# Patient Record
Sex: FEMALE | Race: ASIAN | NOT HISPANIC OR LATINO
[De-identification: names, ages, dates, MRNs, and addresses within clinical notes are randomized per-mention and may not be internally consistent; named-entity substitution may affect disease eponyms.]

---

## 2021-09-29 VITALS
HEIGHT: 62 IN | TEMPERATURE: 97.3 F | WEIGHT: 156 LBS | OXYGEN SATURATION: 98 % | BODY MASS INDEX: 28.71 KG/M2 | HEART RATE: 94 BPM | RESPIRATION RATE: 12 BRPM | SYSTOLIC BLOOD PRESSURE: 136 MMHG | DIASTOLIC BLOOD PRESSURE: 72 MMHG

## 2023-04-27 ENCOUNTER — NON-APPOINTMENT (OUTPATIENT)
Age: 63
End: 2023-04-27

## 2023-04-27 DIAGNOSIS — Z87.2 PERSONAL HISTORY OF DISEASES OF THE SKIN AND SUBCUTANEOUS TISSUE: ICD-10-CM

## 2023-04-27 DIAGNOSIS — Z86.2 PERSONAL HISTORY OF DISEASES OF THE BLOOD AND BLOOD-FORMING ORGANS AND CERTAIN DISORDERS INVOLVING THE IMMUNE MECHANISM: ICD-10-CM

## 2023-04-27 DIAGNOSIS — Z83.3 FAMILY HISTORY OF DIABETES MELLITUS: ICD-10-CM

## 2023-10-16 ENCOUNTER — INPATIENT (INPATIENT)
Facility: HOSPITAL | Age: 63
LOS: 3 days | Discharge: HOME HEALTH | End: 2023-10-20
Attending: STUDENT IN AN ORGANIZED HEALTH CARE EDUCATION/TRAINING PROGRAM | Admitting: STUDENT IN AN ORGANIZED HEALTH CARE EDUCATION/TRAINING PROGRAM
Payer: MEDICAID

## 2023-10-16 VITALS
TEMPERATURE: 98 F | RESPIRATION RATE: 18 BRPM | HEART RATE: 100 BPM | DIASTOLIC BLOOD PRESSURE: 73 MMHG | OXYGEN SATURATION: 100 % | SYSTOLIC BLOOD PRESSURE: 124 MMHG

## 2023-10-16 LAB
ALBUMIN SERPL ELPH-MCNC: 3.2 G/DL — LOW (ref 3.3–5)
ALBUMIN SERPL ELPH-MCNC: 3.2 G/DL — LOW (ref 3.3–5)
ALP SERPL-CCNC: 88 U/L — SIGNIFICANT CHANGE UP (ref 40–120)
ALP SERPL-CCNC: 88 U/L — SIGNIFICANT CHANGE UP (ref 40–120)
ALT FLD-CCNC: 14 U/L — SIGNIFICANT CHANGE UP (ref 4–33)
ALT FLD-CCNC: 14 U/L — SIGNIFICANT CHANGE UP (ref 4–33)
ANION GAP SERPL CALC-SCNC: 19 MMOL/L — HIGH (ref 7–14)
ANION GAP SERPL CALC-SCNC: 19 MMOL/L — HIGH (ref 7–14)
AST SERPL-CCNC: 18 U/L — SIGNIFICANT CHANGE UP (ref 4–32)
AST SERPL-CCNC: 18 U/L — SIGNIFICANT CHANGE UP (ref 4–32)
BASOPHILS # BLD AUTO: 0.03 K/UL — SIGNIFICANT CHANGE UP (ref 0–0.2)
BASOPHILS # BLD AUTO: 0.03 K/UL — SIGNIFICANT CHANGE UP (ref 0–0.2)
BASOPHILS NFR BLD AUTO: 0.2 % — SIGNIFICANT CHANGE UP (ref 0–2)
BASOPHILS NFR BLD AUTO: 0.2 % — SIGNIFICANT CHANGE UP (ref 0–2)
BILIRUB SERPL-MCNC: 0.2 MG/DL — SIGNIFICANT CHANGE UP (ref 0.2–1.2)
BILIRUB SERPL-MCNC: 0.2 MG/DL — SIGNIFICANT CHANGE UP (ref 0.2–1.2)
BUN SERPL-MCNC: 76 MG/DL — HIGH (ref 7–23)
BUN SERPL-MCNC: 76 MG/DL — HIGH (ref 7–23)
CALCIUM SERPL-MCNC: 9.2 MG/DL — SIGNIFICANT CHANGE UP (ref 8.4–10.5)
CALCIUM SERPL-MCNC: 9.2 MG/DL — SIGNIFICANT CHANGE UP (ref 8.4–10.5)
CHLORIDE SERPL-SCNC: 102 MMOL/L — SIGNIFICANT CHANGE UP (ref 98–107)
CHLORIDE SERPL-SCNC: 102 MMOL/L — SIGNIFICANT CHANGE UP (ref 98–107)
CO2 SERPL-SCNC: 16 MMOL/L — LOW (ref 22–31)
CO2 SERPL-SCNC: 16 MMOL/L — LOW (ref 22–31)
CREAT SERPL-MCNC: 5.36 MG/DL — HIGH (ref 0.5–1.3)
CREAT SERPL-MCNC: 5.36 MG/DL — HIGH (ref 0.5–1.3)
EGFR: 8 ML/MIN/1.73M2 — LOW
EGFR: 8 ML/MIN/1.73M2 — LOW
EOSINOPHIL # BLD AUTO: 0.25 K/UL — SIGNIFICANT CHANGE UP (ref 0–0.5)
EOSINOPHIL # BLD AUTO: 0.25 K/UL — SIGNIFICANT CHANGE UP (ref 0–0.5)
EOSINOPHIL NFR BLD AUTO: 1.6 % — SIGNIFICANT CHANGE UP (ref 0–6)
EOSINOPHIL NFR BLD AUTO: 1.6 % — SIGNIFICANT CHANGE UP (ref 0–6)
GLUCOSE SERPL-MCNC: 147 MG/DL — HIGH (ref 70–99)
GLUCOSE SERPL-MCNC: 147 MG/DL — HIGH (ref 70–99)
HCT VFR BLD CALC: 30.2 % — LOW (ref 34.5–45)
HCT VFR BLD CALC: 30.2 % — LOW (ref 34.5–45)
HGB BLD-MCNC: 9.7 G/DL — LOW (ref 11.5–15.5)
HGB BLD-MCNC: 9.7 G/DL — LOW (ref 11.5–15.5)
IANC: 11.55 K/UL — HIGH (ref 1.8–7.4)
IANC: 11.55 K/UL — HIGH (ref 1.8–7.4)
IMM GRANULOCYTES NFR BLD AUTO: 0.5 % — SIGNIFICANT CHANGE UP (ref 0–0.9)
IMM GRANULOCYTES NFR BLD AUTO: 0.5 % — SIGNIFICANT CHANGE UP (ref 0–0.9)
LIDOCAIN IGE QN: 113 U/L — HIGH (ref 7–60)
LIDOCAIN IGE QN: 113 U/L — HIGH (ref 7–60)
LYMPHOCYTES # BLD AUTO: 15 % — SIGNIFICANT CHANGE UP (ref 13–44)
LYMPHOCYTES # BLD AUTO: 15 % — SIGNIFICANT CHANGE UP (ref 13–44)
LYMPHOCYTES # BLD AUTO: 2.33 K/UL — SIGNIFICANT CHANGE UP (ref 1–3.3)
LYMPHOCYTES # BLD AUTO: 2.33 K/UL — SIGNIFICANT CHANGE UP (ref 1–3.3)
MAGNESIUM SERPL-MCNC: 2.7 MG/DL — HIGH (ref 1.6–2.6)
MAGNESIUM SERPL-MCNC: 2.7 MG/DL — HIGH (ref 1.6–2.6)
MCHC RBC-ENTMCNC: 28.9 PG — SIGNIFICANT CHANGE UP (ref 27–34)
MCHC RBC-ENTMCNC: 28.9 PG — SIGNIFICANT CHANGE UP (ref 27–34)
MCHC RBC-ENTMCNC: 32.1 GM/DL — SIGNIFICANT CHANGE UP (ref 32–36)
MCHC RBC-ENTMCNC: 32.1 GM/DL — SIGNIFICANT CHANGE UP (ref 32–36)
MCV RBC AUTO: 89.9 FL — SIGNIFICANT CHANGE UP (ref 80–100)
MCV RBC AUTO: 89.9 FL — SIGNIFICANT CHANGE UP (ref 80–100)
MONOCYTES # BLD AUTO: 1.28 K/UL — HIGH (ref 0–0.9)
MONOCYTES # BLD AUTO: 1.28 K/UL — HIGH (ref 0–0.9)
MONOCYTES NFR BLD AUTO: 8.3 % — SIGNIFICANT CHANGE UP (ref 2–14)
MONOCYTES NFR BLD AUTO: 8.3 % — SIGNIFICANT CHANGE UP (ref 2–14)
NEUTROPHILS # BLD AUTO: 11.55 K/UL — HIGH (ref 1.8–7.4)
NEUTROPHILS # BLD AUTO: 11.55 K/UL — HIGH (ref 1.8–7.4)
NEUTROPHILS NFR BLD AUTO: 74.4 % — SIGNIFICANT CHANGE UP (ref 43–77)
NEUTROPHILS NFR BLD AUTO: 74.4 % — SIGNIFICANT CHANGE UP (ref 43–77)
NRBC # BLD: 0 /100 WBCS — SIGNIFICANT CHANGE UP (ref 0–0)
NRBC # BLD: 0 /100 WBCS — SIGNIFICANT CHANGE UP (ref 0–0)
NRBC # FLD: 0 K/UL — SIGNIFICANT CHANGE UP (ref 0–0)
NRBC # FLD: 0 K/UL — SIGNIFICANT CHANGE UP (ref 0–0)
PLATELET # BLD AUTO: 385 K/UL — SIGNIFICANT CHANGE UP (ref 150–400)
PLATELET # BLD AUTO: 385 K/UL — SIGNIFICANT CHANGE UP (ref 150–400)
POTASSIUM SERPL-MCNC: 4.8 MMOL/L — SIGNIFICANT CHANGE UP (ref 3.5–5.3)
POTASSIUM SERPL-MCNC: 4.8 MMOL/L — SIGNIFICANT CHANGE UP (ref 3.5–5.3)
POTASSIUM SERPL-SCNC: 4.8 MMOL/L — SIGNIFICANT CHANGE UP (ref 3.5–5.3)
POTASSIUM SERPL-SCNC: 4.8 MMOL/L — SIGNIFICANT CHANGE UP (ref 3.5–5.3)
PROT SERPL-MCNC: 7.9 G/DL — SIGNIFICANT CHANGE UP (ref 6–8.3)
PROT SERPL-MCNC: 7.9 G/DL — SIGNIFICANT CHANGE UP (ref 6–8.3)
RBC # BLD: 3.36 M/UL — LOW (ref 3.8–5.2)
RBC # BLD: 3.36 M/UL — LOW (ref 3.8–5.2)
RBC # FLD: 12.5 % — SIGNIFICANT CHANGE UP (ref 10.3–14.5)
RBC # FLD: 12.5 % — SIGNIFICANT CHANGE UP (ref 10.3–14.5)
SODIUM SERPL-SCNC: 137 MMOL/L — SIGNIFICANT CHANGE UP (ref 135–145)
SODIUM SERPL-SCNC: 137 MMOL/L — SIGNIFICANT CHANGE UP (ref 135–145)
WBC # BLD: 15.51 K/UL — HIGH (ref 3.8–10.5)
WBC # BLD: 15.51 K/UL — HIGH (ref 3.8–10.5)
WBC # FLD AUTO: 15.51 K/UL — HIGH (ref 3.8–10.5)
WBC # FLD AUTO: 15.51 K/UL — HIGH (ref 3.8–10.5)

## 2023-10-16 PROCEDURE — 99285 EMERGENCY DEPT VISIT HI MDM: CPT

## 2023-10-16 RX ORDER — CEFTRIAXONE 500 MG/1
1000 INJECTION, POWDER, FOR SOLUTION INTRAMUSCULAR; INTRAVENOUS ONCE
Refills: 0 | Status: COMPLETED | OUTPATIENT
Start: 2023-10-16 | End: 2023-10-16

## 2023-10-16 RX ORDER — ACETAMINOPHEN 500 MG
975 TABLET ORAL ONCE
Refills: 0 | Status: COMPLETED | OUTPATIENT
Start: 2023-10-16 | End: 2023-10-16

## 2023-10-16 RX ORDER — SODIUM CHLORIDE 9 MG/ML
500 INJECTION INTRAMUSCULAR; INTRAVENOUS; SUBCUTANEOUS ONCE
Refills: 0 | Status: COMPLETED | OUTPATIENT
Start: 2023-10-16 | End: 2023-10-16

## 2023-10-16 RX ADMIN — Medication 975 MILLIGRAM(S): at 22:18

## 2023-10-16 RX ADMIN — SODIUM CHLORIDE 500 MILLILITER(S): 9 INJECTION INTRAMUSCULAR; INTRAVENOUS; SUBCUTANEOUS at 22:18

## 2023-10-16 NOTE — ED PROVIDER NOTE - CLINICAL SUMMARY MEDICAL DECISION MAKING FREE TEXT BOX
63-year-old female with history of hypertension, hyperlipidemia, diabetes, CKD with baseline creatinine of 1.1 and GFR of 11 comes into the ED for evaluation of increased weakness over the past 2 to 3 days with associated urinary symptoms and some abd pain. Will assess for anemia, electrolyte abnormalities, UA, treat with tylenol. Dispo pending reeval and work up. 63-year-old female with history of hypertension, hyperlipidemia, diabetes, CKD with baseline creatinine of 4.1 and GFR of 11 comes into the ED for evaluation of increased weakness over the past 2 to 3 days with associated urinary symptoms and some abd pain. Will assess for anemia, electrolyte abnormalities, UA, treat with tylenol. Dispo pending reeval and work up.

## 2023-10-16 NOTE — ED ADULT TRIAGE NOTE - CHIEF COMPLAINT QUOTE
Pt c/o burning on urination and fevers at home. Daughter states she has been weak over the last few days and had difficulty walking.MD Leroy called for stroke eval, no code stroke activated. Pt c/o burning on urination and fevers at home. Daughter states she has been weak over the last few days and had difficulty walking since 1400. States she had to help patient to bed to avoid falling to ground. Denies syncope. MD Leroy called for stroke eval, no code stroke activated. Hx DM, CKD, HTN, HLD

## 2023-10-16 NOTE — ED PROVIDER NOTE - PHYSICAL EXAMINATION
GENERAL: Not in acute distress, non-toxic appearing  HEAD: normocephalic, atraumatic  HEENT: PERRLA, EOMI, normal conjunctiva, oral mucosa moist, neck supple  CARDIAC: regular rate and rhythm, normal S1 and S2,  no appreciable murmurs  PULM: clear to ascultation bilaterally, no crackles, rales, rhonchi, or wheezing  GI: abdomen nondistended, soft, + mild suprapubic tenderness, no guarding or rebound tenderness  NEURO: alert and oriented x 3, normal speech, 5/5 strength in all 4 extremities, normal sensation in all 4 extremities, no gross neurologic deficit  MSK: No visible deformities, no peripheral edema, calf tenderness/redness/swelling  SKIN: No visible rashes, dry, well-perfused  PSYCH: appropriate mood and affect

## 2023-10-16 NOTE — ED PROVIDER NOTE - PROGRESS NOTE DETAILS
Antoine Lott PGY2:  Bedside US, no hydronephrosis noted bilat. Bladder does not appear consistent with urinary retention.

## 2023-10-16 NOTE — ED ADULT NURSE NOTE - NSFALLRISKINTERV_ED_ALL_ED
Assistance OOB with selected safe patient handling equipment if applicable/Assistance with ambulation/Communicate fall risk and risk factors to all staff, patient, and family/Monitor gait and stability/Provide visual cue: yellow wristband, yellow gown, etc/Reinforce activity limits and safety measures with patient and family/Call bell, personal items and telephone in reach/Instruct patient to call for assistance before getting out of bed/chair/stretcher/Non-slip footwear applied when patient is off stretcher/[place] to call system/Physically safe environment - no spills, clutter or unnecessary equipment/Purposeful Proactive Rounding/Room/bathroom lighting operational, light cord in reach

## 2023-10-16 NOTE — ED ADULT NURSE NOTE - OBJECTIVE STATEMENT
Dipti RN: Pt received in 7a. Pt alert and oriented x 4. Hx of DM, CKD, HTN, HLD. Pt c/o fever, burning on urination, and weakness that began a few days ago. Family at bedside reports pt having difficulty walking since earlier today and helped pt to the bed to avoid falling to the ground. Pt reports dysuria and constipation, last BM yesterday. Respirations even and unlabored. Abdomen soft, round, nondistended, nontender. No edema x 4 extremities. Pt denies chest pain, shortness of breath, N/V/D, headache, dizziness, blurry vision, dark stools. 20G IV in the right hand, labs drawn and pt medicated per MD orders. Bed in lowest position, safety maintained.

## 2023-10-16 NOTE — ED PROVIDER NOTE - OBJECTIVE STATEMENT
63-year-old female with history of hypertension, hyperlipidemia, diabetes, CKD with baseline creatinine of 1.1 and GFR of 11 comes into the ED for evaluation of increased weakness over the past 2 to 3 days with associated urinary symptoms and some suprapubic tenderness.  Patient is accompanied by her daughter who is translating.  She reports over the past day patient's been having increased weakness not able to walk as far as usual and had more difficulty getting out of bed.  She denies any change in vision, headaches, chest pain, shortness of breath, nausea, vomiting, lower extremity swelling or pain. 63-year-old female with history of hypertension, hyperlipidemia, diabetes, CKD with baseline creatinine of 4.1 and GFR of 11 comes into the ED for evaluation of increased weakness over the past 2 to 3 days with associated urinary symptoms and some suprapubic tenderness.  Patient is accompanied by her daughter who is translating.  She reports over the past day patient's been having increased weakness not able to walk as far as usual and had more difficulty getting out of bed.  She denies any change in vision, headaches, chest pain, shortness of breath, nausea, vomiting, lower extremity swelling or pain.

## 2023-10-16 NOTE — ED ADULT NURSE NOTE - CHIEF COMPLAINT QUOTE
Pt c/o burning on urination and fevers at home. Daughter states she has been weak over the last few days and had difficulty walking since 1400. States she had to help patient to bed to avoid falling to ground. Denies syncope. MD Leroy called for stroke eval, no code stroke activated. Hx DM, CKD, HTN, HLD

## 2023-10-16 NOTE — ED PROVIDER NOTE - ATTENDING CONTRIBUTION TO CARE
63-year-old female with history of diabetes CKD hypertension hyperlipidemia presents with generalized weakness fevers and urinary symptoms for last couple days.  Patient has urinary frequency and dysuria also having trouble urinating.  Patient has had UTI in the past and feels similar.  Patient fevers have been up to 101.  Patient is well-appearing vitals are within normal limits abdomen is nontender neuruo intact we will check labs UA cultures and start as antibiotic

## 2023-10-17 DIAGNOSIS — N39.0 URINARY TRACT INFECTION, SITE NOT SPECIFIED: ICD-10-CM

## 2023-10-17 DIAGNOSIS — Z29.9 ENCOUNTER FOR PROPHYLACTIC MEASURES, UNSPECIFIED: ICD-10-CM

## 2023-10-17 DIAGNOSIS — N18.4 CHRONIC KIDNEY DISEASE, STAGE 4 (SEVERE): ICD-10-CM

## 2023-10-17 DIAGNOSIS — E78.5 HYPERLIPIDEMIA, UNSPECIFIED: ICD-10-CM

## 2023-10-17 DIAGNOSIS — R53.1 WEAKNESS: ICD-10-CM

## 2023-10-17 DIAGNOSIS — E11.9 TYPE 2 DIABETES MELLITUS WITHOUT COMPLICATIONS: ICD-10-CM

## 2023-10-17 DIAGNOSIS — I10 ESSENTIAL (PRIMARY) HYPERTENSION: ICD-10-CM

## 2023-10-17 LAB
ANION GAP SERPL CALC-SCNC: 16 MMOL/L — HIGH (ref 7–14)
ANION GAP SERPL CALC-SCNC: 16 MMOL/L — HIGH (ref 7–14)
APPEARANCE UR: CLEAR — SIGNIFICANT CHANGE UP
APPEARANCE UR: CLEAR — SIGNIFICANT CHANGE UP
BACTERIA # UR AUTO: ABNORMAL /HPF
BACTERIA # UR AUTO: ABNORMAL /HPF
BASE EXCESS BLDV CALC-SCNC: -5.3 MMOL/L — LOW (ref -2–3)
BASE EXCESS BLDV CALC-SCNC: -5.3 MMOL/L — LOW (ref -2–3)
BASOPHILS # BLD AUTO: 0.04 K/UL — SIGNIFICANT CHANGE UP (ref 0–0.2)
BASOPHILS # BLD AUTO: 0.04 K/UL — SIGNIFICANT CHANGE UP (ref 0–0.2)
BASOPHILS NFR BLD AUTO: 0.3 % — SIGNIFICANT CHANGE UP (ref 0–2)
BASOPHILS NFR BLD AUTO: 0.3 % — SIGNIFICANT CHANGE UP (ref 0–2)
BILIRUB UR-MCNC: NEGATIVE — SIGNIFICANT CHANGE UP
BILIRUB UR-MCNC: NEGATIVE — SIGNIFICANT CHANGE UP
BLOOD GAS VENOUS COMPREHENSIVE RESULT: SIGNIFICANT CHANGE UP
BLOOD GAS VENOUS COMPREHENSIVE RESULT: SIGNIFICANT CHANGE UP
BUN SERPL-MCNC: 74 MG/DL — HIGH (ref 7–23)
BUN SERPL-MCNC: 74 MG/DL — HIGH (ref 7–23)
CALCIUM SERPL-MCNC: 9.5 MG/DL — SIGNIFICANT CHANGE UP (ref 8.4–10.5)
CALCIUM SERPL-MCNC: 9.5 MG/DL — SIGNIFICANT CHANGE UP (ref 8.4–10.5)
CAST: 2 /LPF — SIGNIFICANT CHANGE UP (ref 0–4)
CAST: 2 /LPF — SIGNIFICANT CHANGE UP (ref 0–4)
CHLORIDE BLDV-SCNC: 103 MMOL/L — SIGNIFICANT CHANGE UP (ref 96–108)
CHLORIDE BLDV-SCNC: 103 MMOL/L — SIGNIFICANT CHANGE UP (ref 96–108)
CHLORIDE SERPL-SCNC: 102 MMOL/L — SIGNIFICANT CHANGE UP (ref 98–107)
CHLORIDE SERPL-SCNC: 102 MMOL/L — SIGNIFICANT CHANGE UP (ref 98–107)
CO2 BLDV-SCNC: 21.1 MMOL/L — LOW (ref 22–26)
CO2 BLDV-SCNC: 21.1 MMOL/L — LOW (ref 22–26)
CO2 SERPL-SCNC: 20 MMOL/L — LOW (ref 22–31)
CO2 SERPL-SCNC: 20 MMOL/L — LOW (ref 22–31)
COLOR SPEC: YELLOW — SIGNIFICANT CHANGE UP
COLOR SPEC: YELLOW — SIGNIFICANT CHANGE UP
CREAT ?TM UR-MCNC: 37 MG/DL — SIGNIFICANT CHANGE UP
CREAT ?TM UR-MCNC: 37 MG/DL — SIGNIFICANT CHANGE UP
CREAT SERPL-MCNC: 5.13 MG/DL — HIGH (ref 0.5–1.3)
CREAT SERPL-MCNC: 5.13 MG/DL — HIGH (ref 0.5–1.3)
DIFF PNL FLD: NEGATIVE — SIGNIFICANT CHANGE UP
DIFF PNL FLD: NEGATIVE — SIGNIFICANT CHANGE UP
EGFR: 9 ML/MIN/1.73M2 — LOW
EGFR: 9 ML/MIN/1.73M2 — LOW
EOSINOPHIL # BLD AUTO: 0.22 K/UL — SIGNIFICANT CHANGE UP (ref 0–0.5)
EOSINOPHIL # BLD AUTO: 0.22 K/UL — SIGNIFICANT CHANGE UP (ref 0–0.5)
EOSINOPHIL NFR BLD AUTO: 1.8 % — SIGNIFICANT CHANGE UP (ref 0–6)
EOSINOPHIL NFR BLD AUTO: 1.8 % — SIGNIFICANT CHANGE UP (ref 0–6)
FINE GRAN CASTS #/AREA URNS AUTO: PRESENT
FINE GRAN CASTS #/AREA URNS AUTO: PRESENT
GAS PNL BLDV: 133 MMOL/L — LOW (ref 136–145)
GAS PNL BLDV: 133 MMOL/L — LOW (ref 136–145)
GAS PNL BLDV: SIGNIFICANT CHANGE UP
GAS PNL BLDV: SIGNIFICANT CHANGE UP
GLUCOSE BLDV-MCNC: 160 MG/DL — HIGH (ref 70–99)
GLUCOSE BLDV-MCNC: 160 MG/DL — HIGH (ref 70–99)
GLUCOSE SERPL-MCNC: 201 MG/DL — HIGH (ref 70–99)
GLUCOSE SERPL-MCNC: 201 MG/DL — HIGH (ref 70–99)
GLUCOSE UR QL: 250 MG/DL
GLUCOSE UR QL: 250 MG/DL
HCO3 BLDV-SCNC: 20 MMOL/L — LOW (ref 22–29)
HCO3 BLDV-SCNC: 20 MMOL/L — LOW (ref 22–29)
HCT VFR BLD CALC: 32.9 % — LOW (ref 34.5–45)
HCT VFR BLD CALC: 32.9 % — LOW (ref 34.5–45)
HCT VFR BLDA CALC: 32 % — LOW (ref 34.5–46.5)
HCT VFR BLDA CALC: 32 % — LOW (ref 34.5–46.5)
HGB BLD CALC-MCNC: 10.7 G/DL — LOW (ref 11.7–16.1)
HGB BLD CALC-MCNC: 10.7 G/DL — LOW (ref 11.7–16.1)
HGB BLD-MCNC: 10.5 G/DL — LOW (ref 11.5–15.5)
HGB BLD-MCNC: 10.5 G/DL — LOW (ref 11.5–15.5)
IANC: 9.18 K/UL — HIGH (ref 1.8–7.4)
IANC: 9.18 K/UL — HIGH (ref 1.8–7.4)
IMM GRANULOCYTES NFR BLD AUTO: 0.5 % — SIGNIFICANT CHANGE UP (ref 0–0.9)
IMM GRANULOCYTES NFR BLD AUTO: 0.5 % — SIGNIFICANT CHANGE UP (ref 0–0.9)
KETONES UR-MCNC: NEGATIVE MG/DL — SIGNIFICANT CHANGE UP
KETONES UR-MCNC: NEGATIVE MG/DL — SIGNIFICANT CHANGE UP
LACTATE BLDV-MCNC: 1.4 MMOL/L — SIGNIFICANT CHANGE UP (ref 0.5–2)
LACTATE BLDV-MCNC: 1.4 MMOL/L — SIGNIFICANT CHANGE UP (ref 0.5–2)
LEUKOCYTE ESTERASE UR-ACNC: NEGATIVE — SIGNIFICANT CHANGE UP
LEUKOCYTE ESTERASE UR-ACNC: NEGATIVE — SIGNIFICANT CHANGE UP
LYMPHOCYTES # BLD AUTO: 1.96 K/UL — SIGNIFICANT CHANGE UP (ref 1–3.3)
LYMPHOCYTES # BLD AUTO: 1.96 K/UL — SIGNIFICANT CHANGE UP (ref 1–3.3)
LYMPHOCYTES # BLD AUTO: 16 % — SIGNIFICANT CHANGE UP (ref 13–44)
LYMPHOCYTES # BLD AUTO: 16 % — SIGNIFICANT CHANGE UP (ref 13–44)
MAGNESIUM SERPL-MCNC: 2.9 MG/DL — HIGH (ref 1.6–2.6)
MAGNESIUM SERPL-MCNC: 2.9 MG/DL — HIGH (ref 1.6–2.6)
MCHC RBC-ENTMCNC: 28.7 PG — SIGNIFICANT CHANGE UP (ref 27–34)
MCHC RBC-ENTMCNC: 28.7 PG — SIGNIFICANT CHANGE UP (ref 27–34)
MCHC RBC-ENTMCNC: 31.9 GM/DL — LOW (ref 32–36)
MCHC RBC-ENTMCNC: 31.9 GM/DL — LOW (ref 32–36)
MCV RBC AUTO: 89.9 FL — SIGNIFICANT CHANGE UP (ref 80–100)
MCV RBC AUTO: 89.9 FL — SIGNIFICANT CHANGE UP (ref 80–100)
MONOCYTES # BLD AUTO: 0.81 K/UL — SIGNIFICANT CHANGE UP (ref 0–0.9)
MONOCYTES # BLD AUTO: 0.81 K/UL — SIGNIFICANT CHANGE UP (ref 0–0.9)
MONOCYTES NFR BLD AUTO: 6.6 % — SIGNIFICANT CHANGE UP (ref 2–14)
MONOCYTES NFR BLD AUTO: 6.6 % — SIGNIFICANT CHANGE UP (ref 2–14)
NEUTROPHILS # BLD AUTO: 9.18 K/UL — HIGH (ref 1.8–7.4)
NEUTROPHILS # BLD AUTO: 9.18 K/UL — HIGH (ref 1.8–7.4)
NEUTROPHILS NFR BLD AUTO: 74.8 % — SIGNIFICANT CHANGE UP (ref 43–77)
NEUTROPHILS NFR BLD AUTO: 74.8 % — SIGNIFICANT CHANGE UP (ref 43–77)
NITRITE UR-MCNC: NEGATIVE — SIGNIFICANT CHANGE UP
NITRITE UR-MCNC: NEGATIVE — SIGNIFICANT CHANGE UP
NRBC # BLD: 0 /100 WBCS — SIGNIFICANT CHANGE UP (ref 0–0)
NRBC # BLD: 0 /100 WBCS — SIGNIFICANT CHANGE UP (ref 0–0)
NRBC # FLD: 0 K/UL — SIGNIFICANT CHANGE UP (ref 0–0)
NRBC # FLD: 0 K/UL — SIGNIFICANT CHANGE UP (ref 0–0)
PCO2 BLDV: 37 MMHG — LOW (ref 39–52)
PCO2 BLDV: 37 MMHG — LOW (ref 39–52)
PH BLDV: 7.34 — SIGNIFICANT CHANGE UP (ref 7.32–7.43)
PH BLDV: 7.34 — SIGNIFICANT CHANGE UP (ref 7.32–7.43)
PH UR: 6 — SIGNIFICANT CHANGE UP (ref 5–8)
PH UR: 6 — SIGNIFICANT CHANGE UP (ref 5–8)
PHOSPHATE SERPL-MCNC: 5.6 MG/DL — HIGH (ref 2.5–4.5)
PHOSPHATE SERPL-MCNC: 5.6 MG/DL — HIGH (ref 2.5–4.5)
PLATELET # BLD AUTO: 403 K/UL — HIGH (ref 150–400)
PLATELET # BLD AUTO: 403 K/UL — HIGH (ref 150–400)
PO2 BLDV: 88 MMHG — HIGH (ref 25–45)
PO2 BLDV: 88 MMHG — HIGH (ref 25–45)
POTASSIUM BLDV-SCNC: 4.3 MMOL/L — SIGNIFICANT CHANGE UP (ref 3.5–5.1)
POTASSIUM BLDV-SCNC: 4.3 MMOL/L — SIGNIFICANT CHANGE UP (ref 3.5–5.1)
POTASSIUM SERPL-MCNC: 4.6 MMOL/L — SIGNIFICANT CHANGE UP (ref 3.5–5.3)
POTASSIUM SERPL-MCNC: 4.6 MMOL/L — SIGNIFICANT CHANGE UP (ref 3.5–5.3)
POTASSIUM SERPL-SCNC: 4.6 MMOL/L — SIGNIFICANT CHANGE UP (ref 3.5–5.3)
POTASSIUM SERPL-SCNC: 4.6 MMOL/L — SIGNIFICANT CHANGE UP (ref 3.5–5.3)
PROT UR-MCNC: 300 MG/DL
PROT UR-MCNC: 300 MG/DL
RBC # BLD: 3.66 M/UL — LOW (ref 3.8–5.2)
RBC # BLD: 3.66 M/UL — LOW (ref 3.8–5.2)
RBC # FLD: 12.4 % — SIGNIFICANT CHANGE UP (ref 10.3–14.5)
RBC # FLD: 12.4 % — SIGNIFICANT CHANGE UP (ref 10.3–14.5)
RBC CASTS # UR COMP ASSIST: 0 /HPF — SIGNIFICANT CHANGE UP (ref 0–4)
RBC CASTS # UR COMP ASSIST: 0 /HPF — SIGNIFICANT CHANGE UP (ref 0–4)
REVIEW: SIGNIFICANT CHANGE UP
REVIEW: SIGNIFICANT CHANGE UP
SAO2 % BLDV: 96.8 % — HIGH (ref 67–88)
SAO2 % BLDV: 96.8 % — HIGH (ref 67–88)
SODIUM SERPL-SCNC: 138 MMOL/L — SIGNIFICANT CHANGE UP (ref 135–145)
SODIUM SERPL-SCNC: 138 MMOL/L — SIGNIFICANT CHANGE UP (ref 135–145)
SODIUM UR-SCNC: 97 MMOL/L — SIGNIFICANT CHANGE UP
SODIUM UR-SCNC: 97 MMOL/L — SIGNIFICANT CHANGE UP
SP GR SPEC: 1.01 — SIGNIFICANT CHANGE UP (ref 1–1.03)
SP GR SPEC: 1.01 — SIGNIFICANT CHANGE UP (ref 1–1.03)
SQUAMOUS # UR AUTO: 5 /HPF — SIGNIFICANT CHANGE UP (ref 0–5)
SQUAMOUS # UR AUTO: 5 /HPF — SIGNIFICANT CHANGE UP (ref 0–5)
UROBILINOGEN FLD QL: 0.2 MG/DL — SIGNIFICANT CHANGE UP (ref 0.2–1)
UROBILINOGEN FLD QL: 0.2 MG/DL — SIGNIFICANT CHANGE UP (ref 0.2–1)
UUN UR-MCNC: 287 MG/DL — SIGNIFICANT CHANGE UP
UUN UR-MCNC: 287 MG/DL — SIGNIFICANT CHANGE UP
WBC # BLD: 12.27 K/UL — HIGH (ref 3.8–10.5)
WBC # BLD: 12.27 K/UL — HIGH (ref 3.8–10.5)
WBC # FLD AUTO: 12.27 K/UL — HIGH (ref 3.8–10.5)
WBC # FLD AUTO: 12.27 K/UL — HIGH (ref 3.8–10.5)
WBC UR QL: 24 /HPF — HIGH (ref 0–5)
WBC UR QL: 24 /HPF — HIGH (ref 0–5)

## 2023-10-17 PROCEDURE — 76770 US EXAM ABDO BACK WALL COMP: CPT | Mod: 26

## 2023-10-17 PROCEDURE — 99223 1ST HOSP IP/OBS HIGH 75: CPT

## 2023-10-17 RX ORDER — AMLODIPINE BESYLATE 2.5 MG/1
5 TABLET ORAL DAILY
Refills: 0 | Status: DISCONTINUED | OUTPATIENT
Start: 2023-10-17 | End: 2023-10-20

## 2023-10-17 RX ORDER — SODIUM CHLORIDE 9 MG/ML
1000 INJECTION, SOLUTION INTRAVENOUS
Refills: 0 | Status: DISCONTINUED | OUTPATIENT
Start: 2023-10-17 | End: 2023-10-20

## 2023-10-17 RX ORDER — DEXTROSE 50 % IN WATER 50 %
12.5 SYRINGE (ML) INTRAVENOUS ONCE
Refills: 0 | Status: DISCONTINUED | OUTPATIENT
Start: 2023-10-17 | End: 2023-10-20

## 2023-10-17 RX ORDER — INSULIN LISPRO 100/ML
VIAL (ML) SUBCUTANEOUS
Refills: 0 | Status: DISCONTINUED | OUTPATIENT
Start: 2023-10-17 | End: 2023-10-20

## 2023-10-17 RX ORDER — DEXTROSE 50 % IN WATER 50 %
15 SYRINGE (ML) INTRAVENOUS ONCE
Refills: 0 | Status: DISCONTINUED | OUTPATIENT
Start: 2023-10-17 | End: 2023-10-20

## 2023-10-17 RX ORDER — ACETAMINOPHEN 500 MG
650 TABLET ORAL EVERY 6 HOURS
Refills: 0 | Status: DISCONTINUED | OUTPATIENT
Start: 2023-10-17 | End: 2023-10-20

## 2023-10-17 RX ORDER — ATORVASTATIN CALCIUM 80 MG/1
10 TABLET, FILM COATED ORAL AT BEDTIME
Refills: 0 | Status: DISCONTINUED | OUTPATIENT
Start: 2023-10-17 | End: 2023-10-20

## 2023-10-17 RX ORDER — CEFTRIAXONE 500 MG/1
1000 INJECTION, POWDER, FOR SOLUTION INTRAMUSCULAR; INTRAVENOUS EVERY 24 HOURS
Refills: 0 | Status: DISCONTINUED | OUTPATIENT
Start: 2023-10-18 | End: 2023-10-19

## 2023-10-17 RX ORDER — DEXTROSE 50 % IN WATER 50 %
25 SYRINGE (ML) INTRAVENOUS ONCE
Refills: 0 | Status: DISCONTINUED | OUTPATIENT
Start: 2023-10-17 | End: 2023-10-20

## 2023-10-17 RX ORDER — SODIUM CHLORIDE 9 MG/ML
500 INJECTION, SOLUTION INTRAVENOUS
Refills: 0 | Status: DISCONTINUED | OUTPATIENT
Start: 2023-10-17 | End: 2023-10-20

## 2023-10-17 RX ORDER — SODIUM BICARBONATE 1 MEQ/ML
1300 SYRINGE (ML) INTRAVENOUS
Refills: 0 | Status: DISCONTINUED | OUTPATIENT
Start: 2023-10-17 | End: 2023-10-18

## 2023-10-17 RX ORDER — HEPARIN SODIUM 5000 [USP'U]/ML
5000 INJECTION INTRAVENOUS; SUBCUTANEOUS EVERY 8 HOURS
Refills: 0 | Status: DISCONTINUED | OUTPATIENT
Start: 2023-10-17 | End: 2023-10-20

## 2023-10-17 RX ORDER — INSULIN LISPRO 100/ML
VIAL (ML) SUBCUTANEOUS AT BEDTIME
Refills: 0 | Status: DISCONTINUED | OUTPATIENT
Start: 2023-10-17 | End: 2023-10-20

## 2023-10-17 RX ORDER — ONDANSETRON 8 MG/1
4 TABLET, FILM COATED ORAL EVERY 8 HOURS
Refills: 0 | Status: DISCONTINUED | OUTPATIENT
Start: 2023-10-17 | End: 2023-10-20

## 2023-10-17 RX ORDER — GLUCAGON INJECTION, SOLUTION 0.5 MG/.1ML
1 INJECTION, SOLUTION SUBCUTANEOUS ONCE
Refills: 0 | Status: DISCONTINUED | OUTPATIENT
Start: 2023-10-17 | End: 2023-10-20

## 2023-10-17 RX ORDER — LANOLIN ALCOHOL/MO/W.PET/CERES
3 CREAM (GRAM) TOPICAL AT BEDTIME
Refills: 0 | Status: DISCONTINUED | OUTPATIENT
Start: 2023-10-17 | End: 2023-10-20

## 2023-10-17 RX ADMIN — Medication 1300 MILLIGRAM(S): at 18:53

## 2023-10-17 RX ADMIN — Medication 2: at 17:36

## 2023-10-17 RX ADMIN — ATORVASTATIN CALCIUM 10 MILLIGRAM(S): 80 TABLET, FILM COATED ORAL at 23:19

## 2023-10-17 RX ADMIN — CEFTRIAXONE 100 MILLIGRAM(S): 500 INJECTION, POWDER, FOR SOLUTION INTRAMUSCULAR; INTRAVENOUS at 00:15

## 2023-10-17 RX ADMIN — SODIUM CHLORIDE 100 MILLILITER(S): 9 INJECTION, SOLUTION INTRAVENOUS at 15:37

## 2023-10-17 RX ADMIN — HEPARIN SODIUM 5000 UNIT(S): 5000 INJECTION INTRAVENOUS; SUBCUTANEOUS at 23:18

## 2023-10-17 NOTE — H&P ADULT - PROBLEM SELECTOR PLAN 1
-pt with sxs of dysuria, positive u/a, leukocytosis. Pt's daughter reports fevers at home; no fevers here. Monitor fever curve  -given no hx of recurrent UTI, will treat with CTX  -f/u urine and blood cx

## 2023-10-17 NOTE — H&P ADULT - PROBLEM SELECTOR PLAN 2
-reported worse kidney function: Cr 3 in January 2023, 4.17 in early October 2023, now Cr of 5.36  -BUN 70, bicarb of 16. Will initiate sodium bicarb tablets 1300 mg BID  -appears fairly dry on exam; wonder if recent overdiuresis with 40 mg PO Lasix BID  -hold Lasix and lisinopril  -will give 500 cc bolus  -nephrology c/s  -f/u daily BMP  -f/u TTE -reported worse kidney function: Cr 3 in January 2023, 4.17 in early October 2023, now Cr of 5.36  -BUN 70, bicarb of 16. Will initiate sodium bicarb tablets 1300 mg BID  -appears fairly dry on exam; wonder if recent overdiuresis with 40 mg PO Lasix BID  -hold Lasix and lisinopril  -f/u urine studies  -US kidneys/bladder without hydronephrosis  -will give 500 cc bolus  -nephrology c/s  -f/u daily BMP  -f/u TTE -reported worse kidney function: Cr 3 in January 2023, 4.17 in early October 2023, now Cr of 5.36  -BUN 70, bicarb of 16. Will initiate sodium bicarb tablets 1300 mg BID  -appears fairly dry on exam; wonder if recent overdiuresis with 40 mg PO Lasix BID  -hold Lasix and lisinopril  -f/u urine studies  -US kidneys/bladder without hydronephrosis  -will give 500 cc bolus  -follows nephrology at [facility]; will need house nephrology c/s  -f/u daily BMP  -f/u TTE -reported worse kidney function: Cr 3 in January 2023, 4.17 in early October 2023, now Cr of 5.36  -BUN 70, bicarb of 16. Will initiate sodium bicarb tablets 1300 mg BID  -appears fairly dry on exam; wonder if recent overdiuresis with 40 mg PO Lasix BID  -hold Lasix and lisinopril  -US kidneys/bladder without hydronephrosis  -will give 500 cc bolus. FeUrea consistent with pre-renal etiology  -follows nephrology at [facility]; will need house nephrology c/s  -f/u daily BMP  -f/u TTE

## 2023-10-17 NOTE — H&P ADULT - NSICDXPASTMEDICALHX_GEN_ALL_CORE_FT
PAST MEDICAL HISTORY:  HLD (hyperlipidemia)     HTN (hypertension)     Stage 4 chronic kidney disease     Type 2 diabetes mellitus

## 2023-10-17 NOTE — H&P ADULT - PROBLEM SELECTOR PLAN 3
-pt on multiple anti-diabetic agents at home: Victoza, Invokana, repaglinide, as well as Lantus 35 mg qam and 30 mg qhs. However, while admitted, pt has maintained BG <150  -hold lantus; will initiate pt on low dose corrective scale  -f/u hgb a1c

## 2023-10-17 NOTE — H&P ADULT - TIME BILLING
Review of laboratory data, radiology results, consultants' recommendations, documentation in [place], discussion with patient/house staff and interdisciplinary staff (such as , social workers, etc). Interventions were performed as documented above.

## 2023-10-17 NOTE — PATIENT PROFILE ADULT - FALL HARM RISK - HARM RISK INTERVENTIONS
Assistance with ambulation/Assistance OOB with selected safe patient handling equipment/Communicate Risk of Fall with Harm to all staff/Discuss with provider need for PT consult/Monitor for mental status changes/Monitor gait and stability/Provide patient with walking aids - walker, cane, crutches/Reinforce activity limits and safety measures with patient and family/Review medications for side effects contributing to fall risk/Sit up slowly, dangle for a short time, stand at bedside before walking/Tailored Fall Risk Interventions/Use of alarms - bed, chair and/or voice tab/Visual Cue: Yellow wristband and red socks/Bed in lowest position, wheels locked, appropriate side rails in place/Call bell, personal items and telephone in reach/Instruct patient to call for assistance before getting out of bed or chair/Non-slip footwear when patient is out of bed/[place] to call system/Physically safe environment - no spills, clutter or unnecessary equipment/Purposeful Proactive Rounding/Room/bathroom lighting operational, light cord in reach

## 2023-10-17 NOTE — H&P ADULT - PROBLEM SELECTOR PLAN 6
no
-Diet: consistent carb, DASH  -DVT ppx: sq heparin  -Dispo: pending clinical improvement, PT eval

## 2023-10-17 NOTE — H&P ADULT - ASSESSMENT
63 y.o. F with PMH of T2DM on insulin, CKD 4, HTN, HLD who presents with dysuria. Found to have UTI and NELSON superimposed on CKD 4.

## 2023-10-17 NOTE — H&P ADULT - NSHPLABSRESULTS_GEN_ALL_CORE
.                        9.7    15.51 )-----------( 385      ( 16 Oct 2023 22:15 )             30.2     Hgb Trend: 9.7<--  10-16    137  |  102  |  76<H>  ----------------------------<  147<H>  4.8   |  16<L>  |  5.36<H>    Ca    9.2      16 Oct 2023 22:15  Mg     2.70     10-    TPro  7.9  /  Alb  3.2<L>  /  TBili  0.2  /  DBili  x   /  AST  18  /  ALT  14  /  AlkPhos  88  10-    Creatinine Trend: 5.36<--    Urinalysis Basic - ( 17 Oct 2023 00:08 )    Color: Yellow / Appearance: Clear / S.012 / pH: x  Gluc: x / Ketone: Negative mg/dL  / Bili: Negative / Urobili: 0.2 mg/dL   Blood: x / Protein: 300 mg/dL / Nitrite: Negative   Leuk Esterase: Negative / RBC: 0 /HPF / WBC 24 /HPF   Sq Epi: x / Non Sq Epi: 5 /HPF / Bacteria: Many /HPF        Imaging reviewed personally.

## 2023-10-17 NOTE — ED ADULT NURSE REASSESSMENT NOTE - NS ED NURSE REASSESS COMMENT FT1
Break RN: Pt laying in bed, NAD, respirations even and unlabored. Urine collected and pt medicated per MD orders.

## 2023-10-17 NOTE — H&P ADULT - NSHPPHYSICALEXAM_GEN_ALL_CORE
ICU Vital Signs Last 24 Hrs  T(C): 36.8 (17 Oct 2023 09:03), Max: 37.4 (16 Oct 2023 20:38)  T(F): 98.2 (17 Oct 2023 09:03), Max: 99.3 (16 Oct 2023 20:38)  HR: 96 (17 Oct 2023 09:03) (84 - 100)  BP: 117/66 (17 Oct 2023 09:03) (114/57 - 133/69)  BP(mean): --  ABP: --  ABP(mean): --  RR: 16 (17 Oct 2023 09:03) (15 - 18)  SpO2: 98% (17 Oct 2023 09:03) (98% - 100%)    O2 Parameters below as of 17 Oct 2023 06:00  Patient On (Oxygen Delivery Method): room air      General: WN/WD NAD  Neurology:  nonfocal, ROMO x 4  Eyes: PERRLA/ EOMI, Gross vision intact  ENT/Neck: Neck supple, trachea midline, No JVD, Gross hearing intact  Respiratory: Normal respiratory rate. CTA B/L, No wheezing, rales, rhonchi  CV: RRR, S1S2, no murmurs, rubs or gallops. 1+ LE edema b/l  Abdominal: Soft, NT, ND +BS,   Extremities: , + peripheral pulses  Skin: No Rashes, Hematoma, Ecchymosis

## 2023-10-17 NOTE — H&P ADULT - HISTORY OF PRESENT ILLNESS
63 y.o. F with PMH of T2DM on insulin, CKD 4, HTN, HLD who presents with dysuria. Pt's daughter, Randi, provides hx over the phone. Symptoms began approximately 10 days ago. Additionally, pt has had decreased urination for approximately the same amount of time. Consequently, pt has become more debilitated over the past 4-5 days and is no longer able to ambulate; at baseline, she ambulates with walker. Pt's daughter also reported fever of 101F last week as well as decreased PO. Pt follows with nephrologist at [facility]; her cr was last found to be 4.17 earlier this month. Of note, pt recently returned from [facility] in September; she was away for the past 6 months. In [facility], pt's daughter states she was hospitalized for "swelling." Unaware if pt has hx of CHF. Upon return, pt's nephrologist maintained pt Lasix 40 mg BID; she recently was instructed to reduce frequency to 40 mg daily over the past few days.     In the ED, pt was found to have positive u/a. She was treated with a dose of CTX x 1.

## 2023-10-18 LAB
A1C WITH ESTIMATED AVERAGE GLUCOSE RESULT: 6.6 % — HIGH (ref 4–5.6)
A1C WITH ESTIMATED AVERAGE GLUCOSE RESULT: 6.6 % — HIGH (ref 4–5.6)
ANION GAP SERPL CALC-SCNC: 15 MMOL/L — HIGH (ref 7–14)
ANION GAP SERPL CALC-SCNC: 15 MMOL/L — HIGH (ref 7–14)
BUN SERPL-MCNC: 71 MG/DL — HIGH (ref 7–23)
BUN SERPL-MCNC: 71 MG/DL — HIGH (ref 7–23)
CALCIUM SERPL-MCNC: 9 MG/DL — SIGNIFICANT CHANGE UP (ref 8.4–10.5)
CALCIUM SERPL-MCNC: 9 MG/DL — SIGNIFICANT CHANGE UP (ref 8.4–10.5)
CHLORIDE SERPL-SCNC: 103 MMOL/L — SIGNIFICANT CHANGE UP (ref 98–107)
CHLORIDE SERPL-SCNC: 103 MMOL/L — SIGNIFICANT CHANGE UP (ref 98–107)
CO2 SERPL-SCNC: 20 MMOL/L — LOW (ref 22–31)
CO2 SERPL-SCNC: 20 MMOL/L — LOW (ref 22–31)
CREAT SERPL-MCNC: 4.74 MG/DL — HIGH (ref 0.5–1.3)
CREAT SERPL-MCNC: 4.74 MG/DL — HIGH (ref 0.5–1.3)
EGFR: 10 ML/MIN/1.73M2 — LOW
EGFR: 10 ML/MIN/1.73M2 — LOW
ESTIMATED AVERAGE GLUCOSE: 143 — SIGNIFICANT CHANGE UP
ESTIMATED AVERAGE GLUCOSE: 143 — SIGNIFICANT CHANGE UP
GLUCOSE BLDC GLUCOMTR-MCNC: 199 MG/DL — HIGH (ref 70–99)
GLUCOSE BLDC GLUCOMTR-MCNC: 199 MG/DL — HIGH (ref 70–99)
GLUCOSE BLDC GLUCOMTR-MCNC: 246 MG/DL — HIGH (ref 70–99)
GLUCOSE BLDC GLUCOMTR-MCNC: 246 MG/DL — HIGH (ref 70–99)
GLUCOSE BLDC GLUCOMTR-MCNC: 314 MG/DL — HIGH (ref 70–99)
GLUCOSE BLDC GLUCOMTR-MCNC: 314 MG/DL — HIGH (ref 70–99)
GLUCOSE SERPL-MCNC: 160 MG/DL — HIGH (ref 70–99)
GLUCOSE SERPL-MCNC: 160 MG/DL — HIGH (ref 70–99)
HCT VFR BLD CALC: 32.1 % — LOW (ref 34.5–45)
HCT VFR BLD CALC: 32.1 % — LOW (ref 34.5–45)
HCV AB S/CO SERPL IA: 0.09 S/CO — SIGNIFICANT CHANGE UP (ref 0–0.99)
HCV AB S/CO SERPL IA: 0.09 S/CO — SIGNIFICANT CHANGE UP (ref 0–0.99)
HCV AB SERPL-IMP: SIGNIFICANT CHANGE UP
HCV AB SERPL-IMP: SIGNIFICANT CHANGE UP
HGB BLD-MCNC: 10.5 G/DL — LOW (ref 11.5–15.5)
HGB BLD-MCNC: 10.5 G/DL — LOW (ref 11.5–15.5)
MAGNESIUM SERPL-MCNC: 2.8 MG/DL — HIGH (ref 1.6–2.6)
MAGNESIUM SERPL-MCNC: 2.8 MG/DL — HIGH (ref 1.6–2.6)
MCHC RBC-ENTMCNC: 29.1 PG — SIGNIFICANT CHANGE UP (ref 27–34)
MCHC RBC-ENTMCNC: 29.1 PG — SIGNIFICANT CHANGE UP (ref 27–34)
MCHC RBC-ENTMCNC: 32.7 GM/DL — SIGNIFICANT CHANGE UP (ref 32–36)
MCHC RBC-ENTMCNC: 32.7 GM/DL — SIGNIFICANT CHANGE UP (ref 32–36)
MCV RBC AUTO: 88.9 FL — SIGNIFICANT CHANGE UP (ref 80–100)
MCV RBC AUTO: 88.9 FL — SIGNIFICANT CHANGE UP (ref 80–100)
NRBC # BLD: 0 /100 WBCS — SIGNIFICANT CHANGE UP (ref 0–0)
NRBC # BLD: 0 /100 WBCS — SIGNIFICANT CHANGE UP (ref 0–0)
NRBC # FLD: 0 K/UL — SIGNIFICANT CHANGE UP (ref 0–0)
NRBC # FLD: 0 K/UL — SIGNIFICANT CHANGE UP (ref 0–0)
PHOSPHATE SERPL-MCNC: 6.4 MG/DL — HIGH (ref 2.5–4.5)
PHOSPHATE SERPL-MCNC: 6.4 MG/DL — HIGH (ref 2.5–4.5)
PLATELET # BLD AUTO: 399 K/UL — SIGNIFICANT CHANGE UP (ref 150–400)
PLATELET # BLD AUTO: 399 K/UL — SIGNIFICANT CHANGE UP (ref 150–400)
POTASSIUM SERPL-MCNC: 4.3 MMOL/L — SIGNIFICANT CHANGE UP (ref 3.5–5.3)
POTASSIUM SERPL-MCNC: 4.3 MMOL/L — SIGNIFICANT CHANGE UP (ref 3.5–5.3)
POTASSIUM SERPL-SCNC: 4.3 MMOL/L — SIGNIFICANT CHANGE UP (ref 3.5–5.3)
POTASSIUM SERPL-SCNC: 4.3 MMOL/L — SIGNIFICANT CHANGE UP (ref 3.5–5.3)
RBC # BLD: 3.61 M/UL — LOW (ref 3.8–5.2)
RBC # BLD: 3.61 M/UL — LOW (ref 3.8–5.2)
RBC # FLD: 12.3 % — SIGNIFICANT CHANGE UP (ref 10.3–14.5)
RBC # FLD: 12.3 % — SIGNIFICANT CHANGE UP (ref 10.3–14.5)
SODIUM SERPL-SCNC: 138 MMOL/L — SIGNIFICANT CHANGE UP (ref 135–145)
SODIUM SERPL-SCNC: 138 MMOL/L — SIGNIFICANT CHANGE UP (ref 135–145)
WBC # BLD: 11.14 K/UL — HIGH (ref 3.8–10.5)
WBC # BLD: 11.14 K/UL — HIGH (ref 3.8–10.5)
WBC # FLD AUTO: 11.14 K/UL — HIGH (ref 3.8–10.5)
WBC # FLD AUTO: 11.14 K/UL — HIGH (ref 3.8–10.5)

## 2023-10-18 PROCEDURE — 99232 SBSQ HOSP IP/OBS MODERATE 35: CPT

## 2023-10-18 PROCEDURE — 99254 IP/OBS CNSLTJ NEW/EST MOD 60: CPT | Mod: GC

## 2023-10-18 RX ORDER — SODIUM BICARBONATE 1 MEQ/ML
650 SYRINGE (ML) INTRAVENOUS EVERY 12 HOURS
Refills: 0 | Status: DISCONTINUED | OUTPATIENT
Start: 2023-10-18 | End: 2023-10-20

## 2023-10-18 RX ORDER — SODIUM CHLORIDE 9 MG/ML
1000 INJECTION INTRAMUSCULAR; INTRAVENOUS; SUBCUTANEOUS
Refills: 0 | Status: DISCONTINUED | OUTPATIENT
Start: 2023-10-18 | End: 2023-10-20

## 2023-10-18 RX ADMIN — Medication 650 MILLIGRAM(S): at 17:14

## 2023-10-18 RX ADMIN — ATORVASTATIN CALCIUM 10 MILLIGRAM(S): 80 TABLET, FILM COATED ORAL at 21:37

## 2023-10-18 RX ADMIN — HEPARIN SODIUM 5000 UNIT(S): 5000 INJECTION INTRAVENOUS; SUBCUTANEOUS at 21:34

## 2023-10-18 RX ADMIN — Medication 1: at 08:28

## 2023-10-18 RX ADMIN — CEFTRIAXONE 100 MILLIGRAM(S): 500 INJECTION, POWDER, FOR SOLUTION INTRAMUSCULAR; INTRAVENOUS at 05:46

## 2023-10-18 RX ADMIN — AMLODIPINE BESYLATE 5 MILLIGRAM(S): 2.5 TABLET ORAL at 05:45

## 2023-10-18 RX ADMIN — HEPARIN SODIUM 5000 UNIT(S): 5000 INJECTION INTRAVENOUS; SUBCUTANEOUS at 12:15

## 2023-10-18 RX ADMIN — Medication 1: at 17:15

## 2023-10-18 RX ADMIN — HEPARIN SODIUM 5000 UNIT(S): 5000 INJECTION INTRAVENOUS; SUBCUTANEOUS at 05:45

## 2023-10-18 RX ADMIN — Medication 1300 MILLIGRAM(S): at 05:45

## 2023-10-18 RX ADMIN — Medication 4: at 12:06

## 2023-10-18 NOTE — CONSULT NOTE ADULT - ASSESSMENT
Fartun Powers is a 62yo female with a hx of T2DM, CKD4, HTN, and HLD who presents with dysuria, found to have a UTI. Nephrology is consulted for NELSON on CKD vs CKD progression.    Follows with Dr. Alvarez ([facility] Tessie at [facility]. Creatinine has been steadily increasing from 2.26 (2022) -> 3 (1/2023) -> 4.17 (10/10/2023) -> 5.36 (10/16/2023) on admission. SCr has decreased to 4.74 today 10/18.       RECOMMENDATIONS:  - ***      Plan discussed with attending. Note is incomplete until signed by attending.    __________  Ayan Garcia MD PGY-1 Fartun Powers is a 62yo female with a hx of T2DM, CKD4, HTN, and HLD who presents with dysuria, found to have a UTI. Nephrology is consulted for NELSON on CKD vs CKD progression.    Follows with Dr. Alvarez ([facility]) Tessie at [facility]. Creatinine has been steadily increasing from 2.26 (2022) -> 3 (1/2023) -> 4.17 (10/10/2023) -> 5.36 (10/16/2023) on admission. SCr has decreased to 4.74 today 10/18. Likely NELSON on CKD in the setting of infection and over-diuresis. Responding appropriately to fluids and treatment of UTI.       RECOMMENDATIONS:  - Continue abx for UTI  - Decrease sodium bicarb to 675mg BID  - Trend BMP  - Avoid nephrotoxic drugs  - Renally dose medications  - Follow-up with outpatient nephrologist upon discharge      Plan discussed with attending. Note is incomplete until signed by attending.    __________  Ayan Garcia MD PGY-1 Fartun Powers is a 64yo female with a hx of T2DM, CKD4, HTN, and HLD who presents with dysuria, found to have a UTI. Nephrology is consulted for NELSON on CKD vs CKD progression.    Follows with Dr. Alvarez ([facility]) Tessie at [facility]. Creatinine has been steadily increasing from 2.26 (2022) -> 3 (1/2023) -> 4.17 (10/10/2023) -> 5.36 (10/16/2023) on admission. SCr has decreased to 4.74 today 10/18. Likely NELSON on CKD in the setting of infection and over-diuresis. Responding appropriately to fluids and treatment of UTI.       RECOMMENDATIONS:  - Continue abx for UTI  - Hydrate with NS at 75ml/hr   - Decrease sodium bicarb to 650mg BID  - Trend BMP  - Avoid nephrotoxic drugs  - Renally dose medications  - Follow-up with outpatient nephrologist upon discharge      Plan discussed with attending. Note is incomplete until signed by attending.    __________  Ayan Garcia MD PGY-1

## 2023-10-18 NOTE — PHYSICAL THERAPY INITIAL EVALUATION ADULT - ADDITIONAL COMMENTS
Pt. ambulates with a rolling walker. Pt. requires assistance with functional mobility and ADLs.     Pt. was left seated at edge of bed post PT Evaluation, no apparent distress, call bell within reach. RN made aware of pt. status and participation in PT.

## 2023-10-18 NOTE — PHYSICAL THERAPY INITIAL EVALUATION ADULT - ASSISTIVE DEVICE FOR TRANSFER: SIT/STAND, REHAB EVAL
[place] [facility] [place] [facility]  Patient reports she is feeling anxious today, specifically about her body odor issues. She is waiting for her insurance to switch over to make an appt with a specialist.  Patient also states she is losing weight (4 lbs since last appt) but states her appetite is good. She states she may try to drink more Boost drinks again. Patient also states she still has tingling in her left fingers and hot flashes that are unchanged since last visit. Patient had surgery on 4/7 to look at lymph nodes and states that went well.      Review of Systems   Constitutional: Positive for unexpected weight change.   HENT:  Negative.    Eyes: Negative.    Respiratory: Negative.    Cardiovascular: Negative.    Gastrointestinal: Negative.    Endocrine: Positive for hot flashes.   Genitourinary: Negative.     Musculoskeletal: Negative.    Skin: Negative.    Neurological: Positive for numbness (tingling).   Hematological: Negative.    Psychiatric/Behavioral: The patient is nervous/anxious.      Plan: To get pertuzamab next treatment pending insurance auth. Radiation at [facility] hopefully in May. Consult for endocrinology.    rolling walker

## 2023-10-18 NOTE — PHYSICAL THERAPY INITIAL EVALUATION ADULT - GENERAL OBSERVATIONS, REHAB EVAL
Consult received, chart reviewed. Patient received seated at edge of bed, no apparent distress, daughter present.

## 2023-10-18 NOTE — PROGRESS NOTE ADULT - PROBLEM SELECTOR PLAN 2
-reported worse kidney function: Cr 3 in January 2023, 4.17 in early October 2023, now Cr of 5.36 BUN 70, bicarb of 16. S/p 500cc bolus and maintenance fluids with improvement of Cr. to ~4 today. Pre-renal NELSON. Renal following.   -hold Lasix and lisinopril  -US kidneys/bladder without hydronephrosis  -follows nephrology at [facility]; will need house nephrology c/s  -f/u daily BMP  -f/u TTE

## 2023-10-18 NOTE — PROGRESS NOTE ADULT - SUBJECTIVE AND OBJECTIVE BOX
Hospitalist Progress Note  Aimee Neumann MD Pager # [phone]    OVERNIGHT EVENTS: EDIN     SUBJECTIVE / INTERVAL HPI: Patient seen and examined at bedside. Patient's daughter at bedside who translates and says that she is feeling better and has no complaints. Denies chest pain/SOB. No fevers/chills.     VITAL SIGNS:  Vital Signs Last 24 Hrs  T(C): 36.6 (18 Oct 2023 11:43), Max: 36.8 (17 Oct 2023 20:36)  T(F): 97.9 (18 Oct 2023 11:43), Max: 98.3 (17 Oct 2023 20:36)  HR: 91 (18 Oct 2023 11:43) (88 - 100)  BP: 131/74 (18 Oct 2023 11:43) (124/82 - 146/80)  BP(mean): --  RR: 16 (18 Oct 2023 11:43) (16 - 18)  SpO2: 98% (18 Oct 2023 11:43) (98% - 100%)    Parameters below as of 18 Oct 2023 05:44  Patient On (Oxygen Delivery Method): room air        PHYSICAL EXAM:    General: Comfortable and resting in bed   HEENT: NC/AT; PERRL, anicteric sclera; MMM  Neck: supple  Cardiovascular: +S1/S2; RRR  Respiratory: CTA B/L; no W/R/R  Gastrointestinal: soft, NT/ND; +BSx4  Extremities: WWP; no edema, clubbing or cyanosis  Vascular: 2+ radial, DP/PT pulses B/L  Neurological: AAOx3; no focal deficits    MEDICATIONS:  MEDICATIONS  (STANDING):  amLODIPine   Tablet 5 milliGRAM(s) Oral daily  atorvastatin 10 milliGRAM(s) Oral at bedtime  cefTRIAXone   IVPB 1000 milliGRAM(s) IV Intermittent every 24 hours  dextrose 5%. 1000 milliLiter(s) (100 mL/Hr) IV Continuous <Continuous>  dextrose 5%. 1000 milliLiter(s) (50 mL/Hr) IV Continuous <Continuous>  dextrose 50% Injectable 25 Gram(s) IV Push once  dextrose 50% Injectable 25 Gram(s) IV Push once  dextrose 50% Injectable 12.5 Gram(s) IV Push once  glucagon  Injectable 1 milliGRAM(s) IntraMuscular once  heparin   Injectable 5000 Unit(s) SubCutaneous every 8 hours  insulin lispro (ADMELOG) corrective regimen sliding scale   SubCutaneous at bedtime  insulin lispro (ADMELOG) corrective regimen sliding scale   SubCutaneous three times a day before meals  lactated ringers. 500 milliLiter(s) (100 mL/Hr) IV Continuous <Continuous>  sodium bicarbonate 650 milliGRAM(s) Oral every 12 hours  sodium chloride 0.9%. 1000 milliLiter(s) (75 mL/Hr) IV Continuous <Continuous>    MEDICATIONS  (PRN):  acetaminophen     Tablet .. 650 milliGRAM(s) Oral every 6 hours PRN Temp greater or equal to 38C (100.4F), Mild Pain (1 - 3)  aluminum hydroxide/magnesium hydroxide/simethicone Suspension 30 milliLiter(s) Oral every 4 hours PRN Dyspepsia  dextrose Oral Gel 15 Gram(s) Oral once PRN Blood Glucose LESS THAN 70 milliGRAM(s)/deciliter  melatonin 3 milliGRAM(s) Oral at bedtime PRN Insomnia  ondansetron Injectable 4 milliGRAM(s) IV Push every 8 hours PRN Nausea and/or Vomiting      ALLERGIES:  Allergies    No Known Allergies    Intolerances        LABS:                        10.5   11.14 )-----------( 399      ( 18 Oct 2023 06:18 )             32.1     10-18    138  |  103  |  71<H>  ----------------------------<  160<H>  4.3   |  20<L>  |  4.74<H>    Ca    9.0      18 Oct 2023 06:18  Phos  6.4     10-18  Mg     2.80     10-18    TPro  7.9  /  Alb  3.2<L>  /  TBili  0.2  /  DBili  x   /  AST  18  /  ALT  14  /  AlkPhos  88  10-16      Urinalysis Basic - ( 18 Oct 2023 06:18 )    Color: x / Appearance: x / SG: x / pH: x  Gluc: 160 mg/dL / Ketone: x  / Bili: x / Urobili: x   Blood: x / Protein: x / Nitrite: x   Leuk Esterase: x / RBC: x / WBC x   Sq Epi: x / Non Sq Epi: x / Bacteria: x      CAPILLARY BLOOD GLUCOSE      POCT Blood Glucose.: 314 mg/dL (18 Oct 2023 11:51)      RADIOLOGY & ADDITIONAL TESTS: Reviewed.    ASSESSMENT:    PLAN:

## 2023-10-18 NOTE — CONSULT NOTE ADULT - ATTENDING COMMENTS
NELSON on CKD 4 in the setting of acute UTI  Cont IVF, antibiotics  Monitor labs and Is/Os  Dose meds for GFR <15

## 2023-10-19 ENCOUNTER — TRANSCRIPTION ENCOUNTER (OUTPATIENT)
Age: 63
End: 2023-10-19

## 2023-10-19 DIAGNOSIS — R53.1 WEAKNESS: ICD-10-CM

## 2023-10-19 LAB
-  AMPICILLIN: SIGNIFICANT CHANGE UP
-  AMPICILLIN: SIGNIFICANT CHANGE UP
-  CIPROFLOXACIN: SIGNIFICANT CHANGE UP
-  CIPROFLOXACIN: SIGNIFICANT CHANGE UP
-  LEVOFLOXACIN: SIGNIFICANT CHANGE UP
-  LEVOFLOXACIN: SIGNIFICANT CHANGE UP
-  NITROFURANTOIN: SIGNIFICANT CHANGE UP
-  NITROFURANTOIN: SIGNIFICANT CHANGE UP
-  TETRACYCLINE: SIGNIFICANT CHANGE UP
-  TETRACYCLINE: SIGNIFICANT CHANGE UP
-  VANCOMYCIN: SIGNIFICANT CHANGE UP
-  VANCOMYCIN: SIGNIFICANT CHANGE UP
ANION GAP SERPL CALC-SCNC: 15 MMOL/L — HIGH (ref 7–14)
ANION GAP SERPL CALC-SCNC: 15 MMOL/L — HIGH (ref 7–14)
BUN SERPL-MCNC: 66 MG/DL — HIGH (ref 7–23)
BUN SERPL-MCNC: 66 MG/DL — HIGH (ref 7–23)
CALCIUM SERPL-MCNC: 9.5 MG/DL — SIGNIFICANT CHANGE UP (ref 8.4–10.5)
CALCIUM SERPL-MCNC: 9.5 MG/DL — SIGNIFICANT CHANGE UP (ref 8.4–10.5)
CHLORIDE SERPL-SCNC: 102 MMOL/L — SIGNIFICANT CHANGE UP (ref 98–107)
CHLORIDE SERPL-SCNC: 102 MMOL/L — SIGNIFICANT CHANGE UP (ref 98–107)
CO2 SERPL-SCNC: 21 MMOL/L — LOW (ref 22–31)
CO2 SERPL-SCNC: 21 MMOL/L — LOW (ref 22–31)
CREAT SERPL-MCNC: 4.07 MG/DL — HIGH (ref 0.5–1.3)
CREAT SERPL-MCNC: 4.07 MG/DL — HIGH (ref 0.5–1.3)
CULTURE RESULTS: SIGNIFICANT CHANGE UP
CULTURE RESULTS: SIGNIFICANT CHANGE UP
EGFR: 12 ML/MIN/1.73M2 — LOW
EGFR: 12 ML/MIN/1.73M2 — LOW
GLUCOSE BLDC GLUCOMTR-MCNC: 189 MG/DL — HIGH (ref 70–99)
GLUCOSE BLDC GLUCOMTR-MCNC: 189 MG/DL — HIGH (ref 70–99)
GLUCOSE BLDC GLUCOMTR-MCNC: 220 MG/DL — HIGH (ref 70–99)
GLUCOSE BLDC GLUCOMTR-MCNC: 220 MG/DL — HIGH (ref 70–99)
GLUCOSE BLDC GLUCOMTR-MCNC: 261 MG/DL — HIGH (ref 70–99)
GLUCOSE BLDC GLUCOMTR-MCNC: 261 MG/DL — HIGH (ref 70–99)
GLUCOSE BLDC GLUCOMTR-MCNC: 293 MG/DL — HIGH (ref 70–99)
GLUCOSE BLDC GLUCOMTR-MCNC: 293 MG/DL — HIGH (ref 70–99)
GLUCOSE BLDC GLUCOMTR-MCNC: 338 MG/DL — HIGH (ref 70–99)
GLUCOSE BLDC GLUCOMTR-MCNC: 338 MG/DL — HIGH (ref 70–99)
GLUCOSE SERPL-MCNC: 332 MG/DL — HIGH (ref 70–99)
GLUCOSE SERPL-MCNC: 332 MG/DL — HIGH (ref 70–99)
HCT VFR BLD CALC: 31.1 % — LOW (ref 34.5–45)
HCT VFR BLD CALC: 31.1 % — LOW (ref 34.5–45)
HGB BLD-MCNC: 10.2 G/DL — LOW (ref 11.5–15.5)
HGB BLD-MCNC: 10.2 G/DL — LOW (ref 11.5–15.5)
MAGNESIUM SERPL-MCNC: 2.5 MG/DL — SIGNIFICANT CHANGE UP (ref 1.6–2.6)
MAGNESIUM SERPL-MCNC: 2.5 MG/DL — SIGNIFICANT CHANGE UP (ref 1.6–2.6)
MCHC RBC-ENTMCNC: 29.2 PG — SIGNIFICANT CHANGE UP (ref 27–34)
MCHC RBC-ENTMCNC: 29.2 PG — SIGNIFICANT CHANGE UP (ref 27–34)
MCHC RBC-ENTMCNC: 32.8 GM/DL — SIGNIFICANT CHANGE UP (ref 32–36)
MCHC RBC-ENTMCNC: 32.8 GM/DL — SIGNIFICANT CHANGE UP (ref 32–36)
MCV RBC AUTO: 89.1 FL — SIGNIFICANT CHANGE UP (ref 80–100)
MCV RBC AUTO: 89.1 FL — SIGNIFICANT CHANGE UP (ref 80–100)
METHOD TYPE: SIGNIFICANT CHANGE UP
METHOD TYPE: SIGNIFICANT CHANGE UP
NRBC # BLD: 0 /100 WBCS — SIGNIFICANT CHANGE UP (ref 0–0)
NRBC # BLD: 0 /100 WBCS — SIGNIFICANT CHANGE UP (ref 0–0)
NRBC # FLD: 0 K/UL — SIGNIFICANT CHANGE UP (ref 0–0)
NRBC # FLD: 0 K/UL — SIGNIFICANT CHANGE UP (ref 0–0)
ORGANISM # SPEC MICROSCOPIC CNT: SIGNIFICANT CHANGE UP
PHOSPHATE SERPL-MCNC: 5.8 MG/DL — HIGH (ref 2.5–4.5)
PHOSPHATE SERPL-MCNC: 5.8 MG/DL — HIGH (ref 2.5–4.5)
PLATELET # BLD AUTO: 414 K/UL — HIGH (ref 150–400)
PLATELET # BLD AUTO: 414 K/UL — HIGH (ref 150–400)
POTASSIUM SERPL-MCNC: 5.2 MMOL/L — SIGNIFICANT CHANGE UP (ref 3.5–5.3)
POTASSIUM SERPL-MCNC: 5.2 MMOL/L — SIGNIFICANT CHANGE UP (ref 3.5–5.3)
POTASSIUM SERPL-SCNC: 5.2 MMOL/L — SIGNIFICANT CHANGE UP (ref 3.5–5.3)
POTASSIUM SERPL-SCNC: 5.2 MMOL/L — SIGNIFICANT CHANGE UP (ref 3.5–5.3)
RBC # BLD: 3.49 M/UL — LOW (ref 3.8–5.2)
RBC # BLD: 3.49 M/UL — LOW (ref 3.8–5.2)
RBC # FLD: 12 % — SIGNIFICANT CHANGE UP (ref 10.3–14.5)
RBC # FLD: 12 % — SIGNIFICANT CHANGE UP (ref 10.3–14.5)
SODIUM SERPL-SCNC: 138 MMOL/L — SIGNIFICANT CHANGE UP (ref 135–145)
SODIUM SERPL-SCNC: 138 MMOL/L — SIGNIFICANT CHANGE UP (ref 135–145)
SPECIMEN SOURCE: SIGNIFICANT CHANGE UP
SPECIMEN SOURCE: SIGNIFICANT CHANGE UP
WBC # BLD: 10.21 K/UL — SIGNIFICANT CHANGE UP (ref 3.8–10.5)
WBC # BLD: 10.21 K/UL — SIGNIFICANT CHANGE UP (ref 3.8–10.5)
WBC # FLD AUTO: 10.21 K/UL — SIGNIFICANT CHANGE UP (ref 3.8–10.5)
WBC # FLD AUTO: 10.21 K/UL — SIGNIFICANT CHANGE UP (ref 3.8–10.5)

## 2023-10-19 PROCEDURE — 99232 SBSQ HOSP IP/OBS MODERATE 35: CPT

## 2023-10-19 PROCEDURE — 93306 TTE W/DOPPLER COMPLETE: CPT | Mod: 26

## 2023-10-19 PROCEDURE — 99232 SBSQ HOSP IP/OBS MODERATE 35: CPT | Mod: GC

## 2023-10-19 RX ORDER — FUROSEMIDE 40 MG
40 TABLET ORAL DAILY
Refills: 0 | Status: DISCONTINUED | OUTPATIENT
Start: 2023-10-19 | End: 2023-10-19

## 2023-10-19 RX ORDER — LISINOPRIL 2.5 MG/1
40 TABLET ORAL DAILY
Refills: 0 | Status: DISCONTINUED | OUTPATIENT
Start: 2023-10-19 | End: 2023-10-19

## 2023-10-19 RX ORDER — INSULIN GLARGINE 100 [IU]/ML
25 INJECTION, SOLUTION SUBCUTANEOUS EVERY MORNING
Refills: 0 | Status: DISCONTINUED | OUTPATIENT
Start: 2023-10-20 | End: 2023-10-20

## 2023-10-19 RX ORDER — AMOXICILLIN 250 MG/5ML
500 SUSPENSION, RECONSTITUTED, ORAL (ML) ORAL EVERY 12 HOURS
Refills: 0 | Status: DISCONTINUED | OUTPATIENT
Start: 2023-10-19 | End: 2023-10-20

## 2023-10-19 RX ORDER — INSULIN GLARGINE 100 [IU]/ML
20 INJECTION, SOLUTION SUBCUTANEOUS AT BEDTIME
Refills: 0 | Status: DISCONTINUED | OUTPATIENT
Start: 2023-10-19 | End: 2023-10-20

## 2023-10-19 RX ADMIN — CEFTRIAXONE 100 MILLIGRAM(S): 500 INJECTION, POWDER, FOR SOLUTION INTRAMUSCULAR; INTRAVENOUS at 06:35

## 2023-10-19 RX ADMIN — Medication 500 MILLIGRAM(S): at 15:42

## 2023-10-19 RX ADMIN — ATORVASTATIN CALCIUM 10 MILLIGRAM(S): 80 TABLET, FILM COATED ORAL at 22:25

## 2023-10-19 RX ADMIN — Medication 650 MILLIGRAM(S): at 17:12

## 2023-10-19 RX ADMIN — Medication 1: at 22:41

## 2023-10-19 RX ADMIN — Medication 650 MILLIGRAM(S): at 06:30

## 2023-10-19 RX ADMIN — HEPARIN SODIUM 5000 UNIT(S): 5000 INJECTION INTRAVENOUS; SUBCUTANEOUS at 14:16

## 2023-10-19 RX ADMIN — HEPARIN SODIUM 5000 UNIT(S): 5000 INJECTION INTRAVENOUS; SUBCUTANEOUS at 06:31

## 2023-10-19 RX ADMIN — Medication 500 MILLIGRAM(S): at 22:39

## 2023-10-19 RX ADMIN — Medication 3: at 16:33

## 2023-10-19 RX ADMIN — HEPARIN SODIUM 5000 UNIT(S): 5000 INJECTION INTRAVENOUS; SUBCUTANEOUS at 22:25

## 2023-10-19 RX ADMIN — AMLODIPINE BESYLATE 5 MILLIGRAM(S): 2.5 TABLET ORAL at 06:31

## 2023-10-19 RX ADMIN — Medication 2: at 08:42

## 2023-10-19 RX ADMIN — Medication 4: at 12:22

## 2023-10-19 RX ADMIN — INSULIN GLARGINE 20 UNIT(S): 100 INJECTION, SOLUTION SUBCUTANEOUS at 22:26

## 2023-10-19 NOTE — DISCHARGE NOTE PROVIDER - NSDCFUSCHEDAPPT_GEN_ALL_CORE_FT
Adam Holcomb  [facility]  INT[address]  Scheduled Appointment: 10/27/2023     Judith Mcqueen  [facility]  NEPHRO [address]  Scheduled Appointment: 11/02/2023

## 2023-10-19 NOTE — DISCHARGE NOTE PROVIDER - NSDCMRMEDTOKEN_GEN_ALL_CORE_FT
amLODIPine 5 mg oral tablet: 1 tab(s) orally  atorvastatin 10 mg oral tablet: 1 tab(s) orally  furosemide 40 mg oral tablet: 1 tab(s) orally  Invokana 100 mg oral tablet: 1 tab(s) orally once a day  Lantus 100 units/mL subcutaneous solution: 35 unit(s) subcutaneous once a day (in the morning)  Lantus 100 units/mL subcutaneous solution: 30 subcutaneous once a day (at bedtime)  lisinopril 40 mg oral tablet: 1 tab(s) orally  repaglinide 1 mg oral tablet: 1 tab(s) orally once a day after breakfast  repaglinide 2 mg oral tablet: 1 tab(s) orally once a day after lunch  Victoza 18 mg/3 mL subcutaneous solution: 1.8 milligram(s) subcutaneously once a day   amLODIPine 5 mg oral tablet: 1 tab(s) orally once a day  atorvastatin 10 mg oral tablet: 1 tab(s) orally once a day  furosemide 40 mg oral tablet: 1 tab(s) orally once a day as needed for  leg swelling/edema  Lantus 100 units/mL subcutaneous solution: 35 unit(s) subcutaneous once a day (in the morning)  Lantus 100 units/mL subcutaneous solution: 30 subcutaneous once a day (at bedtime)  repaglinide 1 mg oral tablet: 1 tab(s) orally once a day after breakfast  repaglinide 2 mg oral tablet: 1 tab(s) orally once a day after lunch  Victoza 18 mg/3 mL subcutaneous solution: 1.8 milligram(s) subcutaneously once a day   amLODIPine 5 mg oral tablet: 1 tab(s) orally once a day  amoxicillin 500 mg oral capsule: 1 cap(s) orally every 12 hours  atorvastatin 10 mg oral tablet: 1 tab(s) orally once a day  furosemide 40 mg oral tablet: 1 tab(s) orally once a day as needed for  leg swelling/edema  Lantus 100 units/mL subcutaneous solution: 35 unit(s) subcutaneous once a day (in the morning)  Lantus 100 units/mL subcutaneous solution: 30 subcutaneous once a day (at bedtime)  repaglinide 1 mg oral tablet: 1 tab(s) orally once a day after breakfast  repaglinide 2 mg oral tablet: 1 tab(s) orally once a day after lunch  sodium bicarbonate 650 mg oral tablet: 1 tab(s) orally every 12 hours  Victoza 18 mg/3 mL subcutaneous solution: 1.8 milligram(s) subcutaneously once a day

## 2023-10-19 NOTE — PHARMACOTHERAPY INTERVENTION NOTE - COMMENTS
Patient's daughter at bedside, discussed amoxicillin and its indication, administration, and potential side effects. Advised to take probiotic and to separate administration from antibiotic. Patient's daughter was provided a drug information handout.     Mohammad Harris Jalili, PharmD  PGY-1 Pharmacy Resident  spectra: [phone]; ext: [phone]  Available on Teams

## 2023-10-19 NOTE — DIETITIAN INITIAL EVALUATION ADULT - NS FNS DIET ORDER
Diet, DASH/TLC:   Sodium & Cholesterol Restricted  Consistent Carbohydrate {Evening Snack} (CSTCHOSN)  Jana (10-17-23 @ 14:23)

## 2023-10-19 NOTE — PROGRESS NOTE ADULT - ASSESSMENT
Fartun Powers is a 62yo female with a hx of T2DM, CKD4, HTN, and HLD who presents with dysuria, found to have a UTI. Nephrology is consulted for NELSON on CKD vs CKD progression.    Follows with Dr. Alvarez ([facility]) Tessie at [facility] Creatinine has been steadily increasing from 2.26 (2022) -> 3 (1/2023) -> 4.17 (10/10/2023) -> 5.36 (10/16/2023) on admission. SCr has decreased to *** today 10/19. Likely NELSON on CKD in the setting of infection and over-diuresis. Responding appropriately to fluids and treatment of UTI.       RECOMMENDATIONS: ***  - Continue abx for UTI  - cCntinue sodium bicarb to 650mg BID  - Trend BMP  - Avoid nephrotoxic drugs  - Renally dose medications  - Follow-up with outpatient nephrologist upon discharge      Plan discussed with attending. Note is incomplete until signed by attending.    __________  Ayan Garcia MD PGY-1 Fartun Powers is a 62yo female with a hx of T2DM, CKD4, HTN, and HLD who presents with dysuria, found to have a UTI. Nephrology is consulted for NELSON on CKD vs CKD progression.    Follows with Dr. Alvarez ([facility]) Tessie at [facility]. Creatinine has been steadily increasing from 2.26 (2022) -> 3 (1/2023) -> 4.17 (10/10/2023) -> 5.36 (10/16/2023) on admission. SCr has decreased to *** today 10/19. Likely NELSON on CKD in the setting of infection and over-diuresis. Responding appropriately to fluids and treatment of UTI.       RECOMMENDATIONS: *** pending labs  - Continue abx for UTI  - Continue sodium bicarb to 650mg BID  - Trend BMP  - Avoid nephrotoxic drugs  - Renally dose medications  - Follow-up with outpatient nephrologist upon discharge      Plan discussed with attending. Note is incomplete until signed by attending.    __________  Ayan Garcia MD PGY-1 Fartun Powers is a 62yo female with a hx of T2DM, CKD4, HTN, and HLD who presents with dysuria, found to have a UTI. Nephrology is consulted for NELSON on CKD vs CKD progression.    Follows with Dr. Alvarez ([facility]) Tessie at [facility]. Creatinine has been steadily increasing from 2.26 (2022) -> 3 (1/2023) -> 4.17 (10/10/2023) -> 5.36 (10/16/2023) on admission. SCr has decreased to 4.07 today 10/19. Likely NELSON on CKD in the setting of infection and over-diuresis. Responding appropriately to fluids and treatment of UTI.       RECOMMENDATIONS:   - Continue abx for UTI  - Continue sodium bicarb to 650mg BID  - Trend BMP  - Avoid nephrotoxic drugs  - Renally dose medications  - Follow-up with outpatient nephrologist upon discharge      Plan discussed with attending. Note is incomplete until signed by attending.    __________  Ayan Garcia MD PGY-1

## 2023-10-19 NOTE — PROGRESS NOTE ADULT - PROBLEM SELECTOR PLAN 2
-reported worse kidney function: Cr 3 in January 2023, 4.17 in early October 2023, now Cr of 5.36 BUN 70, bicarb of 16. S/p 500cc bolus and maintenance fluids with improvement of Cr. to ~4 today. Pre-renal NELSON. Renal following.   -hold Lasix and lisinopril  -US kidneys/bladder without hydronephrosis  -f/u daily BMP  -f/u TTE  - Renal following -reported worse kidney function: Cr 3 in January 2023, 4.17 in early October 2023, now Cr of 5.36 BUN 70, bicarb of 16. S/p 500cc bolus and maintenance fluids with improvement of Cr. to ~4 today. Pre-renal NELSON. Renal following.   -hold Lasix   -US kidneys/bladder without hydronephrosis  -f/u daily BMP  -f/u TTE  - Renal following

## 2023-10-19 NOTE — PROGRESS NOTE ADULT - PROBLEM SELECTOR PLAN 3
-pt on multiple anti-diabetic agents at home: Victoza, Invokana, repaglinide, as well as Lantus 35 mg qam and 30 mg qhs. Lantus 30U BID held at first but now sugars are elevated and will restart.   - Lantus 25U AM and Lantus 20U PM - reduced dosing for inpatient management.  - ISS  - Trend glucose
-pt on multiple anti-diabetic agents at home: Victoza, Invokana, repaglinide, as well as Lantus 35 mg qam and 30 mg qhs. However, while admitted, pt has maintained BG <150. A1C 6.6.   -hold lantus; will initiate pt on low dose corrective scale

## 2023-10-19 NOTE — DIETITIAN INITIAL EVALUATION ADULT - PERTINENT MEDS FT
MEDICATIONS  (STANDING):  amLODIPine   Tablet 5 milliGRAM(s) Oral daily  amoxicillin 500 milliGRAM(s) Oral every 12 hours  atorvastatin 10 milliGRAM(s) Oral at bedtime  dextrose 5%. 1000 milliLiter(s) (50 mL/Hr) IV Continuous <Continuous>  dextrose 5%. 1000 milliLiter(s) (100 mL/Hr) IV Continuous <Continuous>  dextrose 50% Injectable 12.5 Gram(s) IV Push once  dextrose 50% Injectable 25 Gram(s) IV Push once  dextrose 50% Injectable 25 Gram(s) IV Push once  glucagon  Injectable 1 milliGRAM(s) IntraMuscular once  heparin   Injectable 5000 Unit(s) SubCutaneous every 8 hours  insulin lispro (ADMELOG) corrective regimen sliding scale   SubCutaneous at bedtime  insulin lispro (ADMELOG) corrective regimen sliding scale   SubCutaneous three times a day before meals  lactated ringers. 500 milliLiter(s) (100 mL/Hr) IV Continuous <Continuous>  sodium bicarbonate 650 milliGRAM(s) Oral every 12 hours  sodium chloride 0.9%. 1000 milliLiter(s) (75 mL/Hr) IV Continuous <Continuous>    MEDICATIONS  (PRN):  acetaminophen     Tablet .. 650 milliGRAM(s) Oral every 6 hours PRN Temp greater or equal to 38C (100.4F), Mild Pain (1 - 3)  aluminum hydroxide/magnesium hydroxide/simethicone Suspension 30 milliLiter(s) Oral every 4 hours PRN Dyspepsia  dextrose Oral Gel 15 Gram(s) Oral once PRN Blood Glucose LESS THAN 70 milliGRAM(s)/deciliter  melatonin 3 milliGRAM(s) Oral at bedtime PRN Insomnia  ondansetron Injectable 4 milliGRAM(s) IV Push every 8 hours PRN Nausea and/or Vomiting

## 2023-10-19 NOTE — DIETITIAN INITIAL EVALUATION ADULT - PERTINENT LABORATORY DATA
10-19    138  |  102  |  66<H>  ----------------------------<  332<H>  5.2   |  21<L>  |  4.07<H>    Ca    9.5      19 Oct 2023 11:53  Phos  5.8     10-19  Mg     2.50     10-19    POCT Blood Glucose.: 338 mg/dL (10-19-23 @ 12:21)  A1C with Estimated Average Glucose Result: 6.6 % (10-18-23 @ 06:18)  A1C with Estimated Average Glucose Result: 6.7 % (10-17-23 @ 14:30)

## 2023-10-19 NOTE — PROGRESS NOTE ADULT - SUBJECTIVE AND OBJECTIVE BOX
[facility] Division of Kidney Diseases & Hypertension  FOLLOW UP NOTE  [phone]    --------------------------------------------------------------------------------  Chief Complaint: Weakness    24 hour events/subjective: ***      PAST HISTORY  --------------------------------------------------------------------------------  No significant changes to PMH, PSH, FHx, SHx, unless otherwise noted    ALLERGIES & MEDICATIONS  --------------------------------------------------------------------------------  Allergies    No Known Allergies    Intolerances      Standing Inpatient Medications  amLODIPine   Tablet 5 milliGRAM(s) Oral daily  amoxicillin 500 milliGRAM(s) Oral every 12 hours  atorvastatin 10 milliGRAM(s) Oral at bedtime  dextrose 5%. 1000 milliLiter(s) IV Continuous <Continuous>  dextrose 5%. 1000 milliLiter(s) IV Continuous <Continuous>  dextrose 50% Injectable 25 Gram(s) IV Push once  dextrose 50% Injectable 12.5 Gram(s) IV Push once  dextrose 50% Injectable 25 Gram(s) IV Push once  glucagon  Injectable 1 milliGRAM(s) IntraMuscular once  heparin   Injectable 5000 Unit(s) SubCutaneous every 8 hours  insulin lispro (ADMELOG) corrective regimen sliding scale   SubCutaneous at bedtime  insulin lispro (ADMELOG) corrective regimen sliding scale   SubCutaneous three times a day before meals  lactated ringers. 500 milliLiter(s) IV Continuous <Continuous>  sodium bicarbonate 650 milliGRAM(s) Oral every 12 hours  sodium chloride 0.9%. 1000 milliLiter(s) IV Continuous <Continuous>    PRN Inpatient Medications  acetaminophen     Tablet .. 650 milliGRAM(s) Oral every 6 hours PRN  aluminum hydroxide/magnesium hydroxide/simethicone Suspension 30 milliLiter(s) Oral every 4 hours PRN  dextrose Oral Gel 15 Gram(s) Oral once PRN  melatonin 3 milliGRAM(s) Oral at bedtime PRN  ondansetron Injectable 4 milliGRAM(s) IV Push every 8 hours PRN      REVIEW OF SYSTEMS  --------------------------------------------------------------------------------  Gen: No fevers/chills  Skin: No rashes  Head/Eyes/Ears/Mouth: No headache  Respiratory: No dyspnea, cough  CV: No chest pain  GI: No abdominal pain, diarrhea, constipation, nausea, vomiting  : (+) dysuria. No increased frequency, hematuria, nocturia  MSK: No joint pain, no edema  Neuro: (+) weakness. No lightheadedness.    All other systems were reviewed and are negative, except as noted.    VITALS/PHYSICAL EXAM  --------------------------------------------------------------------------------  T(C): 36.7 (10-19-23 @ 06:15), Max: 36.8 (10-18-23 @ 20:26)  HR: 88 (10-19-23 @ 06:15) (85 - 91)  BP: 132/65 (10-19-23 @ 06:15) (131/65 - 132/65)  RR: 17 (10-19-23 @ 06:15) (16 - 18)  SpO2: 96% (10-19-23 @ 06:15) (96% - 99%)  Wt(kg): --  Height (cm): 154.9 (10-18-23 @ 11:43)  Weight (kg): 71.4 (10-18-23 @ 11:43)  BMI (kg/m2): 29.8 (10-18-23 @ 11:43)  BSA (m2): 1.71 (10-18-23 @ 11:43)    10-18-23 @ 07:01  -  10-19-23 @ 07:00  --------------------------------------------------------  IN: 450 mL / OUT: 0 mL / NET: 450 mL    10-19-23 @ 07:01  -  10-19-23 @ 10:14  --------------------------------------------------------  IN: 237 mL / OUT: 0 mL / NET: 237 mL    Physical Exam:  CONSTITUTIONAL: nno apparent distress.  EYES: PERRLA. EOMI. No scleral icterus.  CV: RRR. Normal S1 and S2. No murmurs, rubs, or gallops. No edema. Pulses equal bilaterally.  PULM: Clear to auscultation throughout. Respirations unlabored. No wheezing, rales, or rhonchi.  GI: Soft, non-tender. No palpable masses. Normal abnormal bowel sounds.  MSK: No cyanosis or clubbing.  NEURO: CN II-XII grossly intact.  PSYCH: A+O, appropriately communicating and responding to questions    LABS/STUDIES  --------------------------------------------------------------------------------              10.5   11.14 >-----------<  399      [10-18-23 @ 06:18]              32.1     138  |  103  |  71  ----------------------------<  160      [10-18-23 @ 06:18]  4.3   |  20  |  4.74        Ca     9.0     [10-18-23 @ 06:18]      Mg     2.80     [10-18-23 @ 06:18]      Phos  6.4     [10-18-23 @ 06:18]    Creatinine Trend:  SCr 4.74 [10-18 @ 06:18]  SCr 5.13 [10-17 @ 14:30]  SCr 5.36 [10-16 @ 22:15]    Urinalysis - [10-18-23 @ 06:18]      Color  / Appearance  / SG  / pH       Gluc 160 / Ketone   / Bili  / Urobili        Blood  / Protein  / Leuk Est  / Nitrite       RBC  / WBC  / Hyaline  / Gran  / Sq Epi  / Non Sq Epi  / Bacteria     Urine Creatinine 37      [10-17-23 @ 14:30]  Urine Sodium 97      [10-17-23 @ 14:30]  Urine Urea Nitrogen 287.0      [10-17-23 @ 14:30]      HCV 0.09, Nonreact      [10-18-23 @ 06:18]     [facility] Division of Kidney Diseases & Hypertension  FOLLOW UP NOTE  [phone]    --------------------------------------------------------------------------------  Chief Complaint: Weakness    24 hour events/subjective: No acute events overnight. No dysuria or hematuria.       PAST HISTORY  --------------------------------------------------------------------------------  No significant changes to PMH, PSH, FHx, SHx, unless otherwise noted    ALLERGIES & MEDICATIONS  --------------------------------------------------------------------------------  Allergies    No Known Allergies    Intolerances      Standing Inpatient Medications  amLODIPine   Tablet 5 milliGRAM(s) Oral daily  amoxicillin 500 milliGRAM(s) Oral every 12 hours  atorvastatin 10 milliGRAM(s) Oral at bedtime  dextrose 5%. 1000 milliLiter(s) IV Continuous <Continuous>  dextrose 5%. 1000 milliLiter(s) IV Continuous <Continuous>  dextrose 50% Injectable 25 Gram(s) IV Push once  dextrose 50% Injectable 12.5 Gram(s) IV Push once  dextrose 50% Injectable 25 Gram(s) IV Push once  glucagon  Injectable 1 milliGRAM(s) IntraMuscular once  heparin   Injectable 5000 Unit(s) SubCutaneous every 8 hours  insulin lispro (ADMELOG) corrective regimen sliding scale   SubCutaneous at bedtime  insulin lispro (ADMELOG) corrective regimen sliding scale   SubCutaneous three times a day before meals  lactated ringers. 500 milliLiter(s) IV Continuous <Continuous>  sodium bicarbonate 650 milliGRAM(s) Oral every 12 hours  sodium chloride 0.9%. 1000 milliLiter(s) IV Continuous <Continuous>    PRN Inpatient Medications  acetaminophen     Tablet .. 650 milliGRAM(s) Oral every 6 hours PRN  aluminum hydroxide/magnesium hydroxide/simethicone Suspension 30 milliLiter(s) Oral every 4 hours PRN  dextrose Oral Gel 15 Gram(s) Oral once PRN  melatonin 3 milliGRAM(s) Oral at bedtime PRN  ondansetron Injectable 4 milliGRAM(s) IV Push every 8 hours PRN      REVIEW OF SYSTEMS  --------------------------------------------------------------------------------  Gen: No fevers/chills  Skin: No rashes  Head/Eyes/Ears/Mouth: No headache  Respiratory: No dyspnea, cough  CV: No chest pain  GI: No abdominal pain, diarrhea, constipation, nausea, vomiting  : (+) dysuria. No increased frequency, hematuria, nocturia  MSK: No joint pain, no edema  Neuro: (+) weakness. No lightheadedness.    All other systems were reviewed and are negative, except as noted.    VITALS/PHYSICAL EXAM  --------------------------------------------------------------------------------  T(C): 36.7 (10-19-23 @ 06:15), Max: 36.8 (10-18-23 @ 20:26)  HR: 88 (10-19-23 @ 06:15) (85 - 91)  BP: 132/65 (10-19-23 @ 06:15) (131/65 - 132/65)  RR: 17 (10-19-23 @ 06:15) (16 - 18)  SpO2: 96% (10-19-23 @ 06:15) (96% - 99%)  Wt(kg): --  Height (cm): 154.9 (10-18-23 @ 11:43)  Weight (kg): 71.4 (10-18-23 @ 11:43)  BMI (kg/m2): 29.8 (10-18-23 @ 11:43)  BSA (m2): 1.71 (10-18-23 @ 11:43)    10-18-23 @ 07:01  -  10-19-23 @ 07:00  --------------------------------------------------------  IN: 450 mL / OUT: 0 mL / NET: 450 mL    10-19-23 @ 07:01  -  10-19-23 @ 10:14  --------------------------------------------------------  IN: 237 mL / OUT: 0 mL / NET: 237 mL    Physical Exam:  CONSTITUTIONAL: nno apparent distress.  EYES: PERRLA. EOMI. No scleral icterus.  CV: RRR. Normal S1 and S2. No murmurs, rubs, or gallops. No edema. Pulses equal bilaterally.  PULM: Clear to auscultation throughout. Respirations unlabored. No wheezing, rales, or rhonchi.  GI: Soft, non-tender. No palpable masses. Normal abnormal bowel sounds.  MSK: No cyanosis or clubbing.  NEURO: CN II-XII grossly intact.  PSYCH: A+O, appropriately communicating and responding to questions    LABS/STUDIES  --------------------------------------------------------------------------------              10.5   11.14 >-----------<  399      [10-18-23 @ 06:18]              32.1     138  |  103  |  71  ----------------------------<  160      [10-18-23 @ 06:18]  4.3   |  20  |  4.74        Ca     9.0     [10-18-23 @ 06:18]      Mg     2.80     [10-18-23 @ 06:18]      Phos  6.4     [10-18-23 @ 06:18]    Creatinine Trend:  SCr 4.74 [10-18 @ 06:18]  SCr 5.13 [10-17 @ 14:30]  SCr 5.36 [10-16 @ 22:15]    Urinalysis - [10-18-23 @ 06:18]      Color  / Appearance  / SG  / pH       Gluc 160 / Ketone   / Bili  / Urobili        Blood  / Protein  / Leuk Est  / Nitrite       RBC  / WBC  / Hyaline  / Gran  / Sq Epi  / Non Sq Epi  / Bacteria     Urine Creatinine 37      [10-17-23 @ 14:30]  Urine Sodium 97      [10-17-23 @ 14:30]  Urine Urea Nitrogen 287.0      [10-17-23 @ 14:30]      HCV 0.09, Nonreact      [10-18-23 @ 06:18]

## 2023-10-19 NOTE — PROGRESS NOTE ADULT - PROBLEM SELECTOR PLAN 5
-hold lisinopril given NELSON on CKD  -hold Lasix, as above - Restarted lisinopril   -hold Lasix, as above

## 2023-10-19 NOTE — DISCHARGE NOTE PROVIDER - CARE PROVIDER_API CALL
Judith Mcqueen  Nephrology  [address] [place] [address], [address]  [place] [address]  Phone: [phone]  Fax: [phone]  Follow Up Time:

## 2023-10-19 NOTE — DISCHARGE NOTE PROVIDER - NSDCCPCAREPLAN_GEN_ALL_CORE_FT
PRINCIPAL DISCHARGE DIAGNOSIS  Diagnosis: Urinary tract infection  Assessment and Plan of Treatment: You presented to the hospital with symptoms of urinary frequency and incontinence due to a urinary tract infection. The symptoms improved with antibiotics. Please continue to take amoxicillin as prescribed.      SECONDARY DISCHARGE DIAGNOSES  Diagnosis: Acute kidney injury superimposed on CKD  Assessment and Plan of Treatment: You had an elevated creatinine level from your baseline creatinine level. Normally, your creatinine level is elevated but it was even higher. This likely happened due to dehyration from the urinary tract infection. You were given IV fluids and the creatinine level came back down to around your normal kidney function. The kidney doctors saw you in the hospital and recommended that you start sodium bicarbonate tablets 650mg twice a day. Please continue to take this every day. We recommend that you visit your kidney doctor soon and regularly.     PRINCIPAL DISCHARGE DIAGNOSIS  Diagnosis: Urinary tract infection  Assessment and Plan of Treatment: You presented to the hospital with symptoms of urinary frequency and incontinence due to a urinary tract infection. The symptoms improved with antibiotics. Please continue to take amoxicillin till 10/21.      SECONDARY DISCHARGE DIAGNOSES  Diagnosis: Acute kidney injury superimposed on CKD  Assessment and Plan of Treatment: You had an elevated creatinine level from your baseline creatinine level. Normally, your creatinine level is elevated but it was even higher. This likely happened due to dehyration from the urinary tract infection. You were given IV fluids and the creatinine level came back down to around your normal kidney function. The kidney doctors saw you in the hospital and recommended that you start sodium bicarbonate tablets 650mg twice a day. Please continue to take this every day. We recommend that you visit your kidney doctor soon and regularly. Please do not take lisinopril at home. If you have swelling of your legs you can take the lasix as needed but do not take this medication every day.    Diagnosis: Type 2 diabetes mellitus  Assessment and Plan of Treatment: You have a history of diabetes. Please discontinue Invokana. Continue to take all of your other diabetes medications and follow up with your endocrinologist in 1-2 weeks.

## 2023-10-19 NOTE — PROGRESS NOTE ADULT - SUBJECTIVE AND OBJECTIVE BOX
Hospitalist Progress Note  Aimee Neumann MD Pager # [phone]    OVERNIGHT EVENTS: EDIN    SUBJECTIVE / INTERVAL HPI: Patient seen and examined at bedside. Daughter at bedside and provides translation. Pt. reports feeling better. No longer experiencing dysuria or increased frequency of urination.     VITAL SIGNS:  Vital Signs Last 24 Hrs  T(C): 36.8 (19 Oct 2023 14:00), Max: 36.8 (18 Oct 2023 20:26)  T(F): 98.3 (19 Oct 2023 14:00), Max: 98.3 (19 Oct 2023 14:00)  HR: 85 (19 Oct 2023 14:00) (85 - 88)  BP: 132/65 (19 Oct 2023 14:00) (131/65 - 132/65)  BP(mean): --  RR: 97 (19 Oct 2023 14:00) (17 - 97)  SpO2: 99% (19 Oct 2023 14:00) (96% - 99%)    Parameters below as of 19 Oct 2023 14:00  Patient On (Oxygen Delivery Method): room air        PHYSICAL EXAM:    General: Elderly appearing female resting in bed   HEENT: NC/AT; PERRL, anicteric sclera; MMM  Neck: supple  Cardiovascular: +S1/S2; RRR  Respiratory: CTA B/L; no W/R/R  Gastrointestinal: soft, NT/ND; +BSx4  Extremities: WWP; no edema, clubbing or cyanosis  Vascular: 2+ radial, DP/PT pulses B/L  Neurological: AAOx3; no focal deficits    MEDICATIONS:  MEDICATIONS  (STANDING):  amLODIPine   Tablet 5 milliGRAM(s) Oral daily  amoxicillin 500 milliGRAM(s) Oral every 12 hours  atorvastatin 10 milliGRAM(s) Oral at bedtime  dextrose 5%. 1000 milliLiter(s) (50 mL/Hr) IV Continuous <Continuous>  dextrose 5%. 1000 milliLiter(s) (100 mL/Hr) IV Continuous <Continuous>  dextrose 50% Injectable 12.5 Gram(s) IV Push once  dextrose 50% Injectable 25 Gram(s) IV Push once  dextrose 50% Injectable 25 Gram(s) IV Push once  glucagon  Injectable 1 milliGRAM(s) IntraMuscular once  heparin   Injectable 5000 Unit(s) SubCutaneous every 8 hours  insulin lispro (ADMELOG) corrective regimen sliding scale   SubCutaneous at bedtime  insulin lispro (ADMELOG) corrective regimen sliding scale   SubCutaneous three times a day before meals  lactated ringers. 500 milliLiter(s) (100 mL/Hr) IV Continuous <Continuous>  sodium bicarbonate 650 milliGRAM(s) Oral every 12 hours  sodium chloride 0.9%. 1000 milliLiter(s) (75 mL/Hr) IV Continuous <Continuous>    MEDICATIONS  (PRN):  acetaminophen     Tablet .. 650 milliGRAM(s) Oral every 6 hours PRN Temp greater or equal to 38C (100.4F), Mild Pain (1 - 3)  aluminum hydroxide/magnesium hydroxide/simethicone Suspension 30 milliLiter(s) Oral every 4 hours PRN Dyspepsia  dextrose Oral Gel 15 Gram(s) Oral once PRN Blood Glucose LESS THAN 70 milliGRAM(s)/deciliter  melatonin 3 milliGRAM(s) Oral at bedtime PRN Insomnia  ondansetron Injectable 4 milliGRAM(s) IV Push every 8 hours PRN Nausea and/or Vomiting      ALLERGIES:  Allergies    No Known Allergies    Intolerances        LABS:                        10.2   10.21 )-----------( 414      ( 19 Oct 2023 11:53 )             31.1     10-19    138  |  102  |  66<H>  ----------------------------<  332<H>  5.2   |  21<L>  |  4.07<H>    Ca    9.5      19 Oct 2023 11:53  Phos  5.8     10-19  Mg     2.50     10-19        Urinalysis Basic - ( 19 Oct 2023 11:53 )    Color: x / Appearance: x / SG: x / pH: x  Gluc: 332 mg/dL / Ketone: x  / Bili: x / Urobili: x   Blood: x / Protein: x / Nitrite: x   Leuk Esterase: x / RBC: x / WBC x   Sq Epi: x / Non Sq Epi: x / Bacteria: x      CAPILLARY BLOOD GLUCOSE      POCT Blood Glucose.: 338 mg/dL (19 Oct 2023 12:21)      RADIOLOGY & ADDITIONAL TESTS: Reviewed.    ASSESSMENT:    PLAN:

## 2023-10-19 NOTE — DISCHARGE NOTE PROVIDER - PREFACE STATEMENT FOR MINUTES SPENT
Report given to [place] [facility].   Tel# [phone] I personally spent

## 2023-10-19 NOTE — DISCHARGE NOTE PROVIDER - NSDCFUADDAPPT_GEN_ALL_CORE_FT
APPTS ARE READY TO BE MADE: [ X ] YES    Best Family or Patient Contact (if needed):    Additional Information about above appointments (if needed):    1: Primary care doctor  2: Endocrinologist     Other comments or requests: patient will like to follow up with the [facility]    APPTS ARE READY TO BE MADE: [ X ] YES    Best Family or Patient Contact (if needed):    Additional Information about above appointments (if needed):    1: Primary care doctor  2: Endocrinologist     Other comments or requests: patient will like to follow up with the [facility] system     Pt is sandra with Dr. Holcomb on 10/27 at 10:20am, [address] [facility] location. APPTS ARE READY TO BE MADE: [ X ] YES    Best Family or Patient Contact (if needed):    Additional Information about above appointments (if needed):    1: Primary care doctor  2: Endocrinologist     Other comments or requests: patient will like to follow up with the [facility]   Providers PCP, Endo, and Nephrology office was contacted to secure an appointment, however the office will follow up with the patient/caregiver directly.   Pt is sandra with Dr. Holcomb on 10/27 at 10:20am, [address] [facility] location.

## 2023-10-19 NOTE — ED ADULT NURSE NOTE - ALCOHOL PRE SCREEN (AUDIT - C)
Detail Level: Detailed Number Of Curettages: 3 Size Of Lesion In Cm: 1.5 Size Of Lesion After Curettage: 1.7 Add Intralesional Injection: No Concentration (Mg/Ml Or Millions Of Plaque Forming Units/Cc): 0.01 Total Volume (Ccs): 1 Anesthesia Type: 1% lidocaine with epinephrine Statement Selected Cautery Type: electrodesiccation What Was Performed First?: Curettage Final Size Statement: The size of the lesion after curettage was Additional Information: (Optional): The wound was cleaned, and a pressure dressing was applied.  The patient received detailed post-op instructions. Consent was obtained from the patient. The risks, benefits and alternatives to therapy were discussed in detail. Specifically, the risks of infection, scarring, bleeding, prolonged wound healing, nerve injury, incomplete removal, allergy to anesthesia and recurrence were addressed. Alternatives to ED&C, such as: surgical removal and XRT were also discussed.  Prior to the procedure, the treatment site was clearly identified and confirmed by the patient. All components of Universal Protocol/PAUSE Rule completed. Post-Care Instructions: I reviewed with the patient in detail post-care instructions. Patient is to keep the area dry for 48 hours, and not to engage in any swimming until the area is healed. Should the patient develop any fevers, chills, bleeding, severe pain patient will contact the office immediately. Bill As A Line Item Or As Units: Line Item Size Of Lesion After Curettage: 1.2

## 2023-10-19 NOTE — PROGRESS NOTE ADULT - PROBLEM SELECTOR PLAN 1
-pt with sxs of dysuria, positive u/a, leukocytosis. Pt's daughter reports fevers at home; no fevers here. Monitor fever curve  -given no hx of recurrent UTI, will treat with CTX  -f/u urine and blood cx
-pt with sxs of dysuria, positive u/a, leukocytosis. Pt's daughter reports fevers at home; no fevers here. Monitor fever curve  -given no hx of recurrent UTI, will treat with CTX  -f/u urine and blood cx

## 2023-10-19 NOTE — DISCHARGE NOTE PROVIDER - NSFOLLOWUPCLINICS_GEN_ALL_ED_FT
[facility]  Endocrinology  [address] [place] [address]  Phone: [phone]  Fax:     [facility]  Internal Medicine  [address] [place] [address]  Phone: [phone]  Fax: [phone]     Detail Level: Zone Photo Preface (Leave Blank If You Do Not Want): Photographs were obtained today

## 2023-10-19 NOTE — DISCHARGE NOTE PROVIDER - HOSPITAL COURSE
64yo female with a hx of T2DM, CKD4, HTN, and HLD who presents with dysuria. In the ED VSS, afebrile and no leukocytosis. BUN/Cr. elevated from baseline. UA positive for infection. Baseline creatinine 2.26 (2022) and slowly uptrending. Cr. 4.17 on 10/10 and on admission Cr. 5.36. Cr downtrending to 4.07. Pre-renal NELSON on CKD that improved with fluid resuscitation. Renal recommending NaHCO3 tabs 650 BID (new med) and to follow up with outpatient nephrologist on discharge. Pt. was started on cefepime for UTI. UCx growing <100,000 enterococcus. Given patients significant symptoms, decided to continue to treat. Cefepime was changed to amoxicillin.

## 2023-10-19 NOTE — DIETITIAN INITIAL EVALUATION ADULT - ORAL INTAKE PTA/DIET HISTORY
Pt seen with daughter at bedside who provided collateral and reported with low po intake for past couple of years now which has resulted on wt. loss but not sure of the amount of loss.  Pt takes Glucerna shakes twice per day at home.

## 2023-10-19 NOTE — PROGRESS NOTE ADULT - PROBLEM SELECTOR PLAN 6
-Diet: consistent carb, DASH  -DVT ppx: sq heparin  -Dispo: pending clinical improvement, PT eval- home with home PT
-Diet: consistent carb, DASH  -DVT ppx: sq heparin  -Dispo: pending clinical improvement, PT eval

## 2023-10-19 NOTE — DISCHARGE NOTE PROVIDER - ATTENDING DISCHARGE PHYSICAL EXAMINATION:
.  VITAL SIGNS:  T(C): 36.4 (10-20-23 @ 05:40), Max: 36.4 (10-19-23 @ 22:15)  T(F): 97.6 (10-20-23 @ 05:40), Max: 97.6 (10-19-23 @ 22:15)  HR: 82 (10-20-23 @ 05:40) (80 - 82)  BP: 135/58 (10-20-23 @ 05:40) (135/58 - 139/90)  BP(mean): --  RR: 18 (10-20-23 @ 05:40) (18 - 18)  SpO2: 99% (10-20-23 @ 05:40) (99% - 99%)  Wt(kg): --    PHYSICAL EXAM:    Constitutional: WDWN resting comfortably in bed; NAD  Head: NC/AT  Eyes: PERRL, EOMI, anicteric sclera  ENT: no nasal discharge; uvula midline, no oropharyngeal erythema or exudates; MMM  Neck: supple; no JVD or thyromegaly  Respiratory: CTA B/L; no W/R/R, no retractions  Cardiac: +S1/S2; RRR; no M/R/G; PMI non-displaced  Gastrointestinal: abdomen soft, NT/ND; no rebound or guarding; +BSx4  Back: spine midline, no bony tenderness or step-offs; no CVAT B/L  Extremities: WWP, no clubbing or cyanosis; no peripheral edema  Musculoskeletal: NROM x4; no joint swelling, tenderness or erythema  Vascular: 2+ radial, femoral, DP/PT pulses B/L  Dermatologic: skin warm, dry and intact; no rashes, wounds, or scars  Neurologic: AAOx3; CNII-XII grossly intact; no focal deficits  Psychiatric: affect and characteristics of appearance, verbalizations, behaviors are appropriate

## 2023-10-19 NOTE — DIETITIAN INITIAL EVALUATION ADULT - OTHER INFO
63 y.o. F with PMH of T2DM on insulin, CKD 4, HTN, HLD who presents with dysuria. Found to have UTI and NELSON superimposed on CKD 4    Pt seen sleeping and the daughter at bedside provided collateral. As per daughter pt feels hungry but eats 25-50% meals due to feels tired shortly and stops eating. No GI distress (nausea/vomiting/diarrhea/constipation.) at present. Rec supplement Glucerna Therapeutic Nutrition 8oz. 2x daily (~440 Kcals, ~20 gm Protein). Noted skin ecchymosis, no edema per nursing flwo shee.t Labs reviewed. A1c- 6.6% (10/18/23).

## 2023-10-19 NOTE — PROGRESS NOTE ADULT - TIME BILLING
Review of laboratory data, radiology results, consultants' recommendations, documentation in [place], discussion with patient/advanced care providers and interdisciplinary staff (such as , social workers, etc). Interventions were performed as documented above.
Review of laboratory data, radiology results, consultants' recommendations, documentation in [place], discussion with patient/advanced care providers and interdisciplinary staff (such as , social workers, etc). Interventions were performed as documented above.

## 2023-10-20 ENCOUNTER — TRANSCRIPTION ENCOUNTER (OUTPATIENT)
Age: 63
End: 2023-10-20

## 2023-10-20 VITALS
SYSTOLIC BLOOD PRESSURE: 128 MMHG | DIASTOLIC BLOOD PRESSURE: 63 MMHG | TEMPERATURE: 98 F | HEART RATE: 83 BPM | RESPIRATION RATE: 18 BRPM | OXYGEN SATURATION: 98 %

## 2023-10-20 LAB
ANION GAP SERPL CALC-SCNC: 16 MMOL/L — HIGH (ref 7–14)
ANION GAP SERPL CALC-SCNC: 16 MMOL/L — HIGH (ref 7–14)
BUN SERPL-MCNC: 62 MG/DL — HIGH (ref 7–23)
BUN SERPL-MCNC: 62 MG/DL — HIGH (ref 7–23)
CALCIUM SERPL-MCNC: 8.9 MG/DL — SIGNIFICANT CHANGE UP (ref 8.4–10.5)
CALCIUM SERPL-MCNC: 8.9 MG/DL — SIGNIFICANT CHANGE UP (ref 8.4–10.5)
CHLORIDE SERPL-SCNC: 103 MMOL/L — SIGNIFICANT CHANGE UP (ref 98–107)
CHLORIDE SERPL-SCNC: 103 MMOL/L — SIGNIFICANT CHANGE UP (ref 98–107)
CO2 SERPL-SCNC: 19 MMOL/L — LOW (ref 22–31)
CO2 SERPL-SCNC: 19 MMOL/L — LOW (ref 22–31)
CREAT SERPL-MCNC: 3.79 MG/DL — HIGH (ref 0.5–1.3)
CREAT SERPL-MCNC: 3.79 MG/DL — HIGH (ref 0.5–1.3)
EGFR: 13 ML/MIN/1.73M2 — LOW
EGFR: 13 ML/MIN/1.73M2 — LOW
GLUCOSE BLDC GLUCOMTR-MCNC: 203 MG/DL — HIGH (ref 70–99)
GLUCOSE BLDC GLUCOMTR-MCNC: 203 MG/DL — HIGH (ref 70–99)
GLUCOSE BLDC GLUCOMTR-MCNC: 344 MG/DL — HIGH (ref 70–99)
GLUCOSE BLDC GLUCOMTR-MCNC: 344 MG/DL — HIGH (ref 70–99)
GLUCOSE SERPL-MCNC: 196 MG/DL — HIGH (ref 70–99)
GLUCOSE SERPL-MCNC: 196 MG/DL — HIGH (ref 70–99)
HCT VFR BLD CALC: 30.7 % — LOW (ref 34.5–45)
HCT VFR BLD CALC: 30.7 % — LOW (ref 34.5–45)
HGB BLD-MCNC: 9.9 G/DL — LOW (ref 11.5–15.5)
HGB BLD-MCNC: 9.9 G/DL — LOW (ref 11.5–15.5)
MAGNESIUM SERPL-MCNC: 2.6 MG/DL — SIGNIFICANT CHANGE UP (ref 1.6–2.6)
MAGNESIUM SERPL-MCNC: 2.6 MG/DL — SIGNIFICANT CHANGE UP (ref 1.6–2.6)
MCHC RBC-ENTMCNC: 28.9 PG — SIGNIFICANT CHANGE UP (ref 27–34)
MCHC RBC-ENTMCNC: 28.9 PG — SIGNIFICANT CHANGE UP (ref 27–34)
MCHC RBC-ENTMCNC: 32.2 GM/DL — SIGNIFICANT CHANGE UP (ref 32–36)
MCHC RBC-ENTMCNC: 32.2 GM/DL — SIGNIFICANT CHANGE UP (ref 32–36)
MCV RBC AUTO: 89.8 FL — SIGNIFICANT CHANGE UP (ref 80–100)
MCV RBC AUTO: 89.8 FL — SIGNIFICANT CHANGE UP (ref 80–100)
NRBC # BLD: 0 /100 WBCS — SIGNIFICANT CHANGE UP (ref 0–0)
NRBC # BLD: 0 /100 WBCS — SIGNIFICANT CHANGE UP (ref 0–0)
NRBC # FLD: 0 K/UL — SIGNIFICANT CHANGE UP (ref 0–0)
NRBC # FLD: 0 K/UL — SIGNIFICANT CHANGE UP (ref 0–0)
PHOSPHATE SERPL-MCNC: 5.8 MG/DL — HIGH (ref 2.5–4.5)
PHOSPHATE SERPL-MCNC: 5.8 MG/DL — HIGH (ref 2.5–4.5)
PLATELET # BLD AUTO: 428 K/UL — HIGH (ref 150–400)
PLATELET # BLD AUTO: 428 K/UL — HIGH (ref 150–400)
POTASSIUM SERPL-MCNC: 4.3 MMOL/L — SIGNIFICANT CHANGE UP (ref 3.5–5.3)
POTASSIUM SERPL-MCNC: 4.3 MMOL/L — SIGNIFICANT CHANGE UP (ref 3.5–5.3)
POTASSIUM SERPL-SCNC: 4.3 MMOL/L — SIGNIFICANT CHANGE UP (ref 3.5–5.3)
POTASSIUM SERPL-SCNC: 4.3 MMOL/L — SIGNIFICANT CHANGE UP (ref 3.5–5.3)
RBC # BLD: 3.42 M/UL — LOW (ref 3.8–5.2)
RBC # BLD: 3.42 M/UL — LOW (ref 3.8–5.2)
RBC # FLD: 11.9 % — SIGNIFICANT CHANGE UP (ref 10.3–14.5)
RBC # FLD: 11.9 % — SIGNIFICANT CHANGE UP (ref 10.3–14.5)
SODIUM SERPL-SCNC: 138 MMOL/L — SIGNIFICANT CHANGE UP (ref 135–145)
SODIUM SERPL-SCNC: 138 MMOL/L — SIGNIFICANT CHANGE UP (ref 135–145)
WBC # BLD: 10.96 K/UL — HIGH (ref 3.8–10.5)
WBC # BLD: 10.96 K/UL — HIGH (ref 3.8–10.5)
WBC # FLD AUTO: 10.96 K/UL — HIGH (ref 3.8–10.5)
WBC # FLD AUTO: 10.96 K/UL — HIGH (ref 3.8–10.5)

## 2023-10-20 PROCEDURE — 99239 HOSP IP/OBS DSCHRG MGMT >30: CPT

## 2023-10-20 PROCEDURE — 99232 SBSQ HOSP IP/OBS MODERATE 35: CPT | Mod: GC

## 2023-10-20 RX ORDER — AMOXICILLIN 250 MG/5ML
1 SUSPENSION, RECONSTITUTED, ORAL (ML) ORAL
Qty: 3 | Refills: 0
Start: 2023-10-20

## 2023-10-20 RX ORDER — SODIUM BICARBONATE 1 MEQ/ML
1 SYRINGE (ML) INTRAVENOUS
Qty: 0 | Refills: 0 | DISCHARGE
Start: 2023-10-20

## 2023-10-20 RX ORDER — SODIUM BICARBONATE 1 MEQ/ML
1 SYRINGE (ML) INTRAVENOUS
Qty: 60 | Refills: 0
Start: 2023-10-20 | End: 2023-11-18

## 2023-10-20 RX ADMIN — Medication 4: at 12:50

## 2023-10-20 RX ADMIN — Medication 650 MILLIGRAM(S): at 05:40

## 2023-10-20 RX ADMIN — Medication 500 MILLIGRAM(S): at 05:56

## 2023-10-20 RX ADMIN — INSULIN GLARGINE 25 UNIT(S): 100 INJECTION, SOLUTION SUBCUTANEOUS at 08:30

## 2023-10-20 RX ADMIN — AMLODIPINE BESYLATE 5 MILLIGRAM(S): 2.5 TABLET ORAL at 05:41

## 2023-10-20 RX ADMIN — HEPARIN SODIUM 5000 UNIT(S): 5000 INJECTION INTRAVENOUS; SUBCUTANEOUS at 05:40

## 2023-10-20 RX ADMIN — HEPARIN SODIUM 5000 UNIT(S): 5000 INJECTION INTRAVENOUS; SUBCUTANEOUS at 12:50

## 2023-10-20 RX ADMIN — Medication 2: at 08:22

## 2023-10-20 NOTE — PROGRESS NOTE ADULT - ATTENDING COMMENTS
NELSON on CKD 4  UTI    Cr improving with antibx, cont for now- hold lisinopril and can be restarted as an outpatient

## 2023-10-20 NOTE — DISCHARGE NOTE NURSING/CASE MANAGEMENT/SOCIAL WORK - NSDCPEFALRISK_GEN_ALL_CORE
For information on Fall & Injury Prevention, visit: [email or web address] OR  [email or web address] OR  https://www.cdc.gov/steadi/patient.html

## 2023-10-20 NOTE — PROGRESS NOTE ADULT - SUBJECTIVE AND OBJECTIVE BOX
[facility] Division of Kidney Diseases & Hypertension  FOLLOW UP NOTE  [phone]    --------------------------------------------------------------------------------  Chief Complaint:Weakness    24 hour events/subjective: No acute events overnight. Seen with daughter at bedside. Ambulating without issues, much improved since admission. No dysuria or hematuria. Patient would like to transition all care, including nephrology, to [facility].      PAST HISTORY  --------------------------------------------------------------------------------  No significant changes to PMH, PSH, FHx, SHx, unless otherwise noted    ALLERGIES & MEDICATIONS  --------------------------------------------------------------------------------  Allergies    No Known Allergies    Intolerances    Standing Inpatient Medications  amLODIPine   Tablet 5 milliGRAM(s) Oral daily  amoxicillin 500 milliGRAM(s) Oral every 12 hours  atorvastatin 10 milliGRAM(s) Oral at bedtime  dextrose 5%. 1000 milliLiter(s) IV Continuous <Continuous>  dextrose 5%. 1000 milliLiter(s) IV Continuous <Continuous>  dextrose 50% Injectable 12.5 Gram(s) IV Push once  dextrose 50% Injectable 25 Gram(s) IV Push once  dextrose 50% Injectable 25 Gram(s) IV Push once  glucagon  Injectable 1 milliGRAM(s) IntraMuscular once  heparin   Injectable 5000 Unit(s) SubCutaneous every 8 hours  insulin glargine Injectable (LANTUS) 20 Unit(s) SubCutaneous at bedtime  insulin glargine Injectable (LANTUS) 25 Unit(s) SubCutaneous every morning  insulin lispro (ADMELOG) corrective regimen sliding scale   SubCutaneous three times a day before meals  insulin lispro (ADMELOG) corrective regimen sliding scale   SubCutaneous at bedtime  lactated ringers. 500 milliLiter(s) IV Continuous <Continuous>  sodium bicarbonate 650 milliGRAM(s) Oral every 12 hours  sodium chloride 0.9%. 1000 milliLiter(s) IV Continuous <Continuous>    PRN Inpatient Medications  acetaminophen     Tablet .. 650 milliGRAM(s) Oral every 6 hours PRN  aluminum hydroxide/magnesium hydroxide/simethicone Suspension 30 milliLiter(s) Oral every 4 hours PRN  dextrose Oral Gel 15 Gram(s) Oral once PRN  melatonin 3 milliGRAM(s) Oral at bedtime PRN  ondansetron Injectable 4 milliGRAM(s) IV Push every 8 hours PRN      REVIEW OF SYSTEMS  --------------------------------------------------------------------------------  Gen: No fevers/chills  Skin: No rashes  Head/Eyes/Ears/Mouth: No headache  Respiratory: No dyspnea, cough  CV: No chest pain  GI: No abdominal pain, diarrhea, constipation, nausea, vomiting  : (+) dysuria. No increased frequency, hematuria, nocturia  MSK: No joint pain, no edema  Neuro: (+) weakness. No lightheadedness.    All other systems were reviewed and are negative, except as noted.    VITALS/PHYSICAL EXAM  --------------------------------------------------------------------------------  T(C): 36.4 (10-20-23 @ 05:40), Max: 36.8 (10-19-23 @ 14:00)  HR: 82 (10-20-23 @ 05:40) (80 - 85)  BP: 135/58 (10-20-23 @ 05:40) (132/65 - 139/90)  RR: 18 (10-20-23 @ 05:40) (17 - 18)  SpO2: 99% (10-20-23 @ 05:40) (99% - 99%)  Wt(kg): --  Height (cm): 154.9 (10-18-23 @ 11:43)  Weight (kg): 71.4 (10-18-23 @ 11:43)  BMI (kg/m2): 29.8 (10-18-23 @ 11:43)  BSA (m2): 1.71 (10-18-23 @ 11:43)    10-19-23 @ 07:01  -  10-20-23 @ 07:00  --------------------------------------------------------  IN: 837 mL / OUT: 0 mL / NET: 837 mL    Physical Exam:  CONSTITUTIONAL: nno apparent distress.  EYES: PERRLA. EOMI. No scleral icterus.  CV: RRR. Normal S1 and S2. No murmurs, rubs, or gallops. No edema. Pulses equal bilaterally.  PULM: Clear to auscultation throughout. Respirations unlabored. No wheezing, rales, or rhonchi.  GI: Soft, non-tender. No palpable masses. Normal abnormal bowel sounds.  MSK: No cyanosis or clubbing.  NEURO: CN II-XII grossly intact.  PSYCH: A+O, appropriately communicating and responding to questions    LABS/STUDIES  --------------------------------------------------------------------------------              9.9    10.96 >-----------<  428      [10-20-23 @ 07:02]              30.7     138  |  103  |  62  ----------------------------<  196      [10-20-23 @ 07:02]  4.3   |  19  |  3.79        Ca     8.9     [10-20-23 @ 07:02]      Mg     2.60     [10-20-23 @ 07:02]      Phos  5.8     [10-20-23 @ 07:02]    Creatinine Trend:  SCr 3.79 [10-20 @ 07:02]  SCr 4.07 [10-19 @ 11:53]  SCr 4.74 [10-18 @ 06:18]  SCr 5.13 [10-17 @ 14:30]  SCr 5.36 [10-16 @ 22:15]    Urinalysis - [10-20-23 @ 07:02]      Color  / Appearance  / SG  / pH       Gluc 196 / Ketone   / Bili  / Urobili        Blood  / Protein  / Leuk Est  / Nitrite       RBC  / WBC  / Hyaline  / Gran  / Sq Epi  / Non Sq Epi  / Bacteria     Urine Creatinine 37      [10-17-23 @ 14:30]  Urine Sodium 97      [10-17-23 @ 14:30]  Urine Urea Nitrogen 287.0      [10-17-23 @ 14:30]      HCV 0.09, Nonreact      [10-18-23 @ 06:18]

## 2023-10-20 NOTE — DISCHARGE NOTE NURSING/CASE MANAGEMENT/SOCIAL WORK - NSDCFUADDAPPT_GEN_ALL_CORE_FT
APPTS ARE READY TO BE MADE: [ X ] YES    Best Family or Patient Contact (if needed):    Additional Information about above appointments (if needed):    1: Primary care doctor  2: Endocrinologist     Other comments or requests: patient will like to follow up with the [facility]

## 2023-10-20 NOTE — DISCHARGE NOTE NURSING/CASE MANAGEMENT/SOCIAL WORK - PATIENT PORTAL LINK FT
You can access the FollowMyHealth Patient Portal offered by [facility] by registering at the following website: [email or web address]. By joining [employer]’s FollowMyHealth portal, you will also be able to view your health information using other applications (apps) compatible with our system.

## 2023-10-20 NOTE — PROGRESS NOTE ADULT - ASSESSMENT
Fartun Powers is a 62yo female with a hx of T2DM, CKD4, HTN, and HLD who presents with dysuria, found to have a UTI. Nephrology is consulted for NELSON on CKD vs CKD progression.    [place] [facility] [place]. Alvarez ([facility]. Creatinine has been steadily increasing from 2.26 (2022) -> 3 (1/2023) -> 4.17 (10/10/2023) -> 5.36 (10/16/2023) on admission. SCr has decreased to 3.79 today 10/20. Likely NELSON on CKD in the setting of infection and over-diuresis. Responding appropriately to fluids and treatment of UTI.       RECOMMENDATIONS:   - Continue abx for UTI  - Increase sodium bicarb 650mg to TID  - Avoid nephrotoxic drugs  - Renally dose medications  - Patient wishes to transition care to [facility]. Follow-up with Dr. Mcqueen in 4 weeks at [address] [place], [place] [address]Jamil      Plan discussed with attending. Note is incomplete until signed by attending.    __________  Ayan Garcia MD PGY-1 Fartun Powers is a 62yo female with a hx of T2DM, CKD4, HTN, and HLD who presents with dysuria, found to have a UTI. Nephrology is consulted for NELSON on CKD vs CKD progression.    Follows with Dr. Alvarez ([facility]) Tessie at [facility]. Creatinine has been steadily increasing from 2.26 (2022) -> 3 (1/2023) -> 4.17 (10/10/2023) -> 5.36 (10/16/2023) on admission. SCr has decreased to 3.79 today 10/20. Likely NELSON on CKD in the setting of infection and over-diuresis. Responding appropriately to fluids and treatment of UTI.       RECOMMENDATIONS:   - Continue abx for UTI  - Increase sodium bicarb 650mg to TID  - Avoid nephrotoxic drugs  - Renally dose medications  - Hold lisinopril upon d/c as it can be restarted as an outpatient  - Discharge with Lasix prn for LE swelling  - Patient wishes to transition care to [facility]. Follow-up with Dr. Mcqueen in 4 weeks at [address] [place], [place] [address]Jamil Oliva discussed with attending. Note is incomplete until signed by attending.    __________  Ayan Garcia MD PGY-1

## 2023-10-20 NOTE — DISCHARGE NOTE NURSING/CASE MANAGEMENT/SOCIAL WORK - ADDITIONAL RESOURCE NAME
Provided with listing of physical therapy office that make home visits. Please call to schedule appointment.

## 2023-10-22 LAB
CULTURE RESULTS: SIGNIFICANT CHANGE UP
SPECIMEN SOURCE: SIGNIFICANT CHANGE UP

## 2023-10-23 NOTE — CHART NOTE - NSCHARTNOTEFT_GEN_A_CORE
Spoke with Nephrology, recommend to continue holding Lisinopril on discharge. Pt can continue with Lasix PRN for leg swelling. Recommend outpatient follow up with Dr. Hawthorne in 4 weeks.     ACP  [phone]
Left a message for patient in regards to follow up care with callback information.
Patient requested call back on 10/24

## 2023-10-24 PROBLEM — Z00.00 ENCOUNTER FOR PREVENTIVE HEALTH EXAMINATION: Status: ACTIVE | Noted: 2023-10-24

## 2023-10-27 ENCOUNTER — APPOINTMENT (OUTPATIENT)
Dept: INTERNAL MEDICINE | Facility: CLINIC | Age: 63
End: 2023-10-27

## 2023-10-27 DIAGNOSIS — Z13.228 ENCOUNTER FOR SCREENING FOR OTHER METABOLIC DISORDERS: ICD-10-CM

## 2023-10-27 DIAGNOSIS — Z12.9 ENCOUNTER FOR SCREENING FOR MALIGNANT NEOPLASM, SITE UNSPECIFIED: ICD-10-CM

## 2023-10-27 DIAGNOSIS — Z13.6 ENCOUNTER FOR SCREENING FOR CARDIOVASCULAR DISORDERS: ICD-10-CM

## 2023-11-02 ENCOUNTER — APPOINTMENT (OUTPATIENT)
Dept: NEPHROLOGY | Facility: CLINIC | Age: 63
End: 2023-11-02
Payer: MEDICAID

## 2023-11-02 VITALS
BODY MASS INDEX: 30.21 KG/M2 | WEIGHT: 160 LBS | HEART RATE: 89 BPM | OXYGEN SATURATION: 99 % | DIASTOLIC BLOOD PRESSURE: 72 MMHG | HEIGHT: 61 IN | SYSTOLIC BLOOD PRESSURE: 138 MMHG | TEMPERATURE: 98.7 F

## 2023-11-02 DIAGNOSIS — N17.9 ACUTE KIDNEY FAILURE, UNSPECIFIED: ICD-10-CM

## 2023-11-02 PROCEDURE — 99215 OFFICE O/P EST HI 40 MIN: CPT

## 2023-11-03 DIAGNOSIS — D72.829 ELEVATED WHITE BLOOD CELL COUNT, UNSPECIFIED: ICD-10-CM

## 2023-11-03 LAB
25(OH)D3 SERPL-MCNC: 19.5 NG/ML
ALBUMIN SERPL ELPH-MCNC: 3.6 G/DL
ANION GAP SERPL CALC-SCNC: 14 MMOL/L
BASOPHILS # BLD AUTO: 0.02 K/UL
BASOPHILS NFR BLD AUTO: 0.1 %
BUN SERPL-MCNC: 60 MG/DL
CALCIUM SERPL-MCNC: 9.2 MG/DL
CALCIUM SERPL-MCNC: 9.2 MG/DL
CHLORIDE SERPL-SCNC: 103 MMOL/L
CO2 SERPL-SCNC: 22 MMOL/L
CREAT SERPL-MCNC: 3.35 MG/DL
EGFR: 15 ML/MIN/1.73M2
EOSINOPHIL # BLD AUTO: 0.15 K/UL
EOSINOPHIL NFR BLD AUTO: 1.1 %
FERRITIN SERPL-MCNC: 165 NG/ML
GLUCOSE SERPL-MCNC: 128 MG/DL
HCT VFR BLD CALC: 30.4 %
HGB BLD-MCNC: 10.1 G/DL
IMM GRANULOCYTES NFR BLD AUTO: 0.3 %
IRON SATN MFR SERPL: 11 %
IRON SERPL-MCNC: 31 UG/DL
LYMPHOCYTES # BLD AUTO: 1.43 K/UL
LYMPHOCYTES NFR BLD AUTO: 10.4 %
MAN DIFF?: NORMAL
MCHC RBC-ENTMCNC: 29.1 PG
MCHC RBC-ENTMCNC: 33.2 GM/DL
MCV RBC AUTO: 87.6 FL
MONOCYTES # BLD AUTO: 0.78 K/UL
MONOCYTES NFR BLD AUTO: 5.7 %
NEUTROPHILS # BLD AUTO: 11.27 K/UL
NEUTROPHILS NFR BLD AUTO: 82.4 %
PARATHYROID HORMONE INTACT: 72 PG/ML
PHOSPHATE SERPL-MCNC: 5.5 MG/DL
PLATELET # BLD AUTO: 415 K/UL
POTASSIUM SERPL-SCNC: 3.8 MMOL/L
RBC # BLD: 3.47 M/UL
RBC # FLD: 12.4 %
SODIUM SERPL-SCNC: 138 MMOL/L
TIBC SERPL-MCNC: 272 UG/DL
UIBC SERPL-MCNC: 241 UG/DL
WBC # FLD AUTO: 13.69 K/UL

## 2023-11-03 RX ORDER — CHOLECALCIFEROL (VITAMIN D3) 125 MCG
50 MCG TABLET ORAL
Qty: 90 | Refills: 3 | Status: ACTIVE | COMMUNITY
Start: 2023-11-03 | End: 1900-01-01

## 2023-11-07 RX ORDER — CANAGLIFLOZIN 100 MG/1
1 TABLET, FILM COATED ORAL
Refills: 0 | DISCHARGE

## 2023-11-07 RX ORDER — LISINOPRIL 2.5 MG/1
1 TABLET ORAL
Refills: 0 | DISCHARGE

## 2023-11-22 LAB
APPEARANCE: CLEAR
BACTERIA UR CULT: NORMAL
BACTERIA: NEGATIVE /HPF
BILIRUBIN URINE: NEGATIVE
BLOOD URINE: NEGATIVE
CAST: 0 /LPF
COLOR: YELLOW
CREAT SPEC-SCNC: 21 MG/DL
CREAT/PROT UR: 10.8 RATIO
EPITHELIAL CELLS: 2 /HPF
GLUCOSE QUALITATIVE U: NEGATIVE MG/DL
KETONES URINE: NEGATIVE MG/DL
LEUKOCYTE ESTERASE URINE: NEGATIVE
MICROSCOPIC-UA: NORMAL
NITRITE URINE: NEGATIVE
PH URINE: 6
PROT UR-MCNC: 224 MG/DL
PROTEIN URINE: 300 MG/DL
RED BLOOD CELLS URINE: 2 /HPF
SPECIFIC GRAVITY URINE: 1.01
UROBILINOGEN URINE: 0.2 MG/DL
WHITE BLOOD CELLS URINE: 8 /HPF

## 2023-11-28 ENCOUNTER — OUTPATIENT (OUTPATIENT)
Dept: OUTPATIENT SERVICES | Facility: HOSPITAL | Age: 63
LOS: 1 days | End: 2023-11-28
Payer: MEDICAID

## 2023-11-28 ENCOUNTER — MED ADMIN CHARGE (OUTPATIENT)
Age: 63
End: 2023-11-28

## 2023-11-28 ENCOUNTER — APPOINTMENT (OUTPATIENT)
Dept: INTERNAL MEDICINE | Facility: CLINIC | Age: 63
End: 2023-11-28
Payer: MEDICAID

## 2023-11-28 ENCOUNTER — RESULT REVIEW (OUTPATIENT)
Age: 63
End: 2023-11-28

## 2023-11-28 VITALS
HEIGHT: 61 IN | DIASTOLIC BLOOD PRESSURE: 70 MMHG | OXYGEN SATURATION: 99 % | HEART RATE: 85 BPM | WEIGHT: 155 LBS | SYSTOLIC BLOOD PRESSURE: 130 MMHG | BODY MASS INDEX: 29.27 KG/M2

## 2023-11-28 DIAGNOSIS — T78.40XA ALLERGY, UNSPECIFIED, INITIAL ENCOUNTER: ICD-10-CM

## 2023-11-28 DIAGNOSIS — Z00.00 ENCOUNTER FOR GENERAL ADULT MEDICAL EXAMINATION W/OUT ABNORMAL FINDINGS: ICD-10-CM

## 2023-11-28 DIAGNOSIS — I10 ESSENTIAL (PRIMARY) HYPERTENSION: ICD-10-CM

## 2023-11-28 PROBLEM — E11.9 TYPE 2 DIABETES MELLITUS WITHOUT COMPLICATIONS: Chronic | Status: ACTIVE | Noted: 2023-10-17

## 2023-11-28 PROBLEM — N18.4 CHRONIC KIDNEY DISEASE, STAGE 4 (SEVERE): Chronic | Status: ACTIVE | Noted: 2023-10-17

## 2023-11-28 PROBLEM — E78.5 HYPERLIPIDEMIA, UNSPECIFIED: Chronic | Status: ACTIVE | Noted: 2023-10-17

## 2023-11-28 PROCEDURE — G0438: CPT

## 2023-11-28 PROCEDURE — G0009: CPT

## 2023-11-28 PROCEDURE — 90677 PCV20 VACCINE IM: CPT

## 2023-11-28 PROCEDURE — 80061 LIPID PANEL: CPT

## 2023-11-28 PROCEDURE — 99387 INIT PM E/M NEW PAT 65+ YRS: CPT | Mod: GC

## 2023-11-28 PROCEDURE — 83036 HEMOGLOBIN GLYCOSYLATED A1C: CPT

## 2023-11-28 RX ORDER — LISINOPRIL 5 MG/1
5 TABLET ORAL DAILY
Refills: 0 | Status: DISCONTINUED | COMMUNITY
End: 2023-11-28

## 2023-11-28 RX ORDER — MONTELUKAST 10 MG/1
10 TABLET, FILM COATED ORAL
Qty: 30 | Refills: 2 | Status: ACTIVE | COMMUNITY

## 2023-11-28 RX ORDER — SITAGLIPTIN 50 MG/1
50 TABLET, FILM COATED ORAL AT BEDTIME
Refills: 0 | Status: DISCONTINUED | COMMUNITY
End: 2023-11-28

## 2023-11-28 RX ORDER — AMLODIPINE BESYLATE 10 MG/1
10 TABLET ORAL DAILY
Refills: 0 | Status: DISCONTINUED | COMMUNITY
End: 2023-11-28

## 2023-11-28 RX ORDER — PEN NEEDLE, DIABETIC 30 GX3/16"
30G X 8 MM NEEDLE, DISPOSABLE MISCELLANEOUS
Qty: 1 | Refills: 0 | Status: ACTIVE | COMMUNITY
Start: 2023-11-28

## 2023-11-28 RX ORDER — FLUTICASONE PROPIONATE 50 UG/1
50 SPRAY, METERED NASAL
Qty: 1 | Refills: 2 | Status: ACTIVE | COMMUNITY

## 2023-11-28 RX ORDER — ALBUTEROL SULFATE 90 UG/1
108 (90 BASE) AEROSOL, METERED RESPIRATORY (INHALATION)
Refills: 0 | Status: DISCONTINUED | COMMUNITY
End: 2023-11-28

## 2023-11-28 RX ORDER — INSULIN GLARGINE 100 [IU]/ML
100 INJECTION, SOLUTION SUBCUTANEOUS
Refills: 0 | Status: DISCONTINUED | COMMUNITY
End: 2023-11-28

## 2023-11-28 RX ORDER — MOMETASONE FUROATE AND FORMOTEROL FUMARATE DIHYDRATE 200; 5 UG/1; UG/1
200-5 AEROSOL RESPIRATORY (INHALATION)
Qty: 1 | Refills: 2 | Status: ACTIVE | COMMUNITY
Start: 2023-11-28 | End: 1900-01-01

## 2023-11-28 RX ORDER — AZELASTINE HYDROCHLORIDE 137 UG/1
137 SPRAY, METERED NASAL
Qty: 1 | Refills: 2 | Status: ACTIVE | COMMUNITY

## 2023-11-28 RX ORDER — CETIRIZINE HCL 10 MG
10 TABLET ORAL DAILY
Refills: 0 | Status: DISCONTINUED | COMMUNITY
End: 2023-11-28

## 2023-11-28 RX ORDER — ATENOLOL 25 MG/1
25 TABLET ORAL DAILY
Refills: 0 | Status: DISCONTINUED | COMMUNITY
End: 2023-11-28

## 2023-11-28 RX ORDER — METFORMIN HYDROCHLORIDE 850 MG/1
850 TABLET, COATED ORAL DAILY
Refills: 0 | Status: DISCONTINUED | COMMUNITY
End: 2023-11-28

## 2023-11-29 LAB
CHOLEST SERPL-MCNC: 107 MG/DL
ESTIMATED AVERAGE GLUCOSE: 154 MG/DL
HBA1C MFR BLD HPLC: 7 %
HDLC SERPL-MCNC: 42 MG/DL
LDLC SERPL CALC-MCNC: 41 MG/DL
NONHDLC SERPL-MCNC: 65 MG/DL
TRIGL SERPL-MCNC: 140 MG/DL

## 2023-12-12 ENCOUNTER — APPOINTMENT (OUTPATIENT)
Dept: NEPHROLOGY | Facility: CLINIC | Age: 63
End: 2023-12-12
Payer: MEDICAID

## 2023-12-12 ENCOUNTER — TRANSCRIPTION ENCOUNTER (OUTPATIENT)
Age: 63
End: 2023-12-12

## 2023-12-12 VITALS
HEART RATE: 82 BPM | OXYGEN SATURATION: 97 % | TEMPERATURE: 97.6 F | DIASTOLIC BLOOD PRESSURE: 90 MMHG | SYSTOLIC BLOOD PRESSURE: 156 MMHG | HEIGHT: 61 IN

## 2023-12-12 DIAGNOSIS — N18.4 CHRONIC KIDNEY DISEASE, STAGE 4 (SEVERE): ICD-10-CM

## 2023-12-12 DIAGNOSIS — R60.0 LOCALIZED EDEMA: ICD-10-CM

## 2023-12-12 PROCEDURE — 99215 OFFICE O/P EST HI 40 MIN: CPT

## 2023-12-13 DIAGNOSIS — N18.4 CHRONIC KIDNEY DISEASE, STAGE 4 (SEVERE): ICD-10-CM

## 2023-12-13 DIAGNOSIS — M54.50 LOW BACK PAIN, UNSPECIFIED: ICD-10-CM

## 2023-12-13 DIAGNOSIS — Z23 ENCOUNTER FOR IMMUNIZATION: ICD-10-CM

## 2023-12-13 DIAGNOSIS — Z00.00 ENCOUNTER FOR GENERAL ADULT MEDICAL EXAMINATION WITHOUT ABNORMAL FINDINGS: ICD-10-CM

## 2023-12-13 DIAGNOSIS — T78.40XA ALLERGY, UNSPECIFIED, INITIAL ENCOUNTER: ICD-10-CM

## 2023-12-13 DIAGNOSIS — G89.29 OTHER CHRONIC PAIN: ICD-10-CM

## 2023-12-13 DIAGNOSIS — E78.5 HYPERLIPIDEMIA, UNSPECIFIED: ICD-10-CM

## 2023-12-13 DIAGNOSIS — E11.9 TYPE 2 DIABETES MELLITUS WITHOUT COMPLICATIONS: ICD-10-CM

## 2023-12-13 PROBLEM — R60.0 LOWER EXTREMITY EDEMA: Status: ACTIVE | Noted: 2023-11-02

## 2023-12-13 NOTE — PHYSICAL EXAM
[General Appearance - Alert] : alert [General Appearance - In No Acute Distress] : in no acute distress [Sclera] : the sclera and conjunctiva were normal [PERRL With Normal Accommodation] : pupils were equal in size, round, and reactive to light [Outer Ear] : the ears and nose were normal in appearance [Extraocular Movements] : extraocular movements were intact [Oropharynx] : the oropharynx was normal [Neck Appearance] : the appearance of the neck was normal [Exaggerated Use Of Accessory Muscles For Inspiration] : no accessory muscle use [Pitting Edema] : pitting edema present [___ +] : bilateral [unfilled]+ pretibial pitting edema [Bowel Sounds] : normal bowel sounds [Abdomen Soft] : soft [Abdomen Tenderness] : non-tender [Abdomen Mass (___ Cm)] : no abdominal mass palpated [Nail Clubbing] : no clubbing  or cyanosis of the fingernails [Involuntary Movements] : no involuntary movements were seen [Skin Color & Pigmentation] : normal skin color and pigmentation [Skin Turgor] : normal skin turgor [] : no rash [No Focal Deficits] : no focal deficits [Oriented To Time, Place, And Person] : oriented to person, place, and time [Impaired Insight] : insight and judgment were intact [Affect] : the affect was normal

## 2023-12-13 NOTE — HISTORY OF PRESENT ILLNESS
[FreeTextEntry1] : 63F with here for follow up post hospitalization. She has a history of T2DM, CKD4, HTN, and HLD who presented to [facility] with dysuria on 10/16/23, found to have a UTI in the setting of invokana use. Nephrology was consulted for NELSON on CKD vs CKD progression.  Follows with Dr. Alvarez ([facility]) Tessie at [facility]. Creatinine has been steadily increasing from 2.26 (2022) -> 3 (1/2023) -> 4.17 (10/10/2023) -> 5.36 (10/16/2023) on admission. SCr has decreased to 3.79 on 10/20. Likely NELSON on CKD in the setting of infection and over-diuresis. Responded appropriately to fluids and treatment of UTI.  Pt has had persistent LE swelling since her discharge from the hospital and daughter has been giving her lasix 40mg po qd which I recommended on last visit. She is not wheezing but has a long history of asthma. No sough or congestion. No fevers or chills.  No blood in the urine, no dysuria, no nausea, vomiting or diarrhea. Her appetite is low. No itching.  She got flu shot 10/12/23.

## 2023-12-13 NOTE — PLAN
[TextEntry] : NELSON on CKD 4- was resolving in the hospital however she has persistent LE edema- cannot be sure it is not related to CCB. Per pt and daughter she has a very high salt diet and refuses to eat unless she adds more salt. Stressed the importance of avoiding salt and continuing lasix daily. Cont on furosemide 40mg po qd standing. Will check CKD labs today. Information given to her regarding CKD 4/5 and possibility of needing dialysis and transplant in the near future previously, daughter attended informational session done by  RN.   Cont follow up with Sue from .  Will check labs today and call daughter. Follow up in 6-8 wks.

## 2023-12-13 NOTE — CURRENT MEDS
[TextEntry] : amlodipine 5mg atorvastatin 10mg Lantus 30 u qhs lantus 35u q am repaglinide 1mg/2mg sodium bicarb 650mg bid Victoza 1.8mg SQW furosemide 40mg po qd

## 2023-12-14 DIAGNOSIS — D50.9 IRON DEFICIENCY ANEMIA, UNSPECIFIED: ICD-10-CM

## 2023-12-15 ENCOUNTER — NON-APPOINTMENT (OUTPATIENT)
Age: 63
End: 2023-12-15

## 2023-12-18 ENCOUNTER — APPOINTMENT (OUTPATIENT)
Dept: NEPHROLOGY | Facility: CLINIC | Age: 63
End: 2023-12-18
Payer: COMMERCIAL

## 2023-12-18 ENCOUNTER — APPOINTMENT (OUTPATIENT)
Dept: TRANSPLANT | Facility: CLINIC | Age: 63
End: 2023-12-18
Payer: COMMERCIAL

## 2023-12-18 VITALS
OXYGEN SATURATION: 98 % | WEIGHT: 155 LBS | DIASTOLIC BLOOD PRESSURE: 83 MMHG | HEIGHT: 61 IN | TEMPERATURE: 98 F | SYSTOLIC BLOOD PRESSURE: 153 MMHG | RESPIRATION RATE: 12 BRPM | HEART RATE: 84 BPM | BODY MASS INDEX: 29.27 KG/M2

## 2023-12-18 PROCEDURE — 99205 OFFICE O/P NEW HI 60 MIN: CPT

## 2023-12-18 RX ORDER — GUSELKUMAB 100 MG/ML
100 INJECTION SUBCUTANEOUS
Refills: 0 | Status: ACTIVE | COMMUNITY
Start: 2023-12-18

## 2023-12-18 RX ORDER — CETIRIZINE HYDROCHLORIDE 10 MG/1
10 TABLET, COATED ORAL DAILY
Refills: 0 | Status: ACTIVE | COMMUNITY
Start: 2023-12-18

## 2023-12-18 NOTE — HISTORY OF PRESENT ILLNESS
[Diabetes Mellitus] : Diabetes Mellitus [Hypertension] : Hypertension [Diabetes] : diabetes [Retinopathy] : retinopathy [Neuropathy] : neuropathy [Insulin] : insulin treatment [Not Working] : Not working [Previous Kidney Transplant] : no previous kidney transplant [Cardiac History] : no cardiac history [Blood Transfusion] : no prior blood transfusion [Claudication/Angina] : no claudication/angina [Hx of DVT/Thrombosis/Miscarriage] : no history of dvt/thrombosis/miscarriage [Lower Extremity Ulcers] : no lower extremity ulcers [TextBox_16] : n/a [TextBox_20] : 2018 [TextBox_24] : 30 years [TextBox_28] : 30 years [TextBox_30] : 1-2 blocks before wheezing [TextBox_39] : 1995 [TextBox_42] : 63F with worsening CKD 5 for evaluation for kidney transplant. Patient initially diagnosed in 2018 with CKD based on routine blood work. Had recent admission 10/23 to [facility] with LE edema and found to have progression to CKD 5. Responded to diuretics  Last Cr 3.26, GFR 15  Prior transplant - No Listed elsewhere - No Dx of kidney disease - 2018 HD - pre-emptive Living Donor - Daughter interested  PMH: CKD 5, HTN, DM, hypercholesterolemia PSH: none Meds: amLODIPine Besylate 5 MG Oral Tablet Atorvastatin Calcium 10 MG Oral Tablet; TAKE 1 TABLET AT BEDTIME Azelastine HCl - 137 MCG/SPRAY Nasal Solution; 2 PUFF BID IN THE NOSTRILS Dulera 200-5 MCG/ACT Inhalation Aerosol; INHALE 2 PUFFS AT 12 HOUR INTERVALS (MORNING AND EVENING) Fluticasone Propionate 50 MCG/ACT Nasal Suspension; 2 PUFF DAILY IN THE NOSTRILS Furosemide 40 MG Oral Tablet; TAKE 1 TABLET DAILY AS DIRECTED Lantus 100 UNIT/ML Subcutaneous Solution Montelukast Sodium 10 MG Oral Tablet; TAKE 1 TABLET IN THE EVENING Pen Needles 30G X 8 MM; USE AS DIRECTED Repaglinide 1 MG Oral Tablet Repaglinide 2 MG Oral Tablet Sodium Bicarbonate 650 MG Oral Tablet; 1 tabs bid Victoza 18 MG/3ML Subcutaneous Solution Pen-injector Vitamin D2 50 MCG (2000 UT) Oral Tablet; Take 1 tablet daily amlodipine 5mg atorvastatin 10mg Lantus 30 u qhs lantus 35u q am repaglinide 1mg/2mg sodium bicarb 650mg bid Victoza 1.8mg SQW furosemide 40mg po qd  Allergies: NKDA Social: Denies tob, etoh, drugs Lives with daughter in [place] [facility]: Parents both , DM-related complications No kidney disease or malignancy Has 5 sisters and 4 brothers, all have DM, no known kidney disease 6 children, no significant medical problems  ROS: Cardiac - no MI, CHF, CAD Pulmonary- no h/o COPD, Asthma, or pneumonia Infections- no h/o TB, HIV, Hepatitis. +vaccinated for covid.  Heme- no h/o malignancy or bleeding disorders. No DVT/PE Endo- Diabetes, insulin with retinopathy and neuropathy. no Thyroid disease Neuro- no h/o CVA or seizures.  Sensitization events- no previous blood transfusions or previous transplant Hospitalized 2 months ago for UTI  - No Kidney stones. Makes normal amount of urine GI - Never had colonoscopy. no blood in stool Last Mammogram in , normal Last PAP smear in , negative

## 2023-12-18 NOTE — PHYSICAL EXAM
[No Acute Distress] : no acute distress [Sclera Anicteric] : sclera anicteric [Clear to Auscultation] : clear to auscultation [Breathing Comfortably on RA] : breathing comfortably on room air [Normal Rate] : normal rate [Regular Rhythm] : regular rhythm [Soft] : soft [Non-tender] : non-tender [No] : no fistula/graft [] : right dorsalis pedis palpable [Normal] : normal [FreeTextEntry1] : lower dentures [TextBox_34] : slight edema b/l LE, No ulcers [TextBox_86] : No inguinal lymphadenopathy

## 2023-12-18 NOTE — REASON FOR VISIT
[Initial] : an initial visit for [Kidney Transplant Evaluation] : kidney transplant evaluation [Family Member] : family member [FreeTextEntry1] : CKD 5 [FreeTextEntry2] : Dr. Judith Mcqueen

## 2023-12-18 NOTE — ASSESSMENT
[Good candidate] : a good candidate. We should proceed with our protocol for evaluation for kidney transplantation. [FreeTextEntry1] : 63F with worsening CKD 5, not yet on HD  Has potential living donors Needs colonoscopy (never had one) Cardiology eval CT abd/pel without contrast to evaluate anatomy and vasculature Routine transplant workup

## 2023-12-18 NOTE — REVIEW OF SYSTEMS
[Sclera anicteric] : sclera anicteric [Wears glasses] : wears glasses [Fever] : no fever [Chest Pain] : no chest pain [SOB] : no shortness of breath [Abdominal Pain] : no abdominal pain [Dysuria] : no dysuria

## 2023-12-18 NOTE — END OF VISIT
[Time Spent: ___ minutes] : I have spent [unfilled] minutes of time on the encounter. [>50% of the face to face encounter time was spent on counseling and/or coordination of care for ___] : Greater than 50% of the face to face encounter time was spent on counseling and/or coordination of care for [unfilled] [Attestation] : We have discussed with the patient at length the risks and benefits of transplantation. The patient is aware that transplantation is a process that requires a life-long commitment, and that it is a personal choice to proceed with it rather than to remain with alternative treatments such as dialysis.  We discussed the differences in quality of life and patient survival between remaining on dialysis versus receiving a kidney transplant. We also discussed increased benefits of receiving a live donor kidney transplant versus a  donor kidney transplant. We discussed the risks of kidney transplantation in depth. Surgical risks included but were not limited to bleeding, infection, graft thrombosis, ureteral and vascular strictures, delayed graft function, urine leak, the development of fluid collections, cardiac events, damage to native blood vessels and potential need for re operation postoperatively. We also discussed the risks of postoperative immunosuppression including but not limited to increased risk of infection, cancer, weight gain, new onset or worsening of diabetes or hypertension on a temporary or permanent basis, water retention, back pain, constipation, diarrhea, dizziness, headache, joint pain, loss of appetite, nausea, stomach pain or upset, trouble sleeping, vomiting, and/or mental or mood changes were.  The patient also understands that there is a risk of recurrent primary kidney disease in the transplanted organ. We also discussed the risks and benefits of receiving a kidney from a donor with a Kidney Donor Profile Index (KDPI) score greater than 85% including a greater risk of delayed graft function, increased need for dialysis within the first 2-3 weeks after transplant, and statistically lower graft survival at 3 years. We discussed the one-year observed and expected patient and graft survival rates in comparison to the national one-year averages as described in the evaluation consent forms. Patient consent is in the chart. Patient is aware that they may withdraw their consent for transplantation at any time. I have answered all of the patient's questions as well as all questions of those present with the patient.  At the culmination of the patient's transplant candidacy workup, the patient will be presented to the Multidisciplinary Transplant Selection Committee for a decision whether to proceed with listing and transplantation.

## 2023-12-19 LAB
25(OH)D3 SERPL-MCNC: 23.9 NG/ML
ALBUMIN SERPL ELPH-MCNC: 3.1 G/DL
ANION GAP SERPL CALC-SCNC: 14 MMOL/L
APPEARANCE: CLEAR
BACTERIA UR CULT: NORMAL
BACTERIA: ABNORMAL /HPF
BASOPHILS # BLD AUTO: 0.03 K/UL
BASOPHILS NFR BLD AUTO: 0.2 %
BILIRUBIN URINE: NEGATIVE
BLOOD URINE: NEGATIVE
BUN SERPL-MCNC: 32 MG/DL
CALCIUM SERPL-MCNC: 8.2 MG/DL
CALCIUM SERPL-MCNC: 8.2 MG/DL
CAST: 1 /LPF
CHLORIDE SERPL-SCNC: 105 MMOL/L
CO2 SERPL-SCNC: 26 MMOL/L
COLOR: YELLOW
CREAT SERPL-MCNC: 3.26 MG/DL
CREAT SPEC-SCNC: 26 MG/DL
CREAT/PROT UR: 15.6 RATIO
EGFR: 15 ML/MIN/1.73M2
EOSINOPHIL # BLD AUTO: 0.36 K/UL
EOSINOPHIL NFR BLD AUTO: 2.7 %
EPITHELIAL CELLS: 7 /HPF
GLUCOSE QUALITATIVE U: 100 MG/DL
GLUCOSE SERPL-MCNC: 39 MG/DL
HCT VFR BLD CALC: 27.9 %
HGB BLD-MCNC: 8.6 G/DL
IMM GRANULOCYTES NFR BLD AUTO: 0.5 %
KETONES URINE: NEGATIVE MG/DL
LEUKOCYTE ESTERASE URINE: ABNORMAL
LYMPHOCYTES # BLD AUTO: 2.3 K/UL
LYMPHOCYTES NFR BLD AUTO: 17.6 %
MAN DIFF?: NORMAL
MCHC RBC-ENTMCNC: 27.5 PG
MCHC RBC-ENTMCNC: 30.8 GM/DL
MCV RBC AUTO: 89.1 FL
MICROSCOPIC-UA: NORMAL
MONOCYTES # BLD AUTO: 0.74 K/UL
MONOCYTES NFR BLD AUTO: 5.6 %
NEUTROPHILS # BLD AUTO: 9.6 K/UL
NEUTROPHILS NFR BLD AUTO: 73.4 %
NITRITE URINE: NEGATIVE
PARATHYROID HORMONE INTACT: 94 PG/ML
PH URINE: 7.5
PHOSPHATE SERPL-MCNC: 4.7 MG/DL
PLATELET # BLD AUTO: 297 K/UL
POTASSIUM SERPL-SCNC: 3.1 MMOL/L
PROT UR-MCNC: 402 MG/DL
PROTEIN URINE: 300 MG/DL
RBC # BLD: 3.13 M/UL
RBC # FLD: 13.5 %
RED BLOOD CELLS URINE: 0 /HPF
SODIUM SERPL-SCNC: 144 MMOL/L
SPECIFIC GRAVITY URINE: 1.01
UROBILINOGEN URINE: 0.2 MG/DL
WBC # FLD AUTO: 13.1 K/UL
WHITE BLOOD CELLS URINE: 5 /HPF

## 2023-12-19 NOTE — HISTORY OF PRESENT ILLNESS
[TextBox_42] : COURTNEY OLSON is a 63 year old female who presents for kidney transplant evaluation.  Cause of advancecd CKD : DM. Has known CKD since 2018.  Nephrologist:  Dr Judith Mcqueen Preemptive candidate   Patient initially diagnosed in 2018 with CKD based on routine blood work. Had recent admission 10/23 to [facility] with LE edema and found to have progression to CKD 5 which responded to diuretics. Last Cr 3.26, GFR 15  Other PMH : Cardiac -has HTN, HLD. no h/o MI , CHF, CAD Pulmonary-  no h/o COPD, Asthma Infections- no h/o TB, HIV, Hepatitis   Heme- no h/o malignancy or bleeding disorders. No DVT/PE Endo- has diabetes , on insulin. with neuropathy and retinopathy.  no Thyroid disease Neuro- no h/o CVA - no h/o renal stones Sensitization events-  no h/o blood transfusions . no h/o previous transplant Ob/gyn- has 6 children.  Surgical h/o : none   Functional status: can ambulate independently. able to walk 1-2 blocks.  Social history:  Is a non smoker, no alcohol or illicit use. not working. Lives with her daughter in [facility]: Parents both , DM-related complications. No kidney disease or malignancy. Has 5 sisters and 4 brothers, all have DM, no known kidney disease. 6 children, no significant medical problems

## 2023-12-19 NOTE — PLAN
[FreeTextEntry1] : 1. Advanced CKD:  Ms. COURTNEY OLSON  Is a good candidate for kidney transplantation. Has potential donors.   2.Cardiac risk: obtain echo, stress test and cardiac evaluation  3. Cancer screening- obtain colonoscopy, Mammo from 2022 was wnl. , Pap smear from 2023 was normal.  4. ID: Serology for acute and chronic viral infections. Screening for latent TB 5. Imaging: CXR, CT A/P  6.Diabetes Mellitus: check HbA1c   I have personally discussed the risks and benefits of transplantation and patient attended transplant education class where the following was disclosed:   Reviewed factors affecting survival and morbidity while on dialysis, the transplant wait list and reviewed carmen-operative and long-term risk factors affecting outcome in kidney transplantation.    One year SRTR outcomes for national and [facility] were discussed in regards to patient survival and graft survival after transplantation.    Details of transplant surgery, including complications were discussed. Immunosuppression and complications including infection including life threatening sepsis and opportunistic infections, malignancy and new onset diabetes were discussed.    Benefits of live donor transplantation as well as variability in wait times across regions and multiple listing were discussed.  KDPI >85% and PHS high risk criteria donors were discussed.  HCV kidney transplantation was discussed.   Will proceed with completing/ updating work up and listing for transplant/ live donor transplant once work up is reviewed and found to be acceptable by multidisciplinary listing committee.

## 2023-12-21 LAB
ABO + RH PNL BLD: NORMAL
ABO + RH PNL BLD: NORMAL
ALBUMIN SERPL ELPH-MCNC: 3.2 G/DL
ALP BLD-CCNC: 80 U/L
ALT SERPL-CCNC: 20 U/L
ANION GAP SERPL CALC-SCNC: 12 MMOL/L
APPEARANCE: CLEAR
AST SERPL-CCNC: 20 U/L
BACTERIA: NEGATIVE /HPF
BILIRUB SERPL-MCNC: 0.2 MG/DL
BILIRUBIN URINE: NEGATIVE
BLOOD URINE: NEGATIVE
BUN SERPL-MCNC: 37 MG/DL
C PEPTIDE SERPL-MCNC: 7.4 NG/ML
CALCIUM SERPL-MCNC: 9 MG/DL
CAST: 1 /LPF
CHLORIDE SERPL-SCNC: 104 MMOL/L
CHOLEST SERPL-MCNC: 121 MG/DL
CMV IGG SERPL QL: 4 U/ML
CMV IGG SERPL-IMP: POSITIVE
CO2 SERPL-SCNC: 26 MMOL/L
COLOR: YELLOW
COVID-19 NUCLEOCAPSID  GAM ANTIBODY INTERPRETATION: POSITIVE
COVID-19 SPIKE DOMAIN ANTIBODY INTERPRETATION: POSITIVE
CREAT SERPL-MCNC: 3.3 MG/DL
CREAT SPEC-SCNC: 27 MG/DL
CREAT/PROT UR: 18.1 RATIO
EBV DNA SERPL NAA+PROBE-ACNC: NOT DETECTED IU/ML
EBV EA AB SER IA-ACNC: <5 U/ML
EBV EA AB TITR SER IF: POSITIVE
EBV EA IGG SER QL IA: >600 U/ML
EBV EA IGG SER-ACNC: NEGATIVE
EBV EA IGM SER IA-ACNC: NEGATIVE
EBV PATRN SPEC IB-IMP: NORMAL
EBV VCA IGG SER IA-ACNC: 125 U/ML
EBV VCA IGM SER QL IA: <10 U/ML
EBVPCR LOG: NOT DETECTED LOG10IU/ML
EGFR: 15 ML/MIN/1.73M2
EPITHELIAL CELLS: 2 /HPF
EPSTEIN-BARR VIRUS CAPSID ANTIGEN IGG: POSITIVE
ESTIMATED AVERAGE GLUCOSE: 143 MG/DL
GLUCOSE QUALITATIVE U: 100 MG/DL
GLUCOSE SERPL-MCNC: 65 MG/DL
HAV IGM SER QL: NONREACTIVE
HBA1C MFR BLD HPLC: 6.6 %
HBV CORE IGG+IGM SER QL: NONREACTIVE
HBV SURFACE AB SER QL: NONREACTIVE
HBV SURFACE AB SERPL IA-ACNC: <3 MIU/ML
HBV SURFACE AG SER QL: NONREACTIVE
HCT VFR BLD CALC: 29.4 %
HCV AB SER QL: NONREACTIVE
HCV S/CO RATIO: 0.15 S/CO
HDLC SERPL-MCNC: 47 MG/DL
HEPATITIS A IGG ANTIBODY: REACTIVE
HGB BLD-MCNC: 9.6 G/DL
HIV1+2 AB SPEC QL IA.RAPID: NONREACTIVE
HSV 1+2 IGG SER IA-IMP: NEGATIVE
HSV 1+2 IGG SER IA-IMP: POSITIVE
HSV1 IGG SER QL: 31.7 INDEX
HSV2 IGG SER QL: 0.09 INDEX
KETONES URINE: NEGATIVE MG/DL
LDLC SERPL CALC-MCNC: 49 MG/DL
LEUKOCYTE ESTERASE URINE: NEGATIVE
M TB IFN-G BLD-IMP: NEGATIVE
MAGNESIUM SERPL-MCNC: 2.5 MG/DL
MCHC RBC-ENTMCNC: 27.9 PG
MCHC RBC-ENTMCNC: 32.7 GM/DL
MCV RBC AUTO: 85.5 FL
MICROSCOPIC-UA: NORMAL
NITRITE URINE: NEGATIVE
NONHDLC SERPL-MCNC: 74 MG/DL
PH URINE: 7.5
PHOSPHATE SERPL-MCNC: 5.2 MG/DL
PLATELET # BLD AUTO: 324 K/UL
POTASSIUM SERPL-SCNC: 3.2 MMOL/L
PROT SERPL-MCNC: 7.4 G/DL
PROT UR-MCNC: 490 MG/DL
PROTEIN URINE: 300 MG/DL
PSA SERPL-MCNC: <0.01 NG/ML
QUANTIFERON TB PLUS MITOGEN MINUS NIL: 8.09 IU/ML
QUANTIFERON TB PLUS NIL: 0.04 IU/ML
QUANTIFERON TB PLUS TB1 MINUS NIL: -0.01 IU/ML
QUANTIFERON TB PLUS TB2 MINUS NIL: 0 IU/ML
RBC # BLD: 3.44 M/UL
RBC # FLD: 13.8 %
RED BLOOD CELLS URINE: 0 /HPF
RUBV IGG FLD-ACNC: 1.1 INDEX
RUBV IGG SER-IMP: POSITIVE
SARS-COV-2 AB SERPL IA-ACNC: >250 U/ML
SARS-COV-2 AB SERPL QL IA: 10.3 INDEX
SODIUM SERPL-SCNC: 142 MMOL/L
SPECIFIC GRAVITY URINE: 1.01
T GONDII AB SER-IMP: NEGATIVE
T GONDII IGG SER QL: 4.2 IU/ML
T PALLIDUM AB SER QL IA: NEGATIVE
TRIGL SERPL-MCNC: 149 MG/DL
UROBILINOGEN URINE: 0.2 MG/DL
VZV AB TITR SER: POSITIVE
VZV IGG SER IF-ACNC: 1887 INDEX
WBC # FLD AUTO: 12.33 K/UL
WHITE BLOOD CELLS URINE: 3 /HPF

## 2023-12-27 ENCOUNTER — NON-APPOINTMENT (OUTPATIENT)
Age: 63
End: 2023-12-27

## 2023-12-27 ENCOUNTER — APPOINTMENT (OUTPATIENT)
Dept: CARDIOLOGY | Facility: CLINIC | Age: 63
End: 2023-12-27
Payer: COMMERCIAL

## 2023-12-27 VITALS
HEART RATE: 82 BPM | DIASTOLIC BLOOD PRESSURE: 86 MMHG | WEIGHT: 146 LBS | HEIGHT: 61 IN | BODY MASS INDEX: 27.56 KG/M2 | SYSTOLIC BLOOD PRESSURE: 182 MMHG | OXYGEN SATURATION: 100 % | RESPIRATION RATE: 16 BRPM

## 2023-12-27 DIAGNOSIS — I34.0 NONRHEUMATIC MITRAL (VALVE) INSUFFICIENCY: ICD-10-CM

## 2023-12-27 DIAGNOSIS — I44.4 LEFT ANTERIOR FASCICULAR BLOCK: ICD-10-CM

## 2023-12-27 DIAGNOSIS — I44.0 ATRIOVENTRICULAR BLOCK, FIRST DEGREE: ICD-10-CM

## 2023-12-27 PROCEDURE — 99205 OFFICE O/P NEW HI 60 MIN: CPT

## 2023-12-27 PROCEDURE — 93000 ELECTROCARDIOGRAM COMPLETE: CPT

## 2023-12-27 NOTE — HISTORY OF PRESENT ILLNESS
[FreeTextEntry1] : Mrs. Fartun Powers is a 63 year old woman presenting for cardiovascular evaluation as a preemptive candidate for kidney transplant. Advanced kidney disease attributed to diabetes. She also has hypertension and hyperlipidemia, but no known cardiac disease. She walks, but very slowly, denies chest pain or dyspnea. Sleeps poorly at night, but there is no orthopnea or paroxysmal nocturnal dyspnea.

## 2023-12-27 NOTE — PHYSICAL EXAM
[Normal Rate] : normal [Rhythm Regular] : regular [Normal S1] : normal S1 [Normal S2] : normal S2 [II] : a grade 2 [Holosystolic] : holosystolic [Medium] : medium pitched [No Pitting Edema] : no pitting edema present [2+] : left 2+ [1+] : left 1+ [No Abnormalities] : the abdominal aorta was not enlarged and no bruit was heard [Normal] : alert and oriented, normal memory [Right Carotid Bruit] : no bruit heard over the right carotid [Left Carotid Bruit] : no bruit heard over the left carotid

## 2023-12-27 NOTE — DISCUSSION/SUMMARY
[Patient] : the patient [EKG obtained to assist in diagnosis and management of assessed problem(s)] : EKG obtained to assist in diagnosis and management of assessed problem(s) [Hypercholesterolemia] : hypercholesterolemia [Responding to Treatment] : responding to treatment [Hypertension] : hypertension [Hypertensive Cardiomyopathy] : hypertensive cardiomyopathy [Echocardiogram] : echocardiogram [de-identified] : not well controlled [de-identified] : Murmur consistent with mitral regurgitation [FreeTextEntry1] : 63 year old woman preemptive candidate for kidney transplant without cardiac history, but with elevated blood pressure, murmur of mitral regurgitation, abnormal EKG. Arranging for cardiac echo and pharmacologic stress test with perfusion imaging.

## 2023-12-27 NOTE — CARDIOLOGY SUMMARY
[de-identified] : 12/27/2023 sinus rhythm 1str degree heart block,  ms, LAHB, LVH, non-specific ST-T wave abnormalitiy.

## 2024-01-18 ENCOUNTER — NON-APPOINTMENT (OUTPATIENT)
Age: 64
End: 2024-01-18

## 2024-01-29 ENCOUNTER — OUTPATIENT (OUTPATIENT)
Dept: OUTPATIENT SERVICES | Facility: HOSPITAL | Age: 64
LOS: 1 days | End: 2024-01-29
Payer: MEDICAID

## 2024-01-29 ENCOUNTER — APPOINTMENT (OUTPATIENT)
Dept: INTERNAL MEDICINE | Facility: CLINIC | Age: 64
End: 2024-01-29
Payer: MEDICAID

## 2024-01-29 DIAGNOSIS — J02.9 ACUTE PHARYNGITIS, UNSPECIFIED: ICD-10-CM

## 2024-01-29 PROCEDURE — G0463: CPT

## 2024-01-29 PROCEDURE — ZZZZZ: CPT

## 2024-01-30 ENCOUNTER — INPATIENT (INPATIENT)
Facility: HOSPITAL | Age: 64
LOS: 14 days | Discharge: HOME | End: 2024-02-14
Attending: STUDENT IN AN ORGANIZED HEALTH CARE EDUCATION/TRAINING PROGRAM | Admitting: STUDENT IN AN ORGANIZED HEALTH CARE EDUCATION/TRAINING PROGRAM
Payer: MEDICAID

## 2024-01-30 VITALS
TEMPERATURE: 98 F | HEART RATE: 118 BPM | SYSTOLIC BLOOD PRESSURE: 161 MMHG | OXYGEN SATURATION: 95 % | DIASTOLIC BLOOD PRESSURE: 59 MMHG | RESPIRATION RATE: 16 BRPM

## 2024-01-30 DIAGNOSIS — I10 ESSENTIAL (PRIMARY) HYPERTENSION: ICD-10-CM

## 2024-01-30 LAB
ALBUMIN SERPL ELPH-MCNC: 3 G/DL — LOW (ref 3.3–5)
ALP SERPL-CCNC: 69 U/L — SIGNIFICANT CHANGE UP (ref 40–120)
ALT FLD-CCNC: 20 U/L — SIGNIFICANT CHANGE UP (ref 4–33)
ANION GAP SERPL CALC-SCNC: 19 MMOL/L — HIGH (ref 7–14)
ANION GAP SERPL CALC-SCNC: 23 MMOL/L — HIGH (ref 7–14)
APPEARANCE UR: CLEAR — SIGNIFICANT CHANGE UP
AST SERPL-CCNC: 21 U/L — SIGNIFICANT CHANGE UP (ref 4–32)
B PERT DNA SPEC QL NAA+PROBE: SIGNIFICANT CHANGE UP
B PERT+PARAPERT DNA PNL SPEC NAA+PROBE: SIGNIFICANT CHANGE UP
B-OH-BUTYR SERPL-SCNC: 0.7 MMOL/L — HIGH (ref 0–0.4)
BACTERIA # UR AUTO: NEGATIVE /HPF — SIGNIFICANT CHANGE UP
BASE EXCESS BLDV CALC-SCNC: -0.8 MMOL/L — SIGNIFICANT CHANGE UP (ref -2–3)
BASE EXCESS BLDV CALC-SCNC: -3.3 MMOL/L — LOW (ref -2–3)
BASOPHILS # BLD AUTO: 0.03 K/UL — SIGNIFICANT CHANGE UP (ref 0–0.2)
BASOPHILS NFR BLD AUTO: 0.2 % — SIGNIFICANT CHANGE UP (ref 0–2)
BILIRUB SERPL-MCNC: 0.3 MG/DL — SIGNIFICANT CHANGE UP (ref 0.2–1.2)
BILIRUB UR-MCNC: NEGATIVE — SIGNIFICANT CHANGE UP
BLOOD GAS VENOUS COMPREHENSIVE RESULT: SIGNIFICANT CHANGE UP
BLOOD GAS VENOUS COMPREHENSIVE RESULT: SIGNIFICANT CHANGE UP
BORDETELLA PARAPERTUSSIS (RAPRVP): SIGNIFICANT CHANGE UP
BUN SERPL-MCNC: 49 MG/DL — HIGH (ref 7–23)
BUN SERPL-MCNC: 49 MG/DL — HIGH (ref 7–23)
C PNEUM DNA SPEC QL NAA+PROBE: SIGNIFICANT CHANGE UP
CALCIUM SERPL-MCNC: 9 MG/DL — SIGNIFICANT CHANGE UP (ref 8.4–10.5)
CALCIUM SERPL-MCNC: 9.4 MG/DL — SIGNIFICANT CHANGE UP (ref 8.4–10.5)
CAST: 1 /LPF — SIGNIFICANT CHANGE UP (ref 0–4)
CHLORIDE BLDV-SCNC: 101 MMOL/L — SIGNIFICANT CHANGE UP (ref 96–108)
CHLORIDE BLDV-SCNC: 97 MMOL/L — SIGNIFICANT CHANGE UP (ref 96–108)
CHLORIDE SERPL-SCNC: 100 MMOL/L — SIGNIFICANT CHANGE UP (ref 98–107)
CHLORIDE SERPL-SCNC: 97 MMOL/L — LOW (ref 98–107)
CO2 BLDV-SCNC: 22.3 MMOL/L — SIGNIFICANT CHANGE UP (ref 22–26)
CO2 BLDV-SCNC: 24.3 MMOL/L — SIGNIFICANT CHANGE UP (ref 22–26)
CO2 SERPL-SCNC: 19 MMOL/L — LOW (ref 22–31)
CO2 SERPL-SCNC: 21 MMOL/L — LOW (ref 22–31)
COLOR SPEC: YELLOW — SIGNIFICANT CHANGE UP
CREAT SERPL-MCNC: 5.13 MG/DL — HIGH (ref 0.5–1.3)
CREAT SERPL-MCNC: 5.25 MG/DL — HIGH (ref 0.5–1.3)
DIFF PNL FLD: ABNORMAL
EGFR: 9 ML/MIN/1.73M2 — LOW
EGFR: 9 ML/MIN/1.73M2 — LOW
EOSINOPHIL # BLD AUTO: 0.02 K/UL — SIGNIFICANT CHANGE UP (ref 0–0.5)
EOSINOPHIL NFR BLD AUTO: 0.1 % — SIGNIFICANT CHANGE UP (ref 0–6)
FLUAV SUBTYP SPEC NAA+PROBE: SIGNIFICANT CHANGE UP
FLUBV RNA SPEC QL NAA+PROBE: SIGNIFICANT CHANGE UP
GAS PNL BLDV: 133 MMOL/L — LOW (ref 136–145)
GAS PNL BLDV: 137 MMOL/L — SIGNIFICANT CHANGE UP (ref 136–145)
GAS PNL BLDV: SIGNIFICANT CHANGE UP
GAS PNL BLDV: SIGNIFICANT CHANGE UP
GLUCOSE BLDV-MCNC: 255 MG/DL — HIGH (ref 70–99)
GLUCOSE BLDV-MCNC: 457 MG/DL — CRITICAL HIGH (ref 70–99)
GLUCOSE SERPL-MCNC: 247 MG/DL — HIGH (ref 70–99)
GLUCOSE SERPL-MCNC: 351 MG/DL — HIGH (ref 70–99)
GLUCOSE UR QL: >=1000 MG/DL
HADV DNA SPEC QL NAA+PROBE: SIGNIFICANT CHANGE UP
HCO3 BLDV-SCNC: 21 MMOL/L — LOW (ref 22–29)
HCO3 BLDV-SCNC: 23 MMOL/L — SIGNIFICANT CHANGE UP (ref 22–29)
HCOV 229E RNA SPEC QL NAA+PROBE: SIGNIFICANT CHANGE UP
HCOV HKU1 RNA SPEC QL NAA+PROBE: SIGNIFICANT CHANGE UP
HCOV NL63 RNA SPEC QL NAA+PROBE: SIGNIFICANT CHANGE UP
HCOV OC43 RNA SPEC QL NAA+PROBE: SIGNIFICANT CHANGE UP
HCT VFR BLD CALC: 26.2 % — LOW (ref 34.5–45)
HCT VFR BLDA CALC: 28 % — LOW (ref 34.5–46.5)
HCT VFR BLDA CALC: 31 % — LOW (ref 34.5–46.5)
HGB BLD CALC-MCNC: 10.3 G/DL — LOW (ref 11.7–16.1)
HGB BLD CALC-MCNC: 9.3 G/DL — LOW (ref 11.7–16.1)
HGB BLD-MCNC: 8.9 G/DL — LOW (ref 11.5–15.5)
HMPV RNA SPEC QL NAA+PROBE: SIGNIFICANT CHANGE UP
HPIV1 RNA SPEC QL NAA+PROBE: SIGNIFICANT CHANGE UP
HPIV2 RNA SPEC QL NAA+PROBE: SIGNIFICANT CHANGE UP
HPIV3 RNA SPEC QL NAA+PROBE: SIGNIFICANT CHANGE UP
HPIV4 RNA SPEC QL NAA+PROBE: SIGNIFICANT CHANGE UP
IANC: 16.07 K/UL — HIGH (ref 1.8–7.4)
IMM GRANULOCYTES NFR BLD AUTO: 0.7 % — SIGNIFICANT CHANGE UP (ref 0–0.9)
KETONES UR-MCNC: NEGATIVE MG/DL — SIGNIFICANT CHANGE UP
LACTATE BLDV-MCNC: 3.4 MMOL/L — HIGH (ref 0.5–2)
LACTATE BLDV-MCNC: 3.7 MMOL/L — HIGH (ref 0.5–2)
LEUKOCYTE ESTERASE UR-ACNC: NEGATIVE — SIGNIFICANT CHANGE UP
LYMPHOCYTES # BLD AUTO: 1.46 K/UL — SIGNIFICANT CHANGE UP (ref 1–3.3)
LYMPHOCYTES # BLD AUTO: 7.8 % — LOW (ref 13–44)
M PNEUMO DNA SPEC QL NAA+PROBE: SIGNIFICANT CHANGE UP
MAGNESIUM SERPL-MCNC: 2 MG/DL — SIGNIFICANT CHANGE UP (ref 1.6–2.6)
MAGNESIUM SERPL-MCNC: 2.1 MG/DL — SIGNIFICANT CHANGE UP (ref 1.6–2.6)
MCHC RBC-ENTMCNC: 28.4 PG — SIGNIFICANT CHANGE UP (ref 27–34)
MCHC RBC-ENTMCNC: 34 GM/DL — SIGNIFICANT CHANGE UP (ref 32–36)
MCV RBC AUTO: 83.7 FL — SIGNIFICANT CHANGE UP (ref 80–100)
MONOCYTES # BLD AUTO: 1.12 K/UL — HIGH (ref 0–0.9)
MONOCYTES NFR BLD AUTO: 5.9 % — SIGNIFICANT CHANGE UP (ref 2–14)
NEUTROPHILS # BLD AUTO: 16.07 K/UL — HIGH (ref 1.8–7.4)
NEUTROPHILS NFR BLD AUTO: 85.3 % — HIGH (ref 43–77)
NITRITE UR-MCNC: NEGATIVE — SIGNIFICANT CHANGE UP
NRBC # BLD: 0 /100 WBCS — SIGNIFICANT CHANGE UP (ref 0–0)
NRBC # FLD: 0 K/UL — SIGNIFICANT CHANGE UP (ref 0–0)
NT-PROBNP SERPL-SCNC: HIGH PG/ML
PCO2 BLDV: 35 MMHG — LOW (ref 39–52)
PCO2 BLDV: 35 MMHG — LOW (ref 39–52)
PH BLDV: 7.39 — SIGNIFICANT CHANGE UP (ref 7.32–7.43)
PH BLDV: 7.43 — SIGNIFICANT CHANGE UP (ref 7.32–7.43)
PH UR: 6.5 — SIGNIFICANT CHANGE UP (ref 5–8)
PHOSPHATE SERPL-MCNC: 5.6 MG/DL — HIGH (ref 2.5–4.5)
PLATELET # BLD AUTO: 337 K/UL — SIGNIFICANT CHANGE UP (ref 150–400)
PO2 BLDV: 58 MMHG — HIGH (ref 25–45)
PO2 BLDV: 61 MMHG — HIGH (ref 25–45)
POTASSIUM BLDV-SCNC: 2.7 MMOL/L — CRITICAL LOW (ref 3.5–5.1)
POTASSIUM BLDV-SCNC: 3.7 MMOL/L — SIGNIFICANT CHANGE UP (ref 3.5–5.1)
POTASSIUM SERPL-MCNC: 2.8 MMOL/L — CRITICAL LOW (ref 3.5–5.3)
POTASSIUM SERPL-MCNC: 2.9 MMOL/L — CRITICAL LOW (ref 3.5–5.3)
POTASSIUM SERPL-SCNC: 2.8 MMOL/L — CRITICAL LOW (ref 3.5–5.3)
POTASSIUM SERPL-SCNC: 2.9 MMOL/L — CRITICAL LOW (ref 3.5–5.3)
PROT SERPL-MCNC: 7.6 G/DL — SIGNIFICANT CHANGE UP (ref 6–8.3)
PROT UR-MCNC: >=1000 MG/DL
RAPID RVP RESULT: SIGNIFICANT CHANGE UP
RBC # BLD: 3.13 M/UL — LOW (ref 3.8–5.2)
RBC # FLD: 14.6 % — HIGH (ref 10.3–14.5)
RBC CASTS # UR COMP ASSIST: 4 /HPF — SIGNIFICANT CHANGE UP (ref 0–4)
RSV RNA SPEC QL NAA+PROBE: SIGNIFICANT CHANGE UP
RV+EV RNA SPEC QL NAA+PROBE: SIGNIFICANT CHANGE UP
SAO2 % BLDV: 87.1 % — SIGNIFICANT CHANGE UP (ref 67–88)
SAO2 % BLDV: 91.5 % — HIGH (ref 67–88)
SARS-COV-2 RNA SPEC QL NAA+PROBE: SIGNIFICANT CHANGE UP
SODIUM SERPL-SCNC: 139 MMOL/L — SIGNIFICANT CHANGE UP (ref 135–145)
SODIUM SERPL-SCNC: 140 MMOL/L — SIGNIFICANT CHANGE UP (ref 135–145)
SP GR SPEC: 1.03 — SIGNIFICANT CHANGE UP (ref 1–1.03)
SQUAMOUS # UR AUTO: 1 /HPF — SIGNIFICANT CHANGE UP (ref 0–5)
TROPONIN T, HIGH SENSITIVITY RESULT: 116 NG/L — CRITICAL HIGH
TROPONIN T, HIGH SENSITIVITY RESULT: 118 NG/L — CRITICAL HIGH
UROBILINOGEN FLD QL: 0.2 MG/DL — SIGNIFICANT CHANGE UP (ref 0.2–1)
WBC # BLD: 18.83 K/UL — HIGH (ref 3.8–10.5)
WBC # FLD AUTO: 18.83 K/UL — HIGH (ref 3.8–10.5)
WBC UR QL: 1 /HPF — SIGNIFICANT CHANGE UP (ref 0–5)

## 2024-01-30 PROCEDURE — 99291 CRITICAL CARE FIRST HOUR: CPT

## 2024-01-30 PROCEDURE — 71046 X-RAY EXAM CHEST 2 VIEWS: CPT | Mod: 26

## 2024-01-30 RX ORDER — INSULIN LISPRO 100/ML
12 VIAL (ML) SUBCUTANEOUS ONCE
Refills: 0 | Status: COMPLETED | OUTPATIENT
Start: 2024-01-30 | End: 2024-01-30

## 2024-01-30 RX ORDER — POTASSIUM CHLORIDE 20 MEQ
40 PACKET (EA) ORAL ONCE
Refills: 0 | Status: COMPLETED | OUTPATIENT
Start: 2024-01-30 | End: 2024-01-30

## 2024-01-30 RX ORDER — POTASSIUM CHLORIDE 20 MEQ
10 PACKET (EA) ORAL
Refills: 0 | Status: DISCONTINUED | OUTPATIENT
Start: 2024-01-30 | End: 2024-01-30

## 2024-01-30 RX ORDER — ACETAMINOPHEN 500 MG
1000 TABLET ORAL ONCE
Refills: 0 | Status: COMPLETED | OUTPATIENT
Start: 2024-01-30 | End: 2024-01-30

## 2024-01-30 RX ORDER — IPRATROPIUM/ALBUTEROL SULFATE 18-103MCG
3 AEROSOL WITH ADAPTER (GRAM) INHALATION
Refills: 0 | Status: COMPLETED | OUTPATIENT
Start: 2024-01-30 | End: 2024-01-30

## 2024-01-30 RX ORDER — PIPERACILLIN AND TAZOBACTAM 4; .5 G/20ML; G/20ML
3.38 INJECTION, POWDER, LYOPHILIZED, FOR SOLUTION INTRAVENOUS ONCE
Refills: 0 | Status: COMPLETED | OUTPATIENT
Start: 2024-01-30 | End: 2024-01-30

## 2024-01-30 RX ORDER — INSULIN GLARGINE 100 [IU]/ML
25 INJECTION, SOLUTION SUBCUTANEOUS ONCE
Refills: 0 | Status: COMPLETED | OUTPATIENT
Start: 2024-01-30 | End: 2024-01-30

## 2024-01-30 RX ORDER — VANCOMYCIN HCL 1 G
1000 VIAL (EA) INTRAVENOUS ONCE
Refills: 0 | Status: COMPLETED | OUTPATIENT
Start: 2024-01-30 | End: 2024-01-30

## 2024-01-30 RX ADMIN — Medication 1000 MILLIGRAM(S): at 18:27

## 2024-01-30 RX ADMIN — PIPERACILLIN AND TAZOBACTAM 200 GRAM(S): 4; .5 INJECTION, POWDER, LYOPHILIZED, FOR SOLUTION INTRAVENOUS at 19:46

## 2024-01-30 RX ADMIN — Medication 400 MILLIGRAM(S): at 17:57

## 2024-01-30 RX ADMIN — Medication 125 MILLIGRAM(S): at 17:35

## 2024-01-30 RX ADMIN — Medication 3 MILLILITER(S): at 17:32

## 2024-01-30 RX ADMIN — Medication 100 MILLIEQUIVALENT(S): at 22:37

## 2024-01-30 RX ADMIN — Medication 3 MILLILITER(S): at 17:51

## 2024-01-30 RX ADMIN — Medication 40 MILLIEQUIVALENT(S): at 19:46

## 2024-01-30 RX ADMIN — Medication 3 MILLILITER(S): at 17:36

## 2024-01-30 RX ADMIN — Medication 250 MILLIGRAM(S): at 21:07

## 2024-01-30 NOTE — ED ADULT TRIAGE NOTE - CHIEF COMPLAINT QUOTE
Pt history of asthma presents for shortness of breath, fever, generalized weakness for the past 3 days. Pt breathing even and mildly labored spo2 95% on room air. Pt denies any present pain.

## 2024-01-30 NOTE — ED ADULT NURSE NOTE - EXTENSIONS OF SELF_ADULT
INTERVAL HPI/OVERNIGHT EVENTS: complains only of dry cough, feels some throat irritation - coughing started yesterday morning  denies chest pain or dyspnea    PHYSICAL EXAM:  GENERAL: NAD, coughing occasionally    HEAD:  Atraumatic, normocephalic, orange beard   EYES: EOMI, PERRL, sclera and conjunctiva clear  ENMT:  MMM, No oropharyngeal erythema or exudates  NECK:  Supple, normal appearance, trachea midline,  NERVOUS SYSTEM:  Alert & Oriented X3, Symmetric strength in all extremities    CHEST/LUNG: Bilateral air entry, no chest deformity, no crackles or wheeze  HEART: Regular rate, regular rhythm;  ABDOMEN: Soft, Nontender, Nondistended; No rebound or guarding, bowel sounds present  EXTREMITIES:  No edema, no clubbing, no cyanosis.  LYMPH:  No lymphadenopathy noted  SKIN:  No visible rashes or skin lesions, good capillary refill  PSYCH: appropriate affect and behavior    RADIOLOGY personally reviewed: cxr - no infiltrates, effusion, or pneumothorax  GOALS OF CARE DISCUSSION WITH PATIENT/FAMILY/PROXY: family updated at bedside   --------------------------------------------------------------------------------------------------------------------------------------------------------------  On Admission  12-22-19 (3d)  HPI:  Pt is a 70 YOM h/o CAD, CABG, HFrEF, MI, HTN, HLD, DM, PAF on last admission pt was recently discharged on Thursday after being treated for decompensated HF with milrinone, dopamine(which he did not tolerate).  Pt presents to ER with weakness and vomiting since yesterday.  Per pt's son since he was discharged his appetite has been very poor and he has been weak but yesterday started vomiting and not tolerating PO medications.  In ER found to have multiple organ dysfunction including RAS, acute liver transaminitis, DKA/anion gap acidosis.  Pt denies CP, no SOB, admits to feeling very cold. (22 Dec 2019 18:25)    PAST MEDICAL & SURGICAL HISTORY:  Heart failure  Essential hypertension  Type 2 diabetes mellitus with other circulatory complication, without long-term current use of insul  Coronary artery disease involving native coronary artery of native heart, angina presence unspecifie  Coronary artery disease involving coronary bypass graft of native heart with unstable angina pectoris  High cholesterol  GERD (gastroesophageal reflux disease)  Status post open reduction with internal fixation of fracture  S/P primary angioplasty with coronary stent  S/P CABG (coronary artery bypass graft): 4V 1983 [place]      Antimicrobial:    Cardiovascular:  metoprolol succinate ER 50 milliGRAM(s) Oral daily    Pulmonary:  albuterol/ipratropium for Nebulization 3 milliLiter(s) Nebulizer every 6 hours PRN    Hematalogic:  aspirin  chewable 81 milliGRAM(s) Oral daily  heparin  Injectable 5000 Unit(s) SubCutaneous every 12 hours  ticagrelor 90 milliGRAM(s) Oral every 12 hours    Other:  benzocaine 15 mG/menthol 3.6 mG (Sugar-Free) Lozenge 1 Lozenge Oral every 4 hours PRN  dextrose 40% Gel 15 Gram(s) Oral once PRN  dextrose 5%. 1000 milliLiter(s) IV Continuous <Continuous>  dextrose 50% Injectable 12.5 Gram(s) IV Push once  dextrose 50% Injectable 25 Gram(s) IV Push once  dextrose 50% Injectable 25 Gram(s) IV Push once  glucagon  Injectable 1 milliGRAM(s) IntraMuscular once PRN  influenza   Vaccine 0.5 milliLiter(s) IntraMuscular once  insulin glargine Injectable (LANTUS) 20 Unit(s) SubCutaneous at bedtime  insulin lispro (HumaLOG) corrective regimen sliding scale   SubCutaneous three times a day before meals  pantoprazole    Tablet 40 milliGRAM(s) Oral before breakfast  potassium chloride    Tablet ER 40 milliEquivalent(s) Oral every 4 hours      Drug Dosing Weight  Height (cm): 170.2 (22 Dec 2019 20:10)  Weight (kg): 58.7 (22 Dec 2019 20:10)  BMI (kg/m2): 20.3 (22 Dec 2019 20:10)  BSA (m2): 1.68 (22 Dec 2019 20:10)    T(C): 36.7 (12-25-19 @ 07:37), Max: 37.2 (12-24-19 @ 15:55)  HR: 99 (12-25-19 @ 08:00)  BP: 122/66 (12-25-19 @ 08:00)  BP(mean): 86 (12-25-19 @ 08:00)  ABP: --  ABP(mean): --  RR: 27 (12-25-19 @ 08:00)  SpO2: 100% (12-25-19 @ 08:00)          12-24 @ 07:01  -  12-25 @ 07:00  --------------------------------------------------------  IN: 1567.7 mL / OUT: 1150 mL / NET: 417.7 mL              LABS:  CBC Full  -  ( 25 Dec 2019 05:32 )  WBC Count : 8.41 K/uL  RBC Count : 4.02 M/uL  Hemoglobin : 9.7 g/dL  Hematocrit : 30.4 %  Platelet Count - Automated : 182 K/uL  Mean Cell Volume : 75.6 fl  Mean Cell Hemoglobin : 24.1 pg  Mean Cell Hemoglobin Concentration : 31.9 gm/dL  Auto Neutrophil # : x  Auto Lymphocyte # : x  Auto Monocyte # : x  Auto Eosinophil # : x  Auto Basophil # : x  Auto Neutrophil % : x  Auto Lymphocyte % : x  Auto Monocyte % : x  Auto Eosinophil % : x  Auto Basophil % : x    12-25    133<L>  |  93<L>  |  37.0<H>  ----------------------------<  93  3.1<L>   |  24.0  |  1.28    Ca    8.6      25 Dec 2019 05:32  Phos  3.1     12-25  Mg     1.9     12-25    TPro  6.2<L>  /  Alb  3.2<L>  /  TBili  1.0  /  DBili  x   /  AST  1942<H>  /  ALT  3321<H>  /  AlkPhos  106  12-25    PT/INR - ( 24 Dec 2019 04:36 )   PT: 23.7 sec;   INR: 2.02 ratio         PTT - ( 24 Dec 2019 04:36 )  PTT:31.0 sec None

## 2024-01-30 NOTE — HISTORY OF PRESENT ILLNESS
[Home] : at home, [unfilled] , at the time of the visit. [Medical Office: ([facility])___] : at the medical office located in  [Verbal consent obtained from patient] : the patient, [unfilled] [FreeTextEntry8] : Telephone visit today for concern for sore throat x2days. Information obtained by daughter - mom with sore throat for 2 days no fevers, however mild wheezing has hx of asthma. No cough or erythema of throat however notes mild pain when she touches mom's 'chin'? concern for tonsils? Has neve had covid prior. Has not gotten tested for covid/ flu. Received flu vaccine in nov 2023. Lives at home with daughter and her family- daughter also with mild cough. Daughter amenable to having mom tested for covid/ flu and make appointment this week for in-person visit as history could not be fully obained via telephone visit today.

## 2024-01-30 NOTE — REVIEW OF SYSTEMS
[Sore Throat] : sore throat [Fever] : no fever [Abdominal Pain] : no abdominal pain [Joint Pain] : no joint pain [Headache] : no headache

## 2024-01-30 NOTE — ED ADULT NURSE REASSESSMENT NOTE - NS ED NURSE REASSESS COMMENT FT1
Received report from MEGA Landaverde. Pt is A&Ox4, respirations are even and unlabored. Pt endorses some SOB, denies pain at this time. Wheezing auscultated bilaterally. Pt on 1L nasal canula. IV is patent and intact. bed in lowest position, safety maintained.

## 2024-01-30 NOTE — ED PROVIDER NOTE - ATTENDING CONTRIBUTION TO CARE
63 year old with complicated medical hx presents with sob and wheezing. concern for viral illness va rad vs chf exacerbation. Will obtain cbc to rule out anemia. Will obtain CMP to rule out electrolyte abnormalities, liver failure or renal failure. duonbes. Will obtain a chest xray to rule out PNA, PTX or pleural effusion steroids. bnp. patient requires bipap for increased wob. frequent reassessments. micu consult. admit.  Discussed with Dr. Cleveland from MICU

## 2024-01-30 NOTE — ED ADULT NURSE REASSESSMENT NOTE - NS ED NURSE REASSESS COMMENT FT1
minimal improvement noted post duoneb treatment. pt still abdominal breathing and mildly tachypneic on exam. indwelling dobbs 14Fr w/10cc balloon inserted to monitor urine output, approx 300cc urine noted. daughter remains at bedside. Plan to start BiPaP. pt taken to [facility] with handoff given to Madison AYOUB.

## 2024-01-30 NOTE — CONSULT NOTE ADULT - ASSESSMENT
63-year-old female history of CKD, hypertension, hyperlipidemia, diabetes, asthma presenting for evaluation of difficulty breathing associated with wheezing onset last night. Placed on bipap for WOB. MICU consulted for new bipap and concern for DKA.    #WOB/DKA concern:  - WOB likely 2/2 volume overload status from HF exacerbation   - gases non-concerning and would recommend to take pt off bipap  - can diurese as needed, f/u repeat TTE  - bicarb mildly decreased but pH normal and AG can be explained by lactic acidosis - unlikely to be in DKA. Do not start pt on insulin gtt  - not a MICU candidate at this time

## 2024-01-30 NOTE — ED ADULT NURSE NOTE - NS ED NURSE REPORT GIVEN TO FT
Pt called and stated she has poison ivy. She has tried OTC options and they are not helping. Pt states she is very uncomfortable to the point where she can't sleep. Pt would like something called in to the [facility] on [facility] to clear the poison ivy up.    Pt callback- [phone]   elizabet Ovalle

## 2024-01-30 NOTE — ED ADULT NURSE REASSESSMENT NOTE - NS ED NURSE REASSESS COMMENT FT1
PRECEPTOR RN: as per MD Leavitt, hold rest of K runs after first IVPB r/t kidney function. pt pending repeat blood work results from add on testing. primary RN Vy made aware.

## 2024-01-30 NOTE — ED ADULT NURSE NOTE - OBJECTIVE STATEMENT
pt aox4, from home accompanied by daughter who is translating at patient request, for shortness of breath and generalized fatigue since yesterday. pt hx of asthma per daughter. pt noted to be tachypneic with mild wheezing noted and diminished breath sounds to bilateral lower lobes. no chest pain. no GI or  complaints. right ac20g inserted using aseptic technique, +blood return and flushed well. pt medicated as per MD orders. stretcher in lowest position. pt aox4, from home accompanied by daughter who is translating at patient request, for shortness of breath and generalized fatigue since yesterday. pt hx of asthma, HTN, Stage 4 CKD per daughter. pt noted to be tachypneic with mild wheezing noted and diminished breath sounds to bilateral lower lobes. no chest pain. no GI or  complaints. right ac20g inserted using aseptic technique, +blood return and flushed well. pt medicated as per MD orders. stretcher in lowest position.  continuous glucose monitor noted to lower abdomen- no insulin pump.

## 2024-01-30 NOTE — ED PROVIDER NOTE - PHYSICAL EXAMINATION
GENERAL: sitting upright, tripoding slightly   HEAD: normocephalic, atraumatic  HEENT: normal conjunctiva, oral mucosa moist,   CARDIAC: regular rate and rhythm, no appreciable murmurs,  PULM: increased work of breathing, shallow respirations clear to ascultation bilaterally, no rales, rhonchi, wheezing  GI: abdomen nondistended, soft, nontender, no guarding, rebound tenderness  NEURO: AAOx3  MSK: +2 pitting edema in the LE b/l, no calf tenderness b/l  SKIN: well-perfused, extremities warm, no visible rashes  PSYCH: appropriate mood and affect

## 2024-01-30 NOTE — ED PROVIDER NOTE - OBJECTIVE STATEMENT
63-year-old female history of CKD, hypertension, hyperlipidemia, diabetes, asthma presenting for evaluation of difficulty breathing associated with wheezing onset last night.  Patient also reports generalized weakness and myalgias.  Daughter at bedside states that they were giving albuterol frequently, at one point every 15 minutes, with no improvement in symptoms.  Patient reports chest tightness without chest pain, denies abdominal pain, nausea, vomiting, diarrhea, fever.  Patient denies recent travel.

## 2024-01-30 NOTE — ED PROVIDER NOTE - CLINICAL SUMMARY MEDICAL DECISION MAKING FREE TEXT BOX
63-year-old female history of CKD, hypertension, hyperlipidemia, diabetes, asthma presenting for evaluation of difficulty breathing associated with wheezing onset last night w/ generalized weakness and myalagias.  Patient is febrile, with increased work of breathing, hypoxic to 93% and tachycardic.    Differential diagnosis includes not manage to acute asthma exacerbation secondary to possible viral syndrome versus pneumonia versus CHF exacerbation versus fluid overload secondary to kidney failure/heart failure plan for steroids, DuoNebs, labs, chest x-ray.  Dispo pending findings.

## 2024-01-30 NOTE — CONSULT NOTE ADULT - SUBJECTIVE AND OBJECTIVE BOX
CHIEF COMPLAINT: SO)B    HPI: 63-year-old female history of CKD, hypertension, hyperlipidemia, diabetes, asthma presenting for evaluation of difficulty breathing associated with wheezing onset last night.  Per ED note, patient also reported generalized weakness and myalgias.  Daughter at bedside states that they were giving albuterol frequently, at one point every 15 minutes, with no improvement in symptoms. In the ED, noted to have inc WOB so given duoneb x3, vanc/zosynx1 and started on bipap. MICU consulted.       PAST MEDICAL & SURGICAL HISTORY:  Type 2 diabetes mellitus  Stage 4 chronic kidney disease  HTN (hypertension)  HLD (hyperlipidemia)    No significant past surgical history          FAMILY HISTORY:  FH: type 2 diabetes mellitus (Mother)      Allergies  No Known Allergies      REVIEW OF SYSTEMS:  [x] Unable to assess ROS because of bipap mask    OBJECTIVE:  ICU Vital Signs Last 24 Hrs  T(C): 36.8 (30 Jan 2024 21:00), Max: 37.9 (30 Jan 2024 17:40)  T(F): 98.2 (30 Jan 2024 21:00), Max: 100.2 (30 Jan 2024 17:40)  HR: 84 (30 Jan 2024 21:51) (84 - 120)  BP: 143/57 (30 Jan 2024 21:00) (143/57 - 161/59)  BP(mean): 84 (30 Jan 2024 21:00) (84 - 84)  ABP: --  ABP(mean): --  RR: 20 (30 Jan 2024 21:00) (16 - 22)  SpO2: 100% (30 Jan 2024 21:51) (93% - 100%)    O2 Parameters below as of 30 Jan 2024 21:00  Patient On (Oxygen Delivery Method): nasal cannula  O2 Flow (L/min): 1        CAPILLARY BLOOD GLUCOSE      POCT Blood Glucose.: 319 mg/dL (30 Jan 2024 14:25)      PHYSICAL EXAM:  GENERAL: NAD, lying in bed comfortably  HEAD:  Atraumatic, Normocephalic  EYES: conjunctiva and sclera clear  ENT: Moist mucous membranes  NECK: Supple, No JVD  CHEST/LUNG: Clear to auscultation bilaterally; No rales, rhonchi, wheezing, or rubs. Unlabored respirations  HEART: Regular rate and rhythm; No murmurs, rubs, or gallops  ABDOMEN: BSx4; Soft, nontender, nondistended  EXTREMITIES:  b/l LE edema      [facility] MEDICATIONS:  MEDICATIONS  (STANDING):  insulin glargine Injectable (LANTUS) 25 Unit(s) SubCutaneous once  insulin lispro Injectable (ADMELOG) 12 Unit(s) SubCutaneous Once    MEDICATIONS  (PRN):      LABS:                        8.9    18.83 )-----------( 337      ( 30 Jan 2024 17:25 )             26.2     01-30    139  |  97<L>  |  49<H>  ----------------------------<  351<H>  2.8<LL>   |  19<L>  |  5.25<H>    Ca    9.0      30 Jan 2024 20:59  Phos  5.6     01-30  Mg     2.00     01-30    TPro  7.5  /  Alb  3.1<L>  /  TBili  0.4  /  DBili  <0.2  /  AST  21  /  ALT  20  /  AlkPhos  66  01-30      Urinalysis Basic - ( 30 Jan 2024 20:59 )    Color: x / Appearance: x / SG: x / pH: x  Gluc: 351 mg/dL / Ketone: x  / Bili: x / Urobili: x   Blood: x / Protein: x / Nitrite: x   Leuk Esterase: x / RBC: x / WBC x   Sq Epi: x / Non Sq Epi: x / Bacteria: x        Venous Blood Gas:  01-30 @ 22:35  7.39/35/58/21/87.1  VBG Lactate: 3.4  Venous Blood Gas:  01-30 @ 17:25  7.43/35/61/23/91.5  VBG Lactate: 3.7

## 2024-01-30 NOTE — ED PROVIDER NOTE - PROGRESS NOTE DETAILS
Myrtle Linda MD, PGY2:    Patient reexamined, patient still with increased work of breathing and accessory muscle usage, breaths are now deeper, clear to auscultation, oxygenation improved after DuoNebs, at patient will need admission this time with Josefa Canales.  Patient will need admission.  Pending chest x-ray at this time.  Pending CMP and troponin at this time.  Patient's lactate is 3.6, however patient has evidence of fluid overload on exam with +2 pitting edema bilaterally, will refrain from giving fluids as patient's increased work of breathing and hypoxia may be due to fluid overload and will want to avoid exacerbating this. Myrtle Linda MD, PGY2:  patient hypokalemic to 2.9, likely in setting of frequent albuterol use,.  Chest x-ray shows perihilar opacities with effusion that is more likely cardiogenic in nature compared to multifocal pneumonia, given patient's breathing, anticipate patient will require BiPAP.  Will replete patient with potassium.  Will escalate patient's care and move patient to the main ED, signout given to red team CAMDEN GREGORY: MICU consulted for BIPAP need. (increased work of breathing and volume overload) Khalif PGY3: patient's repeat lab result reviewed. K 2.8 (2.9 earlier), elevated blood glucose 351, elevated AG 23, downtrending bicarb now 19. Possible DKA vs renal failure considered. Will hold off insulin for now due to low K level. MICU updated. MICU reports patient's lab result likely due to kidney failure and patient is not a MICU candidate. Nephrology consult placed. Patient received as a signout from the intake team.  Plan to place the patient on BiPAP and call MICU by intake team.  MICU evaluated the patient and does not think patient needs to be admitted to MICU at this time.  Patient seems in mild respiratory distress, fluid overloaded.  Labs are reviewed.  Patient has already received antibiotics.  Plan to place the patient on BiPAP given concern for possible decompensation.  Repeat labs are sent which shows potassium dropped despite giving the potassium, not worsening anion gap and bicarb.  MICU was reconsulted.  Still do not think patient is a MICU candidate at this time.  Nephrology was consulted pending recommendations.  Plan to send repeat VBG.  ?pt reports multiple albuterol tx at home and then received three treatments here - ?causing hypokalemia   Taryn Leavitt, DO

## 2024-01-30 NOTE — ED ADULT NURSE NOTE - NSFALLRISKINTERV_ED_ALL_ED

## 2024-01-30 NOTE — ED ADULT NURSE REASSESSMENT NOTE - NS ED NURSE REASSESS COMMENT FT1
Pt medicated as per MD order, pt denies pain at this time. Pt placed on BiPAP, 12,6, 40%. Bed in lowest position, safety maintained.

## 2024-01-30 NOTE — ED PROVIDER NOTE - CARE PLAN
1 Principal Discharge DX:	Shortness of breath   Principal Discharge DX:	Shortness of breath  Secondary Diagnosis:	Heart failure  Secondary Diagnosis:	Pneumonia

## 2024-01-31 DIAGNOSIS — E11.9 TYPE 2 DIABETES MELLITUS WITHOUT COMPLICATIONS: ICD-10-CM

## 2024-01-31 DIAGNOSIS — N17.9 ACUTE KIDNEY FAILURE, UNSPECIFIED: ICD-10-CM

## 2024-01-31 DIAGNOSIS — R06.02 SHORTNESS OF BREATH: ICD-10-CM

## 2024-01-31 DIAGNOSIS — I10 ESSENTIAL (PRIMARY) HYPERTENSION: ICD-10-CM

## 2024-01-31 DIAGNOSIS — D62 ACUTE POSTHEMORRHAGIC ANEMIA: ICD-10-CM

## 2024-01-31 DIAGNOSIS — R79.89 OTHER SPECIFIED ABNORMAL FINDINGS OF BLOOD CHEMISTRY: ICD-10-CM

## 2024-01-31 DIAGNOSIS — E87.6 HYPOKALEMIA: ICD-10-CM

## 2024-01-31 DIAGNOSIS — R65.10 SYSTEMIC INFLAMMATORY RESPONSE SYNDROME (SIRS) OF NON-INFECTIOUS ORIGIN WITHOUT ACUTE ORGAN DYSFUNCTION: ICD-10-CM

## 2024-01-31 DIAGNOSIS — E87.20 ACIDOSIS, UNSPECIFIED: ICD-10-CM

## 2024-01-31 DIAGNOSIS — E78.5 HYPERLIPIDEMIA, UNSPECIFIED: ICD-10-CM

## 2024-01-31 DIAGNOSIS — D64.9 ANEMIA, UNSPECIFIED: ICD-10-CM

## 2024-01-31 DIAGNOSIS — N18.4 CHRONIC KIDNEY DISEASE, STAGE 4 (SEVERE): ICD-10-CM

## 2024-01-31 DIAGNOSIS — J45.901 UNSPECIFIED ASTHMA WITH (ACUTE) EXACERBATION: ICD-10-CM

## 2024-01-31 DIAGNOSIS — E87.70 FLUID OVERLOAD, UNSPECIFIED: ICD-10-CM

## 2024-01-31 DIAGNOSIS — Z29.9 ENCOUNTER FOR PROPHYLACTIC MEASURES, UNSPECIFIED: ICD-10-CM

## 2024-01-31 DIAGNOSIS — J96.01 ACUTE RESPIRATORY FAILURE WITH HYPOXIA: ICD-10-CM

## 2024-01-31 LAB
A1C WITH ESTIMATED AVERAGE GLUCOSE RESULT: 6.1 % — HIGH (ref 4–5.6)
ALBUMIN SERPL ELPH-MCNC: 3 G/DL — LOW (ref 3.3–5)
ALP SERPL-CCNC: 61 U/L — SIGNIFICANT CHANGE UP (ref 40–120)
ALT FLD-CCNC: 15 U/L — SIGNIFICANT CHANGE UP (ref 4–33)
ANION GAP SERPL CALC-SCNC: 13 MMOL/L — SIGNIFICANT CHANGE UP (ref 7–14)
AST SERPL-CCNC: 20 U/L — SIGNIFICANT CHANGE UP (ref 4–32)
B-OH-BUTYR SERPL-SCNC: <0 MMOL/L — SIGNIFICANT CHANGE UP (ref 0–0.4)
BASOPHILS # BLD AUTO: 0.02 K/UL — SIGNIFICANT CHANGE UP (ref 0–0.2)
BASOPHILS NFR BLD AUTO: 0.1 % — SIGNIFICANT CHANGE UP (ref 0–2)
BILIRUB SERPL-MCNC: 0.2 MG/DL — SIGNIFICANT CHANGE UP (ref 0.2–1.2)
BUN SERPL-MCNC: 54 MG/DL — HIGH (ref 7–23)
CALCIUM SERPL-MCNC: 9 MG/DL — SIGNIFICANT CHANGE UP (ref 8.4–10.5)
CHLORIDE SERPL-SCNC: 102 MMOL/L — SIGNIFICANT CHANGE UP (ref 98–107)
CO2 SERPL-SCNC: 22 MMOL/L — SIGNIFICANT CHANGE UP (ref 22–31)
CREAT ?TM UR-MCNC: 78 MG/DL — SIGNIFICANT CHANGE UP
CREAT SERPL-MCNC: 5.66 MG/DL — HIGH (ref 0.5–1.3)
EGFR: 8 ML/MIN/1.73M2 — LOW
EOSINOPHIL # BLD AUTO: 0 K/UL — SIGNIFICANT CHANGE UP (ref 0–0.5)
EOSINOPHIL NFR BLD AUTO: 0 % — SIGNIFICANT CHANGE UP (ref 0–6)
ESTIMATED AVERAGE GLUCOSE: 128 — SIGNIFICANT CHANGE UP
GAS PNL BLDV: SIGNIFICANT CHANGE UP
GLUCOSE BLDC GLUCOMTR-MCNC: 158 MG/DL — HIGH (ref 70–99)
GLUCOSE BLDC GLUCOMTR-MCNC: 320 MG/DL — HIGH (ref 70–99)
GLUCOSE BLDC GLUCOMTR-MCNC: 338 MG/DL — HIGH (ref 70–99)
GLUCOSE SERPL-MCNC: 272 MG/DL — HIGH (ref 70–99)
HCT VFR BLD CALC: 25.2 % — LOW (ref 34.5–45)
HGB BLD-MCNC: 8.6 G/DL — LOW (ref 11.5–15.5)
IANC: 14.34 K/UL — HIGH (ref 1.8–7.4)
IMM GRANULOCYTES NFR BLD AUTO: 1 % — HIGH (ref 0–0.9)
LYMPHOCYTES # BLD AUTO: 0.67 K/UL — LOW (ref 1–3.3)
LYMPHOCYTES # BLD AUTO: 4.3 % — LOW (ref 13–44)
MAGNESIUM SERPL-MCNC: 2.3 MG/DL — SIGNIFICANT CHANGE UP (ref 1.6–2.6)
MCHC RBC-ENTMCNC: 28.9 PG — SIGNIFICANT CHANGE UP (ref 27–34)
MCHC RBC-ENTMCNC: 34.1 GM/DL — SIGNIFICANT CHANGE UP (ref 32–36)
MCV RBC AUTO: 84.6 FL — SIGNIFICANT CHANGE UP (ref 80–100)
MONOCYTES # BLD AUTO: 0.25 K/UL — SIGNIFICANT CHANGE UP (ref 0–0.9)
MONOCYTES NFR BLD AUTO: 1.6 % — LOW (ref 2–14)
NEUTROPHILS # BLD AUTO: 14.34 K/UL — HIGH (ref 1.8–7.4)
NEUTROPHILS NFR BLD AUTO: 93 % — HIGH (ref 43–77)
NRBC # BLD: 0 /100 WBCS — SIGNIFICANT CHANGE UP (ref 0–0)
NRBC # FLD: 0 K/UL — SIGNIFICANT CHANGE UP (ref 0–0)
OSMOLALITY UR: 348 MOSM/KG — SIGNIFICANT CHANGE UP (ref 50–1200)
PHOSPHATE SERPL-MCNC: 4.2 MG/DL — SIGNIFICANT CHANGE UP (ref 2.5–4.5)
PLATELET # BLD AUTO: 299 K/UL — SIGNIFICANT CHANGE UP (ref 150–400)
POTASSIUM SERPL-MCNC: 4 MMOL/L — SIGNIFICANT CHANGE UP (ref 3.5–5.3)
POTASSIUM SERPL-SCNC: 4 MMOL/L — SIGNIFICANT CHANGE UP (ref 3.5–5.3)
PROCALCITONIN SERPL-MCNC: 0.34 NG/ML — HIGH (ref 0.02–0.1)
PROT ?TM UR-MCNC: 1723 MG/DL — SIGNIFICANT CHANGE UP
PROT SERPL-MCNC: 7.5 G/DL — SIGNIFICANT CHANGE UP (ref 6–8.3)
PROT/CREAT UR-RTO: 22.2 RATIO — HIGH (ref 0–0.2)
RBC # BLD: 2.98 M/UL — LOW (ref 3.8–5.2)
RBC # FLD: 14.6 % — HIGH (ref 10.3–14.5)
SODIUM SERPL-SCNC: 137 MMOL/L — SIGNIFICANT CHANGE UP (ref 135–145)
SODIUM UR-SCNC: <20 MMOL/L — SIGNIFICANT CHANGE UP
UUN UR-MCNC: 381 MG/DL — SIGNIFICANT CHANGE UP
WBC # BLD: 15.44 K/UL — HIGH (ref 3.8–10.5)
WBC # FLD AUTO: 15.44 K/UL — HIGH (ref 3.8–10.5)

## 2024-01-31 PROCEDURE — 76770 US EXAM ABDO BACK WALL COMP: CPT | Mod: 26

## 2024-01-31 PROCEDURE — 99223 1ST HOSP IP/OBS HIGH 75: CPT

## 2024-01-31 PROCEDURE — 99254 IP/OBS CNSLTJ NEW/EST MOD 60: CPT | Mod: GC

## 2024-01-31 RX ORDER — ATORVASTATIN CALCIUM 80 MG/1
1 TABLET, FILM COATED ORAL
Qty: 0 | Refills: 0 | DISCHARGE

## 2024-01-31 RX ORDER — DEXTROSE 50 % IN WATER 50 %
15 SYRINGE (ML) INTRAVENOUS ONCE
Refills: 0 | Status: DISCONTINUED | OUTPATIENT
Start: 2024-01-31 | End: 2024-02-14

## 2024-01-31 RX ORDER — ATORVASTATIN CALCIUM 80 MG/1
10 TABLET, FILM COATED ORAL AT BEDTIME
Refills: 0 | Status: DISCONTINUED | OUTPATIENT
Start: 2024-01-31 | End: 2024-01-31

## 2024-01-31 RX ORDER — POTASSIUM CHLORIDE 20 MEQ
40 PACKET (EA) ORAL ONCE
Refills: 0 | Status: COMPLETED | OUTPATIENT
Start: 2024-01-31 | End: 2024-01-31

## 2024-01-31 RX ORDER — FUROSEMIDE 40 MG
40 TABLET ORAL DAILY
Refills: 0 | Status: DISCONTINUED | OUTPATIENT
Start: 2024-02-01 | End: 2024-02-01

## 2024-01-31 RX ORDER — INSULIN GLARGINE 100 [IU]/ML
30 INJECTION, SOLUTION SUBCUTANEOUS
Refills: 0 | DISCHARGE

## 2024-01-31 RX ORDER — MONTELUKAST 4 MG/1
1 TABLET, CHEWABLE ORAL
Refills: 0 | DISCHARGE

## 2024-01-31 RX ORDER — BUMETANIDE 0.25 MG/ML
2 INJECTION INTRAMUSCULAR; INTRAVENOUS ONCE
Refills: 0 | Status: COMPLETED | OUTPATIENT
Start: 2024-01-31 | End: 2024-01-31

## 2024-01-31 RX ORDER — MOMETASONE FUROATE AND FORMOTEROL FUMARATE DIHYDRATE 200; 5 UG/1; UG/1
2 AEROSOL RESPIRATORY (INHALATION)
Refills: 0 | DISCHARGE

## 2024-01-31 RX ORDER — BUDESONIDE AND FORMOTEROL FUMARATE DIHYDRATE 160; 4.5 UG/1; UG/1
2 AEROSOL RESPIRATORY (INHALATION)
Refills: 0 | Status: DISCONTINUED | OUTPATIENT
Start: 2024-01-31 | End: 2024-02-14

## 2024-01-31 RX ORDER — INSULIN LISPRO 100/ML
20 VIAL (ML) SUBCUTANEOUS ONCE
Refills: 0 | Status: COMPLETED | OUTPATIENT
Start: 2024-01-31 | End: 2024-01-31

## 2024-01-31 RX ORDER — INSULIN GLARGINE 100 [IU]/ML
15 INJECTION, SOLUTION SUBCUTANEOUS AT BEDTIME
Refills: 0 | Status: DISCONTINUED | OUTPATIENT
Start: 2024-01-31 | End: 2024-02-01

## 2024-01-31 RX ORDER — INSULIN LISPRO 100/ML
5 VIAL (ML) SUBCUTANEOUS
Refills: 0 | Status: DISCONTINUED | OUTPATIENT
Start: 2024-01-31 | End: 2024-02-01

## 2024-01-31 RX ORDER — AMLODIPINE BESYLATE 2.5 MG/1
1 TABLET ORAL
Refills: 0 | DISCHARGE

## 2024-01-31 RX ORDER — POTASSIUM CHLORIDE 20 MEQ
10 PACKET (EA) ORAL
Refills: 0 | Status: COMPLETED | OUTPATIENT
Start: 2024-01-31 | End: 2024-01-31

## 2024-01-31 RX ORDER — INSULIN LISPRO 100/ML
8 VIAL (ML) SUBCUTANEOUS
Refills: 0 | Status: DISCONTINUED | OUTPATIENT
Start: 2024-01-31 | End: 2024-01-31

## 2024-01-31 RX ORDER — DEXTROSE 50 % IN WATER 50 %
12.5 SYRINGE (ML) INTRAVENOUS ONCE
Refills: 0 | Status: DISCONTINUED | OUTPATIENT
Start: 2024-01-31 | End: 2024-02-14

## 2024-01-31 RX ORDER — IPRATROPIUM/ALBUTEROL SULFATE 18-103MCG
3 AEROSOL WITH ADAPTER (GRAM) INHALATION EVERY 6 HOURS
Refills: 0 | Status: DISCONTINUED | OUTPATIENT
Start: 2024-01-31 | End: 2024-01-31

## 2024-01-31 RX ORDER — ATORVASTATIN CALCIUM 80 MG/1
1 TABLET, FILM COATED ORAL
Refills: 0 | DISCHARGE

## 2024-01-31 RX ORDER — INSULIN LISPRO 100/ML
VIAL (ML) SUBCUTANEOUS
Refills: 0 | Status: DISCONTINUED | OUTPATIENT
Start: 2024-01-31 | End: 2024-02-01

## 2024-01-31 RX ORDER — DEXTROSE 50 % IN WATER 50 %
25 SYRINGE (ML) INTRAVENOUS ONCE
Refills: 0 | Status: DISCONTINUED | OUTPATIENT
Start: 2024-01-31 | End: 2024-02-14

## 2024-01-31 RX ORDER — POLYETHYLENE GLYCOL 3350 17 G/17G
17 POWDER, FOR SOLUTION ORAL DAILY
Refills: 0 | Status: DISCONTINUED | OUTPATIENT
Start: 2024-01-31 | End: 2024-02-06

## 2024-01-31 RX ORDER — SODIUM CHLORIDE 9 MG/ML
1000 INJECTION, SOLUTION INTRAVENOUS
Refills: 0 | Status: DISCONTINUED | OUTPATIENT
Start: 2024-01-31 | End: 2024-02-14

## 2024-01-31 RX ORDER — GUSELKUMAB 100 MG/ML
100 INJECTION SUBCUTANEOUS
Refills: 0 | DISCHARGE

## 2024-01-31 RX ORDER — AMLODIPINE BESYLATE 2.5 MG/1
1 TABLET ORAL
Qty: 0 | Refills: 0 | DISCHARGE

## 2024-01-31 RX ORDER — ATORVASTATIN CALCIUM 80 MG/1
20 TABLET, FILM COATED ORAL AT BEDTIME
Refills: 0 | Status: DISCONTINUED | OUTPATIENT
Start: 2024-01-31 | End: 2024-02-14

## 2024-01-31 RX ORDER — GLUCAGON INJECTION, SOLUTION 0.5 MG/.1ML
1 INJECTION, SOLUTION SUBCUTANEOUS ONCE
Refills: 0 | Status: DISCONTINUED | OUTPATIENT
Start: 2024-01-31 | End: 2024-02-14

## 2024-01-31 RX ORDER — SENNA PLUS 8.6 MG/1
2 TABLET ORAL AT BEDTIME
Refills: 0 | Status: DISCONTINUED | OUTPATIENT
Start: 2024-01-31 | End: 2024-02-14

## 2024-01-31 RX ORDER — INSULIN GLARGINE 100 [IU]/ML
35 INJECTION, SOLUTION SUBCUTANEOUS
Refills: 0 | DISCHARGE

## 2024-01-31 RX ORDER — ALBUTEROL 90 UG/1
2 AEROSOL, METERED ORAL EVERY 6 HOURS
Refills: 0 | Status: DISCONTINUED | OUTPATIENT
Start: 2024-01-31 | End: 2024-01-31

## 2024-01-31 RX ORDER — MONTELUKAST 4 MG/1
10 TABLET, CHEWABLE ORAL DAILY
Refills: 0 | Status: DISCONTINUED | OUTPATIENT
Start: 2024-01-31 | End: 2024-01-31

## 2024-01-31 RX ORDER — SODIUM BICARBONATE 1 MEQ/ML
650 SYRINGE (ML) INTRAVENOUS EVERY 12 HOURS
Refills: 0 | Status: DISCONTINUED | OUTPATIENT
Start: 2024-01-31 | End: 2024-02-02

## 2024-01-31 RX ORDER — LORATADINE 10 MG/1
10 TABLET ORAL DAILY
Refills: 0 | Status: DISCONTINUED | OUTPATIENT
Start: 2024-01-31 | End: 2024-02-14

## 2024-01-31 RX ORDER — MONTELUKAST 4 MG/1
10 TABLET, CHEWABLE ORAL DAILY
Refills: 0 | Status: DISCONTINUED | OUTPATIENT
Start: 2024-01-31 | End: 2024-02-14

## 2024-01-31 RX ORDER — HEPARIN SODIUM 5000 [USP'U]/ML
5000 INJECTION INTRAVENOUS; SUBCUTANEOUS EVERY 8 HOURS
Refills: 0 | Status: DISCONTINUED | OUTPATIENT
Start: 2024-01-31 | End: 2024-02-01

## 2024-01-31 RX ADMIN — Medication 100 MILLIEQUIVALENT(S): at 04:27

## 2024-01-31 RX ADMIN — HEPARIN SODIUM 5000 UNIT(S): 5000 INJECTION INTRAVENOUS; SUBCUTANEOUS at 22:08

## 2024-01-31 RX ADMIN — MONTELUKAST 10 MILLIGRAM(S): 4 TABLET, CHEWABLE ORAL at 12:40

## 2024-01-31 RX ADMIN — Medication 650 MILLIGRAM(S): at 18:52

## 2024-01-31 RX ADMIN — Medication 40 MILLIEQUIVALENT(S): at 03:06

## 2024-01-31 RX ADMIN — Medication 20 UNIT(S): at 03:06

## 2024-01-31 RX ADMIN — HEPARIN SODIUM 5000 UNIT(S): 5000 INJECTION INTRAVENOUS; SUBCUTANEOUS at 18:52

## 2024-01-31 RX ADMIN — Medication 10 MILLIEQUIVALENT(S): at 04:30

## 2024-01-31 RX ADMIN — BUMETANIDE 2 MILLIGRAM(S): 0.25 INJECTION INTRAMUSCULAR; INTRAVENOUS at 03:04

## 2024-01-31 RX ADMIN — INSULIN GLARGINE 25 UNIT(S): 100 INJECTION, SOLUTION SUBCUTANEOUS at 00:26

## 2024-01-31 RX ADMIN — POLYETHYLENE GLYCOL 3350 17 GRAM(S): 17 POWDER, FOR SOLUTION ORAL at 12:40

## 2024-01-31 RX ADMIN — BUDESONIDE AND FORMOTEROL FUMARATE DIHYDRATE 2 PUFF(S): 160; 4.5 AEROSOL RESPIRATORY (INHALATION) at 22:50

## 2024-01-31 RX ADMIN — INSULIN GLARGINE 15 UNIT(S): 100 INJECTION, SOLUTION SUBCUTANEOUS at 22:50

## 2024-01-31 RX ADMIN — ATORVASTATIN CALCIUM 20 MILLIGRAM(S): 80 TABLET, FILM COATED ORAL at 22:08

## 2024-01-31 RX ADMIN — Medication 1: at 14:02

## 2024-01-31 RX ADMIN — Medication 12 UNIT(S): at 00:25

## 2024-01-31 RX ADMIN — Medication 100 MILLIEQUIVALENT(S): at 03:03

## 2024-01-31 RX ADMIN — Medication 5 UNIT(S): at 18:52

## 2024-01-31 RX ADMIN — Medication 4: at 18:52

## 2024-01-31 NOTE — H&P ADULT - PROBLEM SELECTOR PLAN 2
No formal diagnosis of CHF. Prior echo 10/23 with EF 61%  -f/u repeat TTE  -c/w IV Lasix 40mg daily CXR with perihilar opacities, likely cardiogenic. BNP elevated. No formal diagnosis of CHF. Chronic BLE edema per daughter. Prior echo 10/23 with EF 61%  -f/u repeat TTE  -c/w IV Lasix 40mg daily tachycardia with leukocytosis on admission, meeting SIRS criteria but no obvious source of infection. Likely has underlying viral URI, low suspicion for bacterial infection  -f/u blood cultures, MRSA  -monitor off abx

## 2024-01-31 NOTE — H&P ADULT - PROBLEM SELECTOR PLAN 6
stable on admission.   -c/w home meds K 2.9 on admission, now resolved. Likely iso home albuterol use  -trend K, replete to 4.0 Sugars 300s/400s on admission. BHB 0.7 but no acidosis. A1c 6.6% from 10/23. Received Lantus 25u overnight, sugars now improving. Diet resumed. On Basal/bolus 20u/20u at home, as well as repaglinide and victoza  -f/u A1c  -hold home repaglinide and victoza  -c/w lantus 15u qhs, admelog 5u premeal, and low ISS  -FS with meals

## 2024-01-31 NOTE — ED ADULT NURSE REASSESSMENT NOTE - NS ED NURSE REASSESS COMMENT FT1
Received report from day RN. Patient is A&Ox4, daughter at bedside to translate. Patient denies chest pain, chills, nausea, vomiting, abdominal pain, diarrhea. Breathing even and unlabored on 3L NC. 14Fr dobbs in place to monitor fluid output. Patient is admitted, awaiting bed placement.

## 2024-01-31 NOTE — H&P ADULT - NSHPLABSRESULTS_GEN_ALL_CORE
Personally reviewed old records.  Personally reviewed labs.  Personally reviewed imaging.  Personally reviewed EKG.                          8.9    18.83 )-----------( 337      ( 30 Jan 2024 17:25 )             26.2       01-30    136  |  97<L>  |  50<H>  ----------------------------<  450<HH>  3.3<L>   |  17<L>  |  5.23<H>    Ca    9.0      30 Jan 2024 22:35  Phos  5.6     01-30  Mg     2.00     01-30    TPro  7.5  /  Alb  3.1<L>  /  TBili  0.4  /  DBili  <0.2  /  AST  21  /  ALT  20  /  AlkPhos  66  01-30            LIVER FUNCTIONS - ( 30 Jan 2024 20:59 )  Alb: 3.1 g/dL / Pro: 7.5 g/dL / ALK PHOS: 66 U/L / ALT: 20 U/L / AST: 21 U/L / GGT: x                 Urinalysis Basic - ( 30 Jan 2024 22:35 )    Color: x / Appearance: x / SG: x / pH: x  Gluc: 450 mg/dL / Ketone: x  / Bili: x / Urobili: x   Blood: x / Protein: x / Nitrite: x   Leuk Esterase: x / RBC: x / WBC x   Sq Epi: x / Non Sq Epi: x / Bacteria: x

## 2024-01-31 NOTE — ED ADULT NURSE REASSESSMENT NOTE - NS ED NURSE REASSESS COMMENT FT1
BREAK RN: pt a&ox4 with no new complaints. as per MD Pastrana pt only needs a total of 3 K runs. pt pending 1 more Potassium Chloride IVPB. pt pending FS at 345 s/p insulin. pt tolerating room air with respirations even and unlabored. MD Celeste aware of sats on RA. 20g to the Right AC with no redness or swelling. pt safety maintained.

## 2024-01-31 NOTE — H&P ADULT - NSHPSOCIALHISTORY_GEN_ALL_CORE
ambulates with walker ambulates by herself at home  wheelchair outside of home  lives with family, dependent on family for most ADLs

## 2024-01-31 NOTE — H&P ADULT - PROBLEM SELECTOR PLAN 1
p/w 2 days dyspnea, wheezing, generalized weakness, myalgias, and chest tightness.    dyspnea and wheezing  exacerbation of asthma and undiagnosed HFpEF. Lower suspicion for PNA but on differential    -Goal SpO2 >92%, on supplemental O2, wean as tolerated  -f/u blood cultures p/w 2 days dyspnea, wheezing, generalized weakness, myalgias, and chest tightness. Required BiPAP for increased WOB, now titrated to 3L NC. CXR with perihilar opacities. Pt reports improving sx with tx received in ED. Presentation possibly iso viral prodrome leading to exacerbation of asthma and undiagnosed HFpEF. Lower suspicion for PNA but on differential  -Goal SpO2 >92%, on supplemental O2, wean as tolerated  -f/u blood cultures, MRSA  -c/w Zosyn (1/30 - )  -volume overload and asthma exacerbation management per below p/w 2 days dyspnea, wheezing, generalized weakness, myalgias, and chest tightness. Required BiPAP for increased WOB, now titrated to 3L NC. CXR with perihilar opacities. Pt reports improving sx with tx received in ED. Likely exacerbation of CHF 2/2 underlying URI. asthma exacerbation and PNA lower on differential  -Goal SpO2 >92%, on supplemental O2, wean as tolerated  -volume overload and asthma exacerbation management per below

## 2024-01-31 NOTE — CONSULT NOTE ADULT - PROBLEM SELECTOR RECOMMENDATION 3
Pt. with metabolic acidosis in the setting of advanced CKD and lactic acidosis. Lactate initially elevated at 3.7 and remains elevated/improved to 3.4. SCO2 low at 19 on 1/30/24 and remains low at 17 today. Recommend continuing on bicarb tabs at 650mg BID. Please continue to replete potassium to goal of 4. Monitor SCO2.

## 2024-01-31 NOTE — H&P ADULT - HISTORY OF PRESENT ILLNESS
64yo female with a hx of T2DM, asthma, CKD4, HTN, and HLD here for dyspnea and wheezing that began the evening prior to presentation. Also complains of associated generalized weakness, myalgias, and chest tightness. Per daughter giving multiple rounds of Albuterol at home without improvement in symptoms. Denied chest pain, denies abdominal pain, nausea, vomiting, diarrhea, fever.  Patient denies recent travel.    In ED, initial vitals were 98.2F, , /59, 93% on RA. Labs significant for WBC 18.83, Hgb 8.9 (baseline 9-10), K 2.9 > 3.3, BUN/Cr 49/5.13 (baseline Cr ~5), AG 19, Trop 118 > 116, BNP 32027, BHB 0.7, VBG lactate 3.7 > 3.4, UA with >1k glucose and protein. Glucose 480, given Lantus 25u x1 and Admelog 20u x2. RVP negative. CXR with Perihilar opacities with effusion more likely cardiogenic in origin than multifocal pneumonia. Prior EF 61% from 10/23. Due to increase WOB, MICU consulted for first-time BiPAP use which was tolerated well, pt determined to not be MICU candidate. Pt was given Vanc x1, Zosyn x1, solu-medrol, duonebs, and Bumex 2mg x1, and admitted for further management. 64yo female with a hx of T2DM, asthma, CKD4, HTN, and HLD here for dyspnea and wheezing that began the evening prior to presentation. Also complains of associated generalized weakness, myalgias, and chest tightness. Per daughter giving multiple rounds of Albuterol at home without improvement in symptoms. Not on home O2. Reports having sore throat and chills 4 days ago. Denied chest pain, denies abdominal pain, nausea, vomiting, diarrhea, fever, dizziness, lightheadedness, nasal congestion, rhinorrhea, urinary sx.  Patient denies recent travel or sick contacts at home. Per family has chronic lower extremity swelling for >1 year.     In ED, initial vitals were 98.2F, , /59, 93% on RA. Labs significant for WBC 18.83, Hgb 8.9 (baseline 9-10), K 2.9 > 3.3, BUN/Cr 49/5.13 (baseline Cr ~3.3), AG 19, Trop 118 > 116, BNP 13440, BHB 0.7, VBG lactate 3.7 > 3.4, procal 0.34. UA with >1k glucose and protein. Glucose 480, given Lantus 25u x1 and Admelog 20u x2. RVP negative. CXR with Perihilar opacities with effusion more likely cardiogenic in origin than multifocal pneumonia. Prior EF 61% from 10/23. Due to increase WOB, MICU consulted for first-time BiPAP use which was tolerated well, pt determined to not be MICU candidate. Pt was given Vanc x1, Zosyn x1, solu-medrol, duonebs, and Bumex 2mg x1, and admitted for further management.

## 2024-01-31 NOTE — H&P ADULT - PROBLEM SELECTOR PLAN 3
-s/p methylprednisolone and duonebs x3 in ED  -c/w duonebs prn P/w dyspnea not improved with home albuterol. Noted to have increased work of breathing and accessory muscle usage in ED  -s/p methylprednisolone and duonebs x3 in ED  -c/w duonebs prn  -monitor off steroids for now CXR with perihilar opacities, likely cardiogenic. BNP elevated. No formal diagnosis of CHF. Chronic BLE edema per daughter. Prior echo 10/23 with EF 61%  -f/u repeat TTE  -c/w IV Lasix 40mg daily  -goal diuresis 1-1.5L daily

## 2024-01-31 NOTE — H&P ADULT - PROBLEM SELECTOR PLAN 9
stable but soft on admission. on amlodipine at home  -home hold meds iso soft pressures Hgb 8.9 on admission, baseline 9-10. Likely iso CKD. Low suspicion for any source of bleeding  -trend cbc  -f/u iron studies, b12, folate

## 2024-01-31 NOTE — H&P ADULT - NSHPREVIEWOFSYSTEMS_GEN_ALL_CORE
REVIEW OF SYSTEMS:  CONSTITUTIONAL: No weakness. No fevers. No chills.   EYES: No blurry vision. No eye pain.  ENT/NECK:No dysphagia. No sore throat. No Sinusitis/rhinorrhea.  CARDIAC: No chest pain. No palpitations. No lightheadedness.   RESPIRATORY: No cough. No SOB.  GASTROINTESTINAL: No abdominal pain. No nausea. No vomiting. No diarrhea. No constipation  GENITOURINARY: No dysuria. No frequency.   NEUROLOGICAL: No numbness/tingling. No focal weakness. No headache.  BACK: No back pain. No flank pain.  EXTREMITIES: No lower extremity edema. Full ROM. No joint pain.  SKIN: No rashes. No other lesions.  HEMATOLOGIC/LYMPHATIC: No easy bruising. No swollen or tender lymph nodes.  PSYCHIATRIC: No depression. No anxiety. No SI/HI.  ALLERGIC: No lip swelling. No hives.  All other systems reviewed and are negative unless indicated above. REVIEW OF SYSTEMS:  CONSTITUTIONAL: +weakness. No fevers. +chills.   EYES: No blurry vision. No eye pain.  ENT/NECK:No dysphagia. +sore throat. No Sinusitis/rhinorrhea.  CARDIAC: No chest pain. No palpitations. No lightheadedness.   RESPIRATORY: No cough. +SOB.  GASTROINTESTINAL: No abdominal pain. No nausea. No vomiting. No diarrhea.   GENITOURINARY: No dysuria. No frequency.   NEUROLOGICAL: No numbness/tingling. No focal weakness. No headache.  BACK: No back pain. No flank pain.  EXTREMITIES: +lower extremity edema. Full ROM. No joint pain.  SKIN: No rashes. No other lesions.  All other systems reviewed and are negative unless indicated above.

## 2024-01-31 NOTE — H&P ADULT - PROBLEM SELECTOR PLAN 8
diet: NPO pending sugar improvement, then consistent carb  dvt ppx: heparin tid  dispo: pending clinical improvement Hgb 8.9 on admission, baseline 9-10. Likely iso CKD. Low suspicion for any source of bleeding  -trend cbc  -f/u iron studies, b12, folate Elevated trop 118 on admission, now downtrending. No chest pain. ECG with sinus tachycardia, ?LVH. Likely demand ischemia  -CTM

## 2024-01-31 NOTE — H&P ADULT - ATTENDING COMMENTS
The patient is a 63-year-old woman who is followed for ckd stage 4/5, most likely secondary to diabetic nephropathy, and who is admitted for evaluation and management of acute hypoxic respiratory failure, most likely secondary to pulmonary edema in the setting of acute kidney injury superimposed on chronic kidney disease, and complicated by the development of hyperglycemia.     Acte respiratory failure with hypoxia: -Most likely secondary to pulmonary edema in setting of acute kidney injury   -Most recent transthoracic echo does not demonstrate evidence of systolic or diastolic dysfunction; elevated serum troponin this admission with t-wave inversions in lateral leads; will obtain repeat tte to look for any evidence of new cardiomyopathy   -Administering 40 mg of intravenous furosemide once daily (approximately double home dose) with goal diuresis 1-2 liters daily     SIRS: -Patient has evident leukocytosis and tachycardia on presentation with low-grade fever to 100.2   -Administered antibiotics in emergency department; however, clinical syndrome does not seem consistent with bacterial pneumonia; uri symptoms without evident cough or pulmonary consolidation   -Will avoid further antibiotic administration at this time; suspect syndrome more likely secondary to nelson on ckd and pulmonary edema     NELSON on CKD 4/5: -Suspect secondary to vasoplegia in the setting of viral upper respiratory infection; also possible that this represents progression of chronic kidney disease   -Etiology unclear of chronic kidney disease; suspected secondary to diabetic nephropathy, but patient has had a1c level in our lab that measures less than 7; possible that patient had long-standing hyperglycemia prior to need for insulin use     Diabetes mellitus with hyperglycemia: -Known insulin-dependent diabetes mellitus; glucose elevated this admission to 300-400 (subsequent to administration of methylprednisolone in ed)   -Will monitor serum glucose levels and adjust insulin dose as necessary     Asthma: -Suspected asthma history although no pfts to confirm; no evident wheezing and shortness of breath significantly improved today; will avoid administration of additional steroids at this time     Prophylactic measure: -heparin for dvt prophylaxis; consistent carbohydrate diet

## 2024-01-31 NOTE — CONSULT NOTE ADULT - SUBJECTIVE AND OBJECTIVE BOX
[facility] DIVISION OF KIDNEY DISEASES AND HYPERTENSION -- [phone]  -- INITIAL CONSULT NOTE  --------------------------------------------------------------------------------  HPI: 63F with PMH of HTN, HLD, DM2, asthma, and advanced CKD now presenting to the ED with sore throat and chills for the past 3 days with worsening dyspnea x1 day. In the last 24 hours, daughter reports giving 10-12 puffs of albuterol for possible asthma exacerbation. Nephrology consulted for NELSON on CKD.     On review of labs on [place] [facility], last Scr was 3.3 on 12/18/23. On admission, Scr elevated at 5.13 on 1/30/24 and remains elevated at 5.23 today. Serum potassium initially low at 2.9 and remains low at 3.3 today. Hgb low at 8.9.     Pt seen and evaluated in the ED. Pt reports chills and sore throat that has now improved. Pt. currently on BiPAP. CXR on admission shows perihilar opacities with effusion more likely cardiogenic in origin than multifocal pneumonia. Of note pt. was initially seeing Dr. Jah Kurtz at [facility] and now follows with Dr. Mcqueen. Daughter reports that pt has had decreased PO intake and energy levels, feeling more fatigued, and slightly nauseous over the past month. She has not discussed the possibility of dialysis with her mother. However, pt. was seen by Dr. ADDISON Preciado on 12/18/23 for transplant evaluation.     PAST HISTORY  --------------------------------------------------------------------------------  PAST MEDICAL & SURGICAL HISTORY:  Type 2 diabetes mellitus  Stage 4 chronic kidney disease  HTN (hypertension)  HLD (hyperlipidemia)  No significant past surgical history    FAMILY HISTORY:  FH: type 2 diabetes mellitus (Mother)    PAST SOCIAL HISTORY:  No history of smoking    ALLERGIES & MEDICATIONS  --------------------------------------------------------------------------------  Allergies  No Known Allergies    Intolerances    Standing Inpatient Medicationsinsulin glargine Injectable (LANTUS) 25 Unit(s) SubCutaneous once  insulin lispro Injectable (ADMELOG) 12 Unit(s) SubCutaneous Once    PRN Inpatient Medications  REVIEW OF SYSTEMS  --------------------------------------------------------------------------------  Gen: No fevers, +chills  Skin: No rashes  Head/Eyes/Ears: No HA  Respiratory: No cough, +dyspnea  CV: No chest pain  GI: No abdominal pain, diarrhea, +decreased appetite  : No dysuria, hematuria  MSK: No edema    All other systems were reviewed and are negative, except as noted.    VITALS/PHYSICAL EXAM  --------------------------------------------------------------------------------  T(C): 36.8 (01-30-24 @ 21:00), Max: 37.9 (01-30-24 @ 17:40)  HR: 84 (01-30-24 @ 21:51) (84 - 120)  BP: 143/57 (01-30-24 @ 21:00) (143/57 - 161/59)  RR: 20 (01-30-24 @ 21:00) (16 - 22)  SpO2: 100% (01-30-24 @ 21:51) (93% - 100%)  Wt(kg): --    Physical Exam:  Gen: Appears comfortable on BiPAP  HEENT: MMM  Pulm: fine rales in the bases and mild wheezing B/L  CV: S1S2  Abd: Soft, +BS   : dobbs catheter with clear yellow urine  Ext: No B/L LE edema  Neuro: Awake, asterixes   Skin: Warm and dry  Vascular access: None for dialysis    LABS/STUDIES  --------------------------------------------------------------------------------              8.9    18.83 >-----------<  337      [01-30-24 @ 17:25]              26.2     136  |  97  |  50  ----------------------------<  450      [01-30-24 @ 22:35]  3.3   |  17  |  5.23        Ca     9.0     [01-30-24 @ 22:35]      Mg     2.00     [01-30-24 @ 20:59]      Phos  5.6     [01-30-24 @ 20:59]    TPro  7.5  /  Alb  3.1  /  TBili  0.4  /  DBili  <0.2  /  AST  21  /  ALT  20  /  AlkPhos  66  [01-30-24 @ 20:59]    Creatinine Trend:  SCr 5.23 [01-30 @ 22:35]  SCr 5.25 [01-30 @ 20:59]  SCr 5.13 [01-30 @ 17:25]

## 2024-01-31 NOTE — H&P ADULT - PROBLEM SELECTOR PLAN 11
diet: dash/tlc  dvt ppx: heparin tid  dispo: pending clinical improvement  Bowel regimen: miralax and senna  Code: full    PT eval

## 2024-01-31 NOTE — ED ADULT NURSE REASSESSMENT NOTE - NS ED NURSE REASSESS COMMENT FT1
Pt is A&Ox4, respirations are even and unlabored. Pt denies SOB or pain at this time. IV is patent and intact, morning labs drawn and sent. Bed in lowest position, safety maintianed.

## 2024-01-31 NOTE — CONSULT NOTE ADULT - PROBLEM SELECTOR RECOMMENDATION 2
Pt. with hypokalemia in the setting of lasix, albuterol, and oral bicarbonate therapy. Recommend giving IV K riders and oral potassium supplementation to goal of 4 as pt. is being given insulin for hyperglycemia, as well as IV diuretics for presumed HF exacerbation/renal failure, oral bicarbonate therapy for metabolic acidosis, and duonebs for SOB/wheezing. Maintain potassium to goal of 4. Recommend checking VBGs Q4 hours.

## 2024-01-31 NOTE — CONSULT NOTE ADULT - PROBLEM SELECTOR RECOMMENDATION 9
Pt. with NELSON on CKD in the setting of longstanding HTN and DM2. On review of labs on [facility], last Scr was 3.3 on 12/18/23. On admission, Scr elevated at 5.13 on 1/30/24 and remains elevated at 5.23 today. UPCR was 18.1 on 12/18/23. Pt. was previously admitted from 10/17-10/20/23 with UTI and NELSON on CKD. Serologic workup at that time was negative: C3/C4 not low, kappa/lambda ratio WNL with no monoclonal band identified, negative for ANCA, CELIA, dsDNA, Hep B, Hep C, and XAZ1T-Oc. On this admission, UA with >1000mg protein, trace blood, glucosuria. RVP and COVID negative. Pro-BNP elevated at 10,471. CXR shows perihilar opacities with effusion more likely cardiogenic in origin than multifocal pneumonia. ECHO from previous admission with EF of ~60%. Pt likely with progressive CKD. Discussed possibility of hemodialysis with daughter at bedside who informed me she has not discussed this with her mother, would like some time to discuss this with her mother. They are hoping for pre-emptive kidney transplant. Pt. was evaluated by Dr. ADDISON Preciado for kidney transplantation. Recommend to obtain kidney US. Garcia catheter in place. Agree with IV diuretics once potassium is supplemented and is normalized. Recommend Endocrinology consult for optimization of medications for DM2. No urgent indication for dialysis at this time. Monitor labs and urine output. Avoid nephrotoxins. Dose medications as per eGFR.

## 2024-01-31 NOTE — H&P ADULT - PROBLEM SELECTOR PLAN 5
Sugars 300s/400s on admission. A1c 6.6% from 10/23. Received Lantus 25u overnight, sugars now improving. Diet resumed. On Basal/bolus 20u/20u at home, as well as repaglinide and victoza  -f/u A1c  -hold home repaglinide and victoza  -c/w lantus 15u qhs and moderate ISS  -FS with meals Sugars 300s/400s on admission. BHB 0.7 but no acidosis. A1c 6.6% from 10/23. Received Lantus 25u overnight, sugars now improving. Diet resumed. On Basal/bolus 20u/20u at home, as well as repaglinide and victoza  -f/u A1c  -hold home repaglinide and victoza  -c/w lantus 15u qhs and moderate ISS  -FS with meals BUN/Cr 49/5.13 (baseline Cr ~3.3). Also with metabolic acidosis now improving. Makes urine, dobbs placed due to decreased output in ED. Likely pre-renal iso poor PO intake, insensible losses, and third spacing vs ATN   US Kidney/bladder with echogenic lesion in the superior pole of right kidney vs right adrenal gland, CT recommended for further evaluation  -Nephrology consulted, appreciate recs. no plan for dialysis at this time  -trend bmp, UOP, I&Os  -f/u urine lytes  -c/w bicarb tabs bid  -can f/u lesion as outpatient

## 2024-01-31 NOTE — ED ADULT NURSE REASSESSMENT NOTE - NS ED NURSE REASSESS COMMENT FT1
Pt is A&Ox4, respirations are even and unlabored. Pt not in acute distress, denies pain at this time. FS is 360. IV is patent and intact, bed in lowest position safety maintained.

## 2024-01-31 NOTE — CONSULT NOTE ADULT - PROBLEM SELECTOR RECOMMENDATION 4
Pt. with anemia in the setting of advanced CKD. Hgb low at 8.3. Recommend checking iron studies and ferritin level. Transfusion as per primary team. Monitor Hgb.      If you have any questions, please feel free to contact me.  Lc Michael MD  Nephrology Fellow  [phone] / [employer] Teams (Preferred)  (After 4pm or on weekends, please call the on-call Fellow)

## 2024-01-31 NOTE — H&P ADULT - PROBLEM SELECTOR PLAN 10
on atorvastatin at home  -c/w home meds stable but soft on admission. on amlodipine at home  -home hold meds iso soft pressures    #Hyperlipidemia  on atorvastatin at home  -c/w home meds

## 2024-01-31 NOTE — ED ADULT NURSE REASSESSMENT NOTE - NS ED NURSE REASSESS COMMENT FT1
Received patient in room 2, admitted to telemetry awaiting bed assignment. Patient resting in stretcher, no distress noted. Patient denies any pain/discomfort at this time. Patient is on cardiac monitor sinus rhythm w/O2 sat 97% on a 3L/NC. Patient is A&OX4, airway patent, breathing unlabored and even, radial pulses palpable, lungs clear, abdomen soft, nontender. Side rails up and safety maintained. Fall precaution in place. Call bells within reach. Family at bedside.

## 2024-01-31 NOTE — H&P ADULT - ASSESSMENT
62yo female with a hx of T2DM, asthma, CKD4, HTN, and HLD here for dyspnea and wheezing that began the evening prior to presentation, admitted for AHRF likely iso HF exacerbation vs asthma exacerbation.

## 2024-01-31 NOTE — ED ADULT NURSE REASSESSMENT NOTE - NS ED NURSE REASSESS COMMENT FT1
BREAK RN: pt with episode of hypoxia to 88onRA.  pt placed on 4L NC. MD Celeste made aware. pt endorses relief with nasal canula. improvement of O2 sat and improvement of work of breathing noted.

## 2024-01-31 NOTE — H&P ADULT - PROBLEM SELECTOR PLAN 4
BUN/Cr 49/5.13 (baseline Cr ~3.3). Also with metabolic acidosis now improving. Makes her own urine, dobbs placed due to decreased output in ED. Likely pre-renal iso poor PO intake and insensible losses vs intrinsic   -Nephrology consulted, appreciate recs  -trend bmp, UOP, I&Os  -f/u US kidney/bladder, urine lytes  -c/w bicarb tabs bid  -no plan for dialysis at this time P/w dyspnea not improved with home albuterol. Noted to have increased work of breathing and accessory muscle usage in ED  -s/p methylprednisolone and duonebs x3 in ED  -c/w duonebs prn  -monitor off steroids for now

## 2024-01-31 NOTE — H&P ADULT - PROBLEM SELECTOR PLAN 7
-c/w home meds Elevated trop 118 on admission, now downtrending. No chest pain. ECG with sinus tachycardia, ?LVH. Likely demand ischemia  -CTM K 2.9 on admission, now resolved. Likely iso home albuterol use  -trend K, replete to 4.0

## 2024-02-01 LAB
A1C WITH ESTIMATED AVERAGE GLUCOSE RESULT: 6.5 % — HIGH (ref 4–5.6)
ANION GAP SERPL CALC-SCNC: 16 MMOL/L — HIGH (ref 7–14)
ANISOCYTOSIS BLD QL: SLIGHT — SIGNIFICANT CHANGE UP
BASE EXCESS BLDV CALC-SCNC: -2.8 MMOL/L — LOW (ref -2–3)
BASOPHILS # BLD AUTO: 0 K/UL — SIGNIFICANT CHANGE UP (ref 0–0.2)
BASOPHILS NFR BLD AUTO: 0 % — SIGNIFICANT CHANGE UP (ref 0–2)
BUN SERPL-MCNC: 68 MG/DL — HIGH (ref 7–23)
CALCIUM SERPL-MCNC: 8.4 MG/DL — SIGNIFICANT CHANGE UP (ref 8.4–10.5)
CHLORIDE SERPL-SCNC: 101 MMOL/L — SIGNIFICANT CHANGE UP (ref 98–107)
CO2 BLDV-SCNC: 23 MMOL/L — SIGNIFICANT CHANGE UP (ref 22–26)
CO2 SERPL-SCNC: 21 MMOL/L — LOW (ref 22–31)
CREAT SERPL-MCNC: 6.11 MG/DL — HIGH (ref 0.5–1.3)
CULTURE RESULTS: NO GROWTH — SIGNIFICANT CHANGE UP
DIALYSIS INSTRUMENT RESULT - HEPATITIS B SURFACE ANTIGEN: NEGATIVE — SIGNIFICANT CHANGE UP
EGFR: 7 ML/MIN/1.73M2 — LOW
EOSINOPHIL # BLD AUTO: 0 K/UL — SIGNIFICANT CHANGE UP (ref 0–0.5)
EOSINOPHIL NFR BLD AUTO: 0 % — SIGNIFICANT CHANGE UP (ref 0–6)
ESTIMATED AVERAGE GLUCOSE: 140 — SIGNIFICANT CHANGE UP
FERRITIN SERPL-MCNC: 276 NG/ML — SIGNIFICANT CHANGE UP (ref 13–330)
FOLATE SERPL-MCNC: 14 NG/ML — SIGNIFICANT CHANGE UP (ref 3.1–17.5)
GLUCOSE BLDC GLUCOMTR-MCNC: 128 MG/DL — HIGH (ref 70–99)
GLUCOSE BLDC GLUCOMTR-MCNC: 133 MG/DL — HIGH (ref 70–99)
GLUCOSE BLDC GLUCOMTR-MCNC: 133 MG/DL — HIGH (ref 70–99)
GLUCOSE BLDC GLUCOMTR-MCNC: 187 MG/DL — HIGH (ref 70–99)
GLUCOSE BLDC GLUCOMTR-MCNC: 188 MG/DL — HIGH (ref 70–99)
GLUCOSE BLDC GLUCOMTR-MCNC: 259 MG/DL — HIGH (ref 70–99)
GLUCOSE BLDC GLUCOMTR-MCNC: 289 MG/DL — HIGH (ref 70–99)
GLUCOSE BLDC GLUCOMTR-MCNC: 299 MG/DL — HIGH (ref 70–99)
GLUCOSE BLDC GLUCOMTR-MCNC: 299 MG/DL — HIGH (ref 70–99)
GLUCOSE BLDC GLUCOMTR-MCNC: 326 MG/DL — HIGH (ref 70–99)
GLUCOSE BLDC GLUCOMTR-MCNC: 350 MG/DL — HIGH (ref 70–99)
GLUCOSE SERPL-MCNC: 285 MG/DL — HIGH (ref 70–99)
HCO3 BLDV-SCNC: 22 MMOL/L — SIGNIFICANT CHANGE UP (ref 22–29)
HCT VFR BLD CALC: 23.7 % — LOW (ref 34.5–45)
HGB BLD-MCNC: 7.9 G/DL — LOW (ref 11.5–15.5)
IANC: 24.15 K/UL — HIGH (ref 1.8–7.4)
IRON SATN MFR SERPL: 20 % — SIGNIFICANT CHANGE UP (ref 14–50)
IRON SATN MFR SERPL: 44 UG/DL — SIGNIFICANT CHANGE UP (ref 30–160)
LYMPHOCYTES # BLD AUTO: 0.7 K/UL — LOW (ref 1–3.3)
LYMPHOCYTES # BLD AUTO: 2.6 % — LOW (ref 13–44)
MAGNESIUM SERPL-MCNC: 2.4 MG/DL — SIGNIFICANT CHANGE UP (ref 1.6–2.6)
MCHC RBC-ENTMCNC: 28.8 PG — SIGNIFICANT CHANGE UP (ref 27–34)
MCHC RBC-ENTMCNC: 33.3 GM/DL — SIGNIFICANT CHANGE UP (ref 32–36)
MCV RBC AUTO: 86.5 FL — SIGNIFICANT CHANGE UP (ref 80–100)
MICROCYTES BLD QL: SLIGHT — SIGNIFICANT CHANGE UP
MONOCYTES # BLD AUTO: 0.7 K/UL — SIGNIFICANT CHANGE UP (ref 0–0.9)
MONOCYTES NFR BLD AUTO: 2.6 % — SIGNIFICANT CHANGE UP (ref 2–14)
MRSA PCR RESULT.: SIGNIFICANT CHANGE UP
MYELOCYTES NFR BLD: 0.9 % — HIGH (ref 0–0)
NEUTROPHILS # BLD AUTO: 25.43 K/UL — HIGH (ref 1.8–7.4)
NEUTROPHILS NFR BLD AUTO: 93.9 % — HIGH (ref 43–77)
PCO2 BLDV: 37 MMHG — LOW (ref 39–52)
PH BLDV: 7.38 — SIGNIFICANT CHANGE UP (ref 7.32–7.43)
PHOSPHATE SERPL-MCNC: 5.7 MG/DL — HIGH (ref 2.5–4.5)
PLAT MORPH BLD: ABNORMAL
PLATELET # BLD AUTO: 393 K/UL — SIGNIFICANT CHANGE UP (ref 150–400)
PLATELET COUNT - ESTIMATE: NORMAL — SIGNIFICANT CHANGE UP
PO2 BLDV: 58 MMHG — HIGH (ref 25–45)
POLYCHROMASIA BLD QL SMEAR: SLIGHT — SIGNIFICANT CHANGE UP
POTASSIUM SERPL-MCNC: 3.9 MMOL/L — SIGNIFICANT CHANGE UP (ref 3.5–5.3)
POTASSIUM SERPL-SCNC: 3.9 MMOL/L — SIGNIFICANT CHANGE UP (ref 3.5–5.3)
RBC # BLD: 2.74 M/UL — LOW (ref 3.8–5.2)
RBC # FLD: 14.8 % — HIGH (ref 10.3–14.5)
RBC BLD AUTO: ABNORMAL
S AUREUS DNA NOSE QL NAA+PROBE: SIGNIFICANT CHANGE UP
SAO2 % BLDV: 88.2 % — HIGH (ref 67–88)
SODIUM SERPL-SCNC: 138 MMOL/L — SIGNIFICANT CHANGE UP (ref 135–145)
SPECIMEN SOURCE: SIGNIFICANT CHANGE UP
TIBC SERPL-MCNC: 221 UG/DL — SIGNIFICANT CHANGE UP (ref 220–430)
UIBC SERPL-MCNC: 177 UG/DL — SIGNIFICANT CHANGE UP (ref 110–370)
VIT B12 SERPL-MCNC: 780 PG/ML — SIGNIFICANT CHANGE UP (ref 200–900)
WBC # BLD: 27.08 K/UL — HIGH (ref 3.8–10.5)
WBC # FLD AUTO: 27.08 K/UL — HIGH (ref 3.8–10.5)

## 2024-02-01 PROCEDURE — 77001 FLUOROGUIDE FOR VEIN DEVICE: CPT | Mod: 26,GC

## 2024-02-01 PROCEDURE — 76937 US GUIDE VASCULAR ACCESS: CPT | Mod: 26

## 2024-02-01 PROCEDURE — 99233 SBSQ HOSP IP/OBS HIGH 50: CPT

## 2024-02-01 PROCEDURE — 36556 INSERT NON-TUNNEL CV CATH: CPT

## 2024-02-01 PROCEDURE — 99233 SBSQ HOSP IP/OBS HIGH 50: CPT | Mod: GC

## 2024-02-01 RX ORDER — INSULIN LISPRO 100/ML
VIAL (ML) SUBCUTANEOUS
Refills: 0 | Status: DISCONTINUED | OUTPATIENT
Start: 2024-02-01 | End: 2024-02-05

## 2024-02-01 RX ORDER — BUMETANIDE 0.25 MG/ML
2 INJECTION INTRAMUSCULAR; INTRAVENOUS EVERY 12 HOURS
Refills: 0 | Status: DISCONTINUED | OUTPATIENT
Start: 2024-02-01 | End: 2024-02-09

## 2024-02-01 RX ORDER — SODIUM CHLORIDE 9 MG/ML
100 INJECTION INTRAMUSCULAR; INTRAVENOUS; SUBCUTANEOUS
Refills: 0 | Status: DISCONTINUED | OUTPATIENT
Start: 2024-02-01 | End: 2024-02-14

## 2024-02-01 RX ORDER — INSULIN GLARGINE 100 [IU]/ML
18 INJECTION, SOLUTION SUBCUTANEOUS AT BEDTIME
Refills: 0 | Status: DISCONTINUED | OUTPATIENT
Start: 2024-02-01 | End: 2024-02-04

## 2024-02-01 RX ORDER — CHLORHEXIDINE GLUCONATE 213 G/1000ML
1 SOLUTION TOPICAL
Refills: 0 | Status: DISCONTINUED | OUTPATIENT
Start: 2024-02-01 | End: 2024-02-01

## 2024-02-01 RX ORDER — SODIUM CHLORIDE 9 MG/ML
10 INJECTION INTRAMUSCULAR; INTRAVENOUS; SUBCUTANEOUS
Refills: 0 | Status: DISCONTINUED | OUTPATIENT
Start: 2024-02-01 | End: 2024-02-14

## 2024-02-01 RX ORDER — CHLORHEXIDINE GLUCONATE 213 G/1000ML
1 SOLUTION TOPICAL DAILY
Refills: 0 | Status: DISCONTINUED | OUTPATIENT
Start: 2024-02-01 | End: 2024-02-13

## 2024-02-01 RX ORDER — INSULIN LISPRO 100/ML
6 VIAL (ML) SUBCUTANEOUS
Refills: 0 | Status: DISCONTINUED | OUTPATIENT
Start: 2024-02-01 | End: 2024-02-04

## 2024-02-01 RX ADMIN — BUDESONIDE AND FORMOTEROL FUMARATE DIHYDRATE 2 PUFF(S): 160; 4.5 AEROSOL RESPIRATORY (INHALATION) at 22:57

## 2024-02-01 RX ADMIN — BUMETANIDE 2 MILLIGRAM(S): 0.25 INJECTION INTRAMUSCULAR; INTRAVENOUS at 17:06

## 2024-02-01 RX ADMIN — MONTELUKAST 10 MILLIGRAM(S): 4 TABLET, CHEWABLE ORAL at 11:59

## 2024-02-01 RX ADMIN — Medication 5 UNIT(S): at 07:35

## 2024-02-01 RX ADMIN — ATORVASTATIN CALCIUM 20 MILLIGRAM(S): 80 TABLET, FILM COATED ORAL at 22:58

## 2024-02-01 RX ADMIN — Medication 650 MILLIGRAM(S): at 05:40

## 2024-02-01 RX ADMIN — Medication 6 UNIT(S): at 16:55

## 2024-02-01 RX ADMIN — HEPARIN SODIUM 5000 UNIT(S): 5000 INJECTION INTRAVENOUS; SUBCUTANEOUS at 05:39

## 2024-02-01 RX ADMIN — BUDESONIDE AND FORMOTEROL FUMARATE DIHYDRATE 2 PUFF(S): 160; 4.5 AEROSOL RESPIRATORY (INHALATION) at 09:30

## 2024-02-01 RX ADMIN — Medication 6 UNIT(S): at 11:58

## 2024-02-01 RX ADMIN — Medication 2: at 16:56

## 2024-02-01 RX ADMIN — SENNA PLUS 2 TABLET(S): 8.6 TABLET ORAL at 22:58

## 2024-02-01 RX ADMIN — Medication 3: at 11:58

## 2024-02-01 RX ADMIN — Medication 3: at 07:35

## 2024-02-01 RX ADMIN — LORATADINE 10 MILLIGRAM(S): 10 TABLET ORAL at 11:59

## 2024-02-01 RX ADMIN — INSULIN GLARGINE 18 UNIT(S): 100 INJECTION, SOLUTION SUBCUTANEOUS at 22:57

## 2024-02-01 RX ADMIN — CHLORHEXIDINE GLUCONATE 1 APPLICATION(S): 213 SOLUTION TOPICAL at 17:15

## 2024-02-01 RX ADMIN — Medication 40 MILLIGRAM(S): at 05:39

## 2024-02-01 RX ADMIN — Medication 650 MILLIGRAM(S): at 17:06

## 2024-02-01 RX ADMIN — Medication 1 TABLET(S): at 11:59

## 2024-02-01 NOTE — PROGRESS NOTE ADULT - PROBLEM SELECTOR PLAN 5
BUN/Cr 49/5.13 (baseline Cr ~3.3). Also with metabolic acidosis now improving. Makes urine, dobbs placed due to decreased output in ED. Likely pre-renal iso poor PO intake, insensible losses, and third spacing vs ATN   US Kidney/bladder with echogenic lesion in the superior pole of right kidney vs right adrenal gland, CT recommended for further evaluation  -Nephrology consulted, appreciate recs. no plan for dialysis at this time  -trend bmp, UOP, I&Os  -FeUrea consistent with intrinsic disease  -c/w bicarb tabs bid  -can f/u lesion as outpatient BUN/Cr 49/5.13 (baseline Cr ~3.3). Also with metabolic acidosis now improving. Makes urine, dobbs placed due to decreased output in ED. Likely pre-renal iso poor PO intake, insensible losses, and third spacing vs ATN   US Kidney/bladder with echogenic lesion in the superior pole of right kidney vs right adrenal gland, CT recommended for further evaluation  -Nephrology consulted, appreciate recs. no plan for dialysis at this time  -trend bmp, UOP, I&Os  -FeUrea consistent with intrinsic disease  -c/w bicarb tabs bid  -dobbs placed d/t decreased UOP, will consider TOV overnight  -can f/u lesion as outpatient BUN/Cr 49/5.13 (baseline Cr ~3.3). Also with metabolic acidosis now improving. Makes urine, dobbs placed due to decreased output in ED. Likely pre-renal iso poor PO intake, insensible losses, and third spacing vs ATN   US Kidney/bladder with echogenic lesion in the superior pole of right kidney vs right adrenal gland, CT recommended for further evaluation  -Nephrology consulted, appreciate recs. Tentative plan for HD while inpatient  -will consult IR for non tunneled catheter placement  -trend bmp, UOP, I&Os  -c/w bicarb tabs bid  -dobbs placed d/t decreased UOP, will consider TOV overnight  -can f/u lesion as outpatient

## 2024-02-01 NOTE — PHYSICAL THERAPY INITIAL EVALUATION ADULT - PERTINENT HX OF CURRENT PROBLEM, REHAB EVAL
Pt is a 63 year old female with a past medical history of T2DM, asthma, CKD4, HTN, and HLD here for dyspnea and wheezing that began the evening prior to presentation. Also complains of associated generalized weakness, myalgias, and chest tightness. Per daughter giving multiple rounds of Albuterol at home without improvement in symptoms. Not on home O2. Reports having sore throat and chills 4 days ago. Denied chest pain, denies abdominal pain, nausea, vomiting, diarrhea, fever, dizziness, lightheadedness, nasal congestion, rhinorrhea, urinary sx.  Patient denies recent travel or sick contacts at home. Per family has chronic lower extremity swelling for >1 year.

## 2024-02-01 NOTE — PROGRESS NOTE ADULT - PROBLEM SELECTOR PLAN 1
Pt. with NELSON on CKD in the setting of longstanding HTN and DM2. On review of labs on [facility], last Scr was 3.3 on 12/18/23. On admission, Scr elevated at 5.13 on 1/30/24 and remains elevated at 6.11 today. UPCR was 18.1 on 12/18/23. Pt. was previously admitted from 10/17-10/20/23 with UTI and NELSON on CKD. Serologic workup at that time was negative: C3/C4 not low, kappa/lambda ratio WNL with no monoclonal band identified, negative for ANCA, CELIA, dsDNA, Hep B, Hep C, and YEK1I-Ps. On this admission, UA with >1000mg protein, trace blood, glucosuria. Renal US w/o hydronephrosis. RVP and COVID negative. Pro-BNP elevated at 10,471. CXR shows perihilar opacities with effusion more likely cardiogenic in origin than multifocal pneumonia. ECHO from previous admission with EF of ~60%. Pt likely with progressive CKD. Pt. was evaluated by Dr. ADDISON Preciado for kidney transplantation. HD consent obtained and placed in chart. Plan for HD today. IR consulted for HD catheter. c/w IV diuretics for now.  Monitor labs and urine output. Avoid nephrotoxins. Dose medications as per CrCl

## 2024-02-01 NOTE — PHYSICAL THERAPY INITIAL EVALUATION ADULT - PHYSICAL ASSIST/NONPHYSICAL ASSIST: SUPINE/SIT, REHAB EVAL
Immediate Brief Procedure Note for Pain management    Patient: Jamarcus Castro  MRN: [id number]    Procedure(s):  Transforaminal Epidural Injection Lumbar/Sacral Left L3-4 and L4-5    Pre-Operative Diagnosis:  DDD (degenerative disc disease), lumbar  (primary encounter diagnosis)  Lumbar stenosis with neurogenic claudication  Lumbar radiculopathy    Post-operative Diagnosis:  DDD (degenerative disc disease), lumbar  (primary encounter diagnosis)  Lumbar stenosis with neurogenic claudication  Lumbar radiculopathy    Procedural Physician: Christopher Brar MD    Assistant: none    Sedation: None    Findings: same    Specimen(s): none    Estimated Blood Loss :  None    Complications: None   verbal cues/1 person assist

## 2024-02-01 NOTE — PROGRESS NOTE ADULT - PROBLEM SELECTOR PLAN 4
P/w dyspnea not improved with home albuterol. Noted to have increased work of breathing and accessory muscle usage in ED  -s/p methylprednisolone and duonebs x3 in ED  -c/w duonebs prn  -monitor off steroids for now

## 2024-02-01 NOTE — PROGRESS NOTE ADULT - ASSESSMENT
Pt. with NELSON on CKD.  Pt. with NELSON on CKD in the setting of longstanding HTN and DM2. On review of labs on [facility], last Scr was 3.3 on 12/18/23. On admission, Scr elevated at 5.13 on 1/30/24 and remains elevated at 5.23 today. UPCR was 18.1 on 12/18/23. Pt. was previously admitted from 10/17-10/20/23 with UTI and NELSON on CKD. Serologic workup at that time was negative: C3/C4 not low, kappa/lambda ratio WNL with no monoclonal band identified, negative for ANCA, CELIA, dsDNA, Hep B, Hep C, and OAS6O-Tt. On this admission, UA with >1000mg protein, trace blood, glucosuria. RVP and COVID negative. Pro-BNP elevated at 10,471. CXR shows perihilar opacities with effusion more likely cardiogenic in origin than multifocal pneumonia. ECHO from previous admission with EF of ~60%. Pt likely with progressive CKD. Discussed possibility of hemodialysis with daughter at bedside who informed me she has not discussed this with her mother, would like some time to discuss this with her mother. They are hoping for pre-emptive kidney transplant. Pt. was evaluated by Dr. ADDISON Preciado for kidney transplantation. Recommend to obtain kidney US. Garcia catheter in place. Agree with IV diuretics once potassium is supplemented and is normalized. Recommend Endocrinology consult for optimization of medications for DM2. No urgent indication for dialysis at this time. Monitor labs and urine output. Avoid nephrotoxins. Dose medications as per eGFR.     Problem/Recommendation - 2:  ·  Problem: Hypokalemia.   ·  Recommendation: Pt. with hypokalemia in the setting of lasix, albuterol, and oral bicarbonate therapy. Recommend giving IV K riders and oral potassium supplementation to goal of 4 as pt. is being given insulin for hyperglycemia, as well as IV diuretics for presumed HF exacerbation/renal failure, oral bicarbonate therapy for metabolic acidosis, and duonebs for SOB/wheezing. Maintain potassium to goal of 4. Recommend checking VBGs Q4 hours.     Problem/Recommendation - 3:  ·  Problem: Metabolic acidosis.   ·  Recommendation: Pt. with metabolic acidosis in the setting of advanced CKD and lactic acidosis. Lactate initially elevated at 3.7 and remains elevated/improved to 3.4. SCO2 low at 19 on 1/30/24 and remains low at 17 today. Recommend continuing on bicarb tabs at 650mg BID. Please continue to replete potassium to goal of 4. Monitor SCO2.     Problem/Recommendation - 4:  ·  Problem: Anemia.   ·  Recommendation: Pt. with anemia in the setting of advanced CKD. Hgb low at 8.3. Recommend checking iron studies and ferritin level. Transfusion as per primary team. Monitor Hgb.      If you have any questions, please feel free to contact me.  cL Michael MD  Nephrology Fellow  [phone] / [employer] Teams (Preferred)  (After 4pm or on weekends, please call the on-call Fellow).   Pt. with NELSON on CKD.

## 2024-02-01 NOTE — CONSULT NOTE ADULT - SUBJECTIVE AND OBJECTIVE BOX
Interventional Radiology    Evaluate for Procedure:      HPI:  62yo female with a hx of T2DM, asthma, CKD4, HTN, and HLD here for dyspnea and wheezing that began the evening prior to presentation. Also complains of associated generalized weakness, myalgias, and chest tightness. Per daughter giving multiple rounds of Albuterol at home without improvement in symptoms. Not on home O2. Reports having sore throat and chills 4 days ago. Denied chest pain, denies abdominal pain, nausea, vomiting, diarrhea, fever, dizziness, lightheadedness, nasal congestion, rhinorrhea, urinary sx.  Patient denies recent travel or sick contacts at home. Per family has chronic lower extremity swelling for >1 year.     In ED, initial vitals were 98.2F, , /59, 93% on RA. Labs significant for WBC 18.83, Hgb 8.9 (baseline 9-10), K 2.9 > 3.3, BUN/Cr 49/5.13 (baseline Cr ~3.3), AG 19, Trop 118 > 116, BNP 49337, BHB 0.7, VBG lactate 3.7 > 3.4, procal 0.34. UA with >1k glucose and protein. Glucose 480, given Lantus 25u x1 and Admelog 20u x2. RVP negative. CXR with Perihilar opacities with effusion more likely cardiogenic in origin than multifocal pneumonia. Prior EF 61% from 10/23. Due to increase WOB, MICU consulted for first-time BiPAP use which was tolerated well, pt determined to not be MICU candidate. Pt was given Vanc x1, Zosyn x1, solu-medrol, duonebs, and Bumex 2mg x1, and admitted for further management. (31 Jan 2024 07:10)      PAST MEDICAL HISTORY:  63y    PAST SURGICAL HISTORY:  Female    ALLERGIES:  No Known Allergies      MEDICATIONS:    buMETAnide Injectable: 2 milliGRAM(s) IV Push (01-31 @ 03:04)  furosemide   Injectable: 40 milliGRAM(s) IV Push (02-01 @ 05:39)  heparin   Injectable: 5000 Unit(s) SubCutaneous (02-01 @ 05:39)  piperacillin/tazobactam IVPB...: 200 mL/Hr IV Intermittent (01-30 @ 19:46)  vancomycin  IVPB.: 250 mL/Hr IV Intermittent (01-30 @ 21:07)      VITALS & I/Os:  Vital Signs Last 24 Hrs  T(C): 36.1 (01 Feb 2024 10:00), Max: 37.3 (01 Feb 2024 00:27)  T(F): 97 (01 Feb 2024 10:00), Max: 99.1 (01 Feb 2024 00:27)  HR: 101 (01 Feb 2024 10:00) (96 - 104)  BP: 129/59 (01 Feb 2024 10:00) (127/65 - 143/68)  BP(mean): --  RR: 19 (01 Feb 2024 10:00) (18 - 21)  SpO2: 97% (01 Feb 2024 10:00) (94% - 100%)    Parameters below as of 01 Feb 2024 10:00  Patient On (Oxygen Delivery Method): nasal cannula        I&O's Summary    31 Jan 2024 07:01  -  01 Feb 2024 07:00  --------------------------------------------------------  IN: 0 mL / OUT: 300 mL / NET: -300 mL        Allergies: No Known Allergies    Medications (Abx/Cardiac/Anticoagulation/Blood Products)    buMETAnide Injectable: 2 milliGRAM(s) IV Push (01-31 @ 03:04)  furosemide   Injectable: 40 milliGRAM(s) IV Push (02-01 @ 05:39)  heparin   Injectable: 5000 Unit(s) SubCutaneous (02-01 @ 05:39)  piperacillin/tazobactam IVPB...: 200 mL/Hr IV Intermittent (01-30 @ 19:46)  vancomycin  IVPB.: 250 mL/Hr IV Intermittent (01-30 @ 21:07)    Data:  154.9  72.1  T(C): 36.1  HR: 101  BP: 129/59  RR: 19  SpO2: 97%    -WBC 27.08 / HgB 7.9 / Hct 23.7 / Plt 393  -Na 138 / Cl 101 / BUN 68 / Glucose 285  -K 3.9 / CO2 21 / Cr 6.11  -ALT -- / Alk Phos -- / T.Bili --    Radiology:     Assessment/Plan:   -62yo female with a hx of T2DM, asthma, CKD4, HTN, and HLD here for dyspnea and wheezing that began the evening prior to presentation, admitted for AHRF likely iso HF exacerbation vs asthma exacerbation. Patient now to have HD per nephrology. IR consulted for shiley placement        - case reviewed and approved for 2/1/24  - please place IR procedure order under Dr. Kent  - d/w primary team      Kuldip Carrillo MD  PGY-II, Diagnostic Radiology Resident    -Available on [employer] TEAMS  -Emergent issues: [facility]-p.[phone]; [facility]-p.[phone] ([phone])  -Non-emergent consults: Please place a [facility] order "IR Consult" with an appropriate callback number  -Scheduling questions: [facility]: [phone]; [facility]: [phone]  -Clinic/Outpatient booking: [facility]: [phone]; [facility]: [phone]

## 2024-02-01 NOTE — PROGRESS NOTE ADULT - PROBLEM SELECTOR PLAN 2
tachycardia with leukocytosis on admission, meeting SIRS criteria but no obvious source of infection. Likely has underlying viral URI, low suspicion for bacterial infection  -f/u blood cultures (NGTD), MRSA  -monitor off abx

## 2024-02-01 NOTE — PATIENT PROFILE ADULT - FALL HARM RISK - HARM RISK INTERVENTIONS

## 2024-02-01 NOTE — PROVIDER CONTACT NOTE (OTHER) - ASSESSMENT
Pt weaned from 3L NC to 1L NC placed on 2L NC when being transferred off unit for comfort. Goal is above 92%. Dobbs needs to be emptied upon pt arrival back to unit from HD, dobbs not emptied before sending to HD and I&O not documented/emptied 8a-8pm. Pt weaned from 3L NC to 1L NC placed on 2L NC when being transferred off unit for comfort. Goal is above 95%. Dobbs needs to be emptied upon pt arrival back to unit from HD, dobbs not emptied before sending to HD and I&O not documented/emptied 8a-8pm.

## 2024-02-01 NOTE — PHYSICAL THERAPY INITIAL EVALUATION ADULT - GENERAL OBSERVATIONS, REHAB EVAL
Chart reviewed and cleared for PT by MEGA Mcclellan. Pt received semi supine in bed in NAD, all lines intact + telemetry, pulse ox, ,  SpO2 98%, pts daughter at bedside. Chart reviewed and cleared for PT by MEGA Mcclellan. Pt received semi supine in bed in NAD, all lines intact + nasal canula, telemetry, pulse ox, ,  SpO2 98%, pts daughter at bedside.

## 2024-02-01 NOTE — PROGRESS NOTE ADULT - PROBLEM SELECTOR PLAN 4
Pt. with metabolic acidosis in the setting of advanced CKD and lactic acidosis. Lactate initially elevated at 3.7 now normalized. SCO2 low at 19 on 1/30/24, improved to 21 today. Once pt started on HD can d/c bicarb tabs. monitor labs    Final recommendations pending attending signature.  If you have any questions, please feel free to contact me  Bright Paulino  Nephrology Fellow  [employer]/Page [phone]  (After 5pm or on weekends please page the on-call fellow)

## 2024-02-01 NOTE — PROGRESS NOTE ADULT - PROBLEM SELECTOR PLAN 3
CXR with perihilar opacities, likely cardiogenic. BNP elevated. No formal diagnosis of CHF. Chronic BLE edema per daughter. Prior echo 10/23 with EF 61%  -f/u repeat TTE  -c/w IV Lasix 40mg daily  -goal diuresis 1-1.5L daily CXR with perihilar opacities, likely cardiogenic. BNP elevated. No formal diagnosis of CHF. Chronic BLE edema per daughter. Prior echo 10/23 with EF 61%  -f/u repeat TTE  -s/p IV Lasix 40mg, c/w IV Bumex 2mg BID  -goal diuresis 1-1.5L daily

## 2024-02-01 NOTE — PROGRESS NOTE ADULT - PROBLEM SELECTOR PLAN 3
Anemia noted. Hgb low at 7.9. Tsat 20%, Ferritin wnl at 276. Patient will benefit from IV iron if no ongoing infection. Recommend venofer 200mg X 5 days. Recommend age appropriate screening. Monitor Hgb, transfusion per primary team.

## 2024-02-01 NOTE — PROGRESS NOTE ADULT - SUBJECTIVE AND OBJECTIVE BOX
[facility] Division of Kidney Diseases & Hypertension  FOLLOW UP NOTE  [phone]--------------------------------------------------------------------------------  Chief Complaint:Shortness of breath    24 hour events/subjective:  Pt seen and evaluated bedside this morning with daughter at bedside. Pt reports feeling unwell this morning with persistent SOB. Also endorses poor appetite. Per pts daughter pt is not herself is more tired and not eating. HD discussed extensively with pt and daughter.      PAST HISTORY  --------------------------------------------------------------------------------  No significant changes to PMH, PSH, FHx, SHx, unless otherwise noted    ALLERGIES & MEDICATIONS  --------------------------------------------------------------------------------  Allergies  No Known Allergies  Intolerances  Standing Inpatient Medications  atorvastatin 20 milliGRAM(s) Oral at bedtime  budesonide 160 MICROgram(s)/formoterol 4.5 MICROgram(s) Inhaler 2 Puff(s) Inhalation two times a day  buMETAnide Injectable 2 milliGRAM(s) IV Push every 12 hours  dextrose 5%. 1000 milliLiter(s) IV Continuous <Continuous>  dextrose 5%. 1000 milliLiter(s) IV Continuous <Continuous>  dextrose 50% Injectable 25 Gram(s) IV Push once  dextrose 50% Injectable 25 Gram(s) IV Push once  dextrose 50% Injectable 12.5 Gram(s) IV Push once  glucagon  Injectable 1 milliGRAM(s) IntraMuscular once  insulin glargine Injectable (LANTUS) 18 Unit(s) SubCutaneous at bedtime  insulin lispro (ADMELOG) corrective regimen sliding scale   SubCutaneous three times a day before meals  insulin lispro Injectable (ADMELOG) 6 Unit(s) SubCutaneous three times a day before meals  loratadine 10 milliGRAM(s) Oral daily  montelukast 10 milliGRAM(s) Oral daily  multivitamin 1 Tablet(s) Oral daily  polyethylene glycol 3350 17 Gram(s) Oral daily  senna 2 Tablet(s) Oral at bedtime  sodium bicarbonate 650 milliGRAM(s) Oral every 12 hours  PRN Inpatient Medications  dextrose Oral Gel 15 Gram(s) Oral once PRN    REVIEW OF SYSTEMS  --------------------------------------------------------------------------------  Gen: +fatigue, +poor appetite, No fevers/chills  Skin: No rashes  Head/Eyes/Ears/Mouth: No headache  Respiratory: +sob, no cough  CV: No chest pain  GI: No abdominal pain, diarrhea, constipation, nausea, vomiting  : No increased frequency, dysuria, hematuria  MSK: No joint pain/swelling, no edema  Neuro: No dizziness/lightheadedness      All other systems were reviewed and are negative, except as noted.    VITALS/PHYSICAL EXAM  --------------------------------------------------------------------------------  T(C): 36.1 (02-01-24 @ 10:00), Max: 37.3 (02-01-24 @ 00:27)  HR: 101 (02-01-24 @ 10:00) (96 - 103)  BP: 129/59 (02-01-24 @ 10:00) (127/65 - 143/68)  RR: 19 (02-01-24 @ 10:00) (18 - 21)  SpO2: 97% (02-01-24 @ 10:00) (97% - 100%)  Wt(kg): --  Height (cm): 154.9 (02-01-24 @ 02:13)  Weight (kg): 72.1 (02-01-24 @ 02:13)  BMI (kg/m2): 30 (02-01-24 @ 02:13)  BSA (m2): 1.71 (02-01-24 @ 02:13)    01-31-24 @ 07:01  -  02-01-24 @ 07:00  --------------------------------------------------------  IN: 0 mL / OUT: 300 mL / NET: -300 mL    Physical Exam:  Gen: Appears comfortable on NC  HEENT: MMM  Pulm: fine rales in the bases and mild wheezing B/L  CV: S1S2  Abd: Soft, +BS   : dobbs catheter with clear yellow urine  Ext: Trace B/L LE edema  Neuro: Awake, asterixes   Skin: Warm and dry  Vascular access: None for dialysis    LABS/STUDIES  --------------------------------------------------------------------------------              7.9    27.08 >-----------<  393      [02-01-24 @ 05:36]              23.7     138  |  101  |  68  ----------------------------<  285      [02-01-24 @ 05:36]  3.9   |  21  |  6.11        Ca     8.4     [02-01-24 @ 05:36]      Mg     2.40     [02-01-24 @ 05:36]      Phos  5.7     [02-01-24 @ 05:36]    TPro  7.5  /  Alb  3.0  /  TBili  0.2  /  DBili  x   /  AST  20  /  ALT  15  /  AlkPhos  61  [01-31-24 @ 07:37]    Creatinine Trend:  SCr 6.11 [02-01 @ 05:36]  SCr 5.66 [01-31 @ 07:37]  SCr 5.23 [01-30 @ 22:35]  SCr 5.25 [01-30 @ 20:59]  SCr 5.13 [01-30 @ 17:25]    Urine Creatinine 78      [01-31-24 @ 15:55]  Urine Protein 1723      [01-31-24 @ 15:55]  Urine Sodium <20      [01-31-24 @ 15:55]  Urine Urea Nitrogen 381.0      [01-31-24 @ 15:55]  Urine Osmolality 348      [01-31-24 @ 15:55]    Iron 44, TIBC 221, %sat 20      [02-01-24 @ 05:36]  Ferritin 276      [02-01-24 @ 05:36]

## 2024-02-01 NOTE — PROGRESS NOTE ADULT - PROBLEM SELECTOR PLAN 6
Sugars 300s/400s on admission. BHB 0.7 but no acidosis. A1c 6.6% from 10/23, now 6.1%. Received Lantus 25u overnight, sugars now improving. Diet resumed. On Basal/bolus 20u/20u at home, as well as repaglinide and victoza  -hold home repaglinide and victoza  -c/w lantus 15u qhs, admelog 5u premeal, and low ISS  -FS with meals Sugars 300s/400s on admission. BHB 0.7 but no acidosis. A1c 6.6% from 10/23, now 6.1%. Received Lantus 25u overnight, sugars now improving. Diet resumed. On Basal/bolus 20u/20u at home, as well as repaglinide and victoza  -hold home repaglinide and victoza  -c/w lantus 18u qhs, admelog 6u premeal, and low ISS  -FS with meals Sugars 300s/400s on admission. BHB 0.7 but no acidosis. A1c 6.6% from 10/23, now 6.1%. Received Lantus 25u overnight, sugars now improving. Diet resumed. On lantus 20u in am and 20 qhs at home, as well as repaglinide and victoza  -hold home repaglinide and victoza  -c/w lantus 18u qhs, admelog 6u premeal, and low ISS  -FS with meals Sugars 300s/400s on admission. BHB 0.7 but no acidosis. A1c 6.6% from 10/23, now 6.1%. Received Lantus 25u overnight, sugars now improving. Diet resumed. On lantus 20u in am and 20 qhs at home, as well as repaglinide and victoza  -hold home repaglinide and victoza  -c/w lantus 18u qhs, admelog 6u premeal, and moderate ISS  -FS with meals

## 2024-02-01 NOTE — PROGRESS NOTE ADULT - PROBLEM SELECTOR PLAN 9
Hgb 8.9 on admission, baseline 9-10. Likely iso CKD. Low suspicion for any source of bleeding  -trend cbc  -f/u iron studies, b12, folate Hgb 8.9 on admission, baseline 9-10. Likely iso CKD. Low suspicion for any source of bleeding  -trend cbc

## 2024-02-01 NOTE — PROGRESS NOTE ADULT - PROBLEM SELECTOR PLAN 1
p/w 2 days dyspnea, wheezing, generalized weakness, myalgias, and chest tightness. Required BiPAP for increased WOB, now titrated to 3L NC. CXR with perihilar opacities. Pt reports improving sx with tx received in ED. Likely exacerbation of CHF 2/2 underlying URI. asthma exacerbation and PNA lower on differential  -Goal SpO2 >92%, on supplemental O2, wean as tolerated  -volume overload and asthma exacerbation management per below

## 2024-02-01 NOTE — PROGRESS NOTE ADULT - ATTENDING COMMENTS
NELSON on CKD   v/ol with CHF   oliguria  increase diuretics therapy   replete K   she agrees to start Hd. will arrange with team and IR  Further detailed plan of management and recommendation as outlined above.  avoid contrast studies, ACE-I, ARB, or NSAIDs

## 2024-02-01 NOTE — PROGRESS NOTE ADULT - ASSESSMENT
64yo female with a hx of T2DM, asthma, CKD4, HTN, and HLD here for dyspnea and wheezing that began the evening prior to presentation, admitted for AHRF likely iso HF exacerbation vs asthma exacerbation.

## 2024-02-01 NOTE — PHYSICAL THERAPY INITIAL EVALUATION ADULT - ADDITIONAL COMMENTS
Pt lives with her daughter in an apartment with ramp access. Pt does not use an assistive device within the home, but uses a wheelchair in the community. Pt requires assistance with ADLs. Pt reports no falls in the past 6 months.   Pt left sitting at edge of the bed in NAD, all lines intact, call bell in reach, SpO2 96%, MEGA Mcclellan made aware.

## 2024-02-01 NOTE — PROGRESS NOTE ADULT - ATTENDING COMMENTS
The patient is a 63-year-old woman who is followed for ckd stage 4/5, most likely secondary to diabetic nephropathy, and who is admitted for evaluation and management of acute hypoxic respiratory failure, most likely secondary to pulmonary edema in the setting of acute kidney injury superimposed on chronic kidney disease, and complicated by the development of hyperglycemia.     NELSON on CKD 4/5: -Suspect secondary to vasoplegia in the setting of viral upper respiratory infection; also possible that this represents progression of chronic kidney disease   -Etiology unclear of chronic kidney disease; suspected secondary to diabetic nephropathy, but patient has had a1c level in our lab that measures less than 7; possible that patient had long-standing hyperglycemia prior to need for insulin use and that current a1c in setting of ckd may underestimate degree of hyperglycemia   -Worrisome that kidney function does not appear to be improving; nephrology team has been in touch with the patient and is planning for initiation of dialysis; ir consult for permacath placement; no urgent indications for hemodialysis     Acte respiratory failure with hypoxia: -Resolved today subsequent to diuresis; respiratory failure on presentation most likely secondary to pulmonary edema in setting of acute kidney injury   -Most recent transthoracic echo does not demonstrate evidence of systolic or diastolic dysfunction; elevated serum troponin this admission with t-wave inversions in lateral leads; will obtain repeat tte to look for any evidence of new cardiomyopathy   -Administering 2 mg of intravenous bumetanide twice daily (more than double home dose) with goal diuresis 1-2 liters daily     SIRS: -Resolved today; patient presented with evident leukocytosis and tachycardia on presentation with low-grade fever to 100.2   -Administered antibiotics in emergency department; however, clinical syndrome does not seem consistent with bacterial pneumonia; uri symptoms without evident cough or pulmonary consolidation   -Will avoid further antibiotic administration at this time; suspect syndrome more likely secondary to nelson on ckd and pulmonary edema     Diabetes mellitus with hyperglycemia: -Known insulin-dependent diabetes mellitus; glucose elevated this admission to 300-400 (subsequent to administration of methylprednisolone in ed)   -Will monitor serum glucose levels and adjust insulin dose as necessary     Asthma: -Suspected asthma history although no pfts to confirm; no evident wheezing and shortness of breath significantly improved today; will avoid administration of additional steroids at this time     Prophylactic measure: -heparin for dvt prophylaxis; consistent carbohydrate diet

## 2024-02-01 NOTE — CHART NOTE - NSCHARTNOTEFT_GEN_A_CORE
PRE-INTERVENTIONAL RADIOLOGY PROCEDURE NOTE      Patient Age: 63y    Patient Gender: Female    Procedure: Shiley placement    Diagnosis/Indication: NELSON on CKD, requiring HD    Interventional Radiology Attending Physician: Dr. Dhaval Kent    Ordering Attending Physician: Dr. Rudi Herring    Pertinent Medical History: 62yo female with a hx of T2DM, asthma, CKD4, HTN, and HLD here for dyspnea and wheezing that began the evening prior to presentation, admitted for AHRF likely iso HF exacerbation, also with NELSON on CKD vs worsening CKD determined to require dialysis    Pertinent labs:                      7.9    27.08 )-----------( 393      ( 01 Feb 2024 05:36 )             23.7       02-01    138  |  101  |  68<H>  ----------------------------<  285<H>  3.9   |  21<L>  |  6.11<H>    Ca    8.4      01 Feb 2024 05:36  Phos  5.7     02-01  Mg     2.40     02-01    TPro  7.5  /  Alb  3.0<L>  /  TBili  0.2  /  DBili  x   /  AST  20  /  ALT  15  /  AlkPhos  61  01-31              Patient and Family Aware ? Yes

## 2024-02-01 NOTE — PROGRESS NOTE ADULT - PROBLEM SELECTOR PLAN 8
Elevated trop 118 on admission, now downtrending. No chest pain. ECG with sinus tachycardia, ?LVH. Likely demand ischemia  -CTM

## 2024-02-02 ENCOUNTER — TRANSCRIPTION ENCOUNTER (OUTPATIENT)
Age: 64
End: 2024-02-02

## 2024-02-02 LAB
ANION GAP SERPL CALC-SCNC: 16 MMOL/L — HIGH (ref 7–14)
BUN SERPL-MCNC: 49 MG/DL — HIGH (ref 7–23)
CALCIUM SERPL-MCNC: 8.1 MG/DL — LOW (ref 8.4–10.5)
CHLORIDE SERPL-SCNC: 104 MMOL/L — SIGNIFICANT CHANGE UP (ref 98–107)
CO2 SERPL-SCNC: 24 MMOL/L — SIGNIFICANT CHANGE UP (ref 22–31)
CREAT SERPL-MCNC: 4.43 MG/DL — HIGH (ref 0.5–1.3)
EGFR: 10 ML/MIN/1.73M2 — LOW
GLUCOSE BLDC GLUCOMTR-MCNC: 125 MG/DL — HIGH (ref 70–99)
GLUCOSE BLDC GLUCOMTR-MCNC: 133 MG/DL — HIGH (ref 70–99)
GLUCOSE BLDC GLUCOMTR-MCNC: 145 MG/DL — HIGH (ref 70–99)
GLUCOSE BLDC GLUCOMTR-MCNC: 146 MG/DL — HIGH (ref 70–99)
GLUCOSE BLDC GLUCOMTR-MCNC: 275 MG/DL — HIGH (ref 70–99)
GLUCOSE BLDC GLUCOMTR-MCNC: 330 MG/DL — HIGH (ref 70–99)
GLUCOSE SERPL-MCNC: 140 MG/DL — HIGH (ref 70–99)
HBV CORE AB SER-ACNC: SIGNIFICANT CHANGE UP
HBV CORE IGM SER-ACNC: SIGNIFICANT CHANGE UP
HBV SURFACE AB SER-ACNC: <3 MIU/ML — LOW
HBV SURFACE AG SER-ACNC: SIGNIFICANT CHANGE UP
HCT VFR BLD CALC: 24.2 % — LOW (ref 34.5–45)
HCV AB S/CO SERPL IA: 0.1 S/CO — SIGNIFICANT CHANGE UP (ref 0–0.99)
HCV AB SERPL-IMP: SIGNIFICANT CHANGE UP
HGB BLD-MCNC: 8.1 G/DL — LOW (ref 11.5–15.5)
MAGNESIUM SERPL-MCNC: 2.1 MG/DL — SIGNIFICANT CHANGE UP (ref 1.6–2.6)
MCHC RBC-ENTMCNC: 28.6 PG — SIGNIFICANT CHANGE UP (ref 27–34)
MCHC RBC-ENTMCNC: 33.5 GM/DL — SIGNIFICANT CHANGE UP (ref 32–36)
MCV RBC AUTO: 85.5 FL — SIGNIFICANT CHANGE UP (ref 80–100)
NRBC # BLD: 0 /100 WBCS — SIGNIFICANT CHANGE UP (ref 0–0)
NRBC # FLD: 0 K/UL — SIGNIFICANT CHANGE UP (ref 0–0)
PHOSPHATE SERPL-MCNC: 4 MG/DL — SIGNIFICANT CHANGE UP (ref 2.5–4.5)
PLATELET # BLD AUTO: 371 K/UL — SIGNIFICANT CHANGE UP (ref 150–400)
POTASSIUM SERPL-MCNC: 3.4 MMOL/L — LOW (ref 3.5–5.3)
POTASSIUM SERPL-SCNC: 3.4 MMOL/L — LOW (ref 3.5–5.3)
RBC # BLD: 2.83 M/UL — LOW (ref 3.8–5.2)
RBC # FLD: 14.4 % — SIGNIFICANT CHANGE UP (ref 10.3–14.5)
SODIUM SERPL-SCNC: 144 MMOL/L — SIGNIFICANT CHANGE UP (ref 135–145)
WBC # BLD: 18.1 K/UL — HIGH (ref 3.8–10.5)
WBC # FLD AUTO: 18.1 K/UL — HIGH (ref 3.8–10.5)

## 2024-02-02 PROCEDURE — 99233 SBSQ HOSP IP/OBS HIGH 50: CPT

## 2024-02-02 PROCEDURE — 99233 SBSQ HOSP IP/OBS HIGH 50: CPT | Mod: GC

## 2024-02-02 RX ORDER — POTASSIUM CHLORIDE 20 MEQ
20 PACKET (EA) ORAL
Refills: 0 | Status: COMPLETED | OUTPATIENT
Start: 2024-02-02 | End: 2024-02-02

## 2024-02-02 RX ORDER — IRON SUCROSE 20 MG/ML
200 INJECTION, SOLUTION INTRAVENOUS EVERY 24 HOURS
Refills: 0 | Status: COMPLETED | OUTPATIENT
Start: 2024-02-02 | End: 2024-02-06

## 2024-02-02 RX ORDER — HEPARIN SODIUM 5000 [USP'U]/ML
5000 INJECTION INTRAVENOUS; SUBCUTANEOUS EVERY 8 HOURS
Refills: 0 | Status: COMPLETED | OUTPATIENT
Start: 2024-02-02 | End: 2024-02-04

## 2024-02-02 RX ORDER — ERYTHROPOIETIN 10000 [IU]/ML
3000 INJECTION, SOLUTION INTRAVENOUS; SUBCUTANEOUS
Refills: 0 | Status: DISCONTINUED | OUTPATIENT
Start: 2024-02-02 | End: 2024-02-02

## 2024-02-02 RX ORDER — ERYTHROPOIETIN 10000 [IU]/ML
3000 INJECTION, SOLUTION INTRAVENOUS; SUBCUTANEOUS
Refills: 0 | Status: DISCONTINUED | OUTPATIENT
Start: 2024-02-02 | End: 2024-02-14

## 2024-02-02 RX ORDER — ACETAMINOPHEN 500 MG
650 TABLET ORAL EVERY 6 HOURS
Refills: 0 | Status: DISCONTINUED | OUTPATIENT
Start: 2024-02-02 | End: 2024-02-02

## 2024-02-02 RX ADMIN — BUDESONIDE AND FORMOTEROL FUMARATE DIHYDRATE 2 PUFF(S): 160; 4.5 AEROSOL RESPIRATORY (INHALATION) at 08:42

## 2024-02-02 RX ADMIN — HEPARIN SODIUM 5000 UNIT(S): 5000 INJECTION INTRAVENOUS; SUBCUTANEOUS at 14:54

## 2024-02-02 RX ADMIN — CHLORHEXIDINE GLUCONATE 1 APPLICATION(S): 213 SOLUTION TOPICAL at 14:57

## 2024-02-02 RX ADMIN — IRON SUCROSE 110 MILLIGRAM(S): 20 INJECTION, SOLUTION INTRAVENOUS at 17:06

## 2024-02-02 RX ADMIN — Medication 650 MILLIGRAM(S): at 11:03

## 2024-02-02 RX ADMIN — HEPARIN SODIUM 5000 UNIT(S): 5000 INJECTION INTRAVENOUS; SUBCUTANEOUS at 21:35

## 2024-02-02 RX ADMIN — LORATADINE 10 MILLIGRAM(S): 10 TABLET ORAL at 14:56

## 2024-02-02 RX ADMIN — MONTELUKAST 10 MILLIGRAM(S): 4 TABLET, CHEWABLE ORAL at 14:55

## 2024-02-02 RX ADMIN — Medication 20 MILLIEQUIVALENT(S): at 09:20

## 2024-02-02 RX ADMIN — BUMETANIDE 2 MILLIGRAM(S): 0.25 INJECTION INTRAMUSCULAR; INTRAVENOUS at 17:06

## 2024-02-02 RX ADMIN — Medication 6 UNIT(S): at 08:42

## 2024-02-02 RX ADMIN — Medication 6 UNIT(S): at 14:56

## 2024-02-02 RX ADMIN — POLYETHYLENE GLYCOL 3350 17 GRAM(S): 17 POWDER, FOR SOLUTION ORAL at 14:55

## 2024-02-02 RX ADMIN — BUMETANIDE 2 MILLIGRAM(S): 0.25 INJECTION INTRAMUSCULAR; INTRAVENOUS at 05:56

## 2024-02-02 RX ADMIN — Medication 1 TABLET(S): at 14:56

## 2024-02-02 RX ADMIN — BUDESONIDE AND FORMOTEROL FUMARATE DIHYDRATE 2 PUFF(S): 160; 4.5 AEROSOL RESPIRATORY (INHALATION) at 21:35

## 2024-02-02 RX ADMIN — Medication 650 MILLIGRAM(S): at 05:57

## 2024-02-02 RX ADMIN — Medication 650 MILLIGRAM(S): at 10:03

## 2024-02-02 RX ADMIN — Medication 20 MILLIEQUIVALENT(S): at 14:55

## 2024-02-02 RX ADMIN — INSULIN GLARGINE 18 UNIT(S): 100 INJECTION, SOLUTION SUBCUTANEOUS at 21:30

## 2024-02-02 RX ADMIN — ATORVASTATIN CALCIUM 20 MILLIGRAM(S): 80 TABLET, FILM COATED ORAL at 21:35

## 2024-02-02 NOTE — DISCHARGE NOTE PROVIDER - NSDCCPTREATMENT_GEN_ALL_CORE_FT
PRINCIPAL PROCEDURE  Procedure: US kidney and bladder  Findings and Treatment: FINDINGS:  Right kidney: 10.2 cm. No renal mass, hydronephrosis or calculi.   Increased echogenicity. 1.6 x 1.7 x 1.4 cm upper pole simple cyst,   unchanged. An echogenic 2.4 x 2.6 cm lesion either at the superior pole   of right kidney or in the region of the right adrenal gland.  Left kidney: 10.0 cm. No renal mass, hydronephrosis or calculi. Mildly   increased echogenicity.  Urinary bladder: Collapsed around an indwelling catheter.  IMPRESSION:  No hydronephrosis.  An echogenic lesion either in the superior pole of right kidney or in the   region of the right adrenal gland, not previously visualized. An   unenhanced CT of the abdomen is advised for further evaluation.

## 2024-02-02 NOTE — PROGRESS NOTE ADULT - SUBJECTIVE AND OBJECTIVE BOX
Denton Borjas MD  PGY 1 Department of Internal Medicine        Patient is a 63y old  Female who presents with a chief complaint of AHRF (01 Feb 2024 13:28)      SUBJECTIVE / OVERNIGHT EVENTS: Pt seen and examined. Dyspnea is ***. Denies fevers, chills, CP, Abdominal pain, N/V, Diarrhea        MEDICATIONS  (STANDING):  atorvastatin 20 milliGRAM(s) Oral at bedtime  budesonide 160 MICROgram(s)/formoterol 4.5 MICROgram(s) Inhaler 2 Puff(s) Inhalation two times a day  buMETAnide Injectable 2 milliGRAM(s) IV Push every 12 hours  chlorhexidine 2% Cloths 1 Application(s) Topical daily  dextrose 5%. 1000 milliLiter(s) (50 mL/Hr) IV Continuous <Continuous>  dextrose 5%. 1000 milliLiter(s) (100 mL/Hr) IV Continuous <Continuous>  dextrose 50% Injectable 12.5 Gram(s) IV Push once  dextrose 50% Injectable 25 Gram(s) IV Push once  dextrose 50% Injectable 25 Gram(s) IV Push once  glucagon  Injectable 1 milliGRAM(s) IntraMuscular once  insulin glargine Injectable (LANTUS) 18 Unit(s) SubCutaneous at bedtime  insulin lispro (ADMELOG) corrective regimen sliding scale   SubCutaneous three times a day before meals  insulin lispro Injectable (ADMELOG) 6 Unit(s) SubCutaneous three times a day before meals  loratadine 10 milliGRAM(s) Oral daily  montelukast 10 milliGRAM(s) Oral daily  multivitamin 1 Tablet(s) Oral daily  polyethylene glycol 3350 17 Gram(s) Oral daily  senna 2 Tablet(s) Oral at bedtime  sodium bicarbonate 650 milliGRAM(s) Oral every 12 hours    MEDICATIONS  (PRN):  dextrose Oral Gel 15 Gram(s) Oral once PRN Blood Glucose LESS THAN 70 milliGRAM(s)/deciliter  sodium chloride 0.9% Bolus. 100 milliLiter(s) IV Bolus every 5 minutes PRN SBP LESS THAN or EQUAL to 90 mmHg  sodium chloride 0.9% lock flush 10 milliLiter(s) IV Push every 1 hour PRN Pre/post blood products, medications, blood draw, and to maintain line patency      I&O's Summary    01 Feb 2024 07:01  -  02 Feb 2024 07:00  --------------------------------------------------------  IN: 400 mL / OUT: 1400 mL / NET: -1000 mL        Vital Signs Last 24 Hrs  T(C): 36.8 (02 Feb 2024 05:18), Max: 36.8 (02 Feb 2024 05:18)  T(F): 98.3 (02 Feb 2024 05:18), Max: 98.3 (02 Feb 2024 05:18)  HR: 100 (02 Feb 2024 05:18) (90 - 105)  BP: 152/60 (02 Feb 2024 05:18) (129/59 - 166/81)  BP(mean): 98 (01 Feb 2024 15:14) (98 - 99)  RR: 17 (02 Feb 2024 05:18) (17 - 20)  SpO2: 95% (02 Feb 2024 05:18) (95% - 98%)    Parameters below as of 02 Feb 2024 05:18  Patient On (Oxygen Delivery Method): room air        CAPILLARY BLOOD GLUCOSE      POCT Blood Glucose.: 133 mg/dL (01 Feb 2024 22:20)  POCT Blood Glucose.: 128 mg/dL (01 Feb 2024 21:02)  POCT Blood Glucose.: 133 mg/dL (01 Feb 2024 19:52)  POCT Blood Glucose.: 187 mg/dL (01 Feb 2024 16:29)  POCT Blood Glucose.: 188 mg/dL (01 Feb 2024 16:25)  POCT Blood Glucose.: 259 mg/dL (01 Feb 2024 13:58)  POCT Blood Glucose.: 299 mg/dL (01 Feb 2024 11:18)  POCT Blood Glucose.: 289 mg/dL (01 Feb 2024 11:17)  POCT Blood Glucose.: 299 mg/dL (01 Feb 2024 07:18)      PHYSICAL EXAM:  GENERAL: NAD, resting comfortably, shiley in place  HEAD:  Atraumatic, Normocephalic  EYES: EOMI, conjunctiva and sclera clear  NECK: No JVD  CHEST/LUNG: Bibasilar crackles, no wheezes or rhonchi  HEART: Regular rate and rhythm; No murmurs, rubs, or gallops  ABDOMEN: Soft, Nontender, Nondistended; Bowel sounds present  EXTREMITIES:  2+ Peripheral Pulses, No clubbing, cyanosis, 2+ BLE edema  PSYCH: AAOx3  NEUROLOGY: non-focal  SKIN: No rashes or lesions       LABS:                        7.9    27.08 )-----------( 393      ( 01 Feb 2024 05:36 )             23.7     Auto Eosinophil # 0.00  / Auto Eosinophil % 0.0   / Auto Neutrophil # 25.43 / Auto Neutrophil % 93.9  / BANDS % x                            8.6    15.44 )-----------( 299      ( 31 Jan 2024 07:37 )             25.2     Auto Eosinophil # 0.00  / Auto Eosinophil % 0.0   / Auto Neutrophil # 14.34 / Auto Neutrophil % 93.0  / BANDS % x        02-01    138  |  101  |  68<H>  ----------------------------<  285<H>  3.9   |  21<L>  |  6.11<H>  01-31    137  |  102  |  54<H>  ----------------------------<  272<H>  4.0   |  22  |  5.66<H>    Ca    8.4      01 Feb 2024 05:36  Mg     2.40     02-01  Phos  5.7     02-01  TPro  7.5  /  Alb  3.0<L>  /  TBili  0.2  /  DBili  x   /  AST  20  /  ALT  15  /  AlkPhos  61  01-31          Urinalysis Basic - ( 01 Feb 2024 05:36 )    Color: x / Appearance: x / SG: x / pH: x  Gluc: 285 mg/dL / Ketone: x  / Bili: x / Urobili: x   Blood: x / Protein: x / Nitrite: x   Leuk Esterase: x / RBC: x / WBC x   Sq Epi: x / Non Sq Epi: x / Bacteria: x            RADIOLOGY & ADDITIONAL TESTS:    Imaging Personally Reviewed:    Consultant(s) Notes Reviewed:      Care Discussed with Consultants/Other Providers:   Denton Borjas MD  PGY 1 Department of Internal Medicine        Patient is a 63y old  Female who presents with a chief complaint of AHRF (01 Feb 2024 13:28)      SUBJECTIVE / OVERNIGHT EVENTS: Pt seen and examined. Dyspnea is improved. Denies fevers, chills, CP, Abdominal pain, N/V, Diarrhea        MEDICATIONS  (STANDING):  atorvastatin 20 milliGRAM(s) Oral at bedtime  budesonide 160 MICROgram(s)/formoterol 4.5 MICROgram(s) Inhaler 2 Puff(s) Inhalation two times a day  buMETAnide Injectable 2 milliGRAM(s) IV Push every 12 hours  chlorhexidine 2% Cloths 1 Application(s) Topical daily  dextrose 5%. 1000 milliLiter(s) (50 mL/Hr) IV Continuous <Continuous>  dextrose 5%. 1000 milliLiter(s) (100 mL/Hr) IV Continuous <Continuous>  dextrose 50% Injectable 12.5 Gram(s) IV Push once  dextrose 50% Injectable 25 Gram(s) IV Push once  dextrose 50% Injectable 25 Gram(s) IV Push once  glucagon  Injectable 1 milliGRAM(s) IntraMuscular once  insulin glargine Injectable (LANTUS) 18 Unit(s) SubCutaneous at bedtime  insulin lispro (ADMELOG) corrective regimen sliding scale   SubCutaneous three times a day before meals  insulin lispro Injectable (ADMELOG) 6 Unit(s) SubCutaneous three times a day before meals  loratadine 10 milliGRAM(s) Oral daily  montelukast 10 milliGRAM(s) Oral daily  multivitamin 1 Tablet(s) Oral daily  polyethylene glycol 3350 17 Gram(s) Oral daily  senna 2 Tablet(s) Oral at bedtime  sodium bicarbonate 650 milliGRAM(s) Oral every 12 hours    MEDICATIONS  (PRN):  dextrose Oral Gel 15 Gram(s) Oral once PRN Blood Glucose LESS THAN 70 milliGRAM(s)/deciliter  sodium chloride 0.9% Bolus. 100 milliLiter(s) IV Bolus every 5 minutes PRN SBP LESS THAN or EQUAL to 90 mmHg  sodium chloride 0.9% lock flush 10 milliLiter(s) IV Push every 1 hour PRN Pre/post blood products, medications, blood draw, and to maintain line patency      I&O's Summary    01 Feb 2024 07:01  -  02 Feb 2024 07:00  --------------------------------------------------------  IN: 400 mL / OUT: 1400 mL / NET: -1000 mL        Vital Signs Last 24 Hrs  T(C): 36.8 (02 Feb 2024 05:18), Max: 36.8 (02 Feb 2024 05:18)  T(F): 98.3 (02 Feb 2024 05:18), Max: 98.3 (02 Feb 2024 05:18)  HR: 100 (02 Feb 2024 05:18) (90 - 105)  BP: 152/60 (02 Feb 2024 05:18) (129/59 - 166/81)  BP(mean): 98 (01 Feb 2024 15:14) (98 - 99)  RR: 17 (02 Feb 2024 05:18) (17 - 20)  SpO2: 95% (02 Feb 2024 05:18) (95% - 98%)    Parameters below as of 02 Feb 2024 05:18  Patient On (Oxygen Delivery Method): room air        CAPILLARY BLOOD GLUCOSE      POCT Blood Glucose.: 133 mg/dL (01 Feb 2024 22:20)  POCT Blood Glucose.: 128 mg/dL (01 Feb 2024 21:02)  POCT Blood Glucose.: 133 mg/dL (01 Feb 2024 19:52)  POCT Blood Glucose.: 187 mg/dL (01 Feb 2024 16:29)  POCT Blood Glucose.: 188 mg/dL (01 Feb 2024 16:25)  POCT Blood Glucose.: 259 mg/dL (01 Feb 2024 13:58)  POCT Blood Glucose.: 299 mg/dL (01 Feb 2024 11:18)  POCT Blood Glucose.: 289 mg/dL (01 Feb 2024 11:17)  POCT Blood Glucose.: 299 mg/dL (01 Feb 2024 07:18)      PHYSICAL EXAM:  GENERAL: NAD, resting comfortably, shiley in place  HEAD:  Atraumatic, Normocephalic  EYES: EOMI, conjunctiva and sclera clear  NECK: No JVD  CHEST/LUNG: CTAB, no wheezes rales or rhonchi  HEART: Regular rate and rhythm; No murmurs, rubs, or gallops  ABDOMEN: Soft, Nontender, Nondistended; Bowel sounds present  EXTREMITIES:  2+ Peripheral Pulses, No clubbing, cyanosis, 1+ BLE edema  PSYCH: AAOx3  NEUROLOGY: non-focal  SKIN: No rashes or lesions       LABS:                        7.9    27.08 )-----------( 393      ( 01 Feb 2024 05:36 )             23.7     Auto Eosinophil # 0.00  / Auto Eosinophil % 0.0   / Auto Neutrophil # 25.43 / Auto Neutrophil % 93.9  / BANDS % x                            8.6    15.44 )-----------( 299      ( 31 Jan 2024 07:37 )             25.2     Auto Eosinophil # 0.00  / Auto Eosinophil % 0.0   / Auto Neutrophil # 14.34 / Auto Neutrophil % 93.0  / BANDS % x        02-01    138  |  101  |  68<H>  ----------------------------<  285<H>  3.9   |  21<L>  |  6.11<H>  01-31    137  |  102  |  54<H>  ----------------------------<  272<H>  4.0   |  22  |  5.66<H>    Ca    8.4      01 Feb 2024 05:36  Mg     2.40     02-01  Phos  5.7     02-01  TPro  7.5  /  Alb  3.0<L>  /  TBili  0.2  /  DBili  x   /  AST  20  /  ALT  15  /  AlkPhos  61  01-31          Urinalysis Basic - ( 01 Feb 2024 05:36 )    Color: x / Appearance: x / SG: x / pH: x  Gluc: 285 mg/dL / Ketone: x  / Bili: x / Urobili: x   Blood: x / Protein: x / Nitrite: x   Leuk Esterase: x / RBC: x / WBC x   Sq Epi: x / Non Sq Epi: x / Bacteria: x            RADIOLOGY & ADDITIONAL TESTS:    Imaging Personally Reviewed:    Consultant(s) Notes Reviewed:      Care Discussed with Consultants/Other Providers:

## 2024-02-02 NOTE — DISCHARGE NOTE PROVIDER - NSDCCPCAREPLAN_GEN_ALL_CORE_FT
PRINCIPAL DISCHARGE DIAGNOSIS  Diagnosis: Shortness of breath  Assessment and Plan of Treatment: You came to the hospital for shortness of breath. The most likely cause is from having too much fluid on your body. You were given medication called diuretics to help urinate off the extra fluid and your shortness of breath improved. Once discharged, it is important to take your medication as prescribed. You should also follow-up with your primary care doctor to discuss your hospitalization. If you experience any new or worsening symptoms, including shortness of breath, cough, chest pain, fevers, or chills, then you should call your doctor or return to the emergency department for further evaluation.      SECONDARY DISCHARGE DIAGNOSES  Diagnosis: Chronic kidney disease  Assessment and Plan of Treatment: While in the hospital your blood tests showed worsening of your chronic kidney disease. The exact cause of this worsening is unclear but is most likely related to your recent illness and your history of diabetes. You were evaluated by the nephrologists (kidney doctors) and started on dialysis because of your worsening kidney diseas.e You had multiple sessions of dialysis while admitted and your symptoms and kidney tests improved. You may require long-term dialysis so it is important to follow-up with your kidney doctor as an outpatient.     PRINCIPAL DISCHARGE DIAGNOSIS  Diagnosis: Shortness of breath  Assessment and Plan of Treatment: You came to the hospital for shortness of breath. The most likely cause is from having too much fluid on your body. You were given medication called diuretics to help urinate off the extra fluid and your shortness of breath improved. Once discharged, it is important to take your medication as prescribed. You should also follow-up with your primary care doctor to discuss your hospitalization. If you experience any new or worsening symptoms, including shortness of breath, cough, chest pain, fevers, or chills, then you should call your doctor or return to the emergency department for further evaluation.      SECONDARY DISCHARGE DIAGNOSES  Diagnosis: End stage renal disease  Assessment and Plan of Treatment: While in the hospital your blood tests showed worsening of your chronic kidney disease. The exact cause of this worsening is unclear but is most likely related to your recent illness and your history of diabetes. You were evaluated by the nephrologists (kidney doctors) and started on dialysis because of your worsening kidney diseas.e You had multiple sessions of dialysis while admitted and your symptoms and kidney tests improved. You may require long-term dialysis so it is important to follow-up with your kidney doctor as an outpatient.     PRINCIPAL DISCHARGE DIAGNOSIS  Diagnosis: Shortness of breath  Assessment and Plan of Treatment: You came to the hospital for shortness of breath. The most likely cause is from having too much fluid on your body. You were given medication called diuretics to help urinate off the extra fluid and your shortness of breath improved. Once discharged, it is important to take your medication as prescribed. You should also follow-up with your primary care doctor to discuss your hospitalization. If you experience any new or worsening symptoms, including shortness of breath, cough, chest pain, fevers, or chills, then you should call your doctor or return to the emergency department for further evaluation.      SECONDARY DISCHARGE DIAGNOSES  Diagnosis: End stage renal disease  Assessment and Plan of Treatment: While in the hospital your blood tests showed worsening of your chronic kidney disease. The exact cause of this worsening is unclear but is most likely related to your recent illness and your history of diabetes. You were evaluated by the nephrologists (kidney doctors) and started on dialysis because of your worsening kidney diseas.e You had multiple sessions of dialysis while admitted and your symptoms and kidney tests improved. You may require long-term dialysis so it is important to follow-up with your kidney doctor/nephrologist as an outpatient.    Diagnosis: Diabetes mellitus  Assessment and Plan of Treatment: You were seen by the endocrinology team during your hospital stay due to uncontrolled blood sugars. Your insulin doses were adjusted until your blood sugars were better controlled. You are discharged on Basaglar at bedtime, Admelog three times a day before meals, and Trulicity. Please stop taking your home Victoza and repaglinide.   Please follow-up with your endocrinologist.    Diagnosis: Abnormal ultrasound of kidney  Assessment and Plan of Treatment: Please follow-up with your primary care physician regarding the Ultrasound of kidney and bladder that you received. It showed a lesion either in superior pole of the right kidney or in the region of the right adrenal gland, an unenhanced CT scan of the abdomen is advised for further evaluation.     PRINCIPAL DISCHARGE DIAGNOSIS  Diagnosis: Shortness of breath  Assessment and Plan of Treatment: You came to the hospital for shortness of breath. The most likely cause is from having too much fluid on your body. You were given medication called diuretics to help urinate off the extra fluid and your shortness of breath improved. We also started you on dialysis since this was mostly coming from the kidneys. Once discharged, it is important to take your medication as prescribed. You should also follow-up with your primary care doctor to discuss your hospitalization. If you experience any new or worsening symptoms, including shortness of breath, cough, chest pain, fevers, or chills, then you should call your doctor or return to the emergency department for further evaluation.      SECONDARY DISCHARGE DIAGNOSES  Diagnosis: End stage renal disease  Assessment and Plan of Treatment: While in the hospital your blood tests showed worsening of your chronic kidney disease. The exact cause of this worsening is unclear but is most likely related to your recent illness and your history of diabetes. You were evaluated by the nephrologists (kidney doctors) and started on dialysis because of your worsening kidney diseas.e You had multiple sessions of dialysis while admitted and your symptoms and kidney tests improved. We established long-term dialysis for you and you will need this going forwards. Please follow-up with your nephrologist. Please also follow-up with vascular surgery in order to check your AV fistulat that was placed. Please discuss with your nephrologist about clearance to start your medication for your psoriasis.    Diagnosis: Diabetes mellitus  Assessment and Plan of Treatment: You were seen by the endocrinology team during your hospital stay due to uncontrolled blood sugars. Your insulin doses were adjusted until your blood sugars were better controlled. You are discharged on Basaglar at bedtime, Admelog three times a day before meals, and Trulicity. Please stop taking your home Victoza and repaglinide.   Please follow-up with your endocrinologist.    Diagnosis: Abnormal ultrasound of kidney  Assessment and Plan of Treatment: Please follow-up with your primary care physician regarding the Ultrasound of kidney and bladder that you received. It showed a lesion either in superior pole of the right kidney or in the region of the right adrenal gland, an unenhanced CT scan of the abdomen is advised for further evaluation.     PRINCIPAL DISCHARGE DIAGNOSIS  Diagnosis: Shortness of breath  Assessment and Plan of Treatment: You came to the hospital for shortness of breath. The most likely cause is from having too much fluid on your body. You were given medication called diuretics to help urinate off the extra fluid and your shortness of breath improved. We also started you on dialysis since this was mostly coming from the kidneys. Once discharged, it is important to take your medication as prescribed. You should also follow-up with your primary care doctor to discuss your hospitalization. If you experience any new or worsening symptoms, including shortness of breath, cough, chest pain, fevers, or chills, then you should call your doctor or return to the emergency department for further evaluation.      SECONDARY DISCHARGE DIAGNOSES  Diagnosis: End stage renal disease  Assessment and Plan of Treatment: While in the hospital your blood tests showed worsening of your chronic kidney disease. The exact cause of this worsening is unclear but is most likely related to your recent illness and your history of diabetes. You were evaluated by the nephrologists (kidney doctors) and started on dialysis because of your worsening kidney diseas.e You had multiple sessions of dialysis while admitted and your symptoms and kidney tests improved. We established long-term dialysis for you and you will need this going forwards. Please follow-up with your nephrologist. Please also follow-up with vascular surgery in order to check your AV fistulat that was placed. Please discuss with your nephrologist about clearance to start your medication for your psoriasis.    Diagnosis: Diabetes mellitus  Assessment and Plan of Treatment: You were seen by the endocrinology team during your hospital stay due to uncontrolled blood sugars. Your insulin doses were adjusted until your blood sugars were better controlled. You are discharged on Basaglar 45 units at bedtime, Admelog 18 units three times a day before meals, and Trulicity 0.75mg one injection weekly. Please stop taking your home Victoza and repaglinide.   Please follow-up with your endocrinologist.    Diagnosis: Abnormal ultrasound of kidney  Assessment and Plan of Treatment: Please follow-up with your primary care physician regarding the Ultrasound of kidney and bladder that you received. It showed a lesion either in superior pole of the right kidney or in the region of the right adrenal gland, an unenhanced CT scan of the abdomen is advised for further evaluation.

## 2024-02-02 NOTE — PROGRESS NOTE ADULT - SUBJECTIVE AND OBJECTIVE BOX
[facility] DIVISION OF KIDNEY DISEASES AND HYPERTENSION -- HEMODIALYSIS NOTE   Nehrology Office [phone]  available on [employer] teams--> Lio Hernández   --------------------------------------------------------------------------------  Chief Complaint: ESRD/Ongoing hemodialysis requirement  feels less SOB   24 hour events/subjective:  s/p Hd yesterday 1 liter removed     ALLERGIES & MEDICATIONS  --------------------------------------------------------------------------------  Allergies    No Known Allergies    Intolerances      Standing Inpatient Medications  atorvastatin 20 milliGRAM(s) Oral at bedtime  budesonide 160 MICROgram(s)/formoterol 4.5 MICROgram(s) Inhaler 2 Puff(s) Inhalation two times a day  buMETAnide Injectable 2 milliGRAM(s) IV Push every 12 hours  chlorhexidine 2% Cloths 1 Application(s) Topical daily  dextrose 5%. 1000 milliLiter(s) IV Continuous <Continuous>  dextrose 5%. 1000 milliLiter(s) IV Continuous <Continuous>  dextrose 50% Injectable 25 Gram(s) IV Push once  dextrose 50% Injectable 25 Gram(s) IV Push once  dextrose 50% Injectable 12.5 Gram(s) IV Push once  glucagon  Injectable 1 milliGRAM(s) IntraMuscular once  heparin   Injectable 5000 Unit(s) SubCutaneous every 8 hours  insulin glargine Injectable (LANTUS) 18 Unit(s) SubCutaneous at bedtime  insulin lispro (ADMELOG) corrective regimen sliding scale   SubCutaneous three times a day before meals  insulin lispro Injectable (ADMELOG) 6 Unit(s) SubCutaneous three times a day before meals  loratadine 10 milliGRAM(s) Oral daily  montelukast 10 milliGRAM(s) Oral daily  multivitamin 1 Tablet(s) Oral daily  polyethylene glycol 3350 17 Gram(s) Oral daily  potassium chloride    Tablet ER 20 milliEquivalent(s) Oral every 2 hours  senna 2 Tablet(s) Oral at bedtime    PRN Inpatient Medications  acetaminophen     Tablet .. 650 milliGRAM(s) Oral every 6 hours PRN  dextrose Oral Gel 15 Gram(s) Oral once PRN  sodium chloride 0.9% Bolus. 100 milliLiter(s) IV Bolus every 5 minutes PRN  sodium chloride 0.9% lock flush 10 milliLiter(s) IV Push every 1 hour PRN      REVIEW OF SYSTEMS  --------------------------------------------------------------------------------  Gen: No  fevers/chills  Skin: No rashes  Respiratory: + SOB  CV: No chest pain,   GI: + nausea, No abdominal pain  :   -oliguria   MSK: No joint pain/  + swelling;   All other systems were reviewed and are negative, except as noted.    VITALS/PHYSICAL EXAM  --------------------------------------------------------------------------------  T(C): 36.8 (02-02-24 @ 05:18), Max: 36.8 (02-02-24 @ 05:18)  HR: 100 (02-02-24 @ 05:18) (90 - 105)  BP: 152/60 (02-02-24 @ 05:18) (129/59 - 166/81)  RR: 17 (02-02-24 @ 05:18) (17 - 20)  SpO2: 95% (02-02-24 @ 05:18) (95% - 98%)  Wt(kg): --  Height (cm): 154.9 (02-01-24 @ 02:13)  Weight (kg): 72.1 (02-01-24 @ 02:13)  BMI (kg/m2): 30 (02-01-24 @ 02:13)  BSA (m2): 1.71 (02-01-24 @ 02:13)      02-01-24 @ 07:01  -  02-02-24 @ 07:00  --------------------------------------------------------  IN: 400 mL / OUT: 1900 mL / NET: -1500 mL      Physical Exam:  	Gen NAD  	HEENT: no JVD  	Pulm: + crackles B   	CV: S1S2,  	Abd: Soft,   	Ext:  + edema B/L LE   	Neuro: Awake and alert  	Skin: Warm and dry          Vascular:  IJ non-tunneled HD catheter    LABS/STUDIES  --------------------------------------------------------------------------------              8.1    18.10 >-----------<  371      [02-02-24 @ 07:00]              24.2     144  |  104  |  49  ----------------------------<  140      [02-02-24 @ 07:00]  3.4   |  24  |  4.43        Ca     8.1     [02-02-24 @ 07:00]      Mg     2.10     [02-02-24 @ 07:00]      Phos  4.0     [02-02-24 @ 07:00]            Iron 44, TIBC 221, %sat 20      [02-01-24 @ 05:36]  Ferritin 276      [02-01-24 @ 05:36]

## 2024-02-02 NOTE — PROGRESS NOTE ADULT - ATTENDING COMMENTS
The patient is a 63-year-old woman who is followed for ckd stage 4/5, most likely secondary to diabetic nephropathy, and who is admitted for evaluation and management of acute hypoxic respiratory failure, most likely secondary to pulmonary edema in the setting of acute kidney injury superimposed on chronic kidney disease, which has now resolved, and complicated by the development of hyperglycemia.     NELSON on CKD 4/5: -Initially suspected secondary to vasoplegia in the setting of viral upper respiratory infection; as no improvement over the course of hospital stay, greater suspicion now that this represents progression of chronic kidney disease   -Etiology unclear of chronic kidney disease; suspected secondary to diabetic nephropathy, but patient has had a1c level in our lab that measures less than 7; possible that patient had long-standing hyperglycemia prior to need for insulin use and that current a1c in setting of ckd may underestimate degree of hyperglycemia   -Worrisome that kidney function does not appear to be improving; nephrology team has been in touch with the patient and is planning for initiation of dialysis; non-tunneled shiley placed and hemodialysis initiated 2/1; ir consult placed for placement of tunneled hemodialysis catheter; as patient also continues to make urine, administering bumetanide 2 mg intravneously twice daily     Diabetes mellitus with hyperglycemia: -Known insulin-dependent diabetes mellitus; glucose elevated this admission to 300-400 (subsequent to administration of methylprednisolone in ed)   -Will monitor serum glucose levels and adjust insulin dose as necessary     Asthma: -Suspected asthma history although no pfts to confirm; no evident wheezing and shortness of breath significantly improved today; will avoid administration of additional steroids at this time     Prophylactic measure: -heparin for dvt prophylaxis; consistent carbohydrate diet

## 2024-02-02 NOTE — DISCHARGE NOTE PROVIDER - NSDCMRMEDTOKEN_GEN_ALL_CORE_FT
amLODIPine 10 mg oral tablet: 1 tab(s) orally once a day  atorvastatin 20 mg oral tablet: 1 tab(s) orally once a day  cetirizine 10 mg oral tablet: 1 tab(s) orally once a day  cetirizine 10 mg oral tablet: 1 tab(s) orally once a day  Dulera 200 mcg-5 mcg/inh inhalation aerosol: 2 puff(s) inhaled 2 times a day  furosemide 40 mg oral tablet: 1 tab(s) orally once a day  montelukast 10 mg oral tablet: 1 tab(s) orally once a day  repaglinide 1 mg oral tablet: 1 tab(s) orally once a day after breakfast  repaglinide 2 mg oral tablet: 1 tab(s) orally once a day after lunch  sodium bicarbonate 650 mg oral tablet: 1 tab(s) orally every 12 hours  Tremfya One-Press 100 mg/mL subcutaneous solution: 100 milligram(s) subcutaneously every 8 weeks  Victoza 18 mg/3 mL subcutaneous solution: 1.8 milligram(s) subcutaneously once a day   amLODIPine 10 mg oral tablet: 1 tab(s) orally once a day  atorvastatin 20 mg oral tablet: 1 tab(s) orally once a day  Basaglar KwikPen 100 units/mL subcutaneous solution: 20 unit(s) subcutaneous 2 times a day  cetirizine 10 mg oral tablet: 1 tab(s) orally once a day  cetirizine 10 mg oral tablet: 1 tab(s) orally once a day  Dulera 200 mcg-5 mcg/inh inhalation aerosol: 2 puff(s) inhaled 2 times a day  furosemide 40 mg oral tablet: 1 tab(s) orally once a day  montelukast 10 mg oral tablet: 1 tab(s) orally once a day  repaglinide 1 mg oral tablet: 1 tab(s) orally once a day after breakfast  repaglinide 2 mg oral tablet: 1 tab(s) orally once a day after lunch  sodium bicarbonate 650 mg oral tablet: 1 tab(s) orally every 12 hours  Tremfya One-Press 100 mg/mL subcutaneous solution: 100 milligram(s) subcutaneously every 8 weeks  Victoza 18 mg/3 mL subcutaneous solution: 1.8 milligram(s) subcutaneously once a day   alcohol swabs: Apply topically to affected area 4 times a day  amLODIPine 10 mg oral tablet: 1 tab(s) orally once a day  atorvastatin 20 mg oral tablet: 1 tab(s) orally once a day  Dulera 200 mcg-5 mcg/inh inhalation aerosol: 2 puff(s) inhaled 2 times a day  glucometer (per patient&#x27;s insurance): Test blood sugars four times a day. Dispense #1 glucometer.  lancets: 1 application subcutaneously 4 times a day  montelukast 10 mg oral tablet: 1 tab(s) orally once a day  sodium bicarbonate 650 mg oral tablet: 1 tab(s) orally every 12 hours  test strips (per patient&#x27;s insurance): 1 application subcutaneously 4 times a day. ** Compatible with patient&#x27;s glucometer **  Tremfya One-Press 100 mg/mL subcutaneous solution: 100 milligram(s) subcutaneously every 8 weeks   Admelog SoloStar 100 units/mL injectable solution: 18 unit(s) subcutaneous 3 times a day (before meals)  alcohol swabs: Apply topically to affected area 4 times a day  amLODIPine 10 mg oral tablet: 1 tab(s) orally once a day  atorvastatin 20 mg oral tablet: 1 tab(s) orally once a day  Basaglar KwikPen 100 units/mL subcutaneous solution: 45 unit(s) subcutaneous once a day (at bedtime)  bumetanide 2 mg oral tablet: 1 tab(s) orally every 12 hours  Dulera 200 mcg-5 mcg/inh inhalation aerosol: 2 puff(s) inhaled 2 times a day  glucometer (per patient&#x27;s insurance): Test blood sugars four times a day. Dispense #1 glucometer.  Insulin Pen Needles, 4mm: 1 application subcutaneously 4 times a day. ** Use with insulin pen **  lancets: 1 application subcutaneously 4 times a day  montelukast 10 mg oral tablet: 1 tab(s) orally once a day  test strips (per patient&#x27;s insurance): 1 application subcutaneously 4 times a day. ** Compatible with patient&#x27;s glucometer **  Tremfya One-Press 100 mg/mL subcutaneous solution: 100 milligram(s) subcutaneously every 8 weeks  Trulicity Pen 0.75 mg/0.5 mL subcutaneous solution: 0.75 milligram(s) subcutaneously once a week   alcohol swabs: Apply topically to affected area 4 times a day  amLODIPine 10 mg oral tablet: 1 tab(s) orally once a day  atorvastatin 20 mg oral tablet: 1 tab(s) orally once a day  Basaglar KwikPen 100 units/mL subcutaneous solution: 45 unit(s) subcutaneous once a day (at bedtime)  bumetanide 2 mg oral tablet: 1 tab(s) orally every 12 hours  Dulera 200 mcg-5 mcg/inh inhalation aerosol: 2 puff(s) inhaled 2 times a day  glucometer (per patient&#x27;s insurance): Test blood sugars four times a day. Dispense #1 glucometer.  Insulin Pen Needles, 4mm: 1 application subcutaneously 4 times a day. ** Use with insulin pen **  lancets: 1 application subcutaneously 4 times a day  montelukast 10 mg oral tablet: 1 tab(s) orally once a day  NovoLOG FlexPen 100 units/mL injectable solution: 18 international unit(s) subcutaneous 3 times a day (before meals)  test strips (per patient&#x27;s insurance): 1 application subcutaneously 4 times a day. ** Compatible with patient&#x27;s glucometer **  Tremfya One-Press 100 mg/mL subcutaneous solution: 100 milligram(s) subcutaneously every 8 weeks  Trulicity Pen 0.75 mg/0.5 mL subcutaneous solution: 0.75 milligram(s) subcutaneously once a week   alcohol swabs: Apply topically to affected area 4 times a day  amLODIPine 10 mg oral tablet: 1 tab(s) orally once a day  atorvastatin 20 mg oral tablet: 1 tab(s) orally once a day  Basaglar KwikPen 100 units/mL subcutaneous solution: 45 unit(s) subcutaneous once a day (at bedtime)  bumetanide 2 mg oral tablet: 1 tab(s) orally every 12 hours  Dulera 200 mcg-5 mcg/inh inhalation aerosol: 2 puff(s) inhaled 2 times a day  glucometer (per patient&#x27;s insurance): Test blood sugars four times a day. Dispense #1 glucometer.  Insulin Pen Needles, 4mm: 1 application subcutaneously 4 times a day. ** Use with insulin pen **  lancets: 1 application subcutaneously 4 times a day  montelukast 10 mg oral tablet: 1 tab(s) orally once a day  Nepro drink cans - 1 drink twice a day: Please drink 1 can twice a day  NovoLOG FlexPen 100 units/mL injectable solution: 18 international unit(s) subcutaneous 3 times a day (before meals)  test strips (per patient&#x27;s insurance): 1 application subcutaneously 4 times a day. ** Compatible with patient&#x27;s glucometer **  Tremfya One-Press 100 mg/mL subcutaneous solution: 100 milligram(s) subcutaneously every 8 weeks  Trulicity Pen 0.75 mg/0.5 mL subcutaneous solution: 0.75 milligram(s) subcutaneously once a week   alcohol swabs: Apply topically to affected area 4 times a day  amLODIPine 10 mg oral tablet: 1 tab(s) orally once a day  atorvastatin 20 mg oral tablet: 1 tab(s) orally once a day  Basaglar KwikPen 100 units/mL subcutaneous solution: 45 unit(s) subcutaneous once a day (at bedtime)  bumetanide 2 mg oral tablet: 1 tab(s) orally every 12 hours  bumetanide 2 mg oral tablet: 1 tab(s) orally 2 times a day  Dulera 200 mcg-5 mcg/inh inhalation aerosol: 2 puff(s) inhaled 2 times a day  glucometer (per patient&#x27;s insurance): Test blood sugars four times a day. Dispense #1 glucometer.  Insulin Pen Needles, 4mm: 1 application subcutaneously 4 times a day. ** Use with insulin pen **  lancets: 1 application subcutaneously 4 times a day  montelukast 10 mg oral tablet: 1 tab(s) orally once a day  Nepro drink cans - 1 drink twice a day: Please drink 1 can twice a day  NovoLOG FlexPen 100 units/mL injectable solution: 18 international unit(s) subcutaneous 3 times a day (before meals)  test strips (per patient&#x27;s insurance): 1 application subcutaneously 4 times a day. ** Compatible with patient&#x27;s glucometer **  Tremfya One-Press 100 mg/mL subcutaneous solution: 100 milligram(s) subcutaneously every 8 weeks  Trulicity Pen 0.75 mg/0.5 mL subcutaneous solution: 0.75 milligram(s) subcutaneously once a week

## 2024-02-02 NOTE — DISCHARGE NOTE PROVIDER - HOSPITAL COURSE
64yo female with a hx of T2DM, asthma, CKD4, HTN, and HLD presented for 1 day of dyspnea and wheezing, as well as associated generalized weakness, myalgias, and chest tightness. Received multiple rounds of home albuterol without improvement. Of note, developed sore throat and chills prior to onset of dyspnea.     In ED, patient was tachycardic and hypoxemic. Initiated on BiPAP for increased WOB which was titrated to NC. Labs were significant for leukocytosis, elevated BUN/Cr from baseline, elevated BNP, and glucose in 400s. CXR with perihilar opacities consistent with cardiogenic edema. She was given empiric abx, solu-medrol, duonebs, and diuretics, and admitted for further management of AHRF likely iso volume overload.    While hospitalized, her symptoms improved with further diuresis. She was titrated off NC and to RA. Pt was evaluated by nephrology and HD was initiated for worsening NELSON on CKD.    Rest of hospital course was uncomplicated. She was discharged with follow-up with her primary care doctor and nephrology.      vitals were 98.2F, , /59, 93% on RA. Labs significant for WBC 18.83, Hgb 8.9 (baseline 9-10), K 2.9 > 3.3, BUN/Cr 49/5.13 (baseline Cr ~3.3), AG 19, Trop 118 > 116, BNP 32599, BHB 0.7, VBG lactate 3.7 > 3.4, procal 0.34. UA with >1k glucose and protein. Glucose 480, given Lantus 25u x1 and Admelog 20u x2. RVP negative. CXR with Perihilar opacities with effusion more likely cardiogenic in origin than multifocal pneumonia. Prior EF 61% from 10/23. Due to increase WOB, MICU consulted for first-time BiPAP use which was tolerated well, pt determined to not be MICU candidate. Pt was given Vanc x1, Zosyn x1, solu-medrol, duonebs, and Bumex 2mg x1, and admitted for further management.   62yo female with a hx of T2DM, asthma, CKD4, HTN, and HLD presented for 1 day of dyspnea and wheezing, as well as associated generalized weakness, myalgias, and chest tightness. Received multiple rounds of home albuterol without improvement. Of note, developed sore throat and chills prior to onset of dyspnea.     In ED, patient was tachycardic and hypoxemic. Placed on BiPAP for increased WOB which was later titrated to NC. Labs were significant for leukocytosis, elevated BUN/Cr from baseline, elevated BNP, and glucose in 400s. Infectious work-up negative. CXR with perihilar opacities consistent with cardiogenic edema. US renal found echogenic lesion in the superior pole of right kidney vs right adrenal gland. She was given empiric abx, solu-medrol, duonebs, and diuretics, and admitted for further management of AHRF likely iso volume overload.    While hospitalized, her symptoms improved with further diuresis. She was titrated off NC and to RA. Had TTE repeated which showed *** Pt was evaluated by nephrology and HD was initiated for worsening NELSON on CKD. Rest of hospital course was uncomplicated. She was discharged with follow-up with her primary care doctor and nephrology. The echogenic lesion seen on ultrasound should be discussed as an outpatient. She should also follow-up with endocrinology to discuss appropriate regimen for her diabetes.   64yo female with a hx of T2DM, asthma, CKD4, HTN, and HLD presented for 1 day of dyspnea and wheezing, as well as associated generalized weakness, myalgias, and chest tightness. Received multiple rounds of home albuterol without improvement. Of note, developed sore throat and chills prior to onset of dyspnea.     In ED, patient was tachycardic and hypoxemic. Placed on BiPAP for increased WOB which was later titrated to NC. Infectious work-up negative. CXR with perihilar opacities consistent with cardiogenic edema. US renal found echogenic lesion in the superior pole of right kidney vs right adrenal gland. She was admitted for further management of AHRF likely iso volume overload.    While hospitalized, her symptoms improved with diuresis. Had TTE repeated which showed unchanged EF. Pt was evaluated by nephrology and HD was initiated for new ESRD. AVF was placed in preparation for long term dialysis. Rest of hospital course was uncomplicated. She was discharged with plan for outpatient dialysis and she should follow-up with her primary care doctor and nephrology. The echogenic lesion seen on ultrasound should be discussed as an outpatient. She should also follow-up with endocrinology to discuss appropriate regimen for her diabetes.   64yo female with a hx of T2DM, asthma, CKD4, HTN, and HLD presented for 1 day of dyspnea and wheezing, as well as associated generalized weakness, myalgias, and chest tightness. Received multiple rounds of home albuterol without improvement. Of note, developed sore throat and chills prior to onset of dyspnea.     In ED, patient was tachycardic and hypoxemic. Placed on BiPAP for increased WOB which was later titrated to NC. Infectious work-up negative. CXR with perihilar opacities consistent with cardiogenic edema. US renal found echogenic lesion in the superior pole of right kidney vs right adrenal gland. She was admitted for further management of AHRF likely iso volume overload.    While hospitalized, her symptoms improved with diuresis. Had TTE repeated which showed unchanged EF. Pt was evaluated by nephrology and HD was initiated for new ESRD. AVF was placed in preparation for long term dialysis. Rest of hospital course was uncomplicated. She was discharged with plan for outpatient dialysis and she should follow-up with her primary care doctor and nephrology. The echogenic lesion seen on ultrasound should be discussed as an outpatient. She should also follow-up with endocrinology to discuss appropriate regimen for her diabetes.    She can benefit from additional hours at home for home care.    64yo female with a hx of T2DM, asthma, CKD4, HTN, and HLD presented for 1 day of dyspnea and wheezing, as well as associated generalized weakness, myalgias, and chest tightness. Received multiple rounds of home albuterol without improvement. Of note, developed sore throat and chills prior to onset of dyspnea.     In ED, patient was tachycardic and hypoxemic. Placed on BiPAP for increased WOB which was later titrated to NC. Infectious work-up negative. CXR with perihilar opacities consistent with cardiogenic edema. US renal found no hydro, but an echogenic lesion in the superior pole of right kidney vs right adrenal gland. Unenhanced CT abd advised for further eval. She was admitted for further management of AHRF likely iso volume overload.    While hospitalized, her symptoms improved with diuresis. Had TTE repeated which showed unchanged EF. Pt was evaluated by nephrology and HD was initiated on 2/1/24 for new ESRD. Pt had RIJ shiley placed on 2/1, converted to a tunneled cath to RIJ on 2/5. LUE radiocephalic AVF was placed on 2/9 in preparation for long term dialysis. Pt was continued on Bumex 2mg PO bid per nephro to assist with volume control. Pt was seen by endocrine during her stay for uncontrolled BG's, insulin was adjusted accordingly. Pt was switched to trulicity qweek on discharge instead of victoza qd, and repaglinide was stopped. She was discharged with plan for outpatient dialysis and she should follow-up with her primary care doctor and nephrology. The echogenic lesion seen on ultrasound should be discussed as an outpatient. She should also follow-up with endocrinology to discuss appropriate regimen for her diabetes.    She can benefit from additional hours at home for home care.     For follow-up/on discharge:  - discuss w PCP renal US finding  - Diabetes: Lantus 45 + Lispro 18-18-18 regimen. Stop home repaglinide and victoza qd. Start trulicity 0.75mg qweek.    - PT rec home PT  - outpatient f/u vascular Dr. Dale  - outpatient f/u w pt's endo Dr. Mary Ann Patel  - outpatient f/u w nephro   64yo female with a hx of T2DM, asthma, CKD4, HTN, and HLD presented for 1 day of dyspnea and wheezing, as well as associated generalized weakness, myalgias, and chest tightness. Received multiple rounds of home albuterol without improvement. Of note, developed sore throat and chills prior to onset of dyspnea.     In ED, patient was tachycardic and hypoxemic. Placed on BiPAP for increased WOB which was later titrated to NC. Infectious work-up negative. CXR with perihilar opacities consistent with cardiogenic edema. US renal found no hydro, but an echogenic lesion in the superior pole of right kidney vs right adrenal gland. Unenhanced CT abd advised for further eval. She was admitted for further management of AHRF likely iso volume overload.    While hospitalized, her symptoms improved with diuresis. Had TTE repeated which showed unchanged EF. Pt was evaluated by nephrology and HD was initiated on 2/1/24 for new ESRD. Pt had RIJ shiley placed on 2/1, converted to a tunneled cath to RIJ on 2/5. LUE radiocephalic AVF was placed on 2/9 in preparation for long term dialysis. Pt was continued on Bumex 2mg PO bid per nephro to assist with volume control. Pt was seen by endocrine during her stay for uncontrolled BG's, insulin was adjusted accordingly. Pt was switched to trulicity qweek on discharge instead of victoza qd, and repaglinide was stopped. She was discharged with plan for outpatient dialysis and she should follow-up with her primary care doctor and nephrology. The echogenic lesion seen on ultrasound should be discussed as an outpatient. She should also follow-up with endocrinology to discuss appropriate regimen for her diabetes.    She can benefit from additional hours at home for home care.     For follow-up/on discharge:  - discuss w PCP renal US finding  - Diabetes: Lantus 45 + Lispro 18-18-18 regimen. Stop home repaglinide and victoza qd. Start trulicity 0.75mg qweek.  - stop Furosemide, start Bumetanide 2mg twice daily    - PT rec home PT  - outpatient f/u vascular Dr. Dale  - outpatient f/u w pt's endo Dr. Mary Ann Patel  - outpatient f/u w nephro   64yo female with a hx of T2DM, asthma, CKD4, HTN, chronic mild diastolic HF, HFpEF EF 60%, and HLD presented for 1 day of dyspnea and wheezing, as well as associated generalized weakness, myalgias, and chest tightness. Received multiple rounds of home albuterol without improvement. Of note, developed sore throat and chills prior to onset of dyspnea.     In ED, patient was tachycardic and hypoxemic. Placed on BiPAP for increased WOB which was later titrated to NC. Infectious work-up negative. CXR with perihilar opacities consistent with cardiogenic edema. US renal found no hydro, but an echogenic lesion in the superior pole of right kidney vs right adrenal gland. Unenhanced CT abd advised for further eval. She was admitted for further management of AHRF likely iso volume overload 2/2 ESKD requiring urgent renal replacement therapy.     While hospitalized, her symptoms improved with diuresis. Had TTE repeated which showed unchanged EF 60% with mild diastolic dysfunction, likely a component of HFpEF. Pt was evaluated by nephrology and HD was initiated on 2/1/24 for new ESRD. Pt had RIJ shiley placed on 2/1, converted to a tunneled cath to RIJ on 2/5. LUE radiocephalic AVF was placed on 2/9 in preparation for long term dialysis. Pt was continued on Bumex 2mg PO bid per nephro to assist with volume control. Pt was seen by endocrine during her stay for uncontrolled BG's, insulin was adjusted accordingly. Pt was switched to trulicity qweek on discharge instead of victoza qd, and repaglinide was stopped. She was discharged with plan for outpatient dialysis and she should follow-up with her primary care doctor and nephrology. The echogenic lesion seen on ultrasound should be discussed as an outpatient. She should also follow-up with endocrinology to discuss appropriate regimen for her diabetes.    She can benefit from additional hours at home for home care.     For follow-up/on discharge:  - discuss w PCP renal US finding  - Diabetes: Lantus 45 + Lispro 18-18-18 regimen. Stop home repaglinide and victoza qd. Start trulicity 0.75mg qweek.  - stop Furosemide, start Bumetanide 2mg twice daily    - PT rec home PT  - outpatient f/u vascular Dr. Dale  - outpatient f/u w pt's endo Dr. Mary Ann Patel  - outpatient f/u w nephro

## 2024-02-02 NOTE — PROGRESS NOTE ADULT - PROBLEM SELECTOR PLAN 1
p/w 2 days dyspnea, wheezing, generalized weakness, myalgias, and chest tightness. Required BiPAP for increased WOB, now titrated to 3L NC. CXR with perihilar opacities. Pt reports improving sx with tx received in ED. Likely exacerbation of CHF 2/2 underlying URI. asthma exacerbation and PNA lower on differential  -Goal SpO2 >92%, on supplemental O2, wean as tolerated  -volume overload and asthma exacerbation management per below p/w 2 days dyspnea, wheezing, generalized weakness, myalgias, and chest tightness. Required BiPAP for increased WOB, now titrated to 3L NC. CXR with perihilar opacities. Pt reports improving sx with tx received in ED. Likely exacerbation of CHF 2/2 underlying URI. asthma exacerbation and PNA lower on differential  -Goal SpO2 >92%, off supplemental O2, wean as tolerated  -volume overload and asthma exacerbation management per below

## 2024-02-02 NOTE — DISCHARGE NOTE PROVIDER - CARE PROVIDERS DIRECT ADDRESSES
[email or web address]

## 2024-02-02 NOTE — PROGRESS NOTE ADULT - PROBLEM SELECTOR PLAN 10
stable but soft on admission. on amlodipine at home  -home hold meds iso soft pressures    #Hyperlipidemia  on atorvastatin at home  -c/w home meds

## 2024-02-02 NOTE — PROGRESS NOTE ADULT - PROBLEM SELECTOR PLAN 4
Pt. with metabolic acidosis in the setting of advanced CKD and lactic acidosis. Lactate initially elevated at 3.7 now normalized. SCO2 low at 19 on 1/30/24, improved to 21 today.   Once pt started on HD can d/c bicarb tabs. monitor labs

## 2024-02-02 NOTE — DISCHARGE NOTE PROVIDER - NSDCFUADDAPPT_GEN_ALL_CORE_FT
APPTS ARE READY TO BE MADE: [ ] YES    Best Family or Patient Contact (if needed):    Additional Information about above appointments (if needed):    1: PCP  2: Nephrology  3:     Other comments or requests:    Please follow-up with your primary care physician in 1-2 weeks. Please discuss the lesion seen on kidney ultrasound (results included in this paperwork).    Please follow-up with your endocrinologist Dr. Mary Ann Patel. Please call to schedule an appointment if you do not already have one.     Please follow-up with vascular surgery Dr. Dale for your AV fistula. Please call to schedule an appointment if you do not already have one.     Please follow-up with a nephrologist/kidney doctor. Please call to schedule an appointment if you do not already have one. You have an admissions appointment at [facility] on 2/15/2024 at 2pm and to have hemodialysis at 3pm.  [address] [place] [address]  [phone]    Please follow-up with your primary care physician in 1-2 weeks. Please discuss the lesion seen on kidney ultrasound (results included in this paperwork).    Please follow-up with your endocrinologist Dr. Mary Ann Patel. Please call to schedule an appointment if you do not already have one.     Please follow-up with vascular surgery Dr. Dale for your AV fistula. Please call to schedule an appointment if you do not already have one.     Please follow-up with a nephrologist/kidney doctor. Please call to schedule an appointment if you do not already have one.

## 2024-02-02 NOTE — PROGRESS NOTE ADULT - PROBLEM SELECTOR PLAN 5
BUN/Cr 49/5.13 (baseline Cr ~3.3). Also with metabolic acidosis now improving. Makes urine, dobbs placed due to decreased output in ED. Likely pre-renal iso poor PO intake, insensible losses, and third spacing vs ATN   US Kidney/bladder with echogenic lesion in the superior pole of right kidney vs right adrenal gland, CT recommended for further evaluation  -Nephrology consulted, appreciate recs.   -s/p 1st dialysis session 2/1, 2nd scheduled for 2/2  -trend bmp, UOP, I&Os  -dobbs placed d/t decreased UOP, ongoing TOV   -can f/u lesion as outpatient BUN/Cr 49/5.13 (baseline Cr ~3.3). Also with metabolic acidosis now improving. Makes urine, dobbs placed due to decreased output in ED. Likely pre-renal iso poor PO intake, insensible losses, and third spacing vs ATN   US Kidney/bladder with echogenic lesion in the superior pole of right kidney vs right adrenal gland, CT recommended for further evaluation  -Nephrology consulted, appreciate recs.   -s/p 1st dialysis session 2/1, 2nd scheduled for 2/2  -plan for permacath placement next week  -trend bmp, UOP, I&Os  -dobbs placed d/t decreased UOP, ongoing TOV   -can f/u lesion as outpatient

## 2024-02-02 NOTE — PROGRESS NOTE ADULT - PROBLEM SELECTOR PLAN 9
Hgb 8.9 on admission, baseline 9-10. Likely iso CKD. Low suspicion for any source of bleeding  -trend cbc Hgb 8.9 on admission, baseline 9-10. Likely iso CKD. Low suspicion for any source of bleeding  -trend cbc  -c/w iv venofer (2/2 - 2/7)  -c/w epo

## 2024-02-02 NOTE — DISCHARGE NOTE PROVIDER - NSDCFUADDINST_GEN_ALL_CORE_FT
1. Please follow-up with your primary care doctor and your nephrologist. 1. Please follow-up with your primary care doctor and your nephrologist.  2. You should discuss the lesion seen on ultrasound with your primary doctor.

## 2024-02-02 NOTE — PROGRESS NOTE ADULT - PROBLEM SELECTOR PLAN 1
Pt. with NELSON on CKD in the setting of longstanding HTN and DM2. On review of labs on [facility], last Scr was 3.3 on 12/18/23. On admission, Scr elevated at 5.13 on 1/30/24 ------> 6.11 2/1/2024.   UPCR was 18.1 on 12/18/23. Pt. was previously admitted from 10/17-10/20/23 with UTI and NELSON on CKD. Serologic workup at that time was negative: C3/C4 not low, kappa/lambda ratio WNL with no monoclonal band identified, negative for ANCA, CELIA, dsDNA, Hep B, Hep C, and [id number]-Lh. On this admission, UA with >1000mg protein, trace blood, glucosuria. Renal US w/o hydronephrosis. RVP and COVID negative. Pro-BNP elevated at 10,471.   CXR shows perihilar opacities with effusion more likely cardiogenic in origin than multifocal pneumonia.   ECHO from previous admission with EF of ~60%. Pt likely with progressive CKD. Pt. was evaluated by Dr. ADDISON Preciado for kidney transplantation.   HD consent obtained and placed in chart.   s/p HD catheter and Hd x1 2/1/24.  Plan for HD today and tomorrow. d/w pt and daughter this am   c/w IV diuretics for now.    Monitor labs and urine output. Avoid nephrotoxins.   Dose medications as per CrCl

## 2024-02-02 NOTE — PROGRESS NOTE ADULT - PROBLEM SELECTOR PLAN 3
CXR with perihilar opacities, likely cardiogenic. BNP elevated. No formal diagnosis of CHF. Chronic BLE edema per daughter. Prior echo 10/23 with EF 61%  -f/u repeat TTE  -s/p IV Lasix 40mg, c/w IV Bumex 2mg BID  -goal diuresis 1-1.5L daily

## 2024-02-02 NOTE — DISCHARGE NOTE PROVIDER - NSDCFUSCHEDAPPT_GEN_ALL_CORE_FT
Judith Mcqueen  [facility]  NEPHRO [address]  Scheduled Appointment: 02/05/2024    [facility]  INTMED  [facility]   Scheduled Appointment: 02/06/2024    [facility]  CARDIOLOGY [address] [facility]   Scheduled Appointment: 02/15/2024    [facility]  CARDIOLOGY [address] [facility]   Scheduled Appointment: 02/22/2024    Flaco Adam  [facility]  [address]STRO [address] [facility] [address]  Scheduled Appointment: 02/22/2024    [facility]  INTMED  [facility]   Scheduled Appointment: 03/11/2024    [facility]  ENDOCRIN OP [id number] [facility]  Scheduled Appointment: 04/02/2024     [facility]  CARDIOLOGY [address] [facility]   Scheduled Appointment: 02/15/2024    [facility]  CARDIOLOGY [address] [facility]   Scheduled Appointment: 02/22/2024    Flaco Adam  [facility]  [address] [address] [facility]  Scheduled Appointment: 02/22/2024    [facility]  INTMED  [facility]   Scheduled Appointment: 03/11/2024    [facility]  ENDOCRIN OP [address] [facility]  Scheduled Appointment: 04/02/2024     [facility]  INTMED  [facility]   Scheduled Appointment: 03/11/2024    [facility]  CARDIOLOGY [address] [facility]   Scheduled Appointment: 03/18/2024    Eli Barajas  [facility]  INFDISEASE [address]  Scheduled Appointment: 03/20/2024    [facility]  CARDIOLOGY [address] [facility]   Scheduled Appointment: 03/25/2024    [facility]  ENDOCRIN OP [address]  Scheduled Appointment: 04/02/2024

## 2024-02-02 NOTE — DISCHARGE NOTE PROVIDER - CARE PROVIDER_API CALL
Stephan Bravo  Internal Medicine  [address] [place] [address]  Phone: [phone]  Fax: [phone]  Follow Up Time: 2 weeks    Judith Mcqueen  Nephrology  [address] [place] [address], [address]  [place] [address]  Phone: [phone]  Fax: [phone]  Follow Up Time: 2 weeks   Stephan Bravo  Internal Medicine  [address] [place] [address]  Phone: [phone]  Fax: [phone]  Follow Up Time: 2 weeks    Judith Mcqueen  Nephrology  [address] [place] [address], [address]  [place] [address]  Phone: [phone]  Fax: [phone]  Follow Up Time: 2 weeks    ELIZABETH DEWEY  Phone: [phone]  Fax: [phone]  Follow Up Time:     Laya Dale  Vascular Surgery  [address] [place] [address], [address]  [place] [address]  Phone: [phone]  Fax: [phone]  Follow Up Time:

## 2024-02-02 NOTE — DISCHARGE NOTE PROVIDER - PROVIDER TOKENS
PROVIDER:[TOKEN:[[id number]],FOLLOWUP:[2 weeks]],PROVIDER:[TOKEN:[[id number]],FOLLOWUP:[2 weeks]] PROVIDER:[TOKEN:[[id number]],FOLLOWUP:[2 weeks]],PROVIDER:[TOKEN:[[id number]],FOLLOWUP:[2 weeks]],PROVIDER:[TOKEN:[[id number]]],PROVIDER:[TOKEN:[[id number]]]

## 2024-02-02 NOTE — PROGRESS NOTE ADULT - PROBLEM SELECTOR PLAN 3
Anemia noted. Hgb low at 7.9. Tsat 20%, Ferritin wnl at 276. Patient will benefit from IV iron if no ongoing infection. Recommend venofer 200mg X 5 days.   Recommend age appropriate screening. Monitor Hgb, transfusion per primary team.  start EPo as well

## 2024-02-03 LAB
ANION GAP SERPL CALC-SCNC: 10 MMOL/L — SIGNIFICANT CHANGE UP (ref 7–14)
BUN SERPL-MCNC: 29 MG/DL — HIGH (ref 7–23)
CALCIUM SERPL-MCNC: 8 MG/DL — LOW (ref 8.4–10.5)
CHLORIDE SERPL-SCNC: 106 MMOL/L — SIGNIFICANT CHANGE UP (ref 98–107)
CO2 SERPL-SCNC: 25 MMOL/L — SIGNIFICANT CHANGE UP (ref 22–31)
CREAT SERPL-MCNC: 3.51 MG/DL — HIGH (ref 0.5–1.3)
EGFR: 14 ML/MIN/1.73M2 — LOW
GLUCOSE BLDC GLUCOMTR-MCNC: 101 MG/DL — HIGH (ref 70–99)
GLUCOSE BLDC GLUCOMTR-MCNC: 137 MG/DL — HIGH (ref 70–99)
GLUCOSE BLDC GLUCOMTR-MCNC: 205 MG/DL — HIGH (ref 70–99)
GLUCOSE BLDC GLUCOMTR-MCNC: 230 MG/DL — HIGH (ref 70–99)
GLUCOSE BLDC GLUCOMTR-MCNC: 234 MG/DL — HIGH (ref 70–99)
GLUCOSE SERPL-MCNC: 190 MG/DL — HIGH (ref 70–99)
HCT VFR BLD CALC: 25.3 % — LOW (ref 34.5–45)
HGB BLD-MCNC: 8.3 G/DL — LOW (ref 11.5–15.5)
MAGNESIUM SERPL-MCNC: 2.2 MG/DL — SIGNIFICANT CHANGE UP (ref 1.6–2.6)
MCHC RBC-ENTMCNC: 28.5 PG — SIGNIFICANT CHANGE UP (ref 27–34)
MCHC RBC-ENTMCNC: 32.8 GM/DL — SIGNIFICANT CHANGE UP (ref 32–36)
MCV RBC AUTO: 86.9 FL — SIGNIFICANT CHANGE UP (ref 80–100)
NRBC # BLD: 0 /100 WBCS — SIGNIFICANT CHANGE UP (ref 0–0)
NRBC # FLD: 0 K/UL — SIGNIFICANT CHANGE UP (ref 0–0)
PHOSPHATE SERPL-MCNC: 3.7 MG/DL — SIGNIFICANT CHANGE UP (ref 2.5–4.5)
PLATELET # BLD AUTO: 338 K/UL — SIGNIFICANT CHANGE UP (ref 150–400)
POTASSIUM SERPL-MCNC: 3.6 MMOL/L — SIGNIFICANT CHANGE UP (ref 3.5–5.3)
POTASSIUM SERPL-SCNC: 3.6 MMOL/L — SIGNIFICANT CHANGE UP (ref 3.5–5.3)
RBC # BLD: 2.91 M/UL — LOW (ref 3.8–5.2)
RBC # FLD: 14.4 % — SIGNIFICANT CHANGE UP (ref 10.3–14.5)
SODIUM SERPL-SCNC: 141 MMOL/L — SIGNIFICANT CHANGE UP (ref 135–145)
WBC # BLD: 14.12 K/UL — HIGH (ref 3.8–10.5)
WBC # FLD AUTO: 14.12 K/UL — HIGH (ref 3.8–10.5)

## 2024-02-03 PROCEDURE — 99232 SBSQ HOSP IP/OBS MODERATE 35: CPT

## 2024-02-03 PROCEDURE — 99232 SBSQ HOSP IP/OBS MODERATE 35: CPT | Mod: GC

## 2024-02-03 RX ADMIN — Medication 6 UNIT(S): at 18:17

## 2024-02-03 RX ADMIN — ATORVASTATIN CALCIUM 20 MILLIGRAM(S): 80 TABLET, FILM COATED ORAL at 22:09

## 2024-02-03 RX ADMIN — ERYTHROPOIETIN 3000 UNIT(S): 10000 INJECTION, SOLUTION INTRAVENOUS; SUBCUTANEOUS at 14:25

## 2024-02-03 RX ADMIN — HEPARIN SODIUM 5000 UNIT(S): 5000 INJECTION INTRAVENOUS; SUBCUTANEOUS at 15:30

## 2024-02-03 RX ADMIN — SENNA PLUS 2 TABLET(S): 8.6 TABLET ORAL at 22:24

## 2024-02-03 RX ADMIN — BUDESONIDE AND FORMOTEROL FUMARATE DIHYDRATE 2 PUFF(S): 160; 4.5 AEROSOL RESPIRATORY (INHALATION) at 21:08

## 2024-02-03 RX ADMIN — Medication 1 TABLET(S): at 11:14

## 2024-02-03 RX ADMIN — Medication 4: at 18:18

## 2024-02-03 RX ADMIN — BUMETANIDE 2 MILLIGRAM(S): 0.25 INJECTION INTRAMUSCULAR; INTRAVENOUS at 17:41

## 2024-02-03 RX ADMIN — BUDESONIDE AND FORMOTEROL FUMARATE DIHYDRATE 2 PUFF(S): 160; 4.5 AEROSOL RESPIRATORY (INHALATION) at 08:32

## 2024-02-03 RX ADMIN — IRON SUCROSE 110 MILLIGRAM(S): 20 INJECTION, SOLUTION INTRAVENOUS at 17:40

## 2024-02-03 RX ADMIN — CHLORHEXIDINE GLUCONATE 1 APPLICATION(S): 213 SOLUTION TOPICAL at 11:14

## 2024-02-03 RX ADMIN — INSULIN GLARGINE 18 UNIT(S): 100 INJECTION, SOLUTION SUBCUTANEOUS at 22:07

## 2024-02-03 RX ADMIN — BUMETANIDE 2 MILLIGRAM(S): 0.25 INJECTION INTRAMUSCULAR; INTRAVENOUS at 05:43

## 2024-02-03 RX ADMIN — Medication 4: at 08:33

## 2024-02-03 RX ADMIN — Medication 6 UNIT(S): at 08:33

## 2024-02-03 RX ADMIN — MONTELUKAST 10 MILLIGRAM(S): 4 TABLET, CHEWABLE ORAL at 11:14

## 2024-02-03 RX ADMIN — HEPARIN SODIUM 5000 UNIT(S): 5000 INJECTION INTRAVENOUS; SUBCUTANEOUS at 22:24

## 2024-02-03 RX ADMIN — HEPARIN SODIUM 5000 UNIT(S): 5000 INJECTION INTRAVENOUS; SUBCUTANEOUS at 05:44

## 2024-02-03 RX ADMIN — LORATADINE 10 MILLIGRAM(S): 10 TABLET ORAL at 11:15

## 2024-02-03 NOTE — PROGRESS NOTE ADULT - PROBLEM SELECTOR PLAN 4
Pt. with metabolic acidosis in the setting of advanced CKD and lactic acidosis. Lactate initially elevated at 3.7 now normalized. Sco2 now improved.  monitor BMP.

## 2024-02-03 NOTE — PROGRESS NOTE ADULT - ASSESSMENT
62yo female with a hx of T2DM, asthma, CKD4, HTN, and HLD here for dyspnea and wheezing that began the evening prior to presentation, admitted for AHRF likely iso volume overload iso of ESRD now on HD

## 2024-02-03 NOTE — PROGRESS NOTE ADULT - PROBLEM SELECTOR PLAN 1
p/w 2 days dyspnea, wheezing, generalized weakness, myalgias, and chest tightness. Required BiPAP for increased WOB, now titrated to 3L NC. CXR with perihilar opacities. Pt reports improving sx with tx received in ED. Likely exacerbation of CHF 2/2 underlying URI. asthma exacerbation and PNA lower on differential  -Goal SpO2 >92%, off supplemental O2, wean as tolerated  -volume overload and asthma exacerbation management per below  - dialysis per below BUN/Cr 49/5.13 (baseline Cr ~3.3). Also with metabolic acidosis now improving. Makes urine, dobbs placed due to decreased output in ED. Likely pre-renal iso poor PO intake, insensible losses, and third spacing vs ATN   US Kidney/bladder with echogenic lesion in the superior pole of right kidney vs right adrenal gland, CT recommended for further evaluation  -Nephrology consulted, appreciate recs.   -s/p 1st dialysis session 2/1, 2nd scheduled for 2/2  -plan for permacath placement next week  -trend bmp, UOP, I&Os  -dobbs placed d/t decreased UOP, passed tov, bladder scan dc'd  -can f/u lesion as outpatient  -pt remains on bumex IV bid

## 2024-02-03 NOTE — PROGRESS NOTE ADULT - SUBJECTIVE AND OBJECTIVE BOX
[facility] Division of Kidney Diseases & Hypertension  FOLLOW UP NOTE  --------------------------------------------------------------------------------  Chief Complaint: CKD requiring dialysis    24 hour events/subjective: Patient was seen and evaluated this morning at bedside.  Patient's daughter was there and provided translation.  Patient had HD yesterday and tolerated well.  Patient reports no dyspnea.  Feels well overall just tired.    PAST HISTORY  --------------------------------------------------------------------------------  No significant changes to PMH, PSH, FHx, SHx, unless otherwise noted    ALLERGIES & MEDICATIONS  --------------------------------------------------------------------------------  Allergies    No Known Allergies    Intolerances      Standing Inpatient Medications  atorvastatin 20 milliGRAM(s) Oral at bedtime  budesonide 160 MICROgram(s)/formoterol 4.5 MICROgram(s) Inhaler 2 Puff(s) Inhalation two times a day  buMETAnide Injectable 2 milliGRAM(s) IV Push every 12 hours  chlorhexidine 2% Cloths 1 Application(s) Topical daily  dextrose 5%. 1000 milliLiter(s) IV Continuous <Continuous>  dextrose 5%. 1000 milliLiter(s) IV Continuous <Continuous>  dextrose 50% Injectable 25 Gram(s) IV Push once  dextrose 50% Injectable 12.5 Gram(s) IV Push once  dextrose 50% Injectable 25 Gram(s) IV Push once  epoetin melody (EPOGEN) Injectable 3000 Unit(s) IV Push <User Schedule>  glucagon  Injectable 1 milliGRAM(s) IntraMuscular once  heparin   Injectable 5000 Unit(s) SubCutaneous every 8 hours  insulin glargine Injectable (LANTUS) 18 Unit(s) SubCutaneous at bedtime  insulin lispro (ADMELOG) corrective regimen sliding scale   SubCutaneous three times a day before meals  insulin lispro Injectable (ADMELOG) 6 Unit(s) SubCutaneous three times a day before meals  iron sucrose IVPB 200 milliGRAM(s) IV Intermittent every 24 hours  loratadine 10 milliGRAM(s) Oral daily  montelukast 10 milliGRAM(s) Oral daily  multivitamin 1 Tablet(s) Oral daily  polyethylene glycol 3350 17 Gram(s) Oral daily  senna 2 Tablet(s) Oral at bedtime    PRN Inpatient Medications  dextrose Oral Gel 15 Gram(s) Oral once PRN  sodium chloride 0.9% Bolus. 100 milliLiter(s) IV Bolus every 5 minutes PRN  sodium chloride 0.9% lock flush 10 milliLiter(s) IV Push every 1 hour PRN      REVIEW OF SYSTEMS  --------------------------------------------------------------------------------  Gen: no fever  Respiratory: no sob  CV: no cp  GI: no ab pain  : no complaints  MSK: no pain    VITALS/PHYSICAL EXAM  --------------------------------------------------------------------------------  T(C): 36.8 (02-03-24 @ 05:35), Max: 37.3 (02-02-24 @ 11:15)  HR: 91 (02-03-24 @ 05:35) (88 - 101)  BP: 158/63 (02-03-24 @ 05:35) (136/82 - 160/85)  ABP: --  ABP(mean): --  RR: 17 (02-03-24 @ 05:35) (17 - 18)  SpO2: 100% (02-03-24 @ 05:35) (95% - 100%)  CVP(mm Hg): --        02-02-24 @ 07:01  -  02-03-24 @ 07:00  --------------------------------------------------------  IN: 400 mL / OUT: 2400 mL / NET: -2000 mL      Physical Exam:  	Gen NAD  	HEENT: no JVD  	Pulm: rales at bases today  	CV: S1S2,  	Abd: Soft,   	Ext:  + trace edema B/L LE   	Neuro: Awake and alert  	Skin: Warm and dry              Vascular:  IJ non-tunneled HD catheter site is CDI    LABS/STUDIES  --------------------------------------------------------------------------------              8.3    14.12 >-----------<  338      [02-03-24 @ 06:25]              25.3     141  |  106  |  29  ----------------------------<  190      [02-03-24 @ 06:25]  3.6   |  25  |  3.51        Ca     8.0     [02-03-24 @ 06:25]      Mg     2.20     [02-03-24 @ 06:25]      Phos  3.7     [02-03-24 @ 06:25]            Creatinine Trend:  SCr 3.51 [02-03 @ 06:25]  SCr 4.43 [02-02 @ 07:00]  SCr 6.11 [02-01 @ 05:36]  SCr 5.66 [01-31 @ 07:37]  SCr 5.23 [01-30 @ 22:35]    Urinalysis - [02-03-24 @ 06:25]      Color  / Appearance  / SG  / pH       Gluc 190 / Ketone   / Bili  / Urobili        Blood  / Protein  / Leuk Est  / Nitrite       RBC  / WBC  / Hyaline  / Gran  / Sq Epi  / Non Sq Epi  / Bacteria     Urine Creatinine 78      [01-31-24 @ 15:55]  Urine Protein 1723      [01-31-24 @ 15:55]  Urine Sodium <20      [01-31-24 @ 15:55]  Urine Urea Nitrogen 381.0      [01-31-24 @ 15:55]  Urine Osmolality 348      [01-31-24 @ 15:55]    Iron 44, TIBC 221, %sat 20      [02-01-24 @ 05:36]  Ferritin 276      [02-01-24 @ 05:36]    HBsAb <3.0      [02-01-24 @ 16:30]  HBsAg Nonreact      [02-01-24 @ 16:30]  HBcAb Nonreact      [02-01-24 @ 16:30]  HCV 0.10, Nonreact      [02-01-24 @ 16:30]

## 2024-02-03 NOTE — PROGRESS NOTE ADULT - SUBJECTIVE AND OBJECTIVE BOX
El Rhodes, PGY3  Pager [phone] [facility] or [phone] [facility]    Patient is a 63y old  Female who presents with a chief complaint of AHRF (2024 14:07)    No OVN events  SUBJECTIVE/INTERVAL EVENTS: Patient seen and examined at bedside.  Patient was seen and examined at bedside this morning. Denies any nausea/vomiting/diarrhea, headache, shortness of breath, abdominal pain or chest pain/palpitations. Patient responding appropriately to questions and able to make needs known. Vital signs/imaging/telemetry events reviewed.    Passed TOV  Updated daughter at bedside    MEDICATIONS  (STANDING):  atorvastatin 20 milliGRAM(s) Oral at bedtime  budesonide 160 MICROgram(s)/formoterol 4.5 MICROgram(s) Inhaler 2 Puff(s) Inhalation two times a day  buMETAnide Injectable 2 milliGRAM(s) IV Push every 12 hours  chlorhexidine 2% Cloths 1 Application(s) Topical daily  dextrose 5%. 1000 milliLiter(s) (50 mL/Hr) IV Continuous <Continuous>  dextrose 5%. 1000 milliLiter(s) (100 mL/Hr) IV Continuous <Continuous>  dextrose 50% Injectable 25 Gram(s) IV Push once  dextrose 50% Injectable 25 Gram(s) IV Push once  dextrose 50% Injectable 12.5 Gram(s) IV Push once  epoetin melody (EPOGEN) Injectable 3000 Unit(s) IV Push <User Schedule>  glucagon  Injectable 1 milliGRAM(s) IntraMuscular once  heparin   Injectable 5000 Unit(s) SubCutaneous every 8 hours  insulin glargine Injectable (LANTUS) 18 Unit(s) SubCutaneous at bedtime  insulin lispro (ADMELOG) corrective regimen sliding scale   SubCutaneous three times a day before meals  insulin lispro Injectable (ADMELOG) 6 Unit(s) SubCutaneous three times a day before meals  iron sucrose IVPB 200 milliGRAM(s) IV Intermittent every 24 hours  loratadine 10 milliGRAM(s) Oral daily  montelukast 10 milliGRAM(s) Oral daily  multivitamin 1 Tablet(s) Oral daily  polyethylene glycol 3350 17 Gram(s) Oral daily  senna 2 Tablet(s) Oral at bedtime    MEDICATIONS  (PRN):  dextrose Oral Gel 15 Gram(s) Oral once PRN Blood Glucose LESS THAN 70 milliGRAM(s)/deciliter  sodium chloride 0.9% Bolus. 100 milliLiter(s) IV Bolus every 5 minutes PRN SBP LESS THAN or EQUAL to 90 mmHg  sodium chloride 0.9% lock flush 10 milliLiter(s) IV Push every 1 hour PRN Pre/post blood products, medications, blood draw, and to maintain line patency      VITAL SIGNS:  T(F): 98.2 (24 @ 05:35), Max: 99.2 (24 @ 11:15)  HR: 91 (24 @ 05:35) (88 - 101)  BP: 158/63 (24 @ 05:35) (136/82 - 160/85)  RR: 17 (24 @ 05:35) (17 - 18)  SpO2: 100% (24 @ 05:35) (95% - 100%)    I&O's Summary    2024 07:01  -  2024 07:00  --------------------------------------------------------  IN: 400 mL / OUT: 2400 mL / NET: -2000 mL      Daily     Daily Weight in k.5 (2024 14:10)    PHYSICAL EXAM:  GENERAL: NAD, resting comfortably, shiley in place  HEAD:  Atraumatic, Normocephalic  EYES: EOMI, conjunctiva and sclera clear  NECK: No JVD  CHEST/LUNG: CTAB, no wheezes rales or rhonchi  HEART: Regular rate and rhythm; No murmurs, rubs, or gallops  ABDOMEN: Soft, Nontender, Nondistended; Bowel sounds present  EXTREMITIES:  2+ Peripheral Pulses, No clubbing, cyanosis, 1+ BLE edema  PSYCH: AAOx3  NEUROLOGY: non-focal  SKIN: No rashes or lesions       LABS:                        8.3    14.12 )-----------( 338      ( 2024 06:25 )             25.3     Hgb Trend: 8.3<--, 8.1<--, 7.9<--, 8.6<--, 8.9<--      144  |  104  |  49<H>  ----------------------------<  140<H>  3.4<L>   |  24  |  4.43<H>    Ca    8.1<L>      2024 07:00  Phos  4.0     02-  Mg     2.10     02-      Creatinine Trend: 4.43<--, 6.11<--, 5.66<--, 5.23<--, 5.25<--, 5.13<--          Urinalysis Basic - ( 2024 07:00 )    Color: x / Appearance: x / SG: x / pH: x  Gluc: 140 mg/dL / Ketone: x  / Bili: x / Urobili: x   Blood: x / Protein: x / Nitrite: x   Leuk Esterase: x / RBC: x / WBC x   Sq Epi: x / Non Sq Epi: x / Bacteria: x        CAPILLARY BLOOD GLUCOSE      POCT Blood Glucose.: 205 mg/dL (2024 08:20)  POCT Blood Glucose.: 330 mg/dL (2024 21:28)  POCT Blood Glucose.: 145 mg/dL (2024 17:38)  POCT Blood Glucose.: 125 mg/dL (2024 14:53)  POCT Blood Glucose.: 133 mg/dL (2024 13:16)  POCT Blood Glucose.: 275 mg/dL (2024 11:04)      RADIOLOGY & ADDITIONAL TESTS: Reviewed    Imaging Personally Reviewed:    Consultant(s) Notes Reviewed:      Care Discussed with Consultants/Other Providers:

## 2024-02-03 NOTE — PROGRESS NOTE ADULT - PROBLEM SELECTOR PLAN 6
Sugars 300s/400s on admission. BHB 0.7 but no acidosis. A1c 6.6% from 10/23, now 6.1%. Received Lantus 25u overnight, sugars now improving. Diet resumed. On lantus 20u in am and 20 qhs at home, as well as repaglinide and victoza  -hold home repaglinide and victoza  -c/w lantus 18u qhs, admelog 6u premeal, and moderate ISS  -FS with meals

## 2024-02-03 NOTE — PROGRESS NOTE ADULT - PROBLEM SELECTOR PLAN 2
tachycardia with leukocytosis on admission, meeting SIRS criteria but no obvious source of infection. Likely has underlying viral URI, low suspicion for bacterial infection  -f/u blood cultures (NGTD), MRSA  -monitor off abx p/w 2 days dyspnea, wheezing, generalized weakness, myalgias, and chest tightness. Required BiPAP for increased WOB, now titrated to 3L NC. CXR with perihilar opacities. Pt reports improving sx with tx received in ED. Likely exacerbation of CHF 2/2 underlying URI. asthma exacerbation and PNA lower on differential  -Goal SpO2 >92%, off supplemental O2, wean as tolerated  -volume overload and asthma exacerbation management per below  - dialysis per below

## 2024-02-03 NOTE — PROGRESS NOTE ADULT - PROBLEM SELECTOR PLAN 1
Pt. with NELSON on CKD in the setting of longstanding HTN and DM2. On review of labs on [place], last Scr was 3.3 on 12/18/23. On admission, Scr elevated at 5.13 on 1/30/24 ------> 6.11 2/1/2024. UPCR was 18.1 on 12/18/23. Pt. was previously admitted from 10/17-10/20/23 with UTI and NELSON on CKD. Serologic workup at that time was negative: C3/C4 not low, kappa/lambda ratio WNL with no monoclonal band identified, negative for ANCA, CELIA, dsDNA, Hep B, Hep C, and [id number]. On this admission, UA with >1000mg protein, trace blood, glucosuria. Renal US w/o hydronephrosis. RVP and COVID negative. Pro-BNP elevated at 10,471.  CXR showed perihilar opacities with effusion more likely cardiogenic in origin than multifocal pneumonia. Pt likely with progressive CKD. Pt. was evaluated by Dr. ADDISON Preciado for kidney transplantation.  HD consent obtained and placed in chart.  s/p HD catheter and Hd x1 2/1/24 and then second treatment 2/2 plan for third treatment today 2/3 and will likely do next HD on tuewsday.  Given BP and evidence of volume overload ok to c/w IV diuretics for now.  Monitor labs and urine output. Avoid nephrotoxins.  Dose medications for HD.  Will needed tunneled HD catheter.

## 2024-02-03 NOTE — PROGRESS NOTE ADULT - PROBLEM SELECTOR PLAN 3
Anemia noted. Hgb low at 8.3 Tsat 20%, Ferritin wnl at 276. Patient will benefit from IV iron if no ongoing infection. on 5 day course of venofer and lynne.  monitor cbc.

## 2024-02-03 NOTE — PROGRESS NOTE ADULT - PROBLEM SELECTOR PLAN 9
Hgb 8.9 on admission, baseline 9-10. Likely iso CKD. Low suspicion for any source of bleeding  -trend cbc  -c/w iv venofer (2/2 - 2/7)  -c/w epo

## 2024-02-03 NOTE — PROGRESS NOTE ADULT - PROBLEM SELECTOR PLAN 4
P/w dyspnea not improved with home albuterol. Noted to have increased work of breathing and accessory muscle usage in ED  -s/p methylprednisolone and duonebs x3 in ED  -c/w duonebs prn  -monitor off steroids for now tachycardia with leukocytosis on admission, meeting SIRS criteria but no obvious source of infection. Likely has underlying viral URI, low suspicion for bacterial infection  -f/u blood cultures (NGTD), MRSA  -monitor off abx

## 2024-02-03 NOTE — PROGRESS NOTE ADULT - PROBLEM SELECTOR PLAN 5
BUN/Cr 49/5.13 (baseline Cr ~3.3). Also with metabolic acidosis now improving. Makes urine, dobbs placed due to decreased output in ED. Likely pre-renal iso poor PO intake, insensible losses, and third spacing vs ATN   US Kidney/bladder with echogenic lesion in the superior pole of right kidney vs right adrenal gland, CT recommended for further evaluation  -Nephrology consulted, appreciate recs.   -s/p 1st dialysis session 2/1, 2nd scheduled for 2/2  -plan for permacath placement next week  -trend bmp, UOP, I&Os  -dobbs placed d/t decreased UOP, ongoing TOV   -can f/u lesion as outpatient P/w dyspnea not improved with home albuterol. Noted to have increased work of breathing and accessory muscle usage in ED  -s/p methylprednisolone and duonebs x3 in ED  -c/w duonebs prn  -monitor off steroids for now

## 2024-02-03 NOTE — PROGRESS NOTE ADULT - ATTENDING COMMENTS
The patient is a 63-year-old woman who is followed for ckd stage 4/5, most likely secondary to diabetic nephropathy, and who is admitted for evaluation and management of acute hypoxic respiratory failure, most likely secondary to pulmonary edema in the setting of acute kidney injury superimposed on chronic kidney disease, which has now resolved, and complicated by the development of hyperglycemia.     NELSON on CKD 4/5: -Initially suspected secondary to vasoplegia in the setting of viral upper respiratory infection; as no improvement over the course of hospital stay, greater suspicion now that this represents progression of chronic kidney disease   -Etiology unclear of chronic kidney disease; suspected secondary to diabetic nephropathy, but patient has had a1c level in our lab that measures less than 7; possible that patient had long-standing hyperglycemia prior to need for insulin use and that current a1c in setting of ckd may underestimate degree of hyperglycemia   -Worrisome that kidney function does not appear to be improving; nephrology team has been in touch with the patient and is planning for initiation of dialysis; non-tunneled shiley placed and hemodialysis initiated 2/1; ir consult placed for placement of tunneled hemodialysis catheter; as patient also continues to make urine, administering bumetanide 2 mg intravenously twice daily     Diabetes mellitus with hyperglycemia: -Known insulin-dependent diabetes mellitus; glucose elevated this admission to 300-400 (subsequent to administration of methylprednisolone in ed)   -Will monitor serum glucose levels and adjust insulin dose as necessary     Asthma: -Suspected asthma history although no pfts to confirm; no evident wheezing and shortness of breath significantly improved today; will avoid administration of additional steroids at this time     Prophylactic measure: -heparin for dvt prophylaxis; consistent carbohydrate diet

## 2024-02-04 LAB
ANION GAP SERPL CALC-SCNC: 14 MMOL/L — SIGNIFICANT CHANGE UP (ref 7–14)
BUN SERPL-MCNC: 21 MG/DL — SIGNIFICANT CHANGE UP (ref 7–23)
CALCIUM SERPL-MCNC: 8 MG/DL — LOW (ref 8.4–10.5)
CHLORIDE SERPL-SCNC: 102 MMOL/L — SIGNIFICANT CHANGE UP (ref 98–107)
CO2 SERPL-SCNC: 24 MMOL/L — SIGNIFICANT CHANGE UP (ref 22–31)
CREAT SERPL-MCNC: 3.13 MG/DL — HIGH (ref 0.5–1.3)
EGFR: 16 ML/MIN/1.73M2 — LOW
GLUCOSE BLDC GLUCOMTR-MCNC: 200 MG/DL — HIGH (ref 70–99)
GLUCOSE BLDC GLUCOMTR-MCNC: 240 MG/DL — HIGH (ref 70–99)
GLUCOSE BLDC GLUCOMTR-MCNC: 247 MG/DL — HIGH (ref 70–99)
GLUCOSE BLDC GLUCOMTR-MCNC: 273 MG/DL — HIGH (ref 70–99)
GLUCOSE SERPL-MCNC: 205 MG/DL — HIGH (ref 70–99)
HCT VFR BLD CALC: 26.5 % — LOW (ref 34.5–45)
HGB BLD-MCNC: 8.8 G/DL — LOW (ref 11.5–15.5)
MAGNESIUM SERPL-MCNC: 2 MG/DL — SIGNIFICANT CHANGE UP (ref 1.6–2.6)
MCHC RBC-ENTMCNC: 28.5 PG — SIGNIFICANT CHANGE UP (ref 27–34)
MCHC RBC-ENTMCNC: 33.2 GM/DL — SIGNIFICANT CHANGE UP (ref 32–36)
MCV RBC AUTO: 85.8 FL — SIGNIFICANT CHANGE UP (ref 80–100)
NRBC # BLD: 0 /100 WBCS — SIGNIFICANT CHANGE UP (ref 0–0)
NRBC # FLD: 0 K/UL — SIGNIFICANT CHANGE UP (ref 0–0)
PHOSPHATE SERPL-MCNC: 4 MG/DL — SIGNIFICANT CHANGE UP (ref 2.5–4.5)
PLATELET # BLD AUTO: 342 K/UL — SIGNIFICANT CHANGE UP (ref 150–400)
POTASSIUM SERPL-MCNC: 3.3 MMOL/L — LOW (ref 3.5–5.3)
POTASSIUM SERPL-SCNC: 3.3 MMOL/L — LOW (ref 3.5–5.3)
RBC # BLD: 3.09 M/UL — LOW (ref 3.8–5.2)
RBC # FLD: 14.1 % — SIGNIFICANT CHANGE UP (ref 10.3–14.5)
SODIUM SERPL-SCNC: 140 MMOL/L — SIGNIFICANT CHANGE UP (ref 135–145)
WBC # BLD: 14.14 K/UL — HIGH (ref 3.8–10.5)
WBC # FLD AUTO: 14.14 K/UL — HIGH (ref 3.8–10.5)

## 2024-02-04 PROCEDURE — 93306 TTE W/DOPPLER COMPLETE: CPT | Mod: 26

## 2024-02-04 PROCEDURE — 99232 SBSQ HOSP IP/OBS MODERATE 35: CPT | Mod: GC

## 2024-02-04 PROCEDURE — 76376 3D RENDER W/INTRP POSTPROCES: CPT | Mod: 26

## 2024-02-04 RX ORDER — INSULIN LISPRO 100/ML
7 VIAL (ML) SUBCUTANEOUS
Refills: 0 | Status: DISCONTINUED | OUTPATIENT
Start: 2024-02-04 | End: 2024-02-06

## 2024-02-04 RX ORDER — AMLODIPINE BESYLATE 2.5 MG/1
5 TABLET ORAL DAILY
Refills: 0 | Status: DISCONTINUED | OUTPATIENT
Start: 2024-02-04 | End: 2024-02-06

## 2024-02-04 RX ORDER — POTASSIUM CHLORIDE 20 MEQ
40 PACKET (EA) ORAL EVERY 4 HOURS
Refills: 0 | Status: COMPLETED | OUTPATIENT
Start: 2024-02-04 | End: 2024-02-04

## 2024-02-04 RX ORDER — POTASSIUM CHLORIDE 20 MEQ
40 PACKET (EA) ORAL ONCE
Refills: 0 | Status: DISCONTINUED | OUTPATIENT
Start: 2024-02-04 | End: 2024-02-04

## 2024-02-04 RX ORDER — INSULIN GLARGINE 100 [IU]/ML
21 INJECTION, SOLUTION SUBCUTANEOUS AT BEDTIME
Refills: 0 | Status: DISCONTINUED | OUTPATIENT
Start: 2024-02-04 | End: 2024-02-06

## 2024-02-04 RX ADMIN — CHLORHEXIDINE GLUCONATE 1 APPLICATION(S): 213 SOLUTION TOPICAL at 13:10

## 2024-02-04 RX ADMIN — Medication 7 UNIT(S): at 18:19

## 2024-02-04 RX ADMIN — HEPARIN SODIUM 5000 UNIT(S): 5000 INJECTION INTRAVENOUS; SUBCUTANEOUS at 13:10

## 2024-02-04 RX ADMIN — Medication 6 UNIT(S): at 09:05

## 2024-02-04 RX ADMIN — BUDESONIDE AND FORMOTEROL FUMARATE DIHYDRATE 2 PUFF(S): 160; 4.5 AEROSOL RESPIRATORY (INHALATION) at 09:08

## 2024-02-04 RX ADMIN — Medication 40 MILLIEQUIVALENT(S): at 13:08

## 2024-02-04 RX ADMIN — SENNA PLUS 2 TABLET(S): 8.6 TABLET ORAL at 21:49

## 2024-02-04 RX ADMIN — Medication 40 MILLIEQUIVALENT(S): at 09:09

## 2024-02-04 RX ADMIN — INSULIN GLARGINE 21 UNIT(S): 100 INJECTION, SOLUTION SUBCUTANEOUS at 21:48

## 2024-02-04 RX ADMIN — ATORVASTATIN CALCIUM 20 MILLIGRAM(S): 80 TABLET, FILM COATED ORAL at 21:50

## 2024-02-04 RX ADMIN — AMLODIPINE BESYLATE 5 MILLIGRAM(S): 2.5 TABLET ORAL at 18:16

## 2024-02-04 RX ADMIN — HEPARIN SODIUM 5000 UNIT(S): 5000 INJECTION INTRAVENOUS; SUBCUTANEOUS at 06:23

## 2024-02-04 RX ADMIN — IRON SUCROSE 110 MILLIGRAM(S): 20 INJECTION, SOLUTION INTRAVENOUS at 18:21

## 2024-02-04 RX ADMIN — Medication 6: at 09:06

## 2024-02-04 RX ADMIN — Medication 4: at 18:18

## 2024-02-04 RX ADMIN — MONTELUKAST 10 MILLIGRAM(S): 4 TABLET, CHEWABLE ORAL at 13:08

## 2024-02-04 RX ADMIN — Medication 2: at 13:05

## 2024-02-04 RX ADMIN — Medication 1 TABLET(S): at 13:07

## 2024-02-04 RX ADMIN — LORATADINE 10 MILLIGRAM(S): 10 TABLET ORAL at 13:08

## 2024-02-04 RX ADMIN — BUDESONIDE AND FORMOTEROL FUMARATE DIHYDRATE 2 PUFF(S): 160; 4.5 AEROSOL RESPIRATORY (INHALATION) at 21:49

## 2024-02-04 RX ADMIN — Medication 7 UNIT(S): at 13:05

## 2024-02-04 RX ADMIN — BUMETANIDE 2 MILLIGRAM(S): 0.25 INJECTION INTRAMUSCULAR; INTRAVENOUS at 18:21

## 2024-02-04 RX ADMIN — BUMETANIDE 2 MILLIGRAM(S): 0.25 INJECTION INTRAMUSCULAR; INTRAVENOUS at 06:23

## 2024-02-04 RX ADMIN — HEPARIN SODIUM 5000 UNIT(S): 5000 INJECTION INTRAVENOUS; SUBCUTANEOUS at 21:49

## 2024-02-04 NOTE — PROGRESS NOTE ADULT - SUBJECTIVE AND OBJECTIVE BOX
Denton Borjas MD  PGY 1 Department of Internal Medicine        Patient is a 63y old  Female who presents with a chief complaint of AHRF (03 Feb 2024 09:51)      SUBJECTIVE / OVERNIGHT EVENTS: Pt seen and examined. No acute overnight events. Denies fevers, chills, CP, SOB, Abdominal pain, N/V, Constipation, Diarrhea        MEDICATIONS  (STANDING):  atorvastatin 20 milliGRAM(s) Oral at bedtime  budesonide 160 MICROgram(s)/formoterol 4.5 MICROgram(s) Inhaler 2 Puff(s) Inhalation two times a day  buMETAnide Injectable 2 milliGRAM(s) IV Push every 12 hours  chlorhexidine 2% Cloths 1 Application(s) Topical daily  dextrose 5%. 1000 milliLiter(s) (50 mL/Hr) IV Continuous <Continuous>  dextrose 5%. 1000 milliLiter(s) (100 mL/Hr) IV Continuous <Continuous>  dextrose 50% Injectable 25 Gram(s) IV Push once  dextrose 50% Injectable 25 Gram(s) IV Push once  dextrose 50% Injectable 12.5 Gram(s) IV Push once  epoetin melody (EPOGEN) Injectable 3000 Unit(s) IV Push <User Schedule>  glucagon  Injectable 1 milliGRAM(s) IntraMuscular once  heparin   Injectable 5000 Unit(s) SubCutaneous every 8 hours  insulin glargine Injectable (LANTUS) 18 Unit(s) SubCutaneous at bedtime  insulin lispro (ADMELOG) corrective regimen sliding scale   SubCutaneous three times a day before meals  insulin lispro Injectable (ADMELOG) 6 Unit(s) SubCutaneous three times a day before meals  iron sucrose IVPB 200 milliGRAM(s) IV Intermittent every 24 hours  loratadine 10 milliGRAM(s) Oral daily  montelukast 10 milliGRAM(s) Oral daily  multivitamin 1 Tablet(s) Oral daily  polyethylene glycol 3350 17 Gram(s) Oral daily  senna 2 Tablet(s) Oral at bedtime    MEDICATIONS  (PRN):  dextrose Oral Gel 15 Gram(s) Oral once PRN Blood Glucose LESS THAN 70 milliGRAM(s)/deciliter  sodium chloride 0.9% Bolus. 100 milliLiter(s) IV Bolus every 5 minutes PRN SBP LESS THAN or EQUAL to 90 mmHg  sodium chloride 0.9% lock flush 10 milliLiter(s) IV Push every 1 hour PRN Pre/post blood products, medications, blood draw, and to maintain line patency      I&O's Summary    03 Feb 2024 07:01  -  04 Feb 2024 07:00  --------------------------------------------------------  IN: 400 mL / OUT: 2350 mL / NET: -1950 mL        Vital Signs Last 24 Hrs  T(C): 36.7 (04 Feb 2024 05:37), Max: 36.9 (03 Feb 2024 21:02)  T(F): 98.1 (04 Feb 2024 05:37), Max: 98.5 (03 Feb 2024 21:02)  HR: 85 (04 Feb 2024 05:37) (85 - 97)  BP: 154/94 (04 Feb 2024 05:37) (145/81 - 161/82)  BP(mean): --  RR: 17 (04 Feb 2024 05:37) (17 - 17)  SpO2: 100% (04 Feb 2024 05:37) (97% - 100%)    Parameters below as of 04 Feb 2024 05:37  Patient On (Oxygen Delivery Method): room air        CAPILLARY BLOOD GLUCOSE      POCT Blood Glucose.: 230 mg/dL (03 Feb 2024 21:25)  POCT Blood Glucose.: 234 mg/dL (03 Feb 2024 17:22)  POCT Blood Glucose.: 101 mg/dL (03 Feb 2024 14:24)  POCT Blood Glucose.: 137 mg/dL (03 Feb 2024 11:15)  POCT Blood Glucose.: 205 mg/dL (03 Feb 2024 08:20)      PHYSICAL EXAM:  GENERAL: NAD, resting comfortably, shiley in place  HEAD:  Atraumatic, Normocephalic  EYES: EOMI, conjunctiva and sclera clear  NECK: No JVD  CHEST/LUNG: CTAB, no wheezes rales or rhonchi  HEART: Regular rate and rhythm; No murmurs, rubs, or gallops  ABDOMEN: Soft, Nontender, Nondistended; Bowel sounds present  EXTREMITIES:  2+ Peripheral Pulses, No clubbing, cyanosis, 1+ BLE edema  PSYCH: AAOx3  NEUROLOGY: non-focal  SKIN: No rashes or lesions          LABS:                        8.3    14.12 )-----------( 338      ( 03 Feb 2024 06:25 )             25.3     Auto Eosinophil # x     / Auto Eosinophil % x     / Auto Neutrophil # x     / Auto Neutrophil % x     / BANDS % x        02-03    141  |  106  |  29<H>  ----------------------------<  190<H>  3.6   |  25  |  3.51<H>    Ca    8.0<L>      03 Feb 2024 06:25  Mg     2.20     02-03  Phos  3.7     02-03          Urinalysis Basic - ( 03 Feb 2024 06:25 )    Color: x / Appearance: x / SG: x / pH: x  Gluc: 190 mg/dL / Ketone: x  / Bili: x / Urobili: x   Blood: x / Protein: x / Nitrite: x   Leuk Esterase: x / RBC: x / WBC x   Sq Epi: x / Non Sq Epi: x / Bacteria: x            RADIOLOGY & ADDITIONAL TESTS:    Imaging Personally Reviewed:    Consultant(s) Notes Reviewed:      Care Discussed with Consultants/Other Providers:   Denton Borjas MD  PGY 1 Department of Internal Medicine        Patient is a 63y old  Female who presents with a chief complaint of AHRF (03 Feb 2024 09:51)      SUBJECTIVE / OVERNIGHT EVENTS: Pt seen and examined. No acute overnight events. Complains of generalized weakness but denies fevers, chills, CP, SOB, Abdominal pain, N/V, Constipation, Diarrhea        MEDICATIONS  (STANDING):  atorvastatin 20 milliGRAM(s) Oral at bedtime  budesonide 160 MICROgram(s)/formoterol 4.5 MICROgram(s) Inhaler 2 Puff(s) Inhalation two times a day  buMETAnide Injectable 2 milliGRAM(s) IV Push every 12 hours  chlorhexidine 2% Cloths 1 Application(s) Topical daily  dextrose 5%. 1000 milliLiter(s) (50 mL/Hr) IV Continuous <Continuous>  dextrose 5%. 1000 milliLiter(s) (100 mL/Hr) IV Continuous <Continuous>  dextrose 50% Injectable 25 Gram(s) IV Push once  dextrose 50% Injectable 25 Gram(s) IV Push once  dextrose 50% Injectable 12.5 Gram(s) IV Push once  epoetin melody (EPOGEN) Injectable 3000 Unit(s) IV Push <User Schedule>  glucagon  Injectable 1 milliGRAM(s) IntraMuscular once  heparin   Injectable 5000 Unit(s) SubCutaneous every 8 hours  insulin glargine Injectable (LANTUS) 18 Unit(s) SubCutaneous at bedtime  insulin lispro (ADMELOG) corrective regimen sliding scale   SubCutaneous three times a day before meals  insulin lispro Injectable (ADMELOG) 6 Unit(s) SubCutaneous three times a day before meals  iron sucrose IVPB 200 milliGRAM(s) IV Intermittent every 24 hours  loratadine 10 milliGRAM(s) Oral daily  montelukast 10 milliGRAM(s) Oral daily  multivitamin 1 Tablet(s) Oral daily  polyethylene glycol 3350 17 Gram(s) Oral daily  senna 2 Tablet(s) Oral at bedtime    MEDICATIONS  (PRN):  dextrose Oral Gel 15 Gram(s) Oral once PRN Blood Glucose LESS THAN 70 milliGRAM(s)/deciliter  sodium chloride 0.9% Bolus. 100 milliLiter(s) IV Bolus every 5 minutes PRN SBP LESS THAN or EQUAL to 90 mmHg  sodium chloride 0.9% lock flush 10 milliLiter(s) IV Push every 1 hour PRN Pre/post blood products, medications, blood draw, and to maintain line patency      I&O's Summary    03 Feb 2024 07:01  -  04 Feb 2024 07:00  --------------------------------------------------------  IN: 400 mL / OUT: 2350 mL / NET: -1950 mL        Vital Signs Last 24 Hrs  T(C): 36.7 (04 Feb 2024 05:37), Max: 36.9 (03 Feb 2024 21:02)  T(F): 98.1 (04 Feb 2024 05:37), Max: 98.5 (03 Feb 2024 21:02)  HR: 85 (04 Feb 2024 05:37) (85 - 97)  BP: 154/94 (04 Feb 2024 05:37) (145/81 - 161/82)  BP(mean): --  RR: 17 (04 Feb 2024 05:37) (17 - 17)  SpO2: 100% (04 Feb 2024 05:37) (97% - 100%)    Parameters below as of 04 Feb 2024 05:37  Patient On (Oxygen Delivery Method): room air        CAPILLARY BLOOD GLUCOSE      POCT Blood Glucose.: 230 mg/dL (03 Feb 2024 21:25)  POCT Blood Glucose.: 234 mg/dL (03 Feb 2024 17:22)  POCT Blood Glucose.: 101 mg/dL (03 Feb 2024 14:24)  POCT Blood Glucose.: 137 mg/dL (03 Feb 2024 11:15)  POCT Blood Glucose.: 205 mg/dL (03 Feb 2024 08:20)      PHYSICAL EXAM:  GENERAL: NAD, resting comfortably, shiley in place  HEAD:  Atraumatic, Normocephalic  EYES: EOMI, conjunctiva and sclera clear  NECK: No JVD  CHEST/LUNG: CTAB, no wheezes rales or rhonchi  HEART: Regular rate and rhythm; No murmurs, rubs, or gallops  ABDOMEN: Soft, Nontender, Nondistended; Bowel sounds present  EXTREMITIES:  2+ Peripheral Pulses, No clubbing, cyanosis, no edema  PSYCH: AAOx3  NEUROLOGY: non-focal  SKIN: No rashes or lesions          LABS:                        8.3    14.12 )-----------( 338      ( 03 Feb 2024 06:25 )             25.3     Auto Eosinophil # x     / Auto Eosinophil % x     / Auto Neutrophil # x     / Auto Neutrophil % x     / BANDS % x        02-03    141  |  106  |  29<H>  ----------------------------<  190<H>  3.6   |  25  |  3.51<H>    Ca    8.0<L>      03 Feb 2024 06:25  Mg     2.20     02-03  Phos  3.7     02-03          Urinalysis Basic - ( 03 Feb 2024 06:25 )    Color: x / Appearance: x / SG: x / pH: x  Gluc: 190 mg/dL / Ketone: x  / Bili: x / Urobili: x   Blood: x / Protein: x / Nitrite: x   Leuk Esterase: x / RBC: x / WBC x   Sq Epi: x / Non Sq Epi: x / Bacteria: x            RADIOLOGY & ADDITIONAL TESTS:    Imaging Personally Reviewed:    Consultant(s) Notes Reviewed:      Care Discussed with Consultants/Other Providers:

## 2024-02-04 NOTE — CHART NOTE - NSCHARTNOTEFT_GEN_A_CORE
PRE-INTERVENTIONAL RADIOLOGY PROCEDURE NOTE      Patient Age: 63y    Patient Gender: Female    Procedure: Permacath placement    Diagnosis/Indication: ESRD requiring dialysis     Interventional Radiology Attending Physician: Dr. Hickman     Ordering Attending Physician: Rudi Herring    Pertinent Medical History: 62yo female with a hx of T2DM, asthma, CKD4, HTN, and HLD here for dyspnea and wheezing that began the evening prior to presentation, admitted for AHRF likely iso volume overload iso of ESRD now on HD    Pertinent labs:                      8.8    14.14 )-----------( 342      ( 04 Feb 2024 08:36 )             26.5       02-04    140  |  102  |  21  ----------------------------<  205<H>  3.3<L>   |  24  |  3.13<H>    Ca    8.0<L>      04 Feb 2024 08:07  Phos  4.0     02-04  Mg     2.00     02-04                Patient and Family Aware ? Yes

## 2024-02-04 NOTE — PROGRESS NOTE ADULT - PROBLEM SELECTOR PLAN 11
diet: renal diet  dvt ppx: heparin tid  dispo: pending clinical improvement  Bowel regimen: miralax and senna  Code: full    PT eval

## 2024-02-04 NOTE — PROGRESS NOTE ADULT - ATTENDING COMMENTS
63-year-old woman who is followed for ckd stage 4/5 admitted for acute hypoxic respiratory failure, most likely secondary to pulmonary edema in the setting of acute kidney injury superimposed on chronic kidney disease, which has now resolved, and complicated by the development of hyperglycemia.     Worsening of CKD 4/5, now requiring HD  -c/w HD per renal  -monitor electrolytes  -volume status improved  -BP control, resume amlodipine 5mg (home dose 10mg )  -c/w Bumex per nephrology  -renally dose meds  -avoid nephrotoxic agents  -f/u renal recs  -pending tunneled HD catheter placement with IR.     #Hyperglycemia  -c/w lantus 21u qhs, admelog 7u premeal, and moderate ISS

## 2024-02-04 NOTE — PROGRESS NOTE ADULT - PROBLEM SELECTOR PLAN 10
stable but soft on admission. on amlodipine at home  -home hold meds iso soft pressures    #Hyperlipidemia  on atorvastatin at home  -c/w home meds stable but soft on admission. on amlodipine at home  -resume amlodipine 5mg daily, increase to home dose 10mg as tolerated    #Hyperlipidemia  on atorvastatin at home  -c/w home meds

## 2024-02-04 NOTE — PROGRESS NOTE ADULT - PROBLEM SELECTOR PLAN 1
BUN/Cr 49/5.13 (baseline Cr ~3.3). Also with metabolic acidosis now improving. Makes urine, dobbs placed due to decreased output in ED. Likely pre-renal iso poor PO intake, insensible losses, and third spacing vs ATN   US Kidney/bladder with echogenic lesion in the superior pole of right kidney vs right adrenal gland, CT recommended for further evaluation  -Nephrology consulted, appreciate recs. s/p dialysis session x3, continue per nephro   -plan for permacath 2/5  -trend bmp, UOP, I&Os  -can f/u lesion as outpatient  -pt remains on bumex IV bid

## 2024-02-04 NOTE — PROGRESS NOTE ADULT - PROBLEM SELECTOR PLAN 5
P/w dyspnea not improved with home albuterol. Noted to have increased work of breathing and accessory muscle usage in ED  -s/p methylprednisolone and duonebs x3 in ED  -duonebs prn  -monitor off steroids for now

## 2024-02-04 NOTE — PROGRESS NOTE ADULT - PROBLEM SELECTOR PLAN 2
p/w 2 days dyspnea, wheezing, generalized weakness, myalgias, and chest tightness. Required BiPAP for increased WOB, now titrated to 3L NC. CXR with perihilar opacities. Pt reports improving sx with tx received in ED. Likely exacerbation of CHF 2/2 underlying URI. asthma exacerbation and PNA lower on differential  -Goal SpO2 >92%, off supplemental O2, wean as tolerated  -volume overload and asthma exacerbation management per below  - dialysis per below

## 2024-02-04 NOTE — PROGRESS NOTE ADULT - PROBLEM SELECTOR PLAN 4
tachycardia with leukocytosis on admission, meeting SIRS criteria but no obvious source of infection. Likely has underlying viral URI, low suspicion for bacterial infection  -f/u blood cultures (NGTD)  -monitor off abx

## 2024-02-04 NOTE — PROGRESS NOTE ADULT - PROBLEM SELECTOR PLAN 6
Sugars 300s/400s on admission. BHB 0.7 but no acidosis. A1c 6.6% from 10/23, now 6.1%. Received Lantus 25u overnight, sugars now improving. Diet resumed. On lantus 20u in am and 20 qhs at home, as well as repaglinide and victoza  -hold home repaglinide and victoza  -c/w lantus 18u qhs, admelog 6u premeal, and moderate ISS  -FS with meals Sugars 300s/400s on admission. BHB 0.7 but no acidosis. A1c 6.6% from 10/23, now 6.1%. Received Lantus 25u overnight, sugars now improving. Diet resumed. On lantus 20u in am and 20 qhs at home, as well as repaglinide and victoza  -hold home repaglinide and victoza  -c/w lantus 21u qhs, admelog 7u premeal, and moderate ISS  -FS with meals

## 2024-02-05 ENCOUNTER — APPOINTMENT (OUTPATIENT)
Dept: NEPHROLOGY | Facility: CLINIC | Age: 64
End: 2024-02-05

## 2024-02-05 LAB
ANION GAP SERPL CALC-SCNC: 16 MMOL/L — HIGH (ref 7–14)
APTT BLD: 36.7 SEC — HIGH (ref 24.5–35.6)
BLD GP AB SCN SERPL QL: NEGATIVE — SIGNIFICANT CHANGE UP
BUN SERPL-MCNC: 33 MG/DL — HIGH (ref 7–23)
CALCIUM SERPL-MCNC: 7.7 MG/DL — LOW (ref 8.4–10.5)
CHLORIDE SERPL-SCNC: 102 MMOL/L — SIGNIFICANT CHANGE UP (ref 98–107)
CO2 SERPL-SCNC: 19 MMOL/L — LOW (ref 22–31)
CREAT SERPL-MCNC: 4.2 MG/DL — HIGH (ref 0.5–1.3)
CULTURE RESULTS: SIGNIFICANT CHANGE UP
CULTURE RESULTS: SIGNIFICANT CHANGE UP
EGFR: 11 ML/MIN/1.73M2 — LOW
GLUCOSE BLDC GLUCOMTR-MCNC: 149 MG/DL — HIGH (ref 70–99)
GLUCOSE BLDC GLUCOMTR-MCNC: 185 MG/DL — HIGH (ref 70–99)
GLUCOSE BLDC GLUCOMTR-MCNC: 234 MG/DL — HIGH (ref 70–99)
GLUCOSE BLDC GLUCOMTR-MCNC: 274 MG/DL — HIGH (ref 70–99)
GLUCOSE BLDC GLUCOMTR-MCNC: 282 MG/DL — HIGH (ref 70–99)
GLUCOSE SERPL-MCNC: 208 MG/DL — HIGH (ref 70–99)
HCT VFR BLD CALC: 26.4 % — LOW (ref 34.5–45)
HGB BLD-MCNC: 8.9 G/DL — LOW (ref 11.5–15.5)
INR BLD: 0.95 RATIO — SIGNIFICANT CHANGE UP (ref 0.85–1.18)
MAGNESIUM SERPL-MCNC: 2 MG/DL — SIGNIFICANT CHANGE UP (ref 1.6–2.6)
MCHC RBC-ENTMCNC: 28.7 PG — SIGNIFICANT CHANGE UP (ref 27–34)
MCHC RBC-ENTMCNC: 33.7 GM/DL — SIGNIFICANT CHANGE UP (ref 32–36)
MCV RBC AUTO: 85.2 FL — SIGNIFICANT CHANGE UP (ref 80–100)
NRBC # BLD: 0 /100 WBCS — SIGNIFICANT CHANGE UP (ref 0–0)
NRBC # FLD: 0 K/UL — SIGNIFICANT CHANGE UP (ref 0–0)
PHOSPHATE SERPL-MCNC: 4.7 MG/DL — HIGH (ref 2.5–4.5)
PLATELET # BLD AUTO: 335 K/UL — SIGNIFICANT CHANGE UP (ref 150–400)
POTASSIUM SERPL-MCNC: 3.6 MMOL/L — SIGNIFICANT CHANGE UP (ref 3.5–5.3)
POTASSIUM SERPL-SCNC: 3.6 MMOL/L — SIGNIFICANT CHANGE UP (ref 3.5–5.3)
PROTHROM AB SERPL-ACNC: 10.7 SEC — SIGNIFICANT CHANGE UP (ref 9.5–13)
RBC # BLD: 3.1 M/UL — LOW (ref 3.8–5.2)
RBC # FLD: 14 % — SIGNIFICANT CHANGE UP (ref 10.3–14.5)
RH IG SCN BLD-IMP: POSITIVE — SIGNIFICANT CHANGE UP
SODIUM SERPL-SCNC: 137 MMOL/L — SIGNIFICANT CHANGE UP (ref 135–145)
SPECIMEN SOURCE: SIGNIFICANT CHANGE UP
SPECIMEN SOURCE: SIGNIFICANT CHANGE UP
WBC # BLD: 17.15 K/UL — HIGH (ref 3.8–10.5)
WBC # FLD AUTO: 17.15 K/UL — HIGH (ref 3.8–10.5)

## 2024-02-05 PROCEDURE — 76937 US GUIDE VASCULAR ACCESS: CPT | Mod: 26

## 2024-02-05 PROCEDURE — 99233 SBSQ HOSP IP/OBS HIGH 50: CPT

## 2024-02-05 PROCEDURE — 99233 SBSQ HOSP IP/OBS HIGH 50: CPT | Mod: GC

## 2024-02-05 PROCEDURE — 36558 INSERT TUNNELED CV CATH: CPT

## 2024-02-05 PROCEDURE — 77001 FLUOROGUIDE FOR VEIN DEVICE: CPT | Mod: 26,GC

## 2024-02-05 PROCEDURE — 99223 1ST HOSP IP/OBS HIGH 75: CPT

## 2024-02-05 RX ORDER — INSULIN LISPRO 100/ML
VIAL (ML) SUBCUTANEOUS AT BEDTIME
Refills: 0 | Status: DISCONTINUED | OUTPATIENT
Start: 2024-02-05 | End: 2024-02-07

## 2024-02-05 RX ORDER — HEPARIN SODIUM 5000 [USP'U]/ML
5000 INJECTION INTRAVENOUS; SUBCUTANEOUS EVERY 8 HOURS
Refills: 0 | Status: DISCONTINUED | OUTPATIENT
Start: 2024-02-05 | End: 2024-02-13

## 2024-02-05 RX ORDER — INSULIN LISPRO 100/ML
VIAL (ML) SUBCUTANEOUS
Refills: 0 | Status: DISCONTINUED | OUTPATIENT
Start: 2024-02-05 | End: 2024-02-07

## 2024-02-05 RX ORDER — INSULIN LISPRO 100/ML
VIAL (ML) SUBCUTANEOUS EVERY 6 HOURS
Refills: 0 | Status: DISCONTINUED | OUTPATIENT
Start: 2024-02-05 | End: 2024-02-05

## 2024-02-05 RX ADMIN — BUMETANIDE 2 MILLIGRAM(S): 0.25 INJECTION INTRAMUSCULAR; INTRAVENOUS at 17:26

## 2024-02-05 RX ADMIN — MONTELUKAST 10 MILLIGRAM(S): 4 TABLET, CHEWABLE ORAL at 11:55

## 2024-02-05 RX ADMIN — BUDESONIDE AND FORMOTEROL FUMARATE DIHYDRATE 2 PUFF(S): 160; 4.5 AEROSOL RESPIRATORY (INHALATION) at 08:47

## 2024-02-05 RX ADMIN — Medication 3: at 17:54

## 2024-02-05 RX ADMIN — INSULIN GLARGINE 21 UNIT(S): 100 INJECTION, SOLUTION SUBCUTANEOUS at 22:09

## 2024-02-05 RX ADMIN — Medication 1: at 22:12

## 2024-02-05 RX ADMIN — Medication 4: at 12:18

## 2024-02-05 RX ADMIN — ATORVASTATIN CALCIUM 20 MILLIGRAM(S): 80 TABLET, FILM COATED ORAL at 22:09

## 2024-02-05 RX ADMIN — Medication 1 TABLET(S): at 11:55

## 2024-02-05 RX ADMIN — LORATADINE 10 MILLIGRAM(S): 10 TABLET ORAL at 11:57

## 2024-02-05 RX ADMIN — BUMETANIDE 2 MILLIGRAM(S): 0.25 INJECTION INTRAMUSCULAR; INTRAVENOUS at 06:07

## 2024-02-05 RX ADMIN — Medication 2: at 06:07

## 2024-02-05 RX ADMIN — AMLODIPINE BESYLATE 5 MILLIGRAM(S): 2.5 TABLET ORAL at 06:06

## 2024-02-05 RX ADMIN — CHLORHEXIDINE GLUCONATE 1 APPLICATION(S): 213 SOLUTION TOPICAL at 11:55

## 2024-02-05 RX ADMIN — HEPARIN SODIUM 5000 UNIT(S): 5000 INJECTION INTRAVENOUS; SUBCUTANEOUS at 22:08

## 2024-02-05 RX ADMIN — SENNA PLUS 2 TABLET(S): 8.6 TABLET ORAL at 22:09

## 2024-02-05 RX ADMIN — IRON SUCROSE 110 MILLIGRAM(S): 20 INJECTION, SOLUTION INTRAVENOUS at 17:26

## 2024-02-05 RX ADMIN — BUDESONIDE AND FORMOTEROL FUMARATE DIHYDRATE 2 PUFF(S): 160; 4.5 AEROSOL RESPIRATORY (INHALATION) at 22:13

## 2024-02-05 NOTE — PRE PROCEDURE NOTE - PRE PROCEDURE EVALUATION
Interventional Radiology    HPI: 63y Female presented with acute on chronic kidney disease, with shortness of breath, requiring dialysis, s/p right IJ non-tunneled HD catheter placement by IR. IR to place tunneled HD catheter.     Allergies: No Known Allergies    Medications (Abx/Cardiac/Anticoagulation/Blood Products)  amLODIPine   Tablet: 5 milliGRAM(s) Oral (02-05 @ 06:06)  buMETAnide Injectable: 2 milliGRAM(s) IV Push (02-05 @ 06:07)  heparin   Injectable: 5000 Unit(s) SubCutaneous (02-04 @ 21:49)    Data:    T(C): 36.5  HR: 82  BP: 144/67  RR: 17  SpO2: 98%    Exam  General: No acute distress  Chest: Non labored breathing  Abdomen: Non-distended  Extremities: No swelling, warm    -WBC 17.15 / HgB 8.9 / Hct 26.4 / Plt 335  -Na 137 / Cl 102 / BUN 33 / Glucose 208  -K 3.6 / CO2 19 / Cr 4.20  -INR0.95    Plan:   63y Female presented with acute on chronic kidney disease, with shortness of breath, requiring dialysis, s/p right IJ non-tunneled HD catheter placement by IR. IR to place tunneled HD catheter.   -- Relevant imaging and labs were reviewed.   -- Risks, benefits, and alternatives were explained to the patient and informed consent was obtained.

## 2024-02-05 NOTE — CONSULT NOTE ADULT - ASSESSMENT
63F hx DM, asthma, CKD4, HTN, and HLD here for dyspnea and wheezing that began the evening prior to presentation. Pt admitted with volume overload 2/2 ESRD, now requiring HD. S/p RIJ permacath placement with IR today. Also with suspected URI. Vascular surgery consulted for AVF creation.    Recommendations:  -To be discussed with vascular surgery fellow on behalf of Dr. Gipson. 63F hx DM, asthma, CKD4, HTN, and HLD here for dyspnea and wheezing that began the evening prior to presentation. Pt admitted with volume overload 2/2 ESRD, now requiring HD. S/p RIJ permacath placement with IR today. Also with suspected URI. Vascular surgery consulted for AVF creation.    Recommendations:  -To be discussed with vascular surgery fellow on behalf of Dr. Dale. 63F hx DM, asthma, CKD4, HTN, and HLD here for dyspnea and wheezing that began the evening prior to presentation. Pt admitted with volume overload 2/2 ESRD, now requiring HD. S/p RIJ permacath placement with IR today. Also with suspected URI. Vascular surgery consulted for AVF creation. Patient is right hand dominant. Denies hx of pacemaker, or LUE surgeries or trauma.     Recommendations:  -Please protect LUE: no IVs, blood draws, or blood pressure cuffs.  -Please obtain bilateral upper extremity vein mapping.  -Please document medical and cardiac clearance for AVF creation in OR on this admission.  -Care per primary.    Discussed with vascular surgery fellow on behalf of Dr. Dale.    C Team/Vascular Surgery  Please page [phone] for all questions   63F hx DM, asthma, CKD4, HTN, and HLD here for dyspnea and wheezing that began the evening prior to presentation. Pt admitted with volume overload 2/2 ESRD, now requiring HD. S/p RIJ permacath placement with IR today. Also with suspected URI. Vascular surgery consulted for AVF creation. Patient is right hand dominant. Denies hx of pacemaker, or LUE surgeries or trauma.     Recommendations:  -Please protect LUE: no IVs, blood draws, or blood pressure cuffs.  -Please obtain bilateral upper extremity vein mapping.  -Please document medical and cardiac clearance for AVF creation in OR on this admission.  -Plan for AVF creation Friday (2/9)  -Care per primary.    Discussed with vascular surgery fellow on behalf of Dr. Dale.    C Team/Vascular Surgery  Please page [phone] for all questions

## 2024-02-05 NOTE — PROGRESS NOTE ADULT - ATTENDING COMMENTS
The patient is a 63-year-old woman who is followed for ckd stage 4/5, most likely secondary to diabetic nephropathy, and who is admitted for evaluation and management of acute hypoxic respiratory failure, most likely secondary to pulmonary edema in the setting of acute kidney injury superimposed on chronic kidney disease, which has now resolved, and complicated by the development of hyperglycemia.     NELSON on CKD 4/5: -Initially suspected secondary to vasoplegia in the setting of viral upper respiratory infection; as no improvement over the course of hospital stay, greater suspicion now that this represents progression of chronic kidney disease   -Etiology unclear of chronic kidney disease; suspected secondary to diabetic nephropathy, but patient has had a1c level in our lab that measures less than 7; possible that patient had long-standing hyperglycemia prior to need for insulin use and that current a1c in setting of ckd may underestimate degree of hyperglycemia   -Worrisome that kidney function does not appear to be improving; nephrology team has been in touch with the patient and is planning for initiation of dialysis; non-tunneled shiley placed and hemodialysis initiated 2/1; ir consult placed for placement of tunneled hemodialysis catheter; as patient also continues to make urine, administering bumetanide 2 mg intravenously twice daily   -Vascular surgery consulted for upper extremity av fistula placement     Pre-operative risk stratification: -The patient has no known history of cardiomyopathy; she has no known arrhythmia or history of acute coronary syndrome   -Although the patient cannot complete four metabolic equivalent tasks, and she has a creatinine greater than 4, and she does require insulin therapy, she would not be undergoing a major surgery, she has no history of cerebrovascular disease   -Her estimated rcri score is 2, indicating about a 10% risk of major adverse cardiac event in the setting of an av fistula surgery   -No further pre-operative risk stratification or optimization is needed prior to surgery    Diabetes mellitus with hyperglycemia: -Known insulin-dependent diabetes mellitus; glucose elevated this admission to 300-400 (subsequent to administration of methylprednisolone in ed)   -Will monitor serum glucose levels and adjust insulin dose as necessary     Asthma: -Suspected asthma history although no pfts to confirm; no evident wheezing and shortness of breath significantly improved today; will avoid administration of additional steroids at this time     Prophylactic measure: -heparin for dvt prophylaxis; consistent carbohydrate diet

## 2024-02-05 NOTE — PROCEDURE NOTE - PROCEDURE FINDINGS AND DETAILS
Right IJ non-tunneled dialysis catheter was removed and hemostasis was achieved prior to tunneled dialysis catheter placement.   Ultrasound and fluoroscopic-guided insertion of right internal jugular vein approach tunneled hemodialysis catheter.  Catheter tip is in the SVC.  Patient tolerated the procedure well with no complications.  Catheter may be used immediately.

## 2024-02-05 NOTE — PROGRESS NOTE ADULT - ASSESSMENT
64yo female with a hx of T2DM, asthma, CKD4, HTN, and HLD here for dyspnea and wheezing that began the evening prior to presentation, admitted for AHRF likely iso volume overload iso of ESRD now on HD

## 2024-02-05 NOTE — PROGRESS NOTE ADULT - PROBLEM SELECTOR PLAN 6
Sugars 300s/400s on admission. BHB 0.7 but no acidosis. A1c 6.6% from 10/23, now 6.1%. Received Lantus 25u overnight, sugars now improving. Diet resumed. On lantus 20u in am and 20 qhs at home, as well as repaglinide and victoza  -hold home repaglinide and victoza  -c/w lantus 21u qhs, admelog 7u premeal, and moderate ISS  -FS with meals

## 2024-02-05 NOTE — PROGRESS NOTE ADULT - SUBJECTIVE AND OBJECTIVE BOX
[facility] Division of Kidney Diseases & Hypertension  FOLLOW UP NOTE  [phone]--------------------------------------------------------------------------------  Chief Complaint:Shortness of breath        24 hour events/subjective:        PAST HISTORY  --------------------------------------------------------------------------------  No significant changes to PMH, PSH, FHx, SHx, unless otherwise noted    ALLERGIES & MEDICATIONS  --------------------------------------------------------------------------------  Allergies    No Known Allergies    Intolerances      Standing Inpatient Medications  amLODIPine   Tablet 5 milliGRAM(s) Oral daily  atorvastatin 20 milliGRAM(s) Oral at bedtime  budesonide 160 MICROgram(s)/formoterol 4.5 MICROgram(s) Inhaler 2 Puff(s) Inhalation two times a day  buMETAnide Injectable 2 milliGRAM(s) IV Push every 12 hours  chlorhexidine 2% Cloths 1 Application(s) Topical daily  dextrose 5%. 1000 milliLiter(s) IV Continuous <Continuous>  dextrose 5%. 1000 milliLiter(s) IV Continuous <Continuous>  dextrose 50% Injectable 25 Gram(s) IV Push once  dextrose 50% Injectable 25 Gram(s) IV Push once  dextrose 50% Injectable 12.5 Gram(s) IV Push once  epoetin melody (EPOGEN) Injectable 3000 Unit(s) IV Push <User Schedule>  glucagon  Injectable 1 milliGRAM(s) IntraMuscular once  insulin glargine Injectable (LANTUS) 21 Unit(s) SubCutaneous at bedtime  insulin lispro (ADMELOG) corrective regimen sliding scale   SubCutaneous every 6 hours  insulin lispro Injectable (ADMELOG) 7 Unit(s) SubCutaneous three times a day before meals  iron sucrose IVPB 200 milliGRAM(s) IV Intermittent every 24 hours  loratadine 10 milliGRAM(s) Oral daily  montelukast 10 milliGRAM(s) Oral daily  multivitamin 1 Tablet(s) Oral daily  polyethylene glycol 3350 17 Gram(s) Oral daily  senna 2 Tablet(s) Oral at bedtime    PRN Inpatient Medications  dextrose Oral Gel 15 Gram(s) Oral once PRN  sodium chloride 0.9% Bolus. 100 milliLiter(s) IV Bolus every 5 minutes PRN  sodium chloride 0.9% lock flush 10 milliLiter(s) IV Push every 1 hour PRN      REVIEW OF SYSTEMS  --------------------------------------------------------------------------------  Gen: No  fevers/chills  Skin: No rashes  Head/Eyes/Ears/Mouth: No headache; Normal hearing; Normal vision w/o blurriness  Respiratory: No dyspnea, cough, wheezing, hemoptysis  CV: No chest pain, PND, orthopnea  GI: No abdominal pain, diarrhea, constipation, nausea, vomiting  : No increased frequency, dysuria, hematuria, nocturia  MSK: No joint pain/swelling; no back pain; no edema  Neuro: No dizziness/lightheadedness, weakness, seizures, numbness, tingling      All other systems were reviewed and are negative, except as noted.    VITALS/PHYSICAL EXAM  --------------------------------------------------------------------------------  T(C): 36.7 (02-05-24 @ 12:44), Max: 36.8 (02-04-24 @ 21:00)  HR: 85 (02-05-24 @ 12:44) (82 - 95)  BP: 153/70 (02-05-24 @ 12:44) (144/67 - 164/71)  RR: 16 (02-05-24 @ 12:44) (16 - 18)  SpO2: 100% (02-05-24 @ 12:44) (95% - 100%)  Wt(kg): --        02-04-24 @ 07:01  -  02-05-24 @ 07:00  --------------------------------------------------------  IN: 325 mL / OUT: 350 mL / NET: -25 mL      Physical Exam:  	Gen NAD  	HEENT: no JVD  	Pulm: rales at bases today  	CV: S1S2,  	Abd: Soft,   	Ext:  + trace edema B/L LE   	Neuro: Awake and alert  	Skin: Warm and dry              Vascular:  IJ non-tunneled HD catheter site is CDI    LABS/STUDIES  --------------------------------------------------------------------------------              8.9    17.15 >-----------<  335      [02-05-24 @ 04:34]              26.4     137  |  102  |  33  ----------------------------<  208      [02-05-24 @ 04:34]  3.6   |  19  |  4.20        Ca     7.7     [02-05-24 @ 04:34]      Mg     2.00     [02-05-24 @ 04:34]      Phos  4.7     [02-05-24 @ 04:34]      PT/INR: PT 10.7 , INR 0.95       [02-05-24 @ 04:34]  PTT: 36.7       [02-05-24 @ 04:34]      Creatinine Trend:  SCr 4.20 [02-05 @ 04:34]  SCr 3.13 [02-04 @ 08:07]  SCr 3.51 [02-03 @ 06:25]  SCr 4.43 [02-02 @ 07:00]  SCr 6.11 [02-01 @ 05:36]    Urine Creatinine 78      [01-31-24 @ 15:55]  Urine Protein 1723      [01-31-24 @ 15:55]  Urine Sodium <20      [01-31-24 @ 15:55]  Urine Urea Nitrogen 381.0      [01-31-24 @ 15:55]  Urine Osmolality 348      [01-31-24 @ 15:55]    Iron 44, TIBC 221, %sat 20      [02-01-24 @ 05:36]  Ferritin 276      [02-01-24 @ 05:36]    HBsAb <3.0      [02-01-24 @ 16:30]  HBsAg Nonreact      [02-01-24 @ 16:30]  HBcAb Nonreact      [02-01-24 @ 16:30]  HCV 0.10, Nonreact      [02-01-24 @ 16:30]     [facility] Division of Kidney Diseases & Hypertension  FOLLOW UP NOTE  [phone]--------------------------------------------------------------------------------  Chief Complaint:Shortness of breath    24 hour events/subjective: Pt seen and evaluated bedside this morning. Reports feeling well, endorses no complaints. Denies any headaches, nausea, vomiting, fevers/chills, chest pain, SOB, abdominal pain, and leg swelling.    PAST HISTORY  --------------------------------------------------------------------------------  No significant changes to PMH, PSH, FHx, SHx, unless otherwise noted    ALLERGIES & MEDICATIONS  --------------------------------------------------------------------------------  Allergies    No Known Allergies    Intolerances      Standing Inpatient Medications  amLODIPine   Tablet 5 milliGRAM(s) Oral daily  atorvastatin 20 milliGRAM(s) Oral at bedtime  budesonide 160 MICROgram(s)/formoterol 4.5 MICROgram(s) Inhaler 2 Puff(s) Inhalation two times a day  buMETAnide Injectable 2 milliGRAM(s) IV Push every 12 hours  chlorhexidine 2% Cloths 1 Application(s) Topical daily  dextrose 5%. 1000 milliLiter(s) IV Continuous <Continuous>  dextrose 5%. 1000 milliLiter(s) IV Continuous <Continuous>  dextrose 50% Injectable 25 Gram(s) IV Push once  dextrose 50% Injectable 25 Gram(s) IV Push once  dextrose 50% Injectable 12.5 Gram(s) IV Push once  epoetin melody (EPOGEN) Injectable 3000 Unit(s) IV Push <User Schedule>  glucagon  Injectable 1 milliGRAM(s) IntraMuscular once  insulin glargine Injectable (LANTUS) 21 Unit(s) SubCutaneous at bedtime  insulin lispro (ADMELOG) corrective regimen sliding scale   SubCutaneous every 6 hours  insulin lispro Injectable (ADMELOG) 7 Unit(s) SubCutaneous three times a day before meals  iron sucrose IVPB 200 milliGRAM(s) IV Intermittent every 24 hours  loratadine 10 milliGRAM(s) Oral daily  montelukast 10 milliGRAM(s) Oral daily  multivitamin 1 Tablet(s) Oral daily  polyethylene glycol 3350 17 Gram(s) Oral daily  senna 2 Tablet(s) Oral at bedtime    PRN Inpatient Medications  dextrose Oral Gel 15 Gram(s) Oral once PRN  sodium chloride 0.9% Bolus. 100 milliLiter(s) IV Bolus every 5 minutes PRN  sodium chloride 0.9% lock flush 10 milliLiter(s) IV Push every 1 hour PRN      REVIEW OF SYSTEMS  --------------------------------------------------------------------------------  Gen: No  fevers/chills  Skin: No rashes  Head/Eyes/Ears/Mouth: No headache; Normal hearing; Normal vision w/o blurriness  Respiratory: No dyspnea, cough, wheezing, hemoptysis  CV: No chest pain, PND, orthopnea  GI: No abdominal pain, diarrhea, constipation, nausea, vomiting  : No increased frequency, dysuria, hematuria, nocturia  MSK: No joint pain/swelling; no back pain; no edema  Neuro: No dizziness/lightheadedness, weakness, seizures, numbness, tingling      All other systems were reviewed and are negative, except as noted.    VITALS/PHYSICAL EXAM  --------------------------------------------------------------------------------  T(C): 36.7 (02-05-24 @ 12:44), Max: 36.8 (02-04-24 @ 21:00)  HR: 85 (02-05-24 @ 12:44) (82 - 95)  BP: 153/70 (02-05-24 @ 12:44) (144/67 - 164/71)  RR: 16 (02-05-24 @ 12:44) (16 - 18)  SpO2: 100% (02-05-24 @ 12:44) (95% - 100%)  Wt(kg): --    02-04-24 @ 07:01  -  02-05-24 @ 07:00  --------------------------------------------------------  IN: 325 mL / OUT: 350 mL / NET: -25 mL    Physical Exam:  Gen NAD  HEENT: no JVD  Pulm: rales at bases today  CV: S1S2,  Abd: Soft,   Ext:  + trace edema B/L LE   Neuro: Awake and alert  Skin: Warm and dry  Vascular:  IJ non-tunneled HD catheter site is CDI    LABS/STUDIES  --------------------------------------------------------------------------------              8.9    17.15 >-----------<  335      [02-05-24 @ 04:34]              26.4     137  |  102  |  33  ----------------------------<  208      [02-05-24 @ 04:34]  3.6   |  19  |  4.20        Ca     7.7     [02-05-24 @ 04:34]      Mg     2.00     [02-05-24 @ 04:34]      Phos  4.7     [02-05-24 @ 04:34]      PT/INR: PT 10.7 , INR 0.95       [02-05-24 @ 04:34]  PTT: 36.7       [02-05-24 @ 04:34]    Creatinine Trend:  SCr 4.20 [02-05 @ 04:34]  SCr 3.13 [02-04 @ 08:07]  SCr 3.51 [02-03 @ 06:25]  SCr 4.43 [02-02 @ 07:00]  SCr 6.11 [02-01 @ 05:36]    Urine Creatinine 78      [01-31-24 @ 15:55]  Urine Protein 1723      [01-31-24 @ 15:55]  Urine Sodium <20      [01-31-24 @ 15:55]  Urine Urea Nitrogen 381.0      [01-31-24 @ 15:55]  Urine Osmolality 348      [01-31-24 @ 15:55]    Iron 44, TIBC 221, %sat 20      [02-01-24 @ 05:36]  Ferritin 276      [02-01-24 @ 05:36]    HBsAb <3.0      [02-01-24 @ 16:30]  HBsAg Nonreact      [02-01-24 @ 16:30]  HBcAb Nonreact      [02-01-24 @ 16:30]  HCV 0.10, Nonreact      [02-01-24 @ 16:30]

## 2024-02-05 NOTE — PROGRESS NOTE ADULT - SUBJECTIVE AND OBJECTIVE BOX
Denton Borjas MD  PGY 1 Department of Internal Medicine        Patient is a 63y old  Female who presents with a chief complaint of AHRF (04 Feb 2024 07:04)      SUBJECTIVE / OVERNIGHT EVENTS: Pt seen and examined. No acute overnight events. Denies fevers, chills, CP, SOB, Abdominal pain, N/V, Constipation, Diarrhea        MEDICATIONS  (STANDING):  amLODIPine   Tablet 5 milliGRAM(s) Oral daily  atorvastatin 20 milliGRAM(s) Oral at bedtime  budesonide 160 MICROgram(s)/formoterol 4.5 MICROgram(s) Inhaler 2 Puff(s) Inhalation two times a day  buMETAnide Injectable 2 milliGRAM(s) IV Push every 12 hours  chlorhexidine 2% Cloths 1 Application(s) Topical daily  dextrose 5%. 1000 milliLiter(s) (50 mL/Hr) IV Continuous <Continuous>  dextrose 5%. 1000 milliLiter(s) (100 mL/Hr) IV Continuous <Continuous>  dextrose 50% Injectable 25 Gram(s) IV Push once  dextrose 50% Injectable 25 Gram(s) IV Push once  dextrose 50% Injectable 12.5 Gram(s) IV Push once  epoetin melody (EPOGEN) Injectable 3000 Unit(s) IV Push <User Schedule>  glucagon  Injectable 1 milliGRAM(s) IntraMuscular once  insulin glargine Injectable (LANTUS) 21 Unit(s) SubCutaneous at bedtime  insulin lispro (ADMELOG) corrective regimen sliding scale   SubCutaneous three times a day before meals  insulin lispro Injectable (ADMELOG) 7 Unit(s) SubCutaneous three times a day before meals  iron sucrose IVPB 200 milliGRAM(s) IV Intermittent every 24 hours  loratadine 10 milliGRAM(s) Oral daily  montelukast 10 milliGRAM(s) Oral daily  multivitamin 1 Tablet(s) Oral daily  polyethylene glycol 3350 17 Gram(s) Oral daily  senna 2 Tablet(s) Oral at bedtime    MEDICATIONS  (PRN):  dextrose Oral Gel 15 Gram(s) Oral once PRN Blood Glucose LESS THAN 70 milliGRAM(s)/deciliter  sodium chloride 0.9% Bolus. 100 milliLiter(s) IV Bolus every 5 minutes PRN SBP LESS THAN or EQUAL to 90 mmHg  sodium chloride 0.9% lock flush 10 milliLiter(s) IV Push every 1 hour PRN Pre/post blood products, medications, blood draw, and to maintain line patency      I&O's Summary    03 Feb 2024 07:01  -  04 Feb 2024 07:00  --------------------------------------------------------  IN: 400 mL / OUT: 2350 mL / NET: -1950 mL    04 Feb 2024 07:01  -  05 Feb 2024 06:53  --------------------------------------------------------  IN: 325 mL / OUT: 350 mL / NET: -25 mL        Vital Signs Last 24 Hrs  T(C): 36.5 (05 Feb 2024 06:02), Max: 36.8 (04 Feb 2024 21:00)  T(F): 97.7 (05 Feb 2024 06:02), Max: 98.3 (04 Feb 2024 21:00)  HR: 82 (05 Feb 2024 06:02) (82 - 95)  BP: 144/67 (05 Feb 2024 06:02) (144/67 - 164/71)  BP(mean): --  RR: 17 (05 Feb 2024 06:02) (17 - 18)  SpO2: 98% (05 Feb 2024 06:02) (95% - 100%)    Parameters below as of 05 Feb 2024 06:02  Patient On (Oxygen Delivery Method): room air        CAPILLARY BLOOD GLUCOSE      POCT Blood Glucose.: 185 mg/dL (05 Feb 2024 06:00)  POCT Blood Glucose.: 240 mg/dL (04 Feb 2024 21:47)  POCT Blood Glucose.: 247 mg/dL (04 Feb 2024 17:29)  POCT Blood Glucose.: 200 mg/dL (04 Feb 2024 12:22)  POCT Blood Glucose.: 273 mg/dL (04 Feb 2024 08:27)      PHYSICAL EXAM:  GENERAL: NAD, resting comfortably, shiley in place  HEAD:  Atraumatic, Normocephalic  EYES: EOMI, conjunctiva and sclera clear  NECK: No JVD  CHEST/LUNG: CTAB, no wheezes rales or rhonchi  HEART: Regular rate and rhythm; No murmurs, rubs, or gallops  ABDOMEN: Soft, Nontender, Nondistended; Bowel sounds present  EXTREMITIES:  2+ Peripheral Pulses, No clubbing, cyanosis, no edema  PSYCH: AAOx3  NEUROLOGY: non-focal  SKIN: No rashes or lesions       LABS:                        8.9    17.15 )-----------( 335      ( 05 Feb 2024 04:34 )             26.4     Auto Eosinophil # x     / Auto Eosinophil % x     / Auto Neutrophil # x     / Auto Neutrophil % x     / BANDS % x                            8.8    14.14 )-----------( 342      ( 04 Feb 2024 08:36 )             26.5     Auto Eosinophil # x     / Auto Eosinophil % x     / Auto Neutrophil # x     / Auto Neutrophil % x     / BANDS % x        02-05    137  |  102  |  33<H>  ----------------------------<  208<H>  3.6   |  19<L>  |  4.20<H>  02-04    140  |  102  |  21  ----------------------------<  205<H>  3.3<L>   |  24  |  3.13<H>    Ca    7.7<L>      05 Feb 2024 04:34  Mg     2.00     02-05  Phos  4.7     02-05    PT/INR - ( 05 Feb 2024 04:34 )   PT: 10.7 sec;   INR: 0.95 ratio         PTT - ( 05 Feb 2024 04:34 )  PTT:36.7 sec      Urinalysis Basic - ( 05 Feb 2024 04:34 )    Color: x / Appearance: x / SG: x / pH: x  Gluc: 208 mg/dL / Ketone: x  / Bili: x / Urobili: x   Blood: x / Protein: x / Nitrite: x   Leuk Esterase: x / RBC: x / WBC x   Sq Epi: x / Non Sq Epi: x / Bacteria: x            RADIOLOGY & ADDITIONAL TESTS:    Imaging Personally Reviewed:    Consultant(s) Notes Reviewed:      Care Discussed with Consultants/Other Providers:

## 2024-02-05 NOTE — PROGRESS NOTE ADULT - PROBLEM SELECTOR PLAN 1
Pt. with NELSON on CKD in the setting of longstanding HTN and DM2. On review of labs on [facility], last Scr was 3.3 on 12/18/23. On admission, Scr elevated at 5.13 on 1/30/24 ------> 6.11 2/1/2024. UPCR was 18.1 on 12/18/23. Pt. was previously admitted from 10/17-10/20/23 with UTI and NELSON on CKD. Serologic workup at that time was negative: C3/C4 not low, kappa/lambda ratio WNL with no monoclonal band identified, negative for ANCA, CELIA, dsDNA, Hep B, Hep C, and [id number]. On this admission, UA with >1000mg protein, trace blood, glucosuria. Renal US w/o hydronephrosis. RVP and COVID negative. Pro-BNP elevated at 10,471.  CXR showed perihilar opacities with effusion more likely cardiogenic in origin than multifocal pneumonia. Pt likely with progressive CKD. Pt. was evaluated by Dr. ADDISON Preciado for kidney transplantation.  HD consent obtained and placed in chart. s/p HD catheter and 3 consecutive Hd tx 2/1-2/3. Given BP and evidence of volume overload ok to c/w IV diuretics for now. Scheduled for tunneled HD catheter today. Plan for maintenance HD tomorrow. Monitor labs and urine output. Avoid nephrotoxins. Dose medications for HD.

## 2024-02-05 NOTE — PROGRESS NOTE ADULT - PROBLEM SELECTOR PLAN 10
stable but soft on admission. on amlodipine at home  -resume amlodipine 5mg daily, increase to home dose 10mg as tolerated    #Hyperlipidemia  on atorvastatin at home  -c/w home meds

## 2024-02-05 NOTE — CONSULT NOTE ADULT - SUBJECTIVE AND OBJECTIVE BOX
General Surgery Consult  Consulting surgical team: C Team  Consulting attending: Dr. Gipson    HPI:  63F hx DM, asthma, CKD4, HTN, and HLD here for dyspnea and wheezing that began the evening prior to presentation. Also complains of associated generalized weakness, myalgias, and chest tightness. Per daughter giving multiple rounds of Albuterol at home without improvement in symptoms. Not on home O2. Reports having sore throat and chills 4 days ago. Denied chest pain, denies abdominal pain, nausea, vomiting, diarrhea, fever, dizziness, lightheadedness, nasal congestion, rhinorrhea, urinary sx.  Patient denies recent travel or sick contacts at home. Per family has chronic lower extremity swelling for >1 year.     In ED, initial vitals were 98.2F, , /59, 93% on RA. Labs significant for WBC 18.83, Hgb 8.9 (baseline 9-10), K 2.9 > 3.3, BUN/Cr 49/5.13 (baseline Cr ~3.3), AG 19, Trop 118 > 116, BNP 77884, BHB 0.7, VBG lactate 3.7 > 3.4, procal 0.34. UA with >1k glucose and protein. Glucose 480, given Lantus 25u x1 and Admelog 20u x2. RVP negative. CXR with Perihilar opacities with effusion more likely cardiogenic in origin than multifocal pneumonia. Prior EF 61% from 10/23. Due to increase WOB, MICU consulted for first-time BiPAP use which was tolerated well, pt determined to not be MICU candidate. Pt was given Vanc x1, Zosyn x1, solu-medrol, duonebs, and Bumex 2mg x1, and admitted for further management. (31 Jan 2024 07:10)    Vascular surgery consulted for AVF creation. Pt admitted with volume overload 2/2 ESRD, now requiring HD. S/p RIJ permacath placement with IR today. Also with suspected URI.    PAST MEDICAL HISTORY:  Type 2 diabetes mellitus    Stage 4 chronic kidney disease    HTN (hypertension)    HLD (hyperlipidemia)        PAST SURGICAL HISTORY:  No significant past surgical history        MEDICATIONS:  amLODIPine   Tablet 5 milliGRAM(s) Oral daily  atorvastatin 20 milliGRAM(s) Oral at bedtime  budesonide 160 MICROgram(s)/formoterol 4.5 MICROgram(s) Inhaler 2 Puff(s) Inhalation two times a day  buMETAnide Injectable 2 milliGRAM(s) IV Push every 12 hours  chlorhexidine 2% Cloths 1 Application(s) Topical daily  dextrose 5%. 1000 milliLiter(s) IV Continuous <Continuous>  dextrose 5%. 1000 milliLiter(s) IV Continuous <Continuous>  dextrose 50% Injectable 25 Gram(s) IV Push once  dextrose 50% Injectable 12.5 Gram(s) IV Push once  dextrose 50% Injectable 25 Gram(s) IV Push once  dextrose Oral Gel 15 Gram(s) Oral once PRN  epoetin melody (EPOGEN) Injectable 3000 Unit(s) IV Push <User Schedule>  glucagon  Injectable 1 milliGRAM(s) IntraMuscular once  insulin glargine Injectable (LANTUS) 21 Unit(s) SubCutaneous at bedtime  insulin lispro (ADMELOG) corrective regimen sliding scale   SubCutaneous every 6 hours  insulin lispro Injectable (ADMELOG) 7 Unit(s) SubCutaneous three times a day before meals  iron sucrose IVPB 200 milliGRAM(s) IV Intermittent every 24 hours  loratadine 10 milliGRAM(s) Oral daily  montelukast 10 milliGRAM(s) Oral daily  multivitamin 1 Tablet(s) Oral daily  polyethylene glycol 3350 17 Gram(s) Oral daily  senna 2 Tablet(s) Oral at bedtime  sodium chloride 0.9% Bolus. 100 milliLiter(s) IV Bolus every 5 minutes PRN  sodium chloride 0.9% lock flush 10 milliLiter(s) IV Push every 1 hour PRN      ALLERGIES:  No Known Allergies      VITALS & I/Os:  Vital Signs Last 24 Hrs  T(C): 36.7 (05 Feb 2024 12:44), Max: 36.8 (04 Feb 2024 21:00)  T(F): 98.1 (05 Feb 2024 12:44), Max: 98.3 (04 Feb 2024 21:00)  HR: 85 (05 Feb 2024 12:44) (82 - 95)  BP: 153/70 (05 Feb 2024 12:44) (144/67 - 164/71)  BP(mean): --  RR: 16 (05 Feb 2024 12:44) (16 - 18)  SpO2: 100% (05 Feb 2024 12:44) (95% - 100%)    Parameters below as of 05 Feb 2024 12:44  Patient On (Oxygen Delivery Method): room air        I&O's Summary    04 Feb 2024 07:01  -  05 Feb 2024 07:00  --------------------------------------------------------  IN: 325 mL / OUT: 350 mL / NET: -25 mL        PHYSICAL EXAM:    LABS:                        8.9    17.15 )-----------( 335      ( 05 Feb 2024 04:34 )             26.4     02-05    137  |  102  |  33<H>  ----------------------------<  208<H>  3.6   |  19<L>  |  4.20<H>    Ca    7.7<L>      05 Feb 2024 04:34  Phos  4.7     02-05  Mg     2.00     02-05      Lactate:    PT/INR - ( 05 Feb 2024 04:34 )   PT: 10.7 sec;   INR: 0.95 ratio         PTT - ( 05 Feb 2024 04:34 )  PTT:36.7 sec          Urinalysis Basic - ( 05 Feb 2024 04:34 )    Color: x / Appearance: x / SG: x / pH: x  Gluc: 208 mg/dL / Ketone: x  / Bili: x / Urobili: x   Blood: x / Protein: x / Nitrite: x   Leuk Esterase: x / RBC: x / WBC x   Sq Epi: x / Non Sq Epi: x / Bacteria: x        IMAGING:                                                                                               General Surgery Consult  Consulting surgical team: C Team  Consulting attending: Dr. Gipson    HPI:  63F hx DM, asthma, CKD4, HTN, and HLD here for dyspnea and wheezing that began the evening prior to presentation. Also complains of associated generalized weakness, myalgias, and chest tightness. Per daughter giving multiple rounds of Albuterol at home without improvement in symptoms. Not on home O2. Reports having sore throat and chills 4 days ago. Denied chest pain, denies abdominal pain, nausea, vomiting, diarrhea, fever, dizziness, lightheadedness, nasal congestion, rhinorrhea, urinary sx.  Patient denies recent travel or sick contacts at home. Per family has chronic lower extremity swelling for >1 year.     In ED, initial vitals were 98.2F, , /59, 93% on RA. Labs significant for WBC 18.83, Hgb 8.9 (baseline 9-10), K 2.9 > 3.3, BUN/Cr 49/5.13 (baseline Cr ~3.3), AG 19, Trop 118 > 116, BNP 51077, BHB 0.7, VBG lactate 3.7 > 3.4, procal 0.34. UA with >1k glucose and protein. Glucose 480, given Lantus 25u x1 and Admelog 20u x2. RVP negative. CXR with Perihilar opacities with effusion more likely cardiogenic in origin than multifocal pneumonia. Prior EF 61% from 10/23. Due to increase WOB, MICU consulted for first-time BiPAP use which was tolerated well, pt determined to not be MICU candidate. Pt was given Vanc x1, Zosyn x1, solu-medrol, duonebs, and Bumex 2mg x1, and admitted for further management. (31 Jan 2024 07:10)    Vascular surgery consulted for AVF creation. Pt admitted with volume overload 2/2 ESRD, now requiring HD. S/p RIJ permacath placement with IR today. Also with suspected URI. Patient's daughter present bedside interpreting, reports patient is right hand dominant. Denies hx of pacemaker, or LUE surgeries or trauma. Reports hx of broken right shoulder, managed nonoperatively.     PAST MEDICAL HISTORY:  Type 2 diabetes mellitus    Stage 4 chronic kidney disease    HTN (hypertension)    HLD (hyperlipidemia)        PAST SURGICAL HISTORY:  No significant past surgical history        MEDICATIONS:  amLODIPine   Tablet 5 milliGRAM(s) Oral daily  atorvastatin 20 milliGRAM(s) Oral at bedtime  budesonide 160 MICROgram(s)/formoterol 4.5 MICROgram(s) Inhaler 2 Puff(s) Inhalation two times a day  buMETAnide Injectable 2 milliGRAM(s) IV Push every 12 hours  chlorhexidine 2% Cloths 1 Application(s) Topical daily  dextrose 5%. 1000 milliLiter(s) IV Continuous <Continuous>  dextrose 5%. 1000 milliLiter(s) IV Continuous <Continuous>  dextrose 50% Injectable 25 Gram(s) IV Push once  dextrose 50% Injectable 12.5 Gram(s) IV Push once  dextrose 50% Injectable 25 Gram(s) IV Push once  dextrose Oral Gel 15 Gram(s) Oral once PRN  epoetin melody (EPOGEN) Injectable 3000 Unit(s) IV Push <User Schedule>  glucagon  Injectable 1 milliGRAM(s) IntraMuscular once  insulin glargine Injectable (LANTUS) 21 Unit(s) SubCutaneous at bedtime  insulin lispro (ADMELOG) corrective regimen sliding scale   SubCutaneous every 6 hours  insulin lispro Injectable (ADMELOG) 7 Unit(s) SubCutaneous three times a day before meals  iron sucrose IVPB 200 milliGRAM(s) IV Intermittent every 24 hours  loratadine 10 milliGRAM(s) Oral daily  montelukast 10 milliGRAM(s) Oral daily  multivitamin 1 Tablet(s) Oral daily  polyethylene glycol 3350 17 Gram(s) Oral daily  senna 2 Tablet(s) Oral at bedtime  sodium chloride 0.9% Bolus. 100 milliLiter(s) IV Bolus every 5 minutes PRN  sodium chloride 0.9% lock flush 10 milliLiter(s) IV Push every 1 hour PRN      ALLERGIES:  No Known Allergies      VITALS & I/Os:  Vital Signs Last 24 Hrs  T(C): 36.7 (05 Feb 2024 12:44), Max: 36.8 (04 Feb 2024 21:00)  T(F): 98.1 (05 Feb 2024 12:44), Max: 98.3 (04 Feb 2024 21:00)  HR: 85 (05 Feb 2024 12:44) (82 - 95)  BP: 153/70 (05 Feb 2024 12:44) (144/67 - 164/71)  BP(mean): --  RR: 16 (05 Feb 2024 12:44) (16 - 18)  SpO2: 100% (05 Feb 2024 12:44) (95% - 100%)    Parameters below as of 05 Feb 2024 12:44  Patient On (Oxygen Delivery Method): room air        I&O's Summary    04 Feb 2024 07:01  -  05 Feb 2024 07:00  --------------------------------------------------------  IN: 325 mL / OUT: 350 mL / NET: -25 mL        PHYSICAL EXAM:  General: not acutely distressed  Neurological: AO X4  Respiratory: nonlabored respirations  Cardiac: pulse present  Abdominal: soft, nontender, nondistended, no rebound, no guarding, no palpable mass  Extremities: warm. LUE FROM and sensation intact, with palpable radial and ulnar pulses. LUE protected  Vascular: RIJ permacath in place      LABS:                        8.9    17.15 )-----------( 335      ( 05 Feb 2024 04:34 )             26.4     02-05    137  |  102  |  33<H>  ----------------------------<  208<H>  3.6   |  19<L>  |  4.20<H>    Ca    7.7<L>      05 Feb 2024 04:34  Phos  4.7     02-05  Mg     2.00     02-05      Lactate:    PT/INR - ( 05 Feb 2024 04:34 )   PT: 10.7 sec;   INR: 0.95 ratio         PTT - ( 05 Feb 2024 04:34 )  PTT:36.7 sec          Urinalysis Basic - ( 05 Feb 2024 04:34 )    Color: x / Appearance: x / SG: x / pH: x  Gluc: 208 mg/dL / Ketone: x  / Bili: x / Urobili: x   Blood: x / Protein: x / Nitrite: x   Leuk Esterase: x / RBC: x / WBC x   Sq Epi: x / Non Sq Epi: x / Bacteria: x        IMAGING:                                                                                               General Surgery Consult  Consulting surgical team: C Team  Consulting attending: Dr. Dale    HPI:  63F hx DM, asthma, CKD4, HTN, and HLD here for dyspnea and wheezing that began the evening prior to presentation. Also complains of associated generalized weakness, myalgias, and chest tightness. Per daughter giving multiple rounds of Albuterol at home without improvement in symptoms. Not on home O2. Reports having sore throat and chills 4 days ago. Denied chest pain, denies abdominal pain, nausea, vomiting, diarrhea, fever, dizziness, lightheadedness, nasal congestion, rhinorrhea, urinary sx.  Patient denies recent travel or sick contacts at home. Per family has chronic lower extremity swelling for >1 year.     In ED, initial vitals were 98.2F, , /59, 93% on RA. Labs significant for WBC 18.83, Hgb 8.9 (baseline 9-10), K 2.9 > 3.3, BUN/Cr 49/5.13 (baseline Cr ~3.3), AG 19, Trop 118 > 116, BNP 48540, BHB 0.7, VBG lactate 3.7 > 3.4, procal 0.34. UA with >1k glucose and protein. Glucose 480, given Lantus 25u x1 and Admelog 20u x2. RVP negative. CXR with Perihilar opacities with effusion more likely cardiogenic in origin than multifocal pneumonia. Prior EF 61% from 10/23. Due to increase WOB, MICU consulted for first-time BiPAP use which was tolerated well, pt determined to not be MICU candidate. Pt was given Vanc x1, Zosyn x1, solu-medrol, duonebs, and Bumex 2mg x1, and admitted for further management. (31 Jan 2024 07:10)    Vascular surgery consulted for AVF creation. Pt admitted with volume overload 2/2 ESRD, now requiring HD. S/p RIJ permacath placement with IR today. Also with suspected URI. Patient's daughter present bedside interpreting, reports patient is right hand dominant. Denies hx of pacemaker, or LUE surgeries or trauma. Reports hx of broken right shoulder, managed nonoperatively.     PAST MEDICAL HISTORY:  Type 2 diabetes mellitus    Stage 4 chronic kidney disease    HTN (hypertension)    HLD (hyperlipidemia)        PAST SURGICAL HISTORY:  No significant past surgical history        MEDICATIONS:  amLODIPine   Tablet 5 milliGRAM(s) Oral daily  atorvastatin 20 milliGRAM(s) Oral at bedtime  budesonide 160 MICROgram(s)/formoterol 4.5 MICROgram(s) Inhaler 2 Puff(s) Inhalation two times a day  buMETAnide Injectable 2 milliGRAM(s) IV Push every 12 hours  chlorhexidine 2% Cloths 1 Application(s) Topical daily  dextrose 5%. 1000 milliLiter(s) IV Continuous <Continuous>  dextrose 5%. 1000 milliLiter(s) IV Continuous <Continuous>  dextrose 50% Injectable 25 Gram(s) IV Push once  dextrose 50% Injectable 12.5 Gram(s) IV Push once  dextrose 50% Injectable 25 Gram(s) IV Push once  dextrose Oral Gel 15 Gram(s) Oral once PRN  epoetin melody (EPOGEN) Injectable 3000 Unit(s) IV Push <User Schedule>  glucagon  Injectable 1 milliGRAM(s) IntraMuscular once  insulin glargine Injectable (LANTUS) 21 Unit(s) SubCutaneous at bedtime  insulin lispro (ADMELOG) corrective regimen sliding scale   SubCutaneous every 6 hours  insulin lispro Injectable (ADMELOG) 7 Unit(s) SubCutaneous three times a day before meals  iron sucrose IVPB 200 milliGRAM(s) IV Intermittent every 24 hours  loratadine 10 milliGRAM(s) Oral daily  montelukast 10 milliGRAM(s) Oral daily  multivitamin 1 Tablet(s) Oral daily  polyethylene glycol 3350 17 Gram(s) Oral daily  senna 2 Tablet(s) Oral at bedtime  sodium chloride 0.9% Bolus. 100 milliLiter(s) IV Bolus every 5 minutes PRN  sodium chloride 0.9% lock flush 10 milliLiter(s) IV Push every 1 hour PRN      ALLERGIES:  No Known Allergies      VITALS & I/Os:  Vital Signs Last 24 Hrs  T(C): 36.7 (05 Feb 2024 12:44), Max: 36.8 (04 Feb 2024 21:00)  T(F): 98.1 (05 Feb 2024 12:44), Max: 98.3 (04 Feb 2024 21:00)  HR: 85 (05 Feb 2024 12:44) (82 - 95)  BP: 153/70 (05 Feb 2024 12:44) (144/67 - 164/71)  BP(mean): --  RR: 16 (05 Feb 2024 12:44) (16 - 18)  SpO2: 100% (05 Feb 2024 12:44) (95% - 100%)    Parameters below as of 05 Feb 2024 12:44  Patient On (Oxygen Delivery Method): room air        I&O's Summary    04 Feb 2024 07:01  -  05 Feb 2024 07:00  --------------------------------------------------------  IN: 325 mL / OUT: 350 mL / NET: -25 mL        PHYSICAL EXAM:  General: not acutely distressed  Neurological: AO X4  Respiratory: nonlabored respirations  Cardiac: pulse present  Abdominal: soft, nontender, nondistended, no rebound, no guarding, no palpable mass  Extremities: warm. LUE FROM and sensation intact, with palpable radial and ulnar pulses. LUE protected  Vascular: RIJ permacath in place      LABS:                        8.9    17.15 )-----------( 335      ( 05 Feb 2024 04:34 )             26.4     02-05    137  |  102  |  33<H>  ----------------------------<  208<H>  3.6   |  19<L>  |  4.20<H>    Ca    7.7<L>      05 Feb 2024 04:34  Phos  4.7     02-05  Mg     2.00     02-05      Lactate:    PT/INR - ( 05 Feb 2024 04:34 )   PT: 10.7 sec;   INR: 0.95 ratio         PTT - ( 05 Feb 2024 04:34 )  PTT:36.7 sec          Urinalysis Basic - ( 05 Feb 2024 04:34 )    Color: x / Appearance: x / SG: x / pH: x  Gluc: 208 mg/dL / Ketone: x  / Bili: x / Urobili: x   Blood: x / Protein: x / Nitrite: x   Leuk Esterase: x / RBC: x / WBC x   Sq Epi: x / Non Sq Epi: x / Bacteria: x        IMAGING:

## 2024-02-05 NOTE — PROGRESS NOTE ADULT - PROBLEM SELECTOR PLAN 3
CXR with perihilar opacities, likely cardiogenic. BNP elevated. No formal diagnosis of CHF. Chronic BLE edema per daughter. Prior echo 10/23 with EF 61%  -f/u repeat TTE  -s/p IV Lasix 40mg, c/w IV Bumex 2mg BID  -goal diuresis 1-1.5L daily CXR with perihilar opacities, likely cardiogenic. BNP elevated. No formal diagnosis of CHF. Chronic BLE edema per daughter. Prior echo 10/23 with EF 61%, repeat 63% with mild diastolic dysfunction  -s/p IV Lasix 40mg, c/w IV Bumex 2mg BID  -goal diuresis 1-1.5L daily

## 2024-02-06 ENCOUNTER — APPOINTMENT (OUTPATIENT)
Dept: INTERNAL MEDICINE | Facility: CLINIC | Age: 64
End: 2024-02-06

## 2024-02-06 DIAGNOSIS — N18.6 END STAGE RENAL DISEASE: ICD-10-CM

## 2024-02-06 LAB
GLUCOSE BLDC GLUCOMTR-MCNC: 124 MG/DL — HIGH (ref 70–99)
GLUCOSE BLDC GLUCOMTR-MCNC: 241 MG/DL — HIGH (ref 70–99)
GLUCOSE BLDC GLUCOMTR-MCNC: 259 MG/DL — HIGH (ref 70–99)
GLUCOSE BLDC GLUCOMTR-MCNC: 281 MG/DL — HIGH (ref 70–99)
GLUCOSE BLDC GLUCOMTR-MCNC: 286 MG/DL — HIGH (ref 70–99)

## 2024-02-06 PROCEDURE — 99233 SBSQ HOSP IP/OBS HIGH 50: CPT

## 2024-02-06 PROCEDURE — 99233 SBSQ HOSP IP/OBS HIGH 50: CPT | Mod: GC

## 2024-02-06 RX ORDER — ACETAMINOPHEN 500 MG
650 TABLET ORAL ONCE
Refills: 0 | Status: COMPLETED | OUTPATIENT
Start: 2024-02-06 | End: 2024-02-06

## 2024-02-06 RX ORDER — AMLODIPINE BESYLATE 2.5 MG/1
10 TABLET ORAL DAILY
Refills: 0 | Status: DISCONTINUED | OUTPATIENT
Start: 2024-02-06 | End: 2024-02-14

## 2024-02-06 RX ORDER — INSULIN GLARGINE 100 [IU]/ML
24 INJECTION, SOLUTION SUBCUTANEOUS AT BEDTIME
Refills: 0 | Status: DISCONTINUED | OUTPATIENT
Start: 2024-02-06 | End: 2024-02-07

## 2024-02-06 RX ORDER — INSULIN LISPRO 100/ML
8 VIAL (ML) SUBCUTANEOUS
Refills: 0 | Status: DISCONTINUED | OUTPATIENT
Start: 2024-02-06 | End: 2024-02-08

## 2024-02-06 RX ADMIN — HEPARIN SODIUM 5000 UNIT(S): 5000 INJECTION INTRAVENOUS; SUBCUTANEOUS at 12:19

## 2024-02-06 RX ADMIN — HEPARIN SODIUM 5000 UNIT(S): 5000 INJECTION INTRAVENOUS; SUBCUTANEOUS at 05:41

## 2024-02-06 RX ADMIN — Medication 1 TABLET(S): at 11:17

## 2024-02-06 RX ADMIN — Medication 3: at 12:28

## 2024-02-06 RX ADMIN — BUDESONIDE AND FORMOTEROL FUMARATE DIHYDRATE 2 PUFF(S): 160; 4.5 AEROSOL RESPIRATORY (INHALATION) at 08:38

## 2024-02-06 RX ADMIN — BUMETANIDE 2 MILLIGRAM(S): 0.25 INJECTION INTRAMUSCULAR; INTRAVENOUS at 17:31

## 2024-02-06 RX ADMIN — Medication 8 UNIT(S): at 17:32

## 2024-02-06 RX ADMIN — MONTELUKAST 10 MILLIGRAM(S): 4 TABLET, CHEWABLE ORAL at 11:17

## 2024-02-06 RX ADMIN — Medication 7 UNIT(S): at 08:39

## 2024-02-06 RX ADMIN — ERYTHROPOIETIN 3000 UNIT(S): 10000 INJECTION, SOLUTION INTRAVENOUS; SUBCUTANEOUS at 20:35

## 2024-02-06 RX ADMIN — LORATADINE 10 MILLIGRAM(S): 10 TABLET ORAL at 11:17

## 2024-02-06 RX ADMIN — Medication 2: at 17:31

## 2024-02-06 RX ADMIN — IRON SUCROSE 110 MILLIGRAM(S): 20 INJECTION, SOLUTION INTRAVENOUS at 17:30

## 2024-02-06 RX ADMIN — BUMETANIDE 2 MILLIGRAM(S): 0.25 INJECTION INTRAMUSCULAR; INTRAVENOUS at 05:41

## 2024-02-06 RX ADMIN — CHLORHEXIDINE GLUCONATE 1 APPLICATION(S): 213 SOLUTION TOPICAL at 11:17

## 2024-02-06 RX ADMIN — Medication 650 MILLIGRAM(S): at 11:46

## 2024-02-06 RX ADMIN — Medication 650 MILLIGRAM(S): at 10:46

## 2024-02-06 RX ADMIN — Medication 3: at 08:39

## 2024-02-06 NOTE — PROGRESS NOTE ADULT - PROBLEM SELECTOR PLAN 4
tachycardia with leukocytosis on admission, meeting SIRS criteria but no obvious source of infection. Likely has underlying viral URI, low suspicion for bacterial infection  -f/u blood cultures (NGTD)  -monitor off abx tachycardia with leukocytosis on admission, meeting SIRS criteria but no obvious source of infection, now RESOLVED. Likely has underlying viral URI, low suspicion for bacterial infection  -f/u blood cultures (NGTD)  -monitor off abx

## 2024-02-06 NOTE — CHART NOTE - NSCHARTNOTEFT_GEN_A_CORE
Patient is a 63y Female w/ PMH     T2DM, asthma, HTN, and HLD     presenting with new ESRD requiring fistula placement for recently initiated dialysis  Admitted under Dr. Rudi Herring  Medicine consulted for pre-operative optimization.    Preoperative Optimization:    I personally reviewed the patient's labs.  I personally reviewed the patient's EKG and my interpretation is:  I personally reviewed the patient's CXR/imaging and my interpretation is:  The patient's RCRI score is: 2   10.1 % 30-day risk of death, MI, or cardiac arrest  No Cardiovascular Contraindication to surgery  Continue medications as ordered.  The patient is considered medically optimized for procedure    *******************************  Denton Borjas MD (PGY-1)  Internal Medicine  Contact via [employer] TEAMS  [facility] Pager: [phone]  [facility] Pager: [phone]  ******************************* Patient is a 63y Female w/ PMH     T2DM, asthma, HTN, and HLD     presenting with new ESRD requiring fistula placement for recently initiated dialysis  Admitted under Dr. Rudi Herring  Medicine consulted for pre-operative optimization.    Preoperative Optimization:    I personally reviewed the patient's labs.  I personally reviewed the patient's EKG and my interpretation is:  I personally reviewed the patient's CXR/imaging and my interpretation is:  The patient's RCRI score is: 2   10.1 % 30-day risk of death, MI, or cardiac arrest  No Cardiovascular Contraindication to surgery  Continue medications as ordered.  The patient is considered optimized for procedure from medical and cardiac standpoint. No further cardiac workup needed.    *******************************  Denton Borjas MD (PGY-1)  Internal Medicine  Contact via [employer] TEAMS  [facility] Pager: [phone]  [facility] Pager: [phone]  *******************************

## 2024-02-06 NOTE — PROGRESS NOTE ADULT - PROBLEM SELECTOR PLAN 1
Pt. with NELSON on progressive CKD in the setting of longstanding HTN and DM2. On review of [place]/[facility]IE Scr was 3.3 on 12/18/23. On admission, Scr elevated at 5.13 increased to 6.11 (2/1/2024). UPCR was 18.1 on 12/18/23. Serologic workup negative (Oct 2023): C3/C4 not low, kappa/lambda ratio WNL with no monoclonal band identified, negative for ANCA, CELIA, dsDNA, Hep B, Hep C, and [id number]. Renal US w/o hydronephrosis. Pro-BNP elevated at 10,471.  CXR showed perihilar opacities with effusion more likely cardiogenic in origin than multifocal pneumonia. HD consent obtained and placed in chart. Initiated on iHD 2/1. s/p Tunneled HD catheter 2/5. Plan for maintenance HD today, patient to be on a TTS schedule. Given BP and evidence of volume overload ok to c/w IV diuretics for now. Vascular consulted for AVF creation. Labs pending today. Monitor labs and urine output. Avoid nephrotoxins. Dose medications for HD.

## 2024-02-06 NOTE — PROGRESS NOTE ADULT - PROBLEM SELECTOR PLAN 6
Sugars 300s/400s on admission. BHB 0.7 but no acidosis. A1c 6.6% from 10/23, now 6.1%. Received Lantus 25u overnight, sugars now improving. Diet resumed. On lantus 20u in am and 20 qhs at home, as well as repaglinide and victoza  -hold home repaglinide and victoza  -c/w lantus 21u qhs, admelog 7u premeal, and moderate ISS  -FS with meals Sugars 300s/400s on admission. BHB 0.7 but no acidosis. A1c 6.6% from 10/23, now 6.1%. Received Lantus 25u overnight, sugars now improving. Diet resumed. On lantus 20u in am and 20 qhs at home, as well as repaglinide and victoza  -hold home repaglinide and victoza  -c/w lantus 24u qhs, admelog 8u premeal, and low ISS  -FS with meals

## 2024-02-06 NOTE — PROGRESS NOTE ADULT - PROBLEM SELECTOR PLAN 1
BUN/Cr 49/5.13 (baseline Cr ~3.3). Also with metabolic acidosis now improving. Makes urine, dobbs placed due to decreased output in ED. Likely pre-renal iso poor PO intake, insensible losses, and third spacing vs ATN   US Kidney/bladder with echogenic lesion in the superior pole of right kidney vs right adrenal gland, CT recommended for further evaluation  -Nephrology consulted, appreciate recs. s/p dialysis session x3, continue per nephro   -s/p permacath 2/5. vascular consulted for avf planning, f/u bilateral upper extremity vein mapping  -trend bmp, UOP, I&Os  -can f/u lesion as outpatient  -c/w bumex IV bid BUN/Cr 49/5.13 (baseline Cr ~3.3). Dialysis initiated during this admission with improvement  US Kidney/bladder with echogenic lesion in the superior pole of right kidney vs right adrenal gland, CT recommended for further evaluation  -Nephrology consulted, appreciate recs. s/p dialysis session x3, continue per nephro   -s/p permacath 2/5. vascular consulted for avf planning, f/u bilateral upper extremity vein mapping  -trend bmp, UOP, I&Os  -can f/u lesion as outpatient  -c/w bumex IV bid

## 2024-02-06 NOTE — PROGRESS NOTE ADULT - PROBLEM SELECTOR PLAN 2
Anemia noted. Hgb low at 8.9 Tsat 20%, Ferritin wnl at 276. Patient will benefit from IV iron if no ongoing infection. on 5 day course of venofer (started 2/2)/ c/w lynne w/ HD.  monitor cbc.

## 2024-02-06 NOTE — PROGRESS NOTE ADULT - SUBJECTIVE AND OBJECTIVE BOX
Denton Borjas MD  PGY 1 Department of Internal Medicine        Patient is a 63y old  Female who presents with a chief complaint of AHRF (05 Feb 2024 15:18)      SUBJECTIVE / OVERNIGHT EVENTS: Pt seen and examined. No acute overnight events. Denies fevers, chills, CP, SOB, Abdominal pain, N/V, Constipation, Diarrhea        MEDICATIONS  (STANDING):  amLODIPine   Tablet 10 milliGRAM(s) Oral daily  atorvastatin 20 milliGRAM(s) Oral at bedtime  budesonide 160 MICROgram(s)/formoterol 4.5 MICROgram(s) Inhaler 2 Puff(s) Inhalation two times a day  buMETAnide Injectable 2 milliGRAM(s) IV Push every 12 hours  chlorhexidine 2% Cloths 1 Application(s) Topical daily  dextrose 5%. 1000 milliLiter(s) (50 mL/Hr) IV Continuous <Continuous>  dextrose 5%. 1000 milliLiter(s) (100 mL/Hr) IV Continuous <Continuous>  dextrose 50% Injectable 25 Gram(s) IV Push once  dextrose 50% Injectable 25 Gram(s) IV Push once  dextrose 50% Injectable 12.5 Gram(s) IV Push once  epoetin melody (EPOGEN) Injectable 3000 Unit(s) IV Push <User Schedule>  glucagon  Injectable 1 milliGRAM(s) IntraMuscular once  heparin   Injectable 5000 Unit(s) SubCutaneous every 8 hours  insulin glargine Injectable (LANTUS) 21 Unit(s) SubCutaneous at bedtime  insulin lispro (ADMELOG) corrective regimen sliding scale   SubCutaneous three times a day before meals  insulin lispro (ADMELOG) corrective regimen sliding scale   SubCutaneous at bedtime  insulin lispro Injectable (ADMELOG) 7 Unit(s) SubCutaneous three times a day before meals  iron sucrose IVPB 200 milliGRAM(s) IV Intermittent every 24 hours  loratadine 10 milliGRAM(s) Oral daily  montelukast 10 milliGRAM(s) Oral daily  multivitamin 1 Tablet(s) Oral daily  senna 2 Tablet(s) Oral at bedtime    MEDICATIONS  (PRN):  dextrose Oral Gel 15 Gram(s) Oral once PRN Blood Glucose LESS THAN 70 milliGRAM(s)/deciliter  sodium chloride 0.9% Bolus. 100 milliLiter(s) IV Bolus every 5 minutes PRN SBP LESS THAN or EQUAL to 90 mmHg  sodium chloride 0.9% lock flush 10 milliLiter(s) IV Push every 1 hour PRN Pre/post blood products, medications, blood draw, and to maintain line patency      I&O's Summary    05 Feb 2024 07:01  -  06 Feb 2024 07:00  --------------------------------------------------------  IN: 480 mL / OUT: 0 mL / NET: 480 mL        Vital Signs Last 24 Hrs  T(C): 36.7 (06 Feb 2024 05:41), Max: 36.9 (05 Feb 2024 17:26)  T(F): 98.1 (06 Feb 2024 05:41), Max: 98.4 (05 Feb 2024 17:26)  HR: 87 (06 Feb 2024 05:41) (82 - 87)  BP: 143/60 (06 Feb 2024 05:41) (143/60 - 156/88)  BP(mean): --  RR: 18 (06 Feb 2024 05:41) (16 - 18)  SpO2: 100% (06 Feb 2024 05:41) (99% - 100%)    Parameters below as of 06 Feb 2024 05:41  Patient On (Oxygen Delivery Method): room air        CAPILLARY BLOOD GLUCOSE      POCT Blood Glucose.: 274 mg/dL (05 Feb 2024 22:09)  POCT Blood Glucose.: 282 mg/dL (05 Feb 2024 17:50)  POCT Blood Glucose.: 149 mg/dL (05 Feb 2024 15:35)  POCT Blood Glucose.: 234 mg/dL (05 Feb 2024 12:03)      PHYSICAL EXAM:  GENERAL: NAD,   HEAD:  Atraumatic, Normocephalic  EYES: EOMI, PERRL, conjunctiva and sclera clear  NECK: No JVD  CHEST/LUNG: Clear to auscultation bilaterally; No wheeze  HEART: Regular rate and rhythm; No murmurs, rubs, or gallops  ABDOMEN: Soft, Nontender, Nondistended; Bowel sounds present  EXTREMITIES:  2+ Peripheral Pulses, No clubbing, cyanosis, or edema  PSYCH: AAOx3  NEUROLOGY: non-focal  SKIN: No rashes or lesions       LABS:                        8.9    17.15 )-----------( 335      ( 05 Feb 2024 04:34 )             26.4     Auto Eosinophil # x     / Auto Eosinophil % x     / Auto Neutrophil # x     / Auto Neutrophil % x     / BANDS % x                            8.8    14.14 )-----------( 342      ( 04 Feb 2024 08:36 )             26.5     Auto Eosinophil # x     / Auto Eosinophil % x     / Auto Neutrophil # x     / Auto Neutrophil % x     / BANDS % x        02-05    137  |  102  |  33<H>  ----------------------------<  208<H>  3.6   |  19<L>  |  4.20<H>  02-04    140  |  102  |  21  ----------------------------<  205<H>  3.3<L>   |  24  |  3.13<H>    Ca    7.7<L>      05 Feb 2024 04:34  Mg     2.00     02-05  Phos  4.7     02-05    PT/INR - ( 05 Feb 2024 04:34 )   PT: 10.7 sec;   INR: 0.95 ratio         PTT - ( 05 Feb 2024 04:34 )  PTT:36.7 sec      Urinalysis Basic - ( 05 Feb 2024 04:34 )    Color: x / Appearance: x / SG: x / pH: x  Gluc: 208 mg/dL / Ketone: x  / Bili: x / Urobili: x   Blood: x / Protein: x / Nitrite: x   Leuk Esterase: x / RBC: x / WBC x   Sq Epi: x / Non Sq Epi: x / Bacteria: x            RADIOLOGY & ADDITIONAL TESTS:    Imaging Personally Reviewed:    Consultant(s) Notes Reviewed:      Care Discussed with Consultants/Other Providers:   Denton Borjas MD  PGY 1 Department of Internal Medicine        Patient is a 63y old  Female who presents with a chief complaint of AHRF (05 Feb 2024 15:18)      SUBJECTIVE / OVERNIGHT EVENTS: Pt seen and examined. No acute overnight events. Complains of generalized weakness. Denies fevers, chills, CP, SOB, Abdominal pain, N/V, Constipation, Diarrhea        MEDICATIONS  (STANDING):  amLODIPine   Tablet 10 milliGRAM(s) Oral daily  atorvastatin 20 milliGRAM(s) Oral at bedtime  budesonide 160 MICROgram(s)/formoterol 4.5 MICROgram(s) Inhaler 2 Puff(s) Inhalation two times a day  buMETAnide Injectable 2 milliGRAM(s) IV Push every 12 hours  chlorhexidine 2% Cloths 1 Application(s) Topical daily  dextrose 5%. 1000 milliLiter(s) (50 mL/Hr) IV Continuous <Continuous>  dextrose 5%. 1000 milliLiter(s) (100 mL/Hr) IV Continuous <Continuous>  dextrose 50% Injectable 25 Gram(s) IV Push once  dextrose 50% Injectable 25 Gram(s) IV Push once  dextrose 50% Injectable 12.5 Gram(s) IV Push once  epoetin melody (EPOGEN) Injectable 3000 Unit(s) IV Push <User Schedule>  glucagon  Injectable 1 milliGRAM(s) IntraMuscular once  heparin   Injectable 5000 Unit(s) SubCutaneous every 8 hours  insulin glargine Injectable (LANTUS) 21 Unit(s) SubCutaneous at bedtime  insulin lispro (ADMELOG) corrective regimen sliding scale   SubCutaneous three times a day before meals  insulin lispro (ADMELOG) corrective regimen sliding scale   SubCutaneous at bedtime  insulin lispro Injectable (ADMELOG) 7 Unit(s) SubCutaneous three times a day before meals  iron sucrose IVPB 200 milliGRAM(s) IV Intermittent every 24 hours  loratadine 10 milliGRAM(s) Oral daily  montelukast 10 milliGRAM(s) Oral daily  multivitamin 1 Tablet(s) Oral daily  senna 2 Tablet(s) Oral at bedtime    MEDICATIONS  (PRN):  dextrose Oral Gel 15 Gram(s) Oral once PRN Blood Glucose LESS THAN 70 milliGRAM(s)/deciliter  sodium chloride 0.9% Bolus. 100 milliLiter(s) IV Bolus every 5 minutes PRN SBP LESS THAN or EQUAL to 90 mmHg  sodium chloride 0.9% lock flush 10 milliLiter(s) IV Push every 1 hour PRN Pre/post blood products, medications, blood draw, and to maintain line patency      I&O's Summary    05 Feb 2024 07:01  -  06 Feb 2024 07:00  --------------------------------------------------------  IN: 480 mL / OUT: 0 mL / NET: 480 mL        Vital Signs Last 24 Hrs  T(C): 36.7 (06 Feb 2024 05:41), Max: 36.9 (05 Feb 2024 17:26)  T(F): 98.1 (06 Feb 2024 05:41), Max: 98.4 (05 Feb 2024 17:26)  HR: 87 (06 Feb 2024 05:41) (82 - 87)  BP: 143/60 (06 Feb 2024 05:41) (143/60 - 156/88)  BP(mean): --  RR: 18 (06 Feb 2024 05:41) (16 - 18)  SpO2: 100% (06 Feb 2024 05:41) (99% - 100%)    Parameters below as of 06 Feb 2024 05:41  Patient On (Oxygen Delivery Method): room air        CAPILLARY BLOOD GLUCOSE      POCT Blood Glucose.: 274 mg/dL (05 Feb 2024 22:09)  POCT Blood Glucose.: 282 mg/dL (05 Feb 2024 17:50)  POCT Blood Glucose.: 149 mg/dL (05 Feb 2024 15:35)  POCT Blood Glucose.: 234 mg/dL (05 Feb 2024 12:03)      PHYSICAL EXAM:  GENERAL: NAD, resting comfortably, permacath CDI  HEAD:  Atraumatic, Normocephalic  EYES: EOMI, conjunctiva and sclera clear  NECK: No JVD  CHEST/LUNG: CTAB, no wheezes rales or rhonchi  HEART: Regular rate and rhythm; No murmurs, rubs, or gallops  ABDOMEN: Soft, Nontender, Nondistended; Bowel sounds present  EXTREMITIES:  2+ Peripheral Pulses, No clubbing, cyanosis, trace bilateral lower extremity edema  PSYCH: AAOx3  NEUROLOGY: non-focal  SKIN: No rashes or lesions    LABS:                        8.9    17.15 )-----------( 335      ( 05 Feb 2024 04:34 )             26.4     Auto Eosinophil # x     / Auto Eosinophil % x     / Auto Neutrophil # x     / Auto Neutrophil % x     / BANDS % x                            8.8    14.14 )-----------( 342      ( 04 Feb 2024 08:36 )             26.5     Auto Eosinophil # x     / Auto Eosinophil % x     / Auto Neutrophil # x     / Auto Neutrophil % x     / BANDS % x        02-05    137  |  102  |  33<H>  ----------------------------<  208<H>  3.6   |  19<L>  |  4.20<H>  02-04    140  |  102  |  21  ----------------------------<  205<H>  3.3<L>   |  24  |  3.13<H>    Ca    7.7<L>      05 Feb 2024 04:34  Mg     2.00     02-05  Phos  4.7     02-05    PT/INR - ( 05 Feb 2024 04:34 )   PT: 10.7 sec;   INR: 0.95 ratio         PTT - ( 05 Feb 2024 04:34 )  PTT:36.7 sec      Urinalysis Basic - ( 05 Feb 2024 04:34 )    Color: x / Appearance: x / SG: x / pH: x  Gluc: 208 mg/dL / Ketone: x  / Bili: x / Urobili: x   Blood: x / Protein: x / Nitrite: x   Leuk Esterase: x / RBC: x / WBC x   Sq Epi: x / Non Sq Epi: x / Bacteria: x            RADIOLOGY & ADDITIONAL TESTS:    Imaging Personally Reviewed:    Consultant(s) Notes Reviewed:      Care Discussed with Consultants/Other Providers:

## 2024-02-06 NOTE — PROGRESS NOTE ADULT - PROBLEM SELECTOR PLAN 11
diet: renal diet  dvt ppx: heparin tid  dispo: pending clinical improvement  Bowel regimen: miralax and senna  Code: full    PT eval diet: renal diet  dvt ppx: heparin tid  dispo: pending clinical improvement  Bowel regimen: miralax and senna  Code: full  PT eval    RCRI 2. Pt is currently medically optimized for procedure diet: renal diet  dvt ppx: heparin tid  dispo: pending clinical improvement  Bowel regimen: miralax and senna  Code: full  PT eval    RCRI 2. Pt is currently optimized for procedure from medical and cardiac standpoint. No further cardiac workup is needed

## 2024-02-06 NOTE — PROGRESS NOTE ADULT - PROBLEM SELECTOR PLAN 10
stable but soft on admission. on amlodipine at home  -resume amlodipine 5mg daily, increase to home dose 10mg as tolerated    #Hyperlipidemia  on atorvastatin at home  -c/w home meds stable but soft on admission. on amlodipine at home  -c/w amlodipine 10mg daily    #Hyperlipidemia  on atorvastatin at home  -c/w home meds

## 2024-02-06 NOTE — PROGRESS NOTE ADULT - ATTENDING COMMENTS
deemed ESRD   s/p PermCath yesterday  HD today  I called vascular for fistula   start EPO and Iv iron   rest of management per above

## 2024-02-06 NOTE — PROGRESS NOTE ADULT - SUBJECTIVE AND OBJECTIVE BOX
ANESTHESIA POSTOP CHECK    63y Female POSTOP DAY 1     Vital Signs Last 24 Hrs  T(C): 36.7 (06 Feb 2024 05:41), Max: 36.9 (05 Feb 2024 17:26)  T(F): 98.1 (06 Feb 2024 05:41), Max: 98.4 (05 Feb 2024 17:26)  HR: 87 (06 Feb 2024 05:41) (82 - 87)  BP: 143/60 (06 Feb 2024 05:41) (143/60 - 156/88)  BP(mean): --  RR: 18 (06 Feb 2024 05:41) (16 - 18)  SpO2: 100% (06 Feb 2024 05:41) (99% - 100%)    Parameters below as of 06 Feb 2024 05:41  Patient On (Oxygen Delivery Method): room air      I&O's Summary    05 Feb 2024 07:01  -  06 Feb 2024 07:00  --------------------------------------------------------  IN: 480 mL / OUT: 0 mL / NET: 480 mL        [X ] NO APPARENT ANESTHESIA COMPLICATIONS      Comments:

## 2024-02-06 NOTE — PROGRESS NOTE ADULT - SUBJECTIVE AND OBJECTIVE BOX
[facility] Division of Kidney Diseases & Hypertension  FOLLOW UP NOTE  [phone]--------------------------------------------------------------------------------  Chief Complaint:Shortness of breath    24 hour events/subjective: S/P Tunneled HD catheter placement 2/5  Pt seen and evaluated bedside this morning. Reports fatigue, poor appetite and HD catheter site pain. Denies any headaches, nausea, vomiting, fevers/chills, chest pain, SOB, abdominal pain, and leg swelling.        PAST HISTORY  --------------------------------------------------------------------------------  No significant changes to PMH, PSH, FHx, SHx, unless otherwise noted    ALLERGIES & MEDICATIONS  --------------------------------------------------------------------------------  Allergies  No Known Allergies  Intolerances    Standing Inpatient Medications  acetaminophen     Tablet .. 650 milliGRAM(s) Oral once  amLODIPine   Tablet 10 milliGRAM(s) Oral daily  atorvastatin 20 milliGRAM(s) Oral at bedtime  budesonide 160 MICROgram(s)/formoterol 4.5 MICROgram(s) Inhaler 2 Puff(s) Inhalation two times a day  buMETAnide Injectable 2 milliGRAM(s) IV Push every 12 hours  chlorhexidine 2% Cloths 1 Application(s) Topical daily  dextrose 5%. 1000 milliLiter(s) IV Continuous <Continuous>  dextrose 5%. 1000 milliLiter(s) IV Continuous <Continuous>  dextrose 50% Injectable 25 Gram(s) IV Push once  dextrose 50% Injectable 12.5 Gram(s) IV Push once  dextrose 50% Injectable 25 Gram(s) IV Push once  epoetin melody (EPOGEN) Injectable 3000 Unit(s) IV Push <User Schedule>  glucagon  Injectable 1 milliGRAM(s) IntraMuscular once  heparin   Injectable 5000 Unit(s) SubCutaneous every 8 hours  insulin glargine Injectable (LANTUS) 24 Unit(s) SubCutaneous at bedtime  insulin lispro (ADMELOG) corrective regimen sliding scale   SubCutaneous three times a day before meals  insulin lispro (ADMELOG) corrective regimen sliding scale   SubCutaneous at bedtime  insulin lispro Injectable (ADMELOG) 8 Unit(s) SubCutaneous three times a day before meals  iron sucrose IVPB 200 milliGRAM(s) IV Intermittent every 24 hours  loratadine 10 milliGRAM(s) Oral daily  montelukast 10 milliGRAM(s) Oral daily  multivitamin 1 Tablet(s) Oral daily  senna 2 Tablet(s) Oral at bedtime    PRN Inpatient Medications  dextrose Oral Gel 15 Gram(s) Oral once PRN  sodium chloride 0.9% Bolus. 100 milliLiter(s) IV Bolus every 5 minutes PRN  sodium chloride 0.9% lock flush 10 milliLiter(s) IV Push every 1 hour PRN    REVIEW OF SYSTEMS  --------------------------------------------------------------------------------  Gen: +fatigue, +poor appetite, no fever  Respiratory: no sob  CV: no cp  GI: no pain, no n/v  : no complaints  MSK: HD catheter site pain     VITALS/PHYSICAL EXAM  --------------------------------------------------------------------------------  T(C): 36.7 (02-06-24 @ 05:41), Max: 36.9 (02-05-24 @ 17:26)  HR: 87 (02-06-24 @ 05:41) (82 - 87)  BP: 143/60 (02-06-24 @ 05:41) (143/60 - 156/88)  RR: 18 (02-06-24 @ 05:41) (16 - 18)  SpO2: 100% (02-06-24 @ 05:41) (99% - 100%)  Wt(kg): --    02-05-24 @ 07:01  -  02-06-24 @ 07:00  --------------------------------------------------------  IN: 480 mL / OUT: 0 mL / NET: 480 mL    Physical Exam:  Gen NAD  HEENT: no JVD  Pulm: rales at bases   CV: S1S2,  Abd: Soft,   Ext:  +trace edema B/L LE   Neuro: Awake and alert  Skin: Warm and dry  Vascular: RIJ tunneled HD catheter site is CDI    LABS/STUDIES  --------------------------------------------------------------------------------              8.9    17.15 >-----------<  335      [02-05-24 @ 04:34]              26.4     137  |  102  |  33  ----------------------------<  208      [02-05-24 @ 04:34]  3.6   |  19  |  4.20        Ca     7.7     [02-05-24 @ 04:34]      Mg     2.00     [02-05-24 @ 04:34]      Phos  4.7     [02-05-24 @ 04:34]      PT/INR: PT 10.7 , INR 0.95       [02-05-24 @ 04:34]  PTT: 36.7       [02-05-24 @ 04:34]      Creatinine Trend:  SCr 4.20 [02-05 @ 04:34]  SCr 3.13 [02-04 @ 08:07]  SCr 3.51 [02-03 @ 06:25]  SCr 4.43 [02-02 @ 07:00]  SCr 6.11 [02-01 @ 05:36]    Urinalysis - [02-05-24 @ 04:34]      Color  / Appearance  / SG  / pH       Gluc 208 / Ketone   / Bili  / Urobili        Blood  / Protein  / Leuk Est  / Nitrite       RBC  / WBC  / Hyaline  / Gran  / Sq Epi  / Non Sq Epi  / Bacteria     Urine Creatinine 78      [01-31-24 @ 15:55]  Urine Protein 1723      [01-31-24 @ 15:55]  Urine Sodium <20      [01-31-24 @ 15:55]  Urine Urea Nitrogen 381.0      [01-31-24 @ 15:55]  Urine Osmolality 348      [01-31-24 @ 15:55]    Iron 44, TIBC 221, %sat 20      [02-01-24 @ 05:36]  Ferritin 276      [02-01-24 @ 05:36]    HBsAb <3.0      [02-01-24 @ 16:30]  HBsAg Nonreact      [02-01-24 @ 16:30]  HBcAb Nonreact      [02-01-24 @ 16:30]  HCV 0.10, Nonreact      [02-01-24 @ 16:30]

## 2024-02-07 LAB
ANION GAP SERPL CALC-SCNC: 13 MMOL/L — SIGNIFICANT CHANGE UP (ref 7–14)
BUN SERPL-MCNC: 19 MG/DL — SIGNIFICANT CHANGE UP (ref 7–23)
CALCIUM SERPL-MCNC: 7.7 MG/DL — LOW (ref 8.4–10.5)
CHLORIDE SERPL-SCNC: 99 MMOL/L — SIGNIFICANT CHANGE UP (ref 98–107)
CO2 SERPL-SCNC: 24 MMOL/L — SIGNIFICANT CHANGE UP (ref 22–31)
CREAT SERPL-MCNC: 3.16 MG/DL — HIGH (ref 0.5–1.3)
EGFR: 16 ML/MIN/1.73M2 — LOW
GLUCOSE BLDC GLUCOMTR-MCNC: 146 MG/DL — HIGH (ref 70–99)
GLUCOSE BLDC GLUCOMTR-MCNC: 154 MG/DL — HIGH (ref 70–99)
GLUCOSE BLDC GLUCOMTR-MCNC: 258 MG/DL — HIGH (ref 70–99)
GLUCOSE BLDC GLUCOMTR-MCNC: 307 MG/DL — HIGH (ref 70–99)
GLUCOSE BLDC GLUCOMTR-MCNC: 365 MG/DL — HIGH (ref 70–99)
GLUCOSE SERPL-MCNC: 157 MG/DL — HIGH (ref 70–99)
HCT VFR BLD CALC: 28.2 % — LOW (ref 34.5–45)
HGB BLD-MCNC: 9.3 G/DL — LOW (ref 11.5–15.5)
MAGNESIUM SERPL-MCNC: 1.9 MG/DL — SIGNIFICANT CHANGE UP (ref 1.6–2.6)
MCHC RBC-ENTMCNC: 28.7 PG — SIGNIFICANT CHANGE UP (ref 27–34)
MCHC RBC-ENTMCNC: 33 GM/DL — SIGNIFICANT CHANGE UP (ref 32–36)
MCV RBC AUTO: 87 FL — SIGNIFICANT CHANGE UP (ref 80–100)
MRSA PCR RESULT.: SIGNIFICANT CHANGE UP
NRBC # BLD: 0 /100 WBCS — SIGNIFICANT CHANGE UP (ref 0–0)
NRBC # FLD: 0 K/UL — SIGNIFICANT CHANGE UP (ref 0–0)
PHOSPHATE SERPL-MCNC: 3.5 MG/DL — SIGNIFICANT CHANGE UP (ref 2.5–4.5)
PLATELET # BLD AUTO: 313 K/UL — SIGNIFICANT CHANGE UP (ref 150–400)
POTASSIUM SERPL-MCNC: 3.2 MMOL/L — LOW (ref 3.5–5.3)
POTASSIUM SERPL-SCNC: 3.2 MMOL/L — LOW (ref 3.5–5.3)
RBC # BLD: 3.24 M/UL — LOW (ref 3.8–5.2)
RBC # FLD: 14.6 % — HIGH (ref 10.3–14.5)
S AUREUS DNA NOSE QL NAA+PROBE: SIGNIFICANT CHANGE UP
SODIUM SERPL-SCNC: 136 MMOL/L — SIGNIFICANT CHANGE UP (ref 135–145)
WBC # BLD: 16.91 K/UL — HIGH (ref 3.8–10.5)
WBC # FLD AUTO: 16.91 K/UL — HIGH (ref 3.8–10.5)

## 2024-02-07 PROCEDURE — 99233 SBSQ HOSP IP/OBS HIGH 50: CPT | Mod: GC

## 2024-02-07 PROCEDURE — 93970 EXTREMITY STUDY: CPT | Mod: 26

## 2024-02-07 PROCEDURE — 99233 SBSQ HOSP IP/OBS HIGH 50: CPT

## 2024-02-07 RX ORDER — INSULIN LISPRO 100/ML
VIAL (ML) SUBCUTANEOUS ONCE
Refills: 0 | Status: COMPLETED | OUTPATIENT
Start: 2024-02-07 | End: 2024-02-07

## 2024-02-07 RX ORDER — INSULIN LISPRO 100/ML
VIAL (ML) SUBCUTANEOUS
Refills: 0 | Status: DISCONTINUED | OUTPATIENT
Start: 2024-02-07 | End: 2024-02-08

## 2024-02-07 RX ORDER — POTASSIUM CHLORIDE 20 MEQ
40 PACKET (EA) ORAL EVERY 4 HOURS
Refills: 0 | Status: COMPLETED | OUTPATIENT
Start: 2024-02-07 | End: 2024-02-07

## 2024-02-07 RX ORDER — INSULIN GLARGINE 100 [IU]/ML
27 INJECTION, SOLUTION SUBCUTANEOUS AT BEDTIME
Refills: 0 | Status: DISCONTINUED | OUTPATIENT
Start: 2024-02-07 | End: 2024-02-08

## 2024-02-07 RX ORDER — INSULIN LISPRO 100/ML
VIAL (ML) SUBCUTANEOUS AT BEDTIME
Refills: 0 | Status: DISCONTINUED | OUTPATIENT
Start: 2024-02-07 | End: 2024-02-08

## 2024-02-07 RX ORDER — ACETAMINOPHEN 500 MG
650 TABLET ORAL EVERY 6 HOURS
Refills: 0 | Status: DISCONTINUED | OUTPATIENT
Start: 2024-02-07 | End: 2024-02-08

## 2024-02-07 RX ADMIN — Medication 650 MILLIGRAM(S): at 10:34

## 2024-02-07 RX ADMIN — BUMETANIDE 2 MILLIGRAM(S): 0.25 INJECTION INTRAMUSCULAR; INTRAVENOUS at 18:14

## 2024-02-07 RX ADMIN — HEPARIN SODIUM 5000 UNIT(S): 5000 INJECTION INTRAVENOUS; SUBCUTANEOUS at 22:36

## 2024-02-07 RX ADMIN — Medication 8 UNIT(S): at 12:39

## 2024-02-07 RX ADMIN — INSULIN GLARGINE 24 UNIT(S): 100 INJECTION, SOLUTION SUBCUTANEOUS at 01:04

## 2024-02-07 RX ADMIN — LORATADINE 10 MILLIGRAM(S): 10 TABLET ORAL at 12:09

## 2024-02-07 RX ADMIN — Medication 40 MILLIEQUIVALENT(S): at 15:07

## 2024-02-07 RX ADMIN — Medication 8 UNIT(S): at 09:05

## 2024-02-07 RX ADMIN — MONTELUKAST 10 MILLIGRAM(S): 4 TABLET, CHEWABLE ORAL at 12:09

## 2024-02-07 RX ADMIN — Medication 8: at 09:06

## 2024-02-07 RX ADMIN — BUMETANIDE 2 MILLIGRAM(S): 0.25 INJECTION INTRAMUSCULAR; INTRAVENOUS at 06:35

## 2024-02-07 RX ADMIN — CHLORHEXIDINE GLUCONATE 1 APPLICATION(S): 213 SOLUTION TOPICAL at 12:09

## 2024-02-07 RX ADMIN — Medication 8 UNIT(S): at 18:00

## 2024-02-07 RX ADMIN — SENNA PLUS 2 TABLET(S): 8.6 TABLET ORAL at 22:36

## 2024-02-07 RX ADMIN — HEPARIN SODIUM 5000 UNIT(S): 5000 INJECTION INTRAVENOUS; SUBCUTANEOUS at 06:35

## 2024-02-07 RX ADMIN — BUDESONIDE AND FORMOTEROL FUMARATE DIHYDRATE 2 PUFF(S): 160; 4.5 AEROSOL RESPIRATORY (INHALATION) at 22:43

## 2024-02-07 RX ADMIN — Medication 1 TABLET(S): at 12:09

## 2024-02-07 RX ADMIN — BUDESONIDE AND FORMOTEROL FUMARATE DIHYDRATE 2 PUFF(S): 160; 4.5 AEROSOL RESPIRATORY (INHALATION) at 09:04

## 2024-02-07 RX ADMIN — Medication 2: at 18:00

## 2024-02-07 RX ADMIN — Medication 2: at 22:35

## 2024-02-07 RX ADMIN — Medication 3: at 01:04

## 2024-02-07 RX ADMIN — ATORVASTATIN CALCIUM 20 MILLIGRAM(S): 80 TABLET, FILM COATED ORAL at 22:36

## 2024-02-07 RX ADMIN — AMLODIPINE BESYLATE 10 MILLIGRAM(S): 2.5 TABLET ORAL at 06:36

## 2024-02-07 RX ADMIN — HEPARIN SODIUM 5000 UNIT(S): 5000 INJECTION INTRAVENOUS; SUBCUTANEOUS at 15:10

## 2024-02-07 RX ADMIN — Medication 40 MILLIEQUIVALENT(S): at 10:34

## 2024-02-07 RX ADMIN — Medication 650 MILLIGRAM(S): at 11:34

## 2024-02-07 RX ADMIN — INSULIN GLARGINE 27 UNIT(S): 100 INJECTION, SOLUTION SUBCUTANEOUS at 22:35

## 2024-02-07 NOTE — DIETITIAN INITIAL EVALUATION ADULT - CALCULATED FROM (CAL/KG)
eMERGENCY dEPARTMENT eNCOUnter      CHIEF COMPLAINT    Chief Complaint   Patient presents with   • Shortness of Breath   • Nausea   • Anxiety       HPI     Nereida Medina is a 48 year old female with past medical history of alcohol-induced liver disease, Partial seizures, anxiety and depression, migraines, hypomagnesemia, hypokalemia and bilateral pedal edema who presents to the emergency department after a follow-up appointment with her primary care provider. Patient presents complaining of spasms in her bilateral hands. She states that the spasms have been occurring since she was discharged from [facility] about 8 days ago. At the clinic this afternoon, she became anxious with one of the spasms, became short of breath and was given a paper bag to breathe into. She did have some chest pain at that time, all of the chest pain and shortness of breath has resolved by the time she arrived to the emergency department. She continues to have bilateral hand spasms. She is also complaining of pain diffusely throughout her abdomen. She has not had any alcohol to drink in 1 month.    Care everywhere was reviewed, she was admitted on 10/16/18 for acute on chronic pancreatitis, also found have leukocytosis, hypomagnesemia, hypokalemia, her diuretics including frusemide and spironolactone were increased, her electrolytes were supplemented and she was discharged home with her increased dose. She states that she has not taken her furosemide or spironolactone in 48 hours, she has also been noncompliant with her magnesium and potassium supplements because her abdomen always \"feels full\". She does have bilateral lower extremity edema to the mid shin. She states that she generally has not been feeling well since she was discharged from the hospital. She does have a history of ascites, but says that she has never had a paracentesis before. Patient denies any fevers, chills, headache, confusion, altered mental status, hallucinations,  nausea, vomiting, diarrhea, constipation, dysuria, urgency or frequency.    ALLERGIES    ALLERGIES:   Allergen Reactions   • Ativan Other (See Comments)     Patient prefers not to take.   • Codeine PRURITUS   • Darvocet [Propoxyphene N-Apap]      itching   • Doxycycline + [id number] [Adoxa Angel] GI UPSET     Stomach contractions   • Erythromycin Base GI UPSET     Stomach contractions   • Levaquin Other (See Comments)     seizures   • Tetracycline Base GI UPSET     Stomcah cramping     • Tylenol [Acetaminophen]      Doesn't take - is on Tegretol       CURRENT MEDICATIONS    Current Facility-Administered Medications   Medication Dose Route Frequency Provider Last Rate Last Dose   • sodium chloride 0.9 % with KCl 20 mEq infusion   Intravenous Continuous Vanessa K ANDREZ Batres 100 mL/hr at 10/26/18 2137 100 mL/hr at 10/26/18 2137   • MAGNESIUM SULFATE 50 % IJ SOLN Pyxis Override            • sodium chloride (PF) 0.9 % injection 2 mL  2 mL Injection 2 times per day Viridiana Dorman MD       • sodium chloride (PF) 0.9 % injection 2 mL  2 mL Injection PRN Viridiana Dorman MD       • sodium chloride (NORMAL SALINE) 0.9 % bolus 500 mL  500 mL Intravenous PRN Viridiana Dorman MD       • [START ON 10/27/2018] enoxaparin (LOVENOX) injection 40 mg  40 mg Subcutaneous Daily Viridiana Dorman MD       • sodium chloride 0.9 % flush bag 500 mL  500 mL Intravenous PRN Viridiana Dorman MD       • potassium chloride (KLOR-CON M) araceli ER tablet 20 mEq  20 mEq Oral Q4H PRN Viridiana Dorman MD       • potassium chloride (KLOR-CON) packet 20 mEq  20 mEq Per NG tube Q4H PRN Viridiana Dorman MD       • potassium chloride (KLOR-CON M) araceli ER tablet 40 mEq  40 mEq Oral Q4H PRN Viridiana Dorman MD       • potassium chloride (KLOR-CON) packet 40 mEq  40 mEq Per NG tube Q4H PRN Viridiana Dorman MD       • magnesium sulfate 1 g in dextrose 5% 100 mL IVPB premix  1 g Intravenous Daily PRN Viridiana Dorman MD       • magnesium  sulfate 2 g in 50 mL premix IVPB  2 g Intravenous Daily PRN Viridiana Dorman MD       • magnesium sulfate 2 g in 50 mL premix IVPB  2 g Intravenous Q4H PRN Viridiana Dorman MD       • potassium phosphate/sodium phosphate (K PHOS NEUTRAL) tablet 1 tablet  250 mg Oral BID PRN Viridiana Dorman MD       • sodium phosphate 15 mmol in dextrose 5 % 250 mL IVPB  15 mmol Intravenous Once PRN Viridiana Dorman MD       • calcium gluconate 2 g in dextrose 5 % 70 mL total volume IVPB  2 g Intravenous PRN Viridiana Dorman MD       • HYDROmorphone (DILAUDID) injection 0.5 mg  0.5 mg Intravenous Q2H PRN Viridiana Dorman MD   0.5 mg at 10/26/18 2137   • ondansetron (ZOFRAN) injection 4 mg  4 mg Intravenous BID PRN Viridiana Dorman MD       • albuterol inhaler 2 puff  2 puff Inhalation Q4H Resp PRN Viridiana Dorman MD       • ondansetron (ZOFRAN ODT) disintegrating tablet 4 mg  4 mg Oral BID PRN Viridiana Dorman MD       • [START ON 10/27/2018] pantoprazole (PROTONIX) EC tablet 40 mg  40 mg Oral QAM AC Viridiana Dorman MD       • [START ON 10/27/2018] cefTRIAXone (ROCEPHIN) 1,000 mg in sodium chloride 0.9 % 100 mL IVPB  1,000 mg Intravenous Daily Viridiana Dorman MD       • [START ON 10/27/2018] atenolol (TENORMIN) tablet 25 mg  25 mg Oral Daily Viridiana Dorman MD       • [START ON 10/27/2018] furosemide (LASIX) tablet 40 mg  40 mg Oral BID Viridiana Dorman MD       • [START ON 10/27/2018] magnesium lactate (MAG-TAB SR) SR tablet 168 mg  168 mg Oral BID WC Viridiana Dorman MD       • [START ON 10/27/2018] potassium chloride (KLOR-CON M) araceli ER tablet 20 mEq  20 mEq Oral Daily with breakfast Viridiana Dorman MD       • [START ON 10/27/2018] potassium phosphate/sodium phosphate (K PHOS NEUTRAL) tablet 1 tablet  250 mg Oral BID Viridiana Dorman MD       • spironolactone (ALDACTONE) tablet 200 mg  200 mg Oral Daily Viridiana Dorman MD       • [START ON 10/27/2018] topiramate (TOPAMAX) tablet 100 mg   100 mg Oral Daily Viridiana Dorman MD           PAST MEDICAL HISTORY    Past Medical History:   Diagnosis Date   • Anxiety and depression 4/29/2015   • Gastritis    • GERD (gastroesophageal reflux disease)    • Headache(784.0)     CDH   • History of alcohol abuse 4/29/2015    Last drink 7/2015    • 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 complex partial seizures, without mention of intractable epilepsy 1994   • Migraine without aura, with intractable migraine, so stated, without mention of status migrainosus    • Tachycardia        SURGICAL HISTORY    Past Surgical History:   Procedure Laterality Date   • Cholecystectomy  1990   • Colonoscopy      endoscopy   • Cosmetic surgery      breast enhancement   • Tubal ligation         SOCIAL HISTORY    Social History     Socioeconomic History   • Marital status: Single     Spouse name: None   • Number of children: 1   • Years of education: None   • Highest education level: None   Social Needs   • Financial resource strain: None   • Food insecurity - worry: None   • Food insecurity - inability: None   • Transportation needs - medical: None   • Transportation needs - non-medical: None   Occupational History   • Occupation:    • Occupation: part [employer]   • Occupation: unemployeed   Tobacco Use   • Smoking status: Current Every Day Smoker     Packs/day: 1.00     Years: 17.00     Pack years: 17.00     Types: Cigarettes   • Smokeless tobacco: Never Used   Substance and Sexual Activity   • Alcohol use: No     Alcohol/week: 0.0 oz   • Drug use: No   • Sexual activity: Not Currently   Other Topics Concern   •  Service Not Asked   • Blood Transfusions Not Asked   • Caffeine Concern Yes     Comment: diet Dr Pepper  2-3/d   • Occupational Exposure Not Asked   • Hobby Hazards Not Asked   • Sleep Concern Not Asked   • Stress Concern Not Asked   • Weight Concern Not Asked   • Special Diet Yes     Comment: general   • Back Care  Not Asked   • Exercise Not Asked     Comment: sporadic   • Bike Helmet Not Asked   • Seat Belt Not Asked   • Self-Exams Not Asked   Social History Narrative    Previous  and .         FAMILY HISTORY    Family History   Problem Relation Age of Onset   • Diabetes Mother         Alive at age 80   • Cancer Mother    • Heart disease Father          age 59   • Cancer Maternal Aunt         pancreatic cancer   • Diabetes Maternal Aunt    • Stroke Maternal Uncle        REVIEW OF SYSTEMS    Constitutional: + decreased appetite. Denies fever or chills.   Eyes:  Denies change in visual acuity.   HENT:  Denies nasal congestion or sore throat.   Respiratory:  Denies cough or shortness of breath.   Cardiovascular:  Denies chest pain or edema.   GI:  Denies abdominal pain, nausea, vomiting or diarrhea.   :  Denies dysuria.   Musculoskeletal: + Carpal spasms. Denies back pain or joint pain.   Integument:  Denies rash or diaphoresis.   Neurologic:  Denies headache, focal weakness or sensory changes.   Endocrine:  Denies polyuria or polydipsia.   Lymphatic:  Denies swollen glands.   Psychiatric:  Denies depression or anxiety.     PHYSICAL EXAM    Vitals:    10/26/18 1812 10/26/18 1813 10/26/18 1924 10/26/18 1935   BP: 94/57  97/64 99/64   Pulse: 78 76 71 78   Resp:   14 16   Temp:    97.9 °F (36.6 °C)   TempSrc:    Oral   SpO2:  97% 98% 97%   Weight:    79.2 kg   Height:    5' 4\" (1.626 m)       Pulse Ox Interpretation:  WNL  Constitutional:  Well developed, well nourished. No acute distress, non-toxic appearance.   Eyes:  PERRL, conjunctivae normal, icteric sclera.   HENT:  Head is atraumatic. External ears without lesions. Nose midline. Oropharynx moist.  Neck: Normal range of motion. No tenderness. Supple.   Respiratory:  No respiratory distress, normal breath sounds. No rales. No wheezing.   Cardiovascular:  Normal rate, normal rhythm. No murmurs, gallops, or rubs.  2+ pitting edema to the knees  bilaterally.  GI:  Soft, distended, + ascitics fluid wave. Normal bowel sounds, diffusely tender. No hepatomegaly, splenomegaly, mass, rebound or guarding.   :  No costovertebral angle tenderness.   Musculoskeletal:  + bilaterally carpal spasms, worse with blood pressure cuff insufflation. No edema, tenderness, or deformities.   Integument:  Jaundiced. Well hydrated, no rash.   Lymphatic:  No lymphadenopathy noted.   Neurologic:  Alert & oriented x 3.  Normal motor function. Normal sensory function. No focal deficits noted.   Psychiatric:  Speech and behavior appropriate.       RADIOLOGY    XR Chest PA and Lateral   Final Result   IMPRESSION: Negative chest x-ray.         IR PARACENTESIS    (Results Pending)     Images independently visualized and agree with above.     LABS    Results for orders placed or performed during the hospital encounter of 10/26/18   CBC & Auto Differential   Result Value    WBC 16.0 (H)    RBC 3.80 (L)    HGB 13.0    HCT 38.6    .6 (H)    MCH 34.2 (H)    MCHC 33.7    RDW-CV 16.9 (H)        DIFF TYPE AUTOMATED DIFFERENTIAL    Neutrophil 75    LYMPH 17    MONO 7    EOSIN 0    BASO 1    Absolute Neutrophil 12.1 (H)    Absolute Lymph 2.7    Absolute Mono 1.1 (H)    Absolute Eos 0.1    Absolute Baso 0.1   Comprehensive Metabolic Panel   Result Value    Sodium 134 (L)    Potassium 2.9 (L)     Comment: Slight hemolysis, result may be falsely increased.    Chloride 85 (L)    Carbon Dioxide 35 (H)    Anion Gap 17    Glucose 109 (H)    BUN 9    Creatinine 0.97 (H)    GFR Estimate,  79     Comment: eGFR 60 - 89 mL/min/1.73m2 = Mild decrease in kidney function.    GFR Estimate, Non  68     Comment: eGFR 60 - 89 mL/min/1.73m2 = Mild decrease in kidney function.    BUN/Creatinine Ratio 9    CALCIUM 7.2 (L)    TOTAL BILIRUBIN 3.6 (H)    AST/SGOT 70 (H)    ALT/SGPT 65    ALK PHOSPHATASE 177 (H)    TOTAL PROTEIN 6.9    Albumin 1.9 (L)    GLOBULIN 5.0 (H)     A/G Ratio, Serum 0.4 (L)   Lipase Level   Result Value    Lipase 311   Magnesium Level   Result Value    MAGNESIUM 1.0 (L)   Alcohol Level Serum   Result Value    Alcohol Ethyl None detected.   Troponin I Ultra Sensitive   Result Value    TROPONIN I <0.02   NT proBNP   Result Value    NT proBNP 479 (H)   Ammonia Plasma   Result Value    AMMONIA 67 (H)   Prothrombin Time   Result Value    PROTIME 12.9 (H)    INR 1.3     Comment: INR Therapeutic Range: 2.0 to 3.0 (2.5 to 3.5 recommended for recurrent thrombotic episodes and mechanical prosthetic heart valves.)    Fluid Cell Count and Differential (non-Synovial/non-CSF)   Result Value    FLUID TYPE ASCITES FLUID    FLUID APPEARANCE YELLOW     Comment: CLEAR    FLUID VOLUME 1,100    NUCLEATED CELL COUNT 250    NEUTROPHILS 44    LYMPHOCYTES 14    MONO/MACROPHAGES 37    MESOTHELIAL CELLS 5       ECG RESULTS:  Rhythm: Normal sinus   Rate: 92/min  ST Segments: No acute change  T Waves: No acute changes  Q Waves: None   Clinical Impression: Normal Sinus Rhythm and prolonged QT interval  Interpreted by : Dr. Rico      ED COURSE & MEDICAL DECISION MAKING    49 yo female with alcohol induced liver disease presenting for b/l carpal spasms. Care everywhere was reviewed from [facility], she was admitted on 10/16/18 for acute on chronic pancreatitis, also found have leukocytosis, hypomagnesemia, hypokalemia, her diuretics including frusemide and spironolactone were increased, her electrolytes were supplemented and she was discharged home with her increased dose. She states that she has not taken her furosemide or spironolactone in 48 hours, she has also been noncompliant with her magnesium and potassium supplements because her abdomen always \"feels full\". She does have bilateral lower extremity edema to the mid shin. She states that she generally has not been feeling well since she was discharged from the hospital. She does have a history of ascites, but says that she has never  had a paracentesis before. Denies any alcohol intake in the past month.  On exam, vitals are stable, pt appears uncomfortable, but in NAD, nontoxic, + b/l carpals spasms and 2+ pitting edema to the knees.     Will recheck electrolytes, LFTs, CBC, ammonia, CXR.   EKG shows prolonged QTc 534.    Ammonia elevated 67; + leukocytosis of 16 (was 22 at beginning of [facility] admission and 12 when she was discharged), + hypokalemia 2.9 (oral 40mEq ordered and NS + KCl infusion), Mag 1.0 (given 4g).    Lipase WNL, no epigastric tenderness, low suspicion for acute on chronic pancreatitis at this time.  CXR neg for acute process.  No alcohol, trop WNL, BNP WNL, INR WNL.     1700  IR was contacted and will perform therapeutic and diagnostic paracentesis. Pt given 4mg morphine prior to the procedure for pain.    1855  Pt returned to ED, assisted to commode, continues to complain of pain. Discussed case with Hospitalist, Dr. Dorman, who recommends admission at this time. Will treat for possible SBP with Rocephin until ascitics fluid analysis is available.    ED Course/MDM:    Clinical Impression:  The primary encounter diagnosis was Hypomagnesemia. Diagnoses of Hypokalemia, Hypocalcemia, Leukocytosis, unspecified type, Prolonged QT interval, and Ascites due to alcoholic hepatitis were also pertinent to this visit.    Disposition:  Admit 10/26/2018  6:55 PM  Patient accepted and admission order received from:: SONYA DORMAN [[id number]]  Patient Class: Inpatient [1]  Patient on Telemetry: Yes  Has physician to physician communication occurred?: Yes  Transferring Patient to? Only adjust for transfers between [facility] ([facility], [facility]: [facility] [[id number]]     This patient was seen under supervision of Dr. Jacky Batres, ANDREZ  10/26/18 0021     [id number]

## 2024-02-07 NOTE — DIETITIAN INITIAL EVALUATION ADULT - PROBLEM SELECTOR PLAN 2
tachycardia with leukocytosis on admission, meeting SIRS criteria but no obvious source of infection. Likely has underlying viral URI, low suspicion for bacterial infection  -f/u blood cultures, MRSA  -monitor off abx

## 2024-02-07 NOTE — DIETITIAN INITIAL EVALUATION ADULT - PROBLEM SELECTOR PLAN 5
BUN/Cr 49/5.13 (baseline Cr ~3.3). Also with metabolic acidosis now improving. Makes urine, dobbs placed due to decreased output in ED. Likely pre-renal iso poor PO intake, insensible losses, and third spacing vs ATN   US Kidney/bladder with echogenic lesion in the superior pole of right kidney vs right adrenal gland, CT recommended for further evaluation  -Nephrology consulted, appreciate recs. no plan for dialysis at this time  -trend bmp, UOP, I&Os  -f/u urine lytes  -c/w bicarb tabs bid  -can f/u lesion as outpatient

## 2024-02-07 NOTE — DIETITIAN INITIAL EVALUATION ADULT - PROBLEM SELECTOR PLAN 6
Sugars 300s/400s on admission. BHB 0.7 but no acidosis. A1c 6.6% from 10/23. Received Lantus 25u overnight, sugars now improving. Diet resumed. On Basal/bolus 20u/20u at home, as well as repaglinide and victoza  -f/u A1c  -hold home repaglinide and victoza  -c/w lantus 15u qhs, admelog 5u premeal, and low ISS  -FS with meals

## 2024-02-07 NOTE — PROGRESS NOTE ADULT - PROBLEM SELECTOR PLAN 7
stable but soft on admission. on amlodipine at home  -c/w amlodipine 10mg daily    #Hyperlipidemia  on atorvastatin at home  -c/w home meds

## 2024-02-07 NOTE — DIETITIAN INITIAL EVALUATION ADULT - OTHER INFO
63 year old female with a PMH of T2DM, CKD4, HTN and HLD here for dyspnea and wheezing that began the evening prior to presentation, admitted for AHRF likely iso volume overload iso of ESRD now on HD per chart.    Patient w/ fair appetite in house. Didn't consume lunch yet upon assessment. Noted w/ some intakes 26-50% per RN flow sheet. Family reports brining in food from home as well. No food preferences at this time. No GI distress or chewing/swallowing difficulties reported. Has no food allergies. Family states UBW is 156 lbs. Per previous RD note (10/19/23), noted w/ weight 71.4 kg. ABW is 62.6 kg (2/6) and 69 kg (2/1) *post HD per chart. ?accuracy of most recent weight obtained, may be r/t scale error. Family reports not noticing any weight changes PTA. No edema or pressure injuries per RN flow sheet.    Discussed renal diet given new HD and provided renal nutrition handouts.

## 2024-02-07 NOTE — PROGRESS NOTE ADULT - PROBLEM SELECTOR PLAN 4
A1c 6.6% from 10/23, now 6.1%. On lantus 20u in am and 20 qhs at home, as well as repaglinide and victoza  -hold home repaglinide and victoza  -c/w lantus 24u qhs, admelog 8u premeal, and low ISS  -FS with meals A1c 6.6% from 10/23, now 6.1%. On lantus 20u in am and 20 qhs at home, as well as repaglinide and victoza  -hold home repaglinide and victoza  -c/w lantus 24u qhs, admelog 8u premeal, and moderate ISS  -FS with meals

## 2024-02-07 NOTE — CONSULT NOTE ADULT - ASSESSMENT
64yo female with a hx of T2DM, asthma, CKD4, HTN, and HLD here for dyspnea and wheezing that began the evening prior to presentation, admitted for AHRF likely iso volume overload iso of ESRD now on HD    EKG: ST LVH PVCs      1. Pre-op assessment  -denies CP, SOB  -EKG: ST LVH PVCs  -TTE normal LV systolic function, mild diastolic dysfunction . moderate AS      2. Aortic stenosis  -TTE with peak transaortic velocity is 2.59 m/s, peak transaortic gradient is 26.8 mmHg and mean transaortic gradient is 15.0 mmHg with an LVOT/aortic valve VTI ratio of 0.30. Probable moderate aortic stenosis  -will need routine follow up OP, repeat TTE in 6 months    3. ESRD  -on new HD this admission  -f/u renal     4. HTN  -controlled  -c.w norvasc  -continue to monitor BP     5. DVT Prophylaxis  -hep subq 64yo female with a hx of T2DM, asthma, CKD4, HTN, and HLD here for dyspnea and wheezing that began the evening prior to presentation, admitted for AHRF likely iso volume overload iso of ESRD now on HD    EKG: ST LVH PVCs      1. Pre-op assessment  -denies CP, SOB  -EKG: ST LVH PVCs  -TTE normal LV systolic function, mild diastolic dysfunction . moderate AS  -optimized from cardiac standpoint for AVF creation, moderate risk for MACE    2. Aortic stenosis  -TTE with peak transaortic velocity is 2.59 m/s, peak transaortic gradient is 26.8 mmHg and mean transaortic gradient is 15.0 mmHg with an LVOT/aortic valve VTI ratio of 0.30. Probable moderate aortic stenosis  -will need routine follow up OP, repeat TTE in 6 months    3. ESRD  -on new HD this admission  -f/u renal     4. HTN  -controlled  -c.w norvasc  -continue to monitor BP     5. DVT Prophylaxis  -hep subq

## 2024-02-07 NOTE — DIETITIAN INITIAL EVALUATION ADULT - PROBLEM SELECTOR PLAN 3
CXR with perihilar opacities, likely cardiogenic. BNP elevated. No formal diagnosis of CHF. Chronic BLE edema per daughter. Prior echo 10/23 with EF 61%  -f/u repeat TTE  -c/w IV Lasix 40mg daily  -goal diuresis 1-1.5L daily

## 2024-02-07 NOTE — PROGRESS NOTE ADULT - ATTENDING COMMENTS
The patient is a 63-year-old woman who is followed for ckd stage 4/5, most likely secondary to diabetic nephropathy, and who is admitted for evaluation and management of acute hypoxic respiratory failure, most likely secondary to pulmonary edema in the setting of acute kidney injury superimposed on chronic kidney disease, which has now resolved, and complicated by the development of hyperglycemia.     NELSON on CKD 4/5: -Initially suspected secondary to vasoplegia in the setting of viral upper respiratory infection; as no improvement over the course of hospital stay, greater suspicion now that this represents progression of chronic kidney disease   -Etiology unclear of chronic kidney disease; suspected secondary to diabetic nephropathy, but patient has had a1c level in our lab that measures less than 7; possible that patient had long-standing hyperglycemia prior to need for insulin use and that current a1c in setting of ckd may underestimate degree of hyperglycemia   -Worrisome that kidney function does not appear to be improving; nephrology team has been in touch with the patient and is planning for initiation of dialysis; non-tunneled shiley placed and hemodialysis initiated 2/1; ir consult placed for placement of tunneled hemodialysis catheter; as patient also continues to make urine, administering bumetanide 2 mg intravenously twice daily   -Vascular surgery consulted for upper extremity av fistula placement; planned for Friday 2/9    Pre-operative risk stratification: -The patient has no known history of cardiomyopathy; she has no known arrhythmia or history of acute coronary syndrome   -Although the patient cannot complete four metabolic equivalent tasks, and she has a creatinine greater than 4, and she does require insulin therapy, she would not be undergoing a major surgery, she has no history of cerebrovascular disease   -Her estimated rcri score is 2, indicating about a 10% risk of major adverse cardiac event in the setting of an av fistula surgery   -No further pre-operative risk stratification or optimization is needed prior to surgery    Diabetes mellitus with hyperglycemia: -Known insulin-dependent diabetes mellitus; glucose elevated this admission to 300-400 (subsequent to administration of methylprednisolone in ed)   -Will monitor serum glucose levels and adjust insulin dose as necessary     Asthma: -Suspected asthma history although no pfts to confirm; no evident wheezing and shortness of breath significantly improved today; will avoid administration of additional steroids at this time     Prophylactic measure: -heparin for dvt prophylaxis; consistent carbohydrate diet

## 2024-02-07 NOTE — DIETITIAN INITIAL EVALUATION ADULT - ORAL INTAKE PTA/DIET HISTORY
Patient seen for assessment w family at bed side providing information. Family reporting patient had good PO intake at home. Monitors sugar intake and follows Halal diet. A1c is 6.1% per chart.

## 2024-02-07 NOTE — PROGRESS NOTE ADULT - PROBLEM SELECTOR PLAN 1
Pt. with NELSON on progressive CKD in the setting of longstanding HTN and DM2. On review of [place]/[facility]IE Scr was 3.3 on 12/18/23. On admission, Scr elevated at 5.13 increased to 6.11 (2/1/2024). UPCR was 18.1 on 12/18/23. Serologic workup negative (Oct 2023): C3/C4 not low, kappa/lambda ratio WNL with no monoclonal band identified, negative for ANCA, CELIA, dsDNA, Hep B, Hep C, and [id number]. Renal US w/o hydronephrosis. Pro-BNP elevated at 10,471.  CXR showed perihilar opacities with effusion more likely cardiogenic in origin than multifocal pneumonia. HD consent obtained and placed in chart. Initiated on iHD 2/1.s/p Tunneled HD catheter 2/5. Last HD 2/6. Plan for maintenance HD 2/8. Patient to be on a TTS schedule. Given BP and evidence of volume overload ok to c/w IV diuretics for now. Vascular consulted for AVF creation. Labs pending today. Monitor labs and urine output. Avoid nephrotoxins. Dose medications for HD.

## 2024-02-07 NOTE — PROGRESS NOTE ADULT - SUBJECTIVE AND OBJECTIVE BOX
Denton Borjas MD  PGY 1 Department of Internal Medicine        Patient is a 63y old  Female who presents with a chief complaint of AHRF (06 Feb 2024 10:43)      SUBJECTIVE / OVERNIGHT EVENTS: Pt seen and examined. No acute overnight events. Complains of generalized weakness. Denies fevers, chills, CP, SOB, Abdominal pain, NVD        MEDICATIONS  (STANDING):  amLODIPine   Tablet 10 milliGRAM(s) Oral daily  atorvastatin 20 milliGRAM(s) Oral at bedtime  budesonide 160 MICROgram(s)/formoterol 4.5 MICROgram(s) Inhaler 2 Puff(s) Inhalation two times a day  buMETAnide Injectable 2 milliGRAM(s) IV Push every 12 hours  chlorhexidine 2% Cloths 1 Application(s) Topical daily  dextrose 5%. 1000 milliLiter(s) (100 mL/Hr) IV Continuous <Continuous>  dextrose 5%. 1000 milliLiter(s) (50 mL/Hr) IV Continuous <Continuous>  dextrose 50% Injectable 25 Gram(s) IV Push once  dextrose 50% Injectable 25 Gram(s) IV Push once  dextrose 50% Injectable 12.5 Gram(s) IV Push once  epoetin melody (EPOGEN) Injectable 3000 Unit(s) IV Push <User Schedule>  glucagon  Injectable 1 milliGRAM(s) IntraMuscular once  heparin   Injectable 5000 Unit(s) SubCutaneous every 8 hours  insulin glargine Injectable (LANTUS) 24 Unit(s) SubCutaneous at bedtime  insulin lispro (ADMELOG) corrective regimen sliding scale   SubCutaneous at bedtime  insulin lispro (ADMELOG) corrective regimen sliding scale   SubCutaneous three times a day before meals  insulin lispro Injectable (ADMELOG) 8 Unit(s) SubCutaneous three times a day before meals  loratadine 10 milliGRAM(s) Oral daily  montelukast 10 milliGRAM(s) Oral daily  multivitamin 1 Tablet(s) Oral daily  senna 2 Tablet(s) Oral at bedtime    MEDICATIONS  (PRN):  dextrose Oral Gel 15 Gram(s) Oral once PRN Blood Glucose LESS THAN 70 milliGRAM(s)/deciliter  sodium chloride 0.9% Bolus. 100 milliLiter(s) IV Bolus every 5 minutes PRN SBP LESS THAN or EQUAL to 90 mmHg  sodium chloride 0.9% lock flush 10 milliLiter(s) IV Push every 1 hour PRN Pre/post blood products, medications, blood draw, and to maintain line patency      I&O's Summary    06 Feb 2024 07:01  -  07 Feb 2024 07:00  --------------------------------------------------------  IN: 400 mL / OUT: 2400 mL / NET: -2000 mL        Vital Signs Last 24 Hrs  T(C): 36.8 (07 Feb 2024 05:20), Max: 37 (06 Feb 2024 22:10)  T(F): 98.3 (07 Feb 2024 05:20), Max: 98.6 (06 Feb 2024 22:10)  HR: 85 (07 Feb 2024 05:20) (81 - 95)  BP: 145/77 (07 Feb 2024 05:20) (128/78 - 156/79)  BP(mean): --  RR: 17 (07 Feb 2024 05:20) (17 - 18)  SpO2: 100% (07 Feb 2024 05:20) (100% - 100%)    Parameters below as of 07 Feb 2024 05:20  Patient On (Oxygen Delivery Method): room air        CAPILLARY BLOOD GLUCOSE      POCT Blood Glucose.: 365 mg/dL (07 Feb 2024 01:01)  POCT Blood Glucose.: 124 mg/dL (06 Feb 2024 21:23)  POCT Blood Glucose.: 286 mg/dL (06 Feb 2024 18:51)  POCT Blood Glucose.: 241 mg/dL (06 Feb 2024 17:26)  POCT Blood Glucose.: 281 mg/dL (06 Feb 2024 12:15)  POCT Blood Glucose.: 259 mg/dL (06 Feb 2024 08:31)      PHYSICAL EXAM:  GENERAL: NAD, resting comfortably, permacath CDI  HEAD:  Atraumatic, Normocephalic  EYES: EOMI, conjunctiva and sclera clear  CHEST/LUNG: CTAB, no wheezes rales or rhonchi  HEART: Regular rate and rhythm; No murmurs, rubs, or gallops  ABDOMEN: Soft, Nontender, Nondistended; Bowel sounds present  EXTREMITIES:  2+ Peripheral Pulses, No clubbing, cyanosis, trace bilateral lower extremity edema  PSYCH: AAOx3  NEUROLOGY: non-focal  SKIN: No rashes or lesions       LABS:                      RADIOLOGY & ADDITIONAL TESTS:    Imaging Personally Reviewed:    Consultant(s) Notes Reviewed:      Care Discussed with Consultants/Other Providers:

## 2024-02-07 NOTE — PROGRESS NOTE ADULT - PROBLEM SELECTOR PLAN 8
diet: renal diet  dvt ppx: heparin tid  dispo: pending clinical improvement  Bowel regimen: miralax and senna  Code: full  Repletes Mg to 2.0, Phos to 3.0, K to 4.0    RCRI 2. Pt is currently optimized for procedure from medical standpoint

## 2024-02-07 NOTE — PROGRESS NOTE ADULT - ATTENDING COMMENTS
deemed ESRD   s/p PermCath yesterday 2/5  no HD today. next Hd 2/8  vascular on board for fistula ?2/9  started EPO and Iv iron   rest of management per above

## 2024-02-07 NOTE — DIETITIAN INITIAL EVALUATION ADULT - PERTINENT MEDS FT
MEDICATIONS  (STANDING):  amLODIPine   Tablet 10 milliGRAM(s) Oral daily  atorvastatin 20 milliGRAM(s) Oral at bedtime  budesonide 160 MICROgram(s)/formoterol 4.5 MICROgram(s) Inhaler 2 Puff(s) Inhalation two times a day  buMETAnide Injectable 2 milliGRAM(s) IV Push every 12 hours  chlorhexidine 2% Cloths 1 Application(s) Topical daily  dextrose 5%. 1000 milliLiter(s) (100 mL/Hr) IV Continuous <Continuous>  dextrose 5%. 1000 milliLiter(s) (50 mL/Hr) IV Continuous <Continuous>  dextrose 50% Injectable 25 Gram(s) IV Push once  dextrose 50% Injectable 12.5 Gram(s) IV Push once  dextrose 50% Injectable 25 Gram(s) IV Push once  epoetin melody (EPOGEN) Injectable 3000 Unit(s) IV Push <User Schedule>  glucagon  Injectable 1 milliGRAM(s) IntraMuscular once  heparin   Injectable 5000 Unit(s) SubCutaneous every 8 hours  insulin glargine Injectable (LANTUS) 24 Unit(s) SubCutaneous at bedtime  insulin lispro (ADMELOG) corrective regimen sliding scale   SubCutaneous three times a day before meals  insulin lispro (ADMELOG) corrective regimen sliding scale   SubCutaneous at bedtime  insulin lispro Injectable (ADMELOG) 8 Unit(s) SubCutaneous three times a day before meals  loratadine 10 milliGRAM(s) Oral daily  montelukast 10 milliGRAM(s) Oral daily  multivitamin 1 Tablet(s) Oral daily  potassium chloride    Tablet ER 40 milliEquivalent(s) Oral every 4 hours  senna 2 Tablet(s) Oral at bedtime    MEDICATIONS  (PRN):  acetaminophen     Tablet .. 650 milliGRAM(s) Oral every 6 hours PRN Temp greater or equal to 38C (100.4F), Mild Pain (1 - 3), Moderate Pain (4 - 6)  dextrose Oral Gel 15 Gram(s) Oral once PRN Blood Glucose LESS THAN 70 milliGRAM(s)/deciliter  sodium chloride 0.9% Bolus. 100 milliLiter(s) IV Bolus every 5 minutes PRN SBP LESS THAN or EQUAL to 90 mmHg  sodium chloride 0.9% lock flush 10 milliLiter(s) IV Push every 1 hour PRN Pre/post blood products, medications, blood draw, and to maintain line patency

## 2024-02-07 NOTE — PROGRESS NOTE ADULT - SUBJECTIVE AND OBJECTIVE BOX
[facility] Division of Kidney Diseases & Hypertension  FOLLOW UP NOTE  [phone]--------------------------------------------------------------------------------  Chief Complaint: Renal failure on HD    24 hour events/subjective: Pt seen and evaluated bedside this morning. Pt endorses Fatigue and poor appetite. Denies any headaches, nausea, vomiting, fevers/chills, chest pain, palpitations, SOB, abdominal pain, and leg swelling.      PAST HISTORY  --------------------------------------------------------------------------------  No significant changes to PMH, PSH, FHx, SHx, unless otherwise noted    ALLERGIES & MEDICATIONS  --------------------------------------------------------------------------------  Allergies    No Known Allergies    Intolerances      Standing Inpatient Medications  amLODIPine   Tablet 10 milliGRAM(s) Oral daily  atorvastatin 20 milliGRAM(s) Oral at bedtime  budesonide 160 MICROgram(s)/formoterol 4.5 MICROgram(s) Inhaler 2 Puff(s) Inhalation two times a day  buMETAnide Injectable 2 milliGRAM(s) IV Push every 12 hours  chlorhexidine 2% Cloths 1 Application(s) Topical daily  dextrose 5%. 1000 milliLiter(s) IV Continuous <Continuous>  dextrose 5%. 1000 milliLiter(s) IV Continuous <Continuous>  dextrose 50% Injectable 25 Gram(s) IV Push once  dextrose 50% Injectable 25 Gram(s) IV Push once  dextrose 50% Injectable 12.5 Gram(s) IV Push once  epoetin melody (EPOGEN) Injectable 3000 Unit(s) IV Push <User Schedule>  glucagon  Injectable 1 milliGRAM(s) IntraMuscular once  heparin   Injectable 5000 Unit(s) SubCutaneous every 8 hours  insulin glargine Injectable (LANTUS) 24 Unit(s) SubCutaneous at bedtime  insulin lispro (ADMELOG) corrective regimen sliding scale   SubCutaneous at bedtime  insulin lispro (ADMELOG) corrective regimen sliding scale   SubCutaneous three times a day before meals  insulin lispro Injectable (ADMELOG) 8 Unit(s) SubCutaneous three times a day before meals  loratadine 10 milliGRAM(s) Oral daily  montelukast 10 milliGRAM(s) Oral daily  multivitamin 1 Tablet(s) Oral daily  potassium chloride    Tablet ER 40 milliEquivalent(s) Oral every 4 hours  senna 2 Tablet(s) Oral at bedtime    PRN Inpatient Medications  acetaminophen     Tablet .. 650 milliGRAM(s) Oral every 6 hours PRN  dextrose Oral Gel 15 Gram(s) Oral once PRN  sodium chloride 0.9% Bolus. 100 milliLiter(s) IV Bolus every 5 minutes PRN  sodium chloride 0.9% lock flush 10 milliLiter(s) IV Push every 1 hour PRN      REVIEW OF SYSTEMS  --------------------------------------------------------------------------------  Gen: +fatigue, +poor appetite, no fever  Respiratory: no sob  CV: no cp  GI: no pain, no n/v  : no complaints  MSK: no pain    VITALS/PHYSICAL EXAM  --------------------------------------------------------------------------------  T(C): 36.8 (02-07-24 @ 05:20), Max: 37 (02-06-24 @ 22:10)  HR: 85 (02-07-24 @ 05:20) (81 - 95)  BP: 145/77 (02-07-24 @ 05:20) (128/78 - 156/79)  RR: 17 (02-07-24 @ 05:20) (17 - 18)  SpO2: 100% (02-07-24 @ 05:20) (100% - 100%)  Wt(kg): --    02-06-24 @ 07:01  -  02-07-24 @ 07:00  --------------------------------------------------------  IN: 400 mL / OUT: 2400 mL / NET: -2000 mL      Physical Exam:  Gen NAD  HEENT: no JVD  Pulm: rales at bases   CV: S1S2,  Abd: Soft,   Ext:  +trace edema B/L LE   Neuro: Awake and alert  Skin: Warm and dry  Vascular: RIJ tunneled HD catheter site is CDI    LABS/STUDIES  --------------------------------------------------------------------------------              9.3    16.91 >-----------<  313      [02-07-24 @ 06:06]              28.2     136  |  99  |  19  ----------------------------<  157      [02-07-24 @ 06:06]  3.2   |  24  |  3.16        Ca     7.7     [02-07-24 @ 06:06]      Mg     1.90     [02-07-24 @ 06:06]      Phos  3.5     [02-07-24 @ 06:06]    Creatinine Trend:  SCr 3.16 [02-07 @ 06:06]  SCr 4.20 [02-05 @ 04:34]  SCr 3.13 [02-04 @ 08:07]  SCr 3.51 [02-03 @ 06:25]  SCr 4.43 [02-02 @ 07:00]    Urine Creatinine 78      [01-31-24 @ 15:55]  Urine Protein 1723      [01-31-24 @ 15:55]  Urine Sodium <20      [01-31-24 @ 15:55]  Urine Urea Nitrogen 381.0      [01-31-24 @ 15:55]  Urine Osmolality 348      [01-31-24 @ 15:55]    Iron 44, TIBC 221, %sat 20      [02-01-24 @ 05:36]  Ferritin 276      [02-01-24 @ 05:36]    HBsAb <3.0      [02-01-24 @ 16:30]  HBsAg Nonreact      [02-01-24 @ 16:30]  HBcAb Nonreact      [02-01-24 @ 16:30]  HCV 0.10, Nonreact      [02-01-24 @ 16:30]

## 2024-02-07 NOTE — DIETITIAN INITIAL EVALUATION ADULT - NS FNS DIET ORDER
Diet, DASH/TLC:   Sodium & Cholesterol Restricted  Consistent Carbohydrate {Evening Snack} (CSTCHOSN)  For patients receiving Renal Replacement - No Protein Restr, No Conc K, No Conc Phos, Low Sodium (RENAL)  Halal  Supplement Feeding Modality:  Oral  Glucerna Shake Cans or Servings Per Day:  1       Frequency:  Three Times a day  Nepro Cans or Servings Per Day:  1       Frequency:  Daily (02-07-24 @ 14:08)

## 2024-02-07 NOTE — DIETITIAN INITIAL EVALUATION ADULT - PERTINENT LABORATORY DATA
02-07    136  |  99  |  19  ----------------------------<  157<H>  3.2<L>   |  24  |  3.16<H>    Ca    7.7<L>      07 Feb 2024 06:06  Phos  3.5     02-07  Mg     1.90     02-07    POCT Blood Glucose.: 146 mg/dL (02-07-24 @ 11:55)  A1C with Estimated Average Glucose Result: 6.5 % (02-01-24 @ 05:36)  A1C with Estimated Average Glucose Result: 6.1 % (01-31-24 @ 07:37)  A1C with Estimated Average Glucose Result: 6.6 % (10-18-23 @ 06:18)

## 2024-02-07 NOTE — PROGRESS NOTE ADULT - PROBLEM SELECTOR PLAN 3
Hypokalemia noted. Likely diuretic induced. Please replete today. Plan for HD with high K bath. Monitor labs.     Final recommendations pending attending signature.    If you have any questions, please feel free to contact me  Bright Paulino  Nephrology Fellow  [employer]/Page [phone]  (After 5pm or on weekends please page the on-call fellow)

## 2024-02-07 NOTE — PROGRESS NOTE ADULT - PROBLEM SELECTOR PLAN 3
CXR with perihilar opacities, likely cardiogenic. BNP elevated. No formal diagnosis of CHF. Chronic BLE edema per daughter. Prior echo 10/23 with EF 61%, repeat 63% with mild diastolic dysfunction  -s/p IV Lasix 40mg, c/w IV Bumex 2mg BID  -goal diuresis 1-1.5L daily

## 2024-02-07 NOTE — DIETITIAN INITIAL EVALUATION ADULT - PROBLEM SELECTOR PLAN 9
Hgb 8.9 on admission, baseline 9-10. Likely iso CKD. Low suspicion for any source of bleeding  -trend cbc  -f/u iron studies, b12, folate

## 2024-02-07 NOTE — CHART NOTE - NSCHARTNOTEFT_GEN_A_CORE
-Plan for LUE AVF creation, possible graft, possible right, on 2/9.  -Pending consent.  -Please protect LUE: no IVs, blood draws, or blood pressure cuffs.  -Please obtain bilateral upper extremity vein mapping.  -Please document medical and cardiac clearance for AVF creation in OR.  -Care per primary.    Discussed with vascular surgery fellow on behalf of Dr. Dale.    C Team/Vascular Surgery  Please page [phone] for all questions

## 2024-02-07 NOTE — CONSULT NOTE ADULT - SUBJECTIVE AND OBJECTIVE BOX
Rakan Ramirez MD  Interventional Cardiology / Advance Heart Failure and Cardiac Transplant Specialist  [place] Office : [address] [place] [address]  Tel:   [place] Office : [address] [facility] [address]  Tel: [phone]      HISTORY OF PRESENTING ILLNESS:  62yo female with a hx of T2DM, asthma, CKD4, HTN, and HLD here for dyspnea and wheezing that began the evening prior to presentation, admitted for AHRF likely iso volume overload iso of ESRD now on HD. Cardiology consulted for pre-op assessment prior to AVF creation.     	  MEDICATIONS:  amLODIPine   Tablet 10 milliGRAM(s) Oral daily  buMETAnide Injectable 2 milliGRAM(s) IV Push every 12 hours  heparin   Injectable 5000 Unit(s) SubCutaneous every 8 hours      budesonide 160 MICROgram(s)/formoterol 4.5 MICROgram(s) Inhaler 2 Puff(s) Inhalation two times a day  loratadine 10 milliGRAM(s) Oral daily  montelukast 10 milliGRAM(s) Oral daily    acetaminophen     Tablet .. 650 milliGRAM(s) Oral every 6 hours PRN    senna 2 Tablet(s) Oral at bedtime    atorvastatin 20 milliGRAM(s) Oral at bedtime  dextrose 50% Injectable 12.5 Gram(s) IV Push once  dextrose 50% Injectable 25 Gram(s) IV Push once  dextrose 50% Injectable 25 Gram(s) IV Push once  dextrose Oral Gel 15 Gram(s) Oral once PRN  glucagon  Injectable 1 milliGRAM(s) IntraMuscular once  insulin glargine Injectable (LANTUS) 24 Unit(s) SubCutaneous at bedtime  insulin lispro (ADMELOG) corrective regimen sliding scale   SubCutaneous at bedtime  insulin lispro (ADMELOG) corrective regimen sliding scale   SubCutaneous three times a day before meals  insulin lispro Injectable (ADMELOG) 8 Unit(s) SubCutaneous three times a day before meals    chlorhexidine 2% Cloths 1 Application(s) Topical daily  dextrose 5%. 1000 milliLiter(s) IV Continuous <Continuous>  dextrose 5%. 1000 milliLiter(s) IV Continuous <Continuous>  epoetin melody (EPOGEN) Injectable 3000 Unit(s) IV Push <User Schedule>  multivitamin 1 Tablet(s) Oral daily  potassium chloride    Tablet ER 40 milliEquivalent(s) Oral every 4 hours  sodium chloride 0.9% Bolus. 100 milliLiter(s) IV Bolus every 5 minutes PRN  sodium chloride 0.9% lock flush 10 milliLiter(s) IV Push every 1 hour PRN      PAST MEDICAL/SURGICAL HISTORY  PAST MEDICAL & SURGICAL HISTORY:  Type 2 diabetes mellitus      Stage 4 chronic kidney disease      HTN (hypertension)      HLD (hyperlipidemia)      No significant past surgical history          SOCIAL HISTORY: Substance Use (street drugs): ( x ) never used  (  ) other:    FAMILY HISTORY:  FH: type 2 diabetes mellitus (Mother)        REVIEW OF SYSTEMS:  CONSTITUTIONAL: No fever, weight loss, or fatigue  EYES: No eye pain, visual disturbances, or discharge  ENMT:  No difficulty hearing, tinnitus, vertigo; No sinus or throat pain  BREASTS: No pain, masses, or nipple discharge  GASTROINTESTINAL: No abdominal or epigastric pain. No nausea, vomiting, or hematemesis; No diarrhea or constipation. No melena or hematochezia.  GENITOURINARY: No dysuria, frequency, hematuria, or incontinence  NEUROLOGICAL: No headaches, memory loss, loss of strength, numbness, or tremors  ENDOCRINE: No heat or cold intolerance; No hair loss  MUSCULOSKELETAL: No joint pain or swelling; No muscle, back, or extremity pain  PSYCHIATRIC: No depression, anxiety, mood swings, or difficulty sleeping  HEME/LYMPH: No easy bruising, or bleeding gums  All others negative    PHYSICAL EXAM:  T(C): 36.5 (02-07-24 @ 12:51), Max: 37 (02-06-24 @ 22:10)  HR: 87 (02-07-24 @ 12:51) (85 - 95)  BP: 121/61 (02-07-24 @ 12:51) (121/61 - 156/79)  RR: 16 (02-07-24 @ 12:51) (16 - 18)  SpO2: 100% (02-07-24 @ 12:51) (100% - 100%)  Wt(kg): --  I&O's Summary    06 Feb 2024 07:01  -  07 Feb 2024 07:00  --------------------------------------------------------  IN: 400 mL / OUT: 2400 mL / NET: -2000 mL          GENERAL: NAD  EYES:  conjunctiva and sclera clear  ENMT: No tonsillar erythema, exudates, or enlargement  Cardiovascular: Normal S1 S2, No JVD, No murmurs, No edema  Respiratory: Lungs clear to auscultation	  Gastrointestinal:  Soft  Extremities: No edema                                     9.3    16.91 )-----------( 313      ( 07 Feb 2024 06:06 )             28.2     02-07    136  |  99  |  19  ----------------------------<  157<H>  3.2<L>   |  24  |  3.16<H>    Ca    7.7<L>      07 Feb 2024 06:06  Phos  3.5     02-07  Mg     1.90     02-07      proBNP:   Lipid Profile:   HgA1c:   TSH:     Consultant(s) Notes Reviewed:  [x ] YES  [ ] NO    Care Discussed with Consultants/Other Providers [ x] YES  [ ] NO    Imaging Personally Reviewed independently:  [x] YES  [ ] NO    All labs, radiologic studies, vitals, orders and medications list reviewed. Patient is seen and examined at bedside. Case discussed with medical team.         Rakan Ramirez MD  Interventional Cardiology / Advance Heart Failure and Cardiac Transplant Specialist  [place] Office : [phone] [place] [address]  Tel:   [place] Office : [address] [facility] [address]  Tel: [phone]      HISTORY OF PRESENTING ILLNESS:  64yo female with a hx of T2DM, asthma, CKD4, HTN, and HLD here for dyspnea and wheezing that began the evening prior to presentation, admitted for AHRF likely iso volume overload iso of ESRD now on HD. Cardiology consulted for pre-op assessment prior to AVF creation. Denies chest pain, SOB or palpitations    	  MEDICATIONS:  amLODIPine   Tablet 10 milliGRAM(s) Oral daily  buMETAnide Injectable 2 milliGRAM(s) IV Push every 12 hours  heparin   Injectable 5000 Unit(s) SubCutaneous every 8 hours      budesonide 160 MICROgram(s)/formoterol 4.5 MICROgram(s) Inhaler 2 Puff(s) Inhalation two times a day  loratadine 10 milliGRAM(s) Oral daily  montelukast 10 milliGRAM(s) Oral daily    acetaminophen     Tablet .. 650 milliGRAM(s) Oral every 6 hours PRN    senna 2 Tablet(s) Oral at bedtime    atorvastatin 20 milliGRAM(s) Oral at bedtime  dextrose 50% Injectable 12.5 Gram(s) IV Push once  dextrose 50% Injectable 25 Gram(s) IV Push once  dextrose 50% Injectable 25 Gram(s) IV Push once  dextrose Oral Gel 15 Gram(s) Oral once PRN  glucagon  Injectable 1 milliGRAM(s) IntraMuscular once  insulin glargine Injectable (LANTUS) 24 Unit(s) SubCutaneous at bedtime  insulin lispro (ADMELOG) corrective regimen sliding scale   SubCutaneous at bedtime  insulin lispro (ADMELOG) corrective regimen sliding scale   SubCutaneous three times a day before meals  insulin lispro Injectable (ADMELOG) 8 Unit(s) SubCutaneous three times a day before meals    chlorhexidine 2% Cloths 1 Application(s) Topical daily  dextrose 5%. 1000 milliLiter(s) IV Continuous <Continuous>  dextrose 5%. 1000 milliLiter(s) IV Continuous <Continuous>  epoetin melody (EPOGEN) Injectable 3000 Unit(s) IV Push <User Schedule>  multivitamin 1 Tablet(s) Oral daily  potassium chloride    Tablet ER 40 milliEquivalent(s) Oral every 4 hours  sodium chloride 0.9% Bolus. 100 milliLiter(s) IV Bolus every 5 minutes PRN  sodium chloride 0.9% lock flush 10 milliLiter(s) IV Push every 1 hour PRN      PAST MEDICAL/SURGICAL HISTORY  PAST MEDICAL & SURGICAL HISTORY:  Type 2 diabetes mellitus      Stage 4 chronic kidney disease      HTN (hypertension)      HLD (hyperlipidemia)      No significant past surgical history          SOCIAL HISTORY: Substance Use (street drugs): ( x ) never used  (  ) other:    FAMILY HISTORY:  FH: type 2 diabetes mellitus (Mother)        REVIEW OF SYSTEMS:  CONSTITUTIONAL: No fever, weight loss, or fatigue  EYES: No eye pain, visual disturbances, or discharge  ENMT:  No difficulty hearing, tinnitus, vertigo; No sinus or throat pain  BREASTS: No pain, masses, or nipple discharge  GASTROINTESTINAL: No abdominal or epigastric pain. No nausea, vomiting, or hematemesis; No diarrhea or constipation. No melena or hematochezia.  GENITOURINARY: No dysuria, frequency, hematuria, or incontinence  NEUROLOGICAL: No headaches, memory loss, loss of strength, numbness, or tremors  ENDOCRINE: No heat or cold intolerance; No hair loss  MUSCULOSKELETAL: No joint pain or swelling; No muscle, back, or extremity pain  PSYCHIATRIC: No depression, anxiety, mood swings, or difficulty sleeping  HEME/LYMPH: No easy bruising, or bleeding gums  All others negative    PHYSICAL EXAM:  T(C): 36.5 (02-07-24 @ 12:51), Max: 37 (02-06-24 @ 22:10)  HR: 87 (02-07-24 @ 12:51) (85 - 95)  BP: 121/61 (02-07-24 @ 12:51) (121/61 - 156/79)  RR: 16 (02-07-24 @ 12:51) (16 - 18)  SpO2: 100% (02-07-24 @ 12:51) (100% - 100%)  Wt(kg): --  I&O's Summary    06 Feb 2024 07:01  -  07 Feb 2024 07:00  --------------------------------------------------------  IN: 400 mL / OUT: 2400 mL / NET: -2000 mL          GENERAL: NAD  EYES:  conjunctiva and sclera clear  ENMT: No tonsillar erythema, exudates, or enlargement  Cardiovascular: Normal S1 S2, No JVD, No murmurs, No edema  Respiratory: Lungs clear to auscultation	  Gastrointestinal:  Soft  Extremities: No edema                                     9.3    16.91 )-----------( 313      ( 07 Feb 2024 06:06 )             28.2     02-07    136  |  99  |  19  ----------------------------<  157<H>  3.2<L>   |  24  |  3.16<H>    Ca    7.7<L>      07 Feb 2024 06:06  Phos  3.5     02-07  Mg     1.90     02-07      proBNP:   Lipid Profile:   HgA1c:   TSH:     Consultant(s) Notes Reviewed:  [x ] YES  [ ] NO    Care Discussed with Consultants/Other Providers [ x] YES  [ ] NO    Imaging Personally Reviewed independently:  [x] YES  [ ] NO    All labs, radiologic studies, vitals, orders and medications list reviewed. Patient is seen and examined at bedside. Case discussed with medical team.

## 2024-02-07 NOTE — PROGRESS NOTE ADULT - PROBLEM SELECTOR PLAN 1
BUN/Cr 49/5.13 (baseline Cr ~3.3). Dialysis initiated during this admission with improvement  US Kidney/bladder with echogenic lesion in the superior pole of right kidney vs right adrenal gland, CT recommended for further evaluation  -Nephrology consulted, appreciate recs. s/p dialysis session x3, continue per nephro   -s/p permacath placement 2/5. vascular consulted for avf planning, f/u bilateral upper extremity vein mapping  -trend bmp, UOP, I&Os  -c/w bumex IV bid  -can f/u lesion as outpatient

## 2024-02-08 ENCOUNTER — TRANSCRIPTION ENCOUNTER (OUTPATIENT)
Age: 64
End: 2024-02-08

## 2024-02-08 LAB
ANION GAP SERPL CALC-SCNC: 15 MMOL/L — HIGH (ref 7–14)
BUN SERPL-MCNC: 33 MG/DL — HIGH (ref 7–23)
CALCIUM SERPL-MCNC: 7.6 MG/DL — LOW (ref 8.4–10.5)
CHLORIDE SERPL-SCNC: 105 MMOL/L — SIGNIFICANT CHANGE UP (ref 98–107)
CO2 SERPL-SCNC: 17 MMOL/L — LOW (ref 22–31)
CREAT SERPL-MCNC: 4.34 MG/DL — HIGH (ref 0.5–1.3)
EGFR: 11 ML/MIN/1.73M2 — LOW
GLUCOSE BLDC GLUCOMTR-MCNC: 169 MG/DL — HIGH (ref 70–99)
GLUCOSE BLDC GLUCOMTR-MCNC: 195 MG/DL — HIGH (ref 70–99)
GLUCOSE BLDC GLUCOMTR-MCNC: 255 MG/DL — HIGH (ref 70–99)
GLUCOSE BLDC GLUCOMTR-MCNC: 266 MG/DL — HIGH (ref 70–99)
GLUCOSE BLDC GLUCOMTR-MCNC: 307 MG/DL — HIGH (ref 70–99)
GLUCOSE BLDC GLUCOMTR-MCNC: 324 MG/DL — HIGH (ref 70–99)
GLUCOSE SERPL-MCNC: 235 MG/DL — HIGH (ref 70–99)
HCT VFR BLD CALC: 27.7 % — LOW (ref 34.5–45)
HGB BLD-MCNC: 9 G/DL — LOW (ref 11.5–15.5)
MAGNESIUM SERPL-MCNC: 2.1 MG/DL — SIGNIFICANT CHANGE UP (ref 1.6–2.6)
MCHC RBC-ENTMCNC: 28.8 PG — SIGNIFICANT CHANGE UP (ref 27–34)
MCHC RBC-ENTMCNC: 32.5 GM/DL — SIGNIFICANT CHANGE UP (ref 32–36)
MCV RBC AUTO: 88.5 FL — SIGNIFICANT CHANGE UP (ref 80–100)
NRBC # BLD: 0 /100 WBCS — SIGNIFICANT CHANGE UP (ref 0–0)
NRBC # FLD: 0 K/UL — SIGNIFICANT CHANGE UP (ref 0–0)
PHOSPHATE SERPL-MCNC: 4.1 MG/DL — SIGNIFICANT CHANGE UP (ref 2.5–4.5)
PLATELET # BLD AUTO: 305 K/UL — SIGNIFICANT CHANGE UP (ref 150–400)
POTASSIUM SERPL-MCNC: 4.3 MMOL/L — SIGNIFICANT CHANGE UP (ref 3.5–5.3)
POTASSIUM SERPL-SCNC: 4.3 MMOL/L — SIGNIFICANT CHANGE UP (ref 3.5–5.3)
RBC # BLD: 3.13 M/UL — LOW (ref 3.8–5.2)
RBC # FLD: 15 % — HIGH (ref 10.3–14.5)
SODIUM SERPL-SCNC: 137 MMOL/L — SIGNIFICANT CHANGE UP (ref 135–145)
WBC # BLD: 17.65 K/UL — HIGH (ref 3.8–10.5)
WBC # FLD AUTO: 17.65 K/UL — HIGH (ref 3.8–10.5)

## 2024-02-08 PROCEDURE — 99233 SBSQ HOSP IP/OBS HIGH 50: CPT

## 2024-02-08 PROCEDURE — 99232 SBSQ HOSP IP/OBS MODERATE 35: CPT | Mod: GC

## 2024-02-08 RX ORDER — INSULIN LISPRO 100/ML
9 VIAL (ML) SUBCUTANEOUS
Refills: 0 | Status: DISCONTINUED | OUTPATIENT
Start: 2024-02-08 | End: 2024-02-09

## 2024-02-08 RX ORDER — INSULIN GLARGINE 100 [IU]/ML
30 INJECTION, SOLUTION SUBCUTANEOUS AT BEDTIME
Refills: 0 | Status: DISCONTINUED | OUTPATIENT
Start: 2024-02-08 | End: 2024-02-10

## 2024-02-08 RX ORDER — INSULIN LISPRO 100/ML
VIAL (ML) SUBCUTANEOUS EVERY 6 HOURS
Refills: 0 | Status: DISCONTINUED | OUTPATIENT
Start: 2024-02-08 | End: 2024-02-09

## 2024-02-08 RX ADMIN — MONTELUKAST 10 MILLIGRAM(S): 4 TABLET, CHEWABLE ORAL at 12:25

## 2024-02-08 RX ADMIN — Medication 1 TABLET(S): at 12:25

## 2024-02-08 RX ADMIN — ERYTHROPOIETIN 3000 UNIT(S): 10000 INJECTION, SOLUTION INTRAVENOUS; SUBCUTANEOUS at 14:40

## 2024-02-08 RX ADMIN — Medication 9 UNIT(S): at 18:11

## 2024-02-08 RX ADMIN — HEPARIN SODIUM 5000 UNIT(S): 5000 INJECTION INTRAVENOUS; SUBCUTANEOUS at 18:04

## 2024-02-08 RX ADMIN — HEPARIN SODIUM 5000 UNIT(S): 5000 INJECTION INTRAVENOUS; SUBCUTANEOUS at 22:02

## 2024-02-08 RX ADMIN — LORATADINE 10 MILLIGRAM(S): 10 TABLET ORAL at 12:25

## 2024-02-08 RX ADMIN — INSULIN GLARGINE 30 UNIT(S): 100 INJECTION, SOLUTION SUBCUTANEOUS at 22:02

## 2024-02-08 RX ADMIN — HEPARIN SODIUM 5000 UNIT(S): 5000 INJECTION INTRAVENOUS; SUBCUTANEOUS at 06:28

## 2024-02-08 RX ADMIN — Medication 2: at 18:10

## 2024-02-08 RX ADMIN — CHLORHEXIDINE GLUCONATE 1 APPLICATION(S): 213 SOLUTION TOPICAL at 12:25

## 2024-02-08 RX ADMIN — SENNA PLUS 2 TABLET(S): 8.6 TABLET ORAL at 22:01

## 2024-02-08 RX ADMIN — AMLODIPINE BESYLATE 10 MILLIGRAM(S): 2.5 TABLET ORAL at 06:28

## 2024-02-08 RX ADMIN — Medication 4: at 22:01

## 2024-02-08 RX ADMIN — Medication 8 UNIT(S): at 12:32

## 2024-02-08 RX ADMIN — Medication 8: at 12:33

## 2024-02-08 RX ADMIN — ATORVASTATIN CALCIUM 20 MILLIGRAM(S): 80 TABLET, FILM COATED ORAL at 22:01

## 2024-02-08 RX ADMIN — Medication 8 UNIT(S): at 08:51

## 2024-02-08 RX ADMIN — BUDESONIDE AND FORMOTEROL FUMARATE DIHYDRATE 2 PUFF(S): 160; 4.5 AEROSOL RESPIRATORY (INHALATION) at 22:03

## 2024-02-08 RX ADMIN — BUMETANIDE 2 MILLIGRAM(S): 0.25 INJECTION INTRAMUSCULAR; INTRAVENOUS at 06:29

## 2024-02-08 RX ADMIN — Medication 6: at 08:52

## 2024-02-08 RX ADMIN — BUMETANIDE 2 MILLIGRAM(S): 0.25 INJECTION INTRAMUSCULAR; INTRAVENOUS at 18:04

## 2024-02-08 RX ADMIN — BUDESONIDE AND FORMOTEROL FUMARATE DIHYDRATE 2 PUFF(S): 160; 4.5 AEROSOL RESPIRATORY (INHALATION) at 08:52

## 2024-02-08 NOTE — PROGRESS NOTE ADULT - PROBLEM SELECTOR PLAN 1
BUN/Cr 49/5.13 (baseline Cr ~3.3). Dialysis initiated during this admission with improvement  US Kidney/bladder with echogenic lesion in the superior pole of right kidney vs right adrenal gland, CT recommended for further evaluation  -Nephrology consulted, appreciate recs. continue dialysis per nephro  -s/p permacath placement 2/5. vascular consulted for avf planning, tentatively scheduled 2/9  -trend bmp, UOP, I&Os  -c/w bumex IV bid  -can f/u lesion as outpatient BUN/Cr 49/5.13 on admission (baseline Cr ~3.3). Dialysis initiated during this admission with improvement  US Kidney/bladder with echogenic lesion in the superior pole of right kidney vs right adrenal gland, CT recommended for further evaluation  -Nephrology consulted, appreciate recs. continue dialysis per nephro  -s/p permacath placement 2/5. vascular consulted for avf planning, tentatively scheduled 2/9  -trend bmp, UOP, I&Os  -c/w bumex IV bid  -can f/u lesion as outpatient BUN/Cr 49/5.13 on admission (baseline Cr ~3.3). Dialysis initiated during this admission with improvement  US Kidney/bladder with echogenic lesion in the superior pole of right kidney vs right adrenal gland, CT recommended for further evaluation  -Nephrology consulted, appreciate recs. continue dialysis per nephro  -s/p permacath placement 2/5. vascular consulted for avf planning, tentatively scheduled 2/9 however family wishes to pursue as outpatient, will discuss risk of delaying with family  -trend bmp, UOP, I&Os  -c/w bumex IV bid  -can f/u lesion as outpatient

## 2024-02-08 NOTE — PROGRESS NOTE ADULT - ASSESSMENT
64yo female with a hx of T2DM, asthma, CKD4, HTN, and HLD here for dyspnea and wheezing that began the evening prior to presentation, admitted for AHRF likely iso volume overload iso of ESRD now on HD    EKG: ST LV[facility] PVCs      1. Pre-op assessment  -denies CP, SOB  -EKG: ST LV[facility] PVCs  -TTE normal LV systolic function, mild diastolic dysfunction . moderate AS  -optimized from cardiac standpoint for AVF creation, moderate risk for MACE    2. Aortic stenosis  -TTE with peak transaortic velocity is 2.59 m/s, peak transaortic gradient is 26.8 mmHg and mean transaortic gradient is 15.0 mmHg with an LVOT/aortic valve VTI ratio of 0.30. Probable moderate aortic stenosis  -will need routine follow up OP, repeat TTE in 6 months    3. ESRD  -on new HD this admission  -f/u renal     4. HTN  -controlled  -c.w norvasc  -continue to monitor BP     5. DVT Prophylaxis  -hep subq

## 2024-02-08 NOTE — PROGRESS NOTE ADULT - PROBLEM SELECTOR PLAN 2
RESOLVED. p/w 2 days dyspnea, wheezing, generalized weakness, myalgias, and chest tightness. Required BiPAP for increased WOB, now titrated to 3L NC. CXR with perihilar opacities. Pt reports improving sx with tx received in ED. Likely exacerbation of CHF 2/2 underlying URI. asthma exacerbation and PNA lower on differential  -Goal SpO2 >92%, off supplemental O2  -volume overload and asthma exacerbation management per below

## 2024-02-08 NOTE — PROGRESS NOTE ADULT - PROBLEM SELECTOR PLAN 6
Hgb 8.9 on admission, baseline 9-10. Likely iso CKD. Low suspicion for any source of bleeding  -trend cbc  -s/p iv venofer (2/2 - 2/7)  -c/w epo

## 2024-02-08 NOTE — PROGRESS NOTE ADULT - PROBLEM SELECTOR PLAN 2
Anemia noted. Tsat 20%, Ferritin wnl at 276. s/p 5 day course of venofer. c/w lynne w/ HD. monitor cbc.

## 2024-02-08 NOTE — PROGRESS NOTE ADULT - SUBJECTIVE AND OBJECTIVE BOX
[facility] Division of Kidney Diseases & Hypertension  FOLLOW UP NOTE  [phone]--------------------------------------------------------------------------------  Chief Complaint: Shortness of breath    24 hour events/subjective: Pt seen and evaluated bedside this morning. Reports fatigue and poor appetite. Denies any headaches, nausea, vomiting, fevers/chills, chest pain, SOB, abdominal pain, and leg swelling.    PAST HISTORY  --------------------------------------------------------------------------------  No significant changes to PMH, PSH, FHx, SHx, unless otherwise noted    ALLERGIES & MEDICATIONS  --------------------------------------------------------------------------------  Allergies  No Known Allergies  Intolerances    Standing Inpatient Medications  amLODIPine   Tablet 10 milliGRAM(s) Oral daily  atorvastatin 20 milliGRAM(s) Oral at bedtime  budesonide 160 MICROgram(s)/formoterol 4.5 MICROgram(s) Inhaler 2 Puff(s) Inhalation two times a day  buMETAnide Injectable 2 milliGRAM(s) IV Push every 12 hours  chlorhexidine 2% Cloths 1 Application(s) Topical daily  dextrose 5%. 1000 milliLiter(s) IV Continuous <Continuous>  dextrose 5%. 1000 milliLiter(s) IV Continuous <Continuous>  dextrose 50% Injectable 25 Gram(s) IV Push once  dextrose 50% Injectable 12.5 Gram(s) IV Push once  dextrose 50% Injectable 25 Gram(s) IV Push once  epoetin melody (EPOGEN) Injectable 3000 Unit(s) IV Push <User Schedule>  glucagon  Injectable 1 milliGRAM(s) IntraMuscular once  heparin   Injectable 5000 Unit(s) SubCutaneous every 8 hours  insulin glargine Injectable (LANTUS) 27 Unit(s) SubCutaneous at bedtime  insulin lispro (ADMELOG) corrective regimen sliding scale   SubCutaneous three times a day before meals  insulin lispro (ADMELOG) corrective regimen sliding scale   SubCutaneous at bedtime  insulin lispro Injectable (ADMELOG) 9 Unit(s) SubCutaneous three times a day before meals  loratadine 10 milliGRAM(s) Oral daily  montelukast 10 milliGRAM(s) Oral daily  multivitamin 1 Tablet(s) Oral daily  senna 2 Tablet(s) Oral at bedtime    PRN Inpatient Medications  dextrose Oral Gel 15 Gram(s) Oral once PRN  sodium chloride 0.9% Bolus. 100 milliLiter(s) IV Bolus every 5 minutes PRN  sodium chloride 0.9% lock flush 10 milliLiter(s) IV Push every 1 hour PRN    REVIEW OF SYSTEMS  --------------------------------------------------------------------------------  Gen: +fatigue, no fever  Respiratory: no sob  CV: no cp  GI: no pain, no n/v  : no complaints  MSK: no pain    VITALS/PHYSICAL EXAM  --------------------------------------------------------------------------------  T(C): 36.6 (02-08-24 @ 12:45), Max: 36.8 (02-07-24 @ 18:13)  HR: 87 (02-08-24 @ 12:45) (87 - 96)  BP: 134/60 (02-08-24 @ 12:45) (116/81 - 149/67)  RR: 16 (02-08-24 @ 12:45) (16 - 17)  SpO2: 99% (02-08-24 @ 12:45) (98% - 100%)  Wt(kg): --    Physical Exam:  Gen NAD  HEENT: no JVD  Pulm: rales at bases   CV: S1S2,  Abd: Soft,   Ext: +trace edema B/L LE   Neuro: Awake and alert  Skin: Warm and dry  Vascular: RIJ tunneled HD catheter site is CDI    LABS/STUDIES  --------------------------------------------------------------------------------              9.0    17.65 >-----------<  305      [02-08-24 @ 06:16]              27.7     137  |  105  |  33  ----------------------------<  235      [02-08-24 @ 06:43]  4.3   |  17  |  4.34        Ca     7.6     [02-08-24 @ 06:43]      Mg     2.10     [02-08-24 @ 06:43]      Phos  4.1     [02-08-24 @ 06:43]    Creatinine Trend:  SCr 4.34 [02-08 @ 06:43]  SCr 3.16 [02-07 @ 06:06]  SCr 4.20 [02-05 @ 04:34]  SCr 3.13 [02-04 @ 08:07]  SCr 3.51 [02-03 @ 06:25]    HBsAb <3.0      [02-01-24 @ 16:30]  HBsAg Nonreact      [02-01-24 @ 16:30]  HBcAb Nonreact      [02-01-24 @ 16:30]  HCV 0.10, Nonreact      [02-01-24 @ 16:30]

## 2024-02-08 NOTE — PROGRESS NOTE ADULT - SUBJECTIVE AND OBJECTIVE BOX
Rkaan Ramirez MD  Interventional Cardiology / Advance Heart Failure and Cardiac Transplant Specialist  [place] Office : [address] [place] [id number]  Tel:   [place] Office : [address] [facility] [address]  Tel: [phone]      Subjective/Overnight events: Pt is lying in bed not in distress,  	  MEDICATIONS:  amLODIPine   Tablet 10 milliGRAM(s) Oral daily  buMETAnide Injectable 2 milliGRAM(s) IV Push every 12 hours  heparin   Injectable 5000 Unit(s) SubCutaneous every 8 hours      budesonide 160 MICROgram(s)/formoterol 4.5 MICROgram(s) Inhaler 2 Puff(s) Inhalation two times a day  loratadine 10 milliGRAM(s) Oral daily  montelukast 10 milliGRAM(s) Oral daily      senna 2 Tablet(s) Oral at bedtime    atorvastatin 20 milliGRAM(s) Oral at bedtime  dextrose 50% Injectable 25 Gram(s) IV Push once  dextrose 50% Injectable 12.5 Gram(s) IV Push once  dextrose 50% Injectable 25 Gram(s) IV Push once  dextrose Oral Gel 15 Gram(s) Oral once PRN  glucagon  Injectable 1 milliGRAM(s) IntraMuscular once  insulin glargine Injectable (LANTUS) 30 Unit(s) SubCutaneous at bedtime  insulin lispro (ADMELOG) corrective regimen sliding scale   SubCutaneous three times a day before meals  insulin lispro (ADMELOG) corrective regimen sliding scale   SubCutaneous at bedtime  insulin lispro Injectable (ADMELOG) 9 Unit(s) SubCutaneous three times a day before meals    chlorhexidine 2% Cloths 1 Application(s) Topical daily  dextrose 5%. 1000 milliLiter(s) IV Continuous <Continuous>  dextrose 5%. 1000 milliLiter(s) IV Continuous <Continuous>  epoetin melody (EPOGEN) Injectable 3000 Unit(s) IV Push <User Schedule>  multivitamin 1 Tablet(s) Oral daily  sodium chloride 0.9% Bolus. 100 milliLiter(s) IV Bolus every 5 minutes PRN  sodium chloride 0.9% lock flush 10 milliLiter(s) IV Push every 1 hour PRN      PAST MEDICAL/SURGICAL HISTORY  PAST MEDICAL & SURGICAL HISTORY:  Type 2 diabetes mellitus      Stage 4 chronic kidney disease      HTN (hypertension)      HLD (hyperlipidemia)      No significant past surgical history          SOCIAL HISTORY: Substance Use (street drugs): ( x ) never used  (  ) other:    FAMILY HISTORY:  FH: type 2 diabetes mellitus (Mother)          PHYSICAL EXAM:  T(C): 37.4 (02-08-24 @ 13:55), Max: 37.4 (02-08-24 @ 13:55)  HR: 95 (02-08-24 @ 13:55) (87 - 96)  BP: 153/66 (02-08-24 @ 13:55) (116/81 - 153/66)  RR: 18 (02-08-24 @ 13:55) (16 - 18)  SpO2: 99% (02-08-24 @ 13:55) (98% - 100%)  Wt(kg): --  I&O's Summary        GENERAL: NAD  EYES:  conjunctiva and sclera clear  ENMT: No tonsillar erythema, exudates, or enlargement  Cardiovascular: Normal S1 S2, No JVD, No murmurs, No edema  Respiratory: Lungs clear to auscultation	  Gastrointestinal:  Soft  Extremities: No edema                                     9.0    17.65 )-----------( 305      ( 08 Feb 2024 06:16 )             27.7     02-08    137  |  105  |  33<H>  ----------------------------<  235<H>  4.3   |  17<L>  |  4.34<H>    Ca    7.6<L>      08 Feb 2024 06:43  Phos  4.1     02-08  Mg     2.10     02-08      proBNP:   Lipid Profile:   HgA1c:   TSH:     Consultant(s) Notes Reviewed:  [x ] YES  [ ] NO    Care Discussed with Consultants/Other Providers [ x] YES  [ ] NO    Imaging Personally Reviewed independently:  [x] YES  [ ] NO    All labs, radiologic studies, vitals, orders and medications list reviewed. Patient is seen and examined at bedside. Case discussed with medical team.

## 2024-02-08 NOTE — PROGRESS NOTE ADULT - PROBLEM/PLAN-2
DISPLAY PLAN FREE TEXT
DISPLAY PLAN FREE TEXT
pt presents with atraumatic right foot and ankle pain. xrays negative. given naproxen with relief. placed in air cast. will d/c with podiatry follow up.

## 2024-02-08 NOTE — CHART NOTE - NSCHARTNOTEFT_GEN_A_CORE
63F hx DM, asthma, CKD4, HTN, and HLD here for dyspnea and wheezing that began the evening prior to presentation. Pt admitted with volume overload 2/2 ESRD, now requiring HD. S/p RIJ permacath placement with IR today. Also with suspected URI. Vascular surgery consulted for AVF creation. Patient is right hand dominant. Denies hx of pacemaker, or LUE surgeries or trauma.     Recommendations:  -OR tomorrow for LUE AVF, possible graft, possible right.  -Please make NPO at midnight and preop with 2am labs.  -Please protect LUE: no IVs, blood draws, or blood pressure cuffs.  -Vein mapping completed  -Appreciate documentation of medical and cardiac clearance for AVF creation in OR.  -Care per primary.    Discussed with vascular surgery fellow on behalf of Dr. Dale.    C Team/Vascular Surgery  Please page [phone] for all questions 63F hx DM, asthma, CKD4, HTN, and HLD here for dyspnea and wheezing that began the evening prior to presentation. Pt admitted with volume overload 2/2 ESRD, now requiring HD. S/p RIJ permacath placement with IR today. Also with suspected URI. Vascular surgery consulted for AVF creation. Patient is right hand dominant. Denies hx of pacemaker, or LUE surgeries or trauma.     Recommendations:  -OR tomorrow for LUE AVF, possible graft, possible right.  -Please make NPO at midnight and preop with 2am labs.  -Please protect LUE: no IVs, blood draws, or blood pressure cuffs.  -Vein mapping completed  -Appreciate documentation of medical and cardiac clearance for AVF creation in OR.  -Care per primary.    Discussed with vascular surgery fellow on behalf of Dr. Dale.    C Team/Vascular Surgery  Please page [phone] for all questions      Update:  Discussed consent for AVF with patient's decision maker, her daughter, who wishes to postpone fistula creation for about one month, to allow her mother to recover from the stress of her current hospitalization and procedures. I explained the risks of waiting, including infection of the patient's permacath, leading to sespis, as well as the additional six week delay for fistula maturation required before any future AVF can be accessed. Patient's daughter expressed understanding of risks and still wishes to postpone AVF creation. Informed her to have her mother follow-up in Dr. Dale's office for future AVF planning.

## 2024-02-08 NOTE — PROGRESS NOTE ADULT - PROBLEM SELECTOR PLAN 4
A1c 6.6% from 10/23, now 6.1%. On lantus 20u in am and 20 qhs at home, as well as repaglinide and victoza  -hold home repaglinide and victoza  -c/w lantus 24u qhs, admelog 8u premeal, and moderate ISS  -FS with meals A1c 6.6% from 10/23, now 6.1%. On lantus 20u in am and 20 qhs at home, as well as repaglinide and victoza  -hold home repaglinide and victoza  -c/w lantus 27u qhs, admelog 9u premeal, and moderate ISS  -FS with meals A1c 6.6% from 10/23, now 6.1%. On lantus 20u in am and 20 qhs at home, as well as repaglinide and victoza  -hold home repaglinide and victoza  -c/w lantus 30u qhs, admelog 9u premeal, and moderate ISS  -FS with meals

## 2024-02-08 NOTE — PROGRESS NOTE ADULT - PROBLEM SELECTOR PLAN 1
Pt. with NELSON on progressive CKD in the setting of longstanding HTN and DM2. On review of [place]/HIE Scr was 3.3 on 12/18/23. On admission, Scr elevated at 5.13 increased to 6.11 (2/1/2024). UPCR was 18.1 on 12/18/23. Serologic workup negative (Oct 2023): C3/C4 not low, kappa/lambda ratio WNL with no monoclonal band identified, negative for ANCA, CELIA, dsDNA, Hep B, Hep C, and [id number]. Renal US w/o hydronephrosis. Pro-BNP elevated at 10,471.  CXR showed perihilar opacities with effusion more likely cardiogenic in origin than multifocal pneumonia. HD consent obtained and placed in chart. Initiated on iHD 2/1. s/p Tunneled HD catheter 2/5. Last HD 2/6. Plan for maintenance HD today. Patient to be on a TTS schedule. Given BP and evidence of volume overload ok to c/w IV diuretics for now. Vascular consulted for AVF creation, however pt and family do not want to pursue while inpt. Labs pending today. Monitor labs and urine output. Avoid nephrotoxins. Dose medications for HD.

## 2024-02-08 NOTE — PROGRESS NOTE ADULT - ATTENDING COMMENTS
The patient is a 63-year-old woman who is followed for ckd stage 4/5, most likely secondary to diabetic nephropathy, and who is admitted for evaluation and management of acute hypoxic respiratory failure, most likely secondary to pulmonary edema in the setting of acute kidney injury superimposed on chronic kidney disease, which has now resolved, and complicated by the development of hyperglycemia.     NELSON on CKD 4/5: -Initially suspected secondary to vasoplegia in the setting of viral upper respiratory infection; as no improvement over the course of hospital stay, greater suspicion now that this represents progression of chronic kidney disease   -Etiology unclear of chronic kidney disease; suspected secondary to diabetic nephropathy, but patient has had a1c level in our lab that measures less than 7; possible that patient had long-standing hyperglycemia prior to need for insulin use and that current a1c in setting of ckd may underestimate degree of hyperglycemia   -Worrisome that kidney function does not appear to be improving; nephrology team has been in touch with the patient and is planning for initiation of dialysis; non-tunneled shiley placed and hemodialysis initiated 2/1; ir consult placed for placement of tunneled hemodialysis catheter; as patient also continues to make urine, administering bumetanide 2 mg intravenously twice daily   -Vascular surgery consulted for upper extremity av fistula placement; planned for Friday 2/9; discussed extensively with patient and family on 2/8 the risks and benefits of delaying surgery (family desires time for patient to recuperate from hospitalization) including infection, hospitalization, death; patient and family will continue to discuss with one another and express desire at this time to delay surgery; will address again 2/9     Pre-operative risk stratification: -The patient has no known history of cardiomyopathy; she has no known arrhythmia or history of acute coronary syndrome   -Although the patient cannot complete four metabolic equivalent tasks, and she has a creatinine greater than 4, and she does require insulin therapy, she would not be undergoing a major surgery, she has no history of cerebrovascular disease   -Her estimated rcri score is 2, indicating about a 10% risk of major adverse cardiac event in the setting of an av fistula surgery   -No further pre-operative risk stratification or optimization is needed prior to surgery    Diabetes mellitus with hyperglycemia: -Known insulin-dependent diabetes mellitus; glucose elevated this admission to 300-400 (subsequent to administration of methylprednisolone in ed)   -Will monitor serum glucose levels and adjust insulin dose as necessary     Asthma: -Suspected asthma history although no pfts to confirm; no evident wheezing and shortness of breath significantly improved today; will avoid administration of additional steroids at this time     Prophylactic measure: -heparin for dvt prophylaxis; consistent carbohydrate diet

## 2024-02-08 NOTE — PROGRESS NOTE ADULT - SUBJECTIVE AND OBJECTIVE BOX
Denton Borjas MD  PGY 1 Department of Internal Medicine        Patient is a 63y old  Female who presents with a chief complaint of Shortness of breath     (07 Feb 2024 14:13)      SUBJECTIVE / OVERNIGHT EVENTS: Pt seen and examined. No acute overnight events. Complains of generalized weakness. Denies fevers, chills, CP, SOB, Abdominal pain, NVD        MEDICATIONS  (STANDING):  amLODIPine   Tablet 10 milliGRAM(s) Oral daily  atorvastatin 20 milliGRAM(s) Oral at bedtime  budesonide 160 MICROgram(s)/formoterol 4.5 MICROgram(s) Inhaler 2 Puff(s) Inhalation two times a day  buMETAnide Injectable 2 milliGRAM(s) IV Push every 12 hours  chlorhexidine 2% Cloths 1 Application(s) Topical daily  dextrose 5%. 1000 milliLiter(s) (100 mL/Hr) IV Continuous <Continuous>  dextrose 5%. 1000 milliLiter(s) (50 mL/Hr) IV Continuous <Continuous>  dextrose 50% Injectable 25 Gram(s) IV Push once  dextrose 50% Injectable 12.5 Gram(s) IV Push once  dextrose 50% Injectable 25 Gram(s) IV Push once  epoetin melody (EPOGEN) Injectable 3000 Unit(s) IV Push <User Schedule>  glucagon  Injectable 1 milliGRAM(s) IntraMuscular once  heparin   Injectable 5000 Unit(s) SubCutaneous every 8 hours  insulin glargine Injectable (LANTUS) 27 Unit(s) SubCutaneous at bedtime  insulin lispro (ADMELOG) corrective regimen sliding scale   SubCutaneous at bedtime  insulin lispro (ADMELOG) corrective regimen sliding scale   SubCutaneous three times a day before meals  insulin lispro Injectable (ADMELOG) 8 Unit(s) SubCutaneous three times a day before meals  loratadine 10 milliGRAM(s) Oral daily  montelukast 10 milliGRAM(s) Oral daily  multivitamin 1 Tablet(s) Oral daily  senna 2 Tablet(s) Oral at bedtime    MEDICATIONS  (PRN):  dextrose Oral Gel 15 Gram(s) Oral once PRN Blood Glucose LESS THAN 70 milliGRAM(s)/deciliter  sodium chloride 0.9% Bolus. 100 milliLiter(s) IV Bolus every 5 minutes PRN SBP LESS THAN or EQUAL to 90 mmHg  sodium chloride 0.9% lock flush 10 milliLiter(s) IV Push every 1 hour PRN Pre/post blood products, medications, blood draw, and to maintain line patency      I&O's Summary      Vital Signs Last 24 Hrs  T(C): 36.7 (08 Feb 2024 05:33), Max: 36.8 (07 Feb 2024 18:13)  T(F): 98.1 (08 Feb 2024 05:33), Max: 98.2 (07 Feb 2024 18:13)  HR: 94 (08 Feb 2024 05:33) (87 - 96)  BP: 130/60 (08 Feb 2024 05:33) (116/81 - 149/67)  BP(mean): --  RR: 17 (08 Feb 2024 05:33) (16 - 17)  SpO2: 100% (08 Feb 2024 05:33) (98% - 100%)    Parameters below as of 08 Feb 2024 05:33  Patient On (Oxygen Delivery Method): room air        CAPILLARY BLOOD GLUCOSE      POCT Blood Glucose.: 258 mg/dL (07 Feb 2024 21:47)  POCT Blood Glucose.: 154 mg/dL (07 Feb 2024 17:43)  POCT Blood Glucose.: 146 mg/dL (07 Feb 2024 11:55)  POCT Blood Glucose.: 307 mg/dL (07 Feb 2024 08:43)      PHYSICAL EXAM:  GENERAL: NAD, resting comfortably, permacath CDI  HEAD:  Atraumatic, Normocephalic  EYES: EOMI, conjunctiva and sclera clear  CHEST/LUNG: CTAB, no wheezes rales or rhonchi  HEART: Regular rate and rhythm; No murmurs, rubs, or gallops  ABDOMEN: Soft, Nontender, Nondistended; Bowel sounds present  EXTREMITIES:  2+ Peripheral Pulses, No clubbing, cyanosis, trace bilateral lower extremity edema  PSYCH: AAOx3  NEUROLOGY: non-focal  SKIN: No rashes or lesions          LABS:                        9.0    17.65 )-----------( 305      ( 08 Feb 2024 06:16 )             27.7     Auto Eosinophil # x     / Auto Eosinophil % x     / Auto Neutrophil # x     / Auto Neutrophil % x     / BANDS % x                            9.3    16.91 )-----------( 313      ( 07 Feb 2024 06:06 )             28.2     Auto Eosinophil # x     / Auto Eosinophil % x     / Auto Neutrophil # x     / Auto Neutrophil % x     / BANDS % x        02-07    136  |  99  |  19  ----------------------------<  157<H>  3.2<L>   |  24  |  3.16<H>    Ca    7.7<L>      07 Feb 2024 06:06  Mg     1.90     02-07  Phos  3.5     02-07          Urinalysis Basic - ( 07 Feb 2024 06:06 )    Color: x / Appearance: x / SG: x / pH: x  Gluc: 157 mg/dL / Ketone: x  / Bili: x / Urobili: x   Blood: x / Protein: x / Nitrite: x   Leuk Esterase: x / RBC: x / WBC x   Sq Epi: x / Non Sq Epi: x / Bacteria: x            RADIOLOGY & ADDITIONAL TESTS:    Imaging Personally Reviewed:    Consultant(s) Notes Reviewed:      Care Discussed with Consultants/Other Providers:

## 2024-02-09 LAB
ANION GAP SERPL CALC-SCNC: 13 MMOL/L — SIGNIFICANT CHANGE UP (ref 7–14)
APTT BLD: 34.6 SEC — SIGNIFICANT CHANGE UP (ref 24.5–35.6)
BLD GP AB SCN SERPL QL: NEGATIVE — SIGNIFICANT CHANGE UP
BUN SERPL-MCNC: 21 MG/DL — SIGNIFICANT CHANGE UP (ref 7–23)
CALCIUM SERPL-MCNC: 9.7 MG/DL — SIGNIFICANT CHANGE UP (ref 8.4–10.5)
CHLORIDE SERPL-SCNC: 100 MMOL/L — SIGNIFICANT CHANGE UP (ref 98–107)
CO2 SERPL-SCNC: 23 MMOL/L — SIGNIFICANT CHANGE UP (ref 22–31)
CREAT SERPL-MCNC: 3.02 MG/DL — HIGH (ref 0.5–1.3)
EGFR: 17 ML/MIN/1.73M2 — LOW
GLUCOSE BLDC GLUCOMTR-MCNC: 169 MG/DL — HIGH (ref 70–99)
GLUCOSE BLDC GLUCOMTR-MCNC: 174 MG/DL — HIGH (ref 70–99)
GLUCOSE BLDC GLUCOMTR-MCNC: 225 MG/DL — HIGH (ref 70–99)
GLUCOSE BLDC GLUCOMTR-MCNC: 256 MG/DL — HIGH (ref 70–99)
GLUCOSE BLDC GLUCOMTR-MCNC: 261 MG/DL — HIGH (ref 70–99)
GLUCOSE BLDC GLUCOMTR-MCNC: 361 MG/DL — HIGH (ref 70–99)
GLUCOSE BLDC GLUCOMTR-MCNC: 395 MG/DL — HIGH (ref 70–99)
GLUCOSE BLDC GLUCOMTR-MCNC: 459 MG/DL — CRITICAL HIGH (ref 70–99)
GLUCOSE SERPL-MCNC: 196 MG/DL — HIGH (ref 70–99)
HCT VFR BLD CALC: 31.6 % — LOW (ref 34.5–45)
HGB BLD-MCNC: 10.2 G/DL — LOW (ref 11.5–15.5)
INR BLD: 0.95 RATIO — SIGNIFICANT CHANGE UP (ref 0.85–1.18)
MAGNESIUM SERPL-MCNC: 2.3 MG/DL — SIGNIFICANT CHANGE UP (ref 1.6–2.6)
MCHC RBC-ENTMCNC: 28.7 PG — SIGNIFICANT CHANGE UP (ref 27–34)
MCHC RBC-ENTMCNC: 32.3 GM/DL — SIGNIFICANT CHANGE UP (ref 32–36)
MCV RBC AUTO: 88.8 FL — SIGNIFICANT CHANGE UP (ref 80–100)
NRBC # BLD: 0 /100 WBCS — SIGNIFICANT CHANGE UP (ref 0–0)
NRBC # FLD: 0.04 K/UL — HIGH (ref 0–0)
PHOSPHATE SERPL-MCNC: 3.7 MG/DL — SIGNIFICANT CHANGE UP (ref 2.5–4.5)
PLATELET # BLD AUTO: 318 K/UL — SIGNIFICANT CHANGE UP (ref 150–400)
POTASSIUM SERPL-MCNC: 3.9 MMOL/L — SIGNIFICANT CHANGE UP (ref 3.5–5.3)
POTASSIUM SERPL-SCNC: 3.9 MMOL/L — SIGNIFICANT CHANGE UP (ref 3.5–5.3)
PROTHROM AB SERPL-ACNC: 10.7 SEC — SIGNIFICANT CHANGE UP (ref 9.5–13)
RBC # BLD: 3.56 M/UL — LOW (ref 3.8–5.2)
RBC # FLD: 15.5 % — HIGH (ref 10.3–14.5)
RH IG SCN BLD-IMP: POSITIVE — SIGNIFICANT CHANGE UP
SODIUM SERPL-SCNC: 136 MMOL/L — SIGNIFICANT CHANGE UP (ref 135–145)
WBC # BLD: 19.73 K/UL — HIGH (ref 3.8–10.5)
WBC # FLD AUTO: 19.73 K/UL — HIGH (ref 3.8–10.5)

## 2024-02-09 PROCEDURE — 36821 AV FUSION DIRECT ANY SITE: CPT | Mod: LT

## 2024-02-09 PROCEDURE — 99233 SBSQ HOSP IP/OBS HIGH 50: CPT | Mod: GC

## 2024-02-09 PROCEDURE — 99233 SBSQ HOSP IP/OBS HIGH 50: CPT

## 2024-02-09 DEVICE — SURGICEL FIBRILLAR 2 X 4": Type: IMPLANTABLE DEVICE | Site: LEFT | Status: FUNCTIONAL

## 2024-02-09 DEVICE — LIGATING CLIPS WECK HORIZON MEDIUM (BLUE) 24: Type: IMPLANTABLE DEVICE | Site: LEFT | Status: FUNCTIONAL

## 2024-02-09 DEVICE — LIGATING CLIPS WECK HORIZON SMALL-WIDE (RED) 24: Type: IMPLANTABLE DEVICE | Site: LEFT | Status: FUNCTIONAL

## 2024-02-09 RX ORDER — INSULIN LISPRO 100/ML
VIAL (ML) SUBCUTANEOUS
Refills: 0 | Status: DISCONTINUED | OUTPATIENT
Start: 2024-02-09 | End: 2024-02-14

## 2024-02-09 RX ORDER — ACETAMINOPHEN 500 MG
650 TABLET ORAL ONCE
Refills: 0 | Status: COMPLETED | OUTPATIENT
Start: 2024-02-09 | End: 2024-02-09

## 2024-02-09 RX ORDER — INSULIN LISPRO 100/ML
4 VIAL (ML) SUBCUTANEOUS ONCE
Refills: 0 | Status: DISCONTINUED | OUTPATIENT
Start: 2024-02-09 | End: 2024-02-09

## 2024-02-09 RX ORDER — BUMETANIDE 0.25 MG/ML
2 INJECTION INTRAMUSCULAR; INTRAVENOUS EVERY 12 HOURS
Refills: 0 | Status: DISCONTINUED | OUTPATIENT
Start: 2024-02-09 | End: 2024-02-14

## 2024-02-09 RX ORDER — INSULIN LISPRO 100/ML
10 VIAL (ML) SUBCUTANEOUS
Refills: 0 | Status: DISCONTINUED | OUTPATIENT
Start: 2024-02-09 | End: 2024-02-10

## 2024-02-09 RX ORDER — INSULIN LISPRO 100/ML
VIAL (ML) SUBCUTANEOUS EVERY 6 HOURS
Refills: 0 | Status: DISCONTINUED | OUTPATIENT
Start: 2024-02-09 | End: 2024-02-09

## 2024-02-09 RX ORDER — INSULIN LISPRO 100/ML
VIAL (ML) SUBCUTANEOUS AT BEDTIME
Refills: 0 | Status: DISCONTINUED | OUTPATIENT
Start: 2024-02-09 | End: 2024-02-09

## 2024-02-09 RX ORDER — INSULIN LISPRO 100/ML
VIAL (ML) SUBCUTANEOUS
Refills: 0 | Status: DISCONTINUED | OUTPATIENT
Start: 2024-02-09 | End: 2024-02-09

## 2024-02-09 RX ORDER — INSULIN LISPRO 100/ML
VIAL (ML) SUBCUTANEOUS AT BEDTIME
Refills: 0 | Status: DISCONTINUED | OUTPATIENT
Start: 2024-02-09 | End: 2024-02-14

## 2024-02-09 RX ORDER — INSULIN LISPRO 100/ML
4 VIAL (ML) SUBCUTANEOUS ONCE
Refills: 0 | Status: COMPLETED | OUTPATIENT
Start: 2024-02-09 | End: 2024-02-09

## 2024-02-09 RX ADMIN — Medication 650 MILLIGRAM(S): at 18:39

## 2024-02-09 RX ADMIN — HEPARIN SODIUM 5000 UNIT(S): 5000 INJECTION INTRAVENOUS; SUBCUTANEOUS at 06:06

## 2024-02-09 RX ADMIN — HEPARIN SODIUM 5000 UNIT(S): 5000 INJECTION INTRAVENOUS; SUBCUTANEOUS at 22:04

## 2024-02-09 RX ADMIN — BUDESONIDE AND FORMOTEROL FUMARATE DIHYDRATE 2 PUFF(S): 160; 4.5 AEROSOL RESPIRATORY (INHALATION) at 16:08

## 2024-02-09 RX ADMIN — Medication 6: at 00:08

## 2024-02-09 RX ADMIN — Medication 4 UNIT(S): at 01:39

## 2024-02-09 RX ADMIN — INSULIN GLARGINE 30 UNIT(S): 100 INJECTION, SOLUTION SUBCUTANEOUS at 22:04

## 2024-02-09 RX ADMIN — BUDESONIDE AND FORMOTEROL FUMARATE DIHYDRATE 2 PUFF(S): 160; 4.5 AEROSOL RESPIRATORY (INHALATION) at 22:04

## 2024-02-09 RX ADMIN — ATORVASTATIN CALCIUM 20 MILLIGRAM(S): 80 TABLET, FILM COATED ORAL at 22:04

## 2024-02-09 RX ADMIN — Medication 10 UNIT(S): at 17:42

## 2024-02-09 RX ADMIN — AMLODIPINE BESYLATE 10 MILLIGRAM(S): 2.5 TABLET ORAL at 06:06

## 2024-02-09 RX ADMIN — Medication 650 MILLIGRAM(S): at 17:39

## 2024-02-09 RX ADMIN — Medication 1 TABLET(S): at 16:08

## 2024-02-09 RX ADMIN — Medication 5: at 17:43

## 2024-02-09 RX ADMIN — BUMETANIDE 2 MILLIGRAM(S): 0.25 INJECTION INTRAMUSCULAR; INTRAVENOUS at 06:06

## 2024-02-09 RX ADMIN — MONTELUKAST 10 MILLIGRAM(S): 4 TABLET, CHEWABLE ORAL at 16:08

## 2024-02-09 RX ADMIN — LORATADINE 10 MILLIGRAM(S): 10 TABLET ORAL at 16:09

## 2024-02-09 RX ADMIN — SENNA PLUS 2 TABLET(S): 8.6 TABLET ORAL at 22:04

## 2024-02-09 RX ADMIN — Medication 2: at 22:08

## 2024-02-09 RX ADMIN — Medication 6: at 13:06

## 2024-02-09 RX ADMIN — Medication 2: at 06:06

## 2024-02-09 NOTE — PROGRESS NOTE ADULT - ASSESSMENT
63F hx DM, asthma, CKD4, HTN, and HLD here for dyspnea and wheezing that began the evening prior to presentation. Pt admitted with volume overload 2/2 ESRD, now requiring HD. S/p RIJ permacath placement with IR today. Also with suspected URI. Vascular surgery consulted for AVF creation. Patient is right hand dominant. Denies hx of pacemaker, or LUE surgeries or trauma.     Recommendations:  -Pt and daughter now amenable to AVF creation.  -OR today for LUE AVF, possible graft, possible right.  -Please keep NPO for OR.  -Please protect LUE: no IVs, blood draws, or blood pressure cuffs.  -Appreciate documentation of medical and cardiac clearance for AVF creation in OR.  -Care per primary.    Discussed with vascular surgery fellow on behalf of Dr. Dale.    C Team/Vascular Surgery  Please page [phone] for all questions

## 2024-02-09 NOTE — PROVIDER CONTACT NOTE (CRITICAL VALUE NOTIFICATION) - TEST AND RESULT REPORTED:
L foot wound abcess culture, no polymorphonuclear cells seen per low power field, few gram + rods per oil power field

## 2024-02-09 NOTE — PROGRESS NOTE ADULT - PROBLEM SELECTOR PLAN 3
Hypokalemia noted. Likely diuretic induced. Improved today. Replete PRN. Monitor labs.     Final recommendations pending attending signature.    If you have any questions, please feel free to contact me  Bright Pualino  Nephrology Fellow  [place]/Page [phone]  (After 5pm or on weekends please page the on-call fellow)

## 2024-02-09 NOTE — PROGRESS NOTE ADULT - SUBJECTIVE AND OBJECTIVE BOX
GENERAL SURGERY PROGRESS NOTE    SUBJECTIVE  No acute issues overnight. Seen and examined on morning rounds.    10-point review of systems completed and negative except as noted above.      OBJECTIVE    MEDICATIONS  amLODIPine   Tablet 10 milliGRAM(s) Oral daily  atorvastatin 20 milliGRAM(s) Oral at bedtime  budesonide 160 MICROgram(s)/formoterol 4.5 MICROgram(s) Inhaler 2 Puff(s) Inhalation two times a day  buMETAnide Injectable 2 milliGRAM(s) IV Push every 12 hours  chlorhexidine 2% Cloths 1 Application(s) Topical daily  dextrose 5%. 1000 milliLiter(s) IV Continuous <Continuous>  dextrose 5%. 1000 milliLiter(s) IV Continuous <Continuous>  dextrose 50% Injectable 25 Gram(s) IV Push once  dextrose 50% Injectable 12.5 Gram(s) IV Push once  dextrose 50% Injectable 25 Gram(s) IV Push once  dextrose Oral Gel 15 Gram(s) Oral once PRN  epoetin melody (EPOGEN) Injectable 3000 Unit(s) IV Push <User Schedule>  glucagon  Injectable 1 milliGRAM(s) IntraMuscular once  heparin   Injectable 5000 Unit(s) SubCutaneous every 8 hours  insulin glargine Injectable (LANTUS) 30 Unit(s) SubCutaneous at bedtime  insulin lispro (ADMELOG) corrective regimen sliding scale   SubCutaneous every 6 hours  insulin lispro Injectable (ADMELOG) 10 Unit(s) SubCutaneous three times a day before meals  loratadine 10 milliGRAM(s) Oral daily  montelukast 10 milliGRAM(s) Oral daily  multivitamin 1 Tablet(s) Oral daily  senna 2 Tablet(s) Oral at bedtime  sodium chloride 0.9% Bolus. 100 milliLiter(s) IV Bolus every 5 minutes PRN  sodium chloride 0.9% lock flush 10 milliLiter(s) IV Push every 1 hour PRN      PHYSICAL EXAM  T(C): 36.8 (02-09-24 @ 06:00), Max: 37.4 (02-08-24 @ 13:55)  HR: 68 (02-09-24 @ 06:00) (68 - 106)  BP: 133/77 (02-09-24 @ 06:00) (121/55 - 153/77)  RR: 18 (02-09-24 @ 06:00) (16 - 18)  SpO2: 98% (02-09-24 @ 06:00) (97% - 100%)    02-08-24 @ 07:01  -  02-09-24 @ 07:00  --------------------------------------------------------  IN: 400 mL / OUT: 2400 mL / NET: -2000 mL    General: Not acutely distressed  Neuro: AO x4  Respiratory: nonlabored respirations  CV: pulse present   Abdomen: soft, nontender, nondistended, no rebound or guarding, no palpable mass  Extremities: warm. LUE protected  Vascular: RIJ permacath in place    LABS                        10.2   19.73 )-----------( 318      ( 09 Feb 2024 04:18 )             31.6     02-09    136  |  100  |  21  ----------------------------<  196<H>  3.9   |  23  |  3.02<H>    Ca    9.7      09 Feb 2024 04:18  Phos  3.7     02-09  Mg     2.30     02-09      PT/INR - ( 09 Feb 2024 05:21 )   PT: 10.7 sec;   INR: 0.95 ratio         PTT - ( 09 Feb 2024 05:21 )  PTT:34.6 sec  Urinalysis Basic - ( 09 Feb 2024 04:18 )    Color: x / Appearance: x / SG: x / pH: x  Gluc: 196 mg/dL / Ketone: x  / Bili: x / Urobili: x   Blood: x / Protein: x / Nitrite: x   Leuk Esterase: x / RBC: x / WBC x   Sq Epi: x / Non Sq Epi: x / Bacteria: x        RADIOLOGY & ADDITIONAL STUDIES

## 2024-02-09 NOTE — PROGRESS NOTE ADULT - PROBLEM SELECTOR PLAN 1
Pt. with NELSON on progressive CKD in the setting of longstanding HTN and DM2. On review of Sylvia/HIE Scr was 3.3 on 12/18/23. On admission, Scr elevated at 5.13 increased to 6.11 (2/1/2024). UPCR was 18.1 on 12/18/23. Serologic workup negative (Oct 2023): C3/C4 not low, kappa/lambda ratio WNL with no monoclonal band identified, negative for ANCA, CELIA, dsDNA, Hep B, Hep C, and ROX1H-Eq. Renal US w/o hydronephrosis. Pro-BNP elevated at 10,471.  CXR showed perihilar opacities with effusion more likely cardiogenic in origin than multifocal pneumonia. HD consent obtained and placed in chart. Initiated on iHD 2/1. s/p Tunneled HD catheter 2/5. Last HD 2/8. Plan for maintenance HD 2/10. Patient to be on a TTS schedule. Given BP and evidence of volume overload ok to c/w diuretics for now. Please change to bumex 2mg BID PO. Pt planned for AVF creation today (2/9). Monitor labs and urine output. Avoid nephrotoxins. Dose medications for HD.

## 2024-02-09 NOTE — PROGRESS NOTE ADULT - TIME BILLING
history, exam, documentation
history, physical exam, documentation
history, exam, documentation
history, physical exam, documentation

## 2024-02-09 NOTE — PROGRESS NOTE ADULT - PROBLEM SELECTOR PLAN 4
A1c 6.6% from 10/23, now 6.1%. On lantus 20u in am and 20 qhs at home, as well as repaglinide and victoza.  -hold home repaglinide and victoza  -c/w lantus 30u qhs, admelog 9u premeal, and moderate ISS  -FS with meals A1c 6.6% from 10/23, now 6.1%. On lantus 20u in am and 20 qhs at home, as well as repaglinide and victoza.  -hold home repaglinide and victoza  -c/w lantus 30u qhs, admelog 10u premeal, and moderate ISS  -FS with meals

## 2024-02-09 NOTE — PROGRESS NOTE ADULT - PROBLEM SELECTOR PLAN 3
CXR with perihilar opacities, likely cardiogenic. BNP elevated. No formal diagnosis of CHF. Chronic BLE edema per daughter. Prior echo 10/23 with EF 61%, repeat 63% with mild diastolic dysfunction  -s/p IV Lasix 40mg, c/w IV Bumex 2mg BID  -goal diuresis 1-1.5L daily CXR with perihilar opacities, likely cardiogenic. BNP elevated. No formal diagnosis of CHF. Chronic BLE edema per daughter. Prior echo 10/23 with EF 61%, repeat 63% with mild diastolic dysfunction  -s/p IV Lasix 40mg, c/w po Bumex 2mg BID  -goal diuresis 1-1.5L daily

## 2024-02-09 NOTE — PROGRESS NOTE ADULT - TIME-BASED BILLING (NON-CRITICAL CARE)
-- DO NOT REPLY / DO NOT REPLY ALL --  -- Message is from [facility]agement [facility] [employer] ([facility]) --    General Patient Message: Patient has the following symptoms: body aches, sore throat, congestion, ear pain.  The symptoms have been present since 3/05  Patient wanted to get in to see the provider today but the schedule is full. Patient is covid negative and is pregnant  Please call to discuss and advise.          Caller Information       Type Contact Phone/Fax    03/09/2023 07:11 AM CST Phone (Incoming) Flora Craven (Self) [phone] (M)        Alternative phone number: [phone]    Can a detailed message be left? Yes    Message Turnaround: [place]:    Refer to site's KB page for routing instructions    Please give this turnaround time to the caller:   \"You can expect to receive a response 2-3 business days after your provider's clinical team reviews the message\"              
Flora has had a cough productive of phlegm, body aches, congestion, ear pain and sore throat that started on Saturday. She is 16 weeks gestation and wants evaluation. No appts available with PCP. I advised she could call her OB/GYN for appt or go to Urgent Care for evaluation. She is in agreement.  Reason for Disposition  • Cough with cold symptoms (e.g., runny nose, postnasal drip, throat clearing)    Protocols used: COUGH - ACUTE [id number]    
Time-based billing (NON-critical care)

## 2024-02-09 NOTE — PROGRESS NOTE ADULT - SUBJECTIVE AND OBJECTIVE BOX
Denton Borjas MD  PGY 1 Department of Internal Medicine        Patient is a 63y old  Female who presents with a chief complaint of AHRF (08 Feb 2024 15:23)      SUBJECTIVE / OVERNIGHT EVENTS: Pt seen and examined. No acute overnight events. Complains of generalized weakness. Denies fevers, chills, CP, SOB, Abdominal pain, NVD        MEDICATIONS  (STANDING):  amLODIPine   Tablet 10 milliGRAM(s) Oral daily  atorvastatin 20 milliGRAM(s) Oral at bedtime  budesonide 160 MICROgram(s)/formoterol 4.5 MICROgram(s) Inhaler 2 Puff(s) Inhalation two times a day  buMETAnide Injectable 2 milliGRAM(s) IV Push every 12 hours  chlorhexidine 2% Cloths 1 Application(s) Topical daily  dextrose 5%. 1000 milliLiter(s) (50 mL/Hr) IV Continuous <Continuous>  dextrose 5%. 1000 milliLiter(s) (100 mL/Hr) IV Continuous <Continuous>  dextrose 50% Injectable 25 Gram(s) IV Push once  dextrose 50% Injectable 25 Gram(s) IV Push once  dextrose 50% Injectable 12.5 Gram(s) IV Push once  epoetin melody (EPOGEN) Injectable 3000 Unit(s) IV Push <User Schedule>  glucagon  Injectable 1 milliGRAM(s) IntraMuscular once  heparin   Injectable 5000 Unit(s) SubCutaneous every 8 hours  insulin glargine Injectable (LANTUS) 30 Unit(s) SubCutaneous at bedtime  insulin lispro (ADMELOG) corrective regimen sliding scale   SubCutaneous every 6 hours  insulin lispro Injectable (ADMELOG) 9 Unit(s) SubCutaneous three times a day before meals  loratadine 10 milliGRAM(s) Oral daily  montelukast 10 milliGRAM(s) Oral daily  multivitamin 1 Tablet(s) Oral daily  senna 2 Tablet(s) Oral at bedtime    MEDICATIONS  (PRN):  dextrose Oral Gel 15 Gram(s) Oral once PRN Blood Glucose LESS THAN 70 milliGRAM(s)/deciliter  sodium chloride 0.9% Bolus. 100 milliLiter(s) IV Bolus every 5 minutes PRN SBP LESS THAN or EQUAL to 90 mmHg  sodium chloride 0.9% lock flush 10 milliLiter(s) IV Push every 1 hour PRN Pre/post blood products, medications, blood draw, and to maintain line patency      I&O's Summary    08 Feb 2024 07:01  -  09 Feb 2024 07:00  --------------------------------------------------------  IN: 400 mL / OUT: 2400 mL / NET: -2000 mL        Vital Signs Last 24 Hrs  T(C): 36.8 (09 Feb 2024 06:00), Max: 37.4 (08 Feb 2024 13:55)  T(F): 98.2 (09 Feb 2024 06:00), Max: 99.3 (08 Feb 2024 13:55)  HR: 68 (09 Feb 2024 06:00) (68 - 106)  BP: 133/77 (09 Feb 2024 06:00) (121/55 - 153/77)  BP(mean): --  RR: 18 (09 Feb 2024 06:00) (16 - 18)  SpO2: 98% (09 Feb 2024 06:00) (97% - 100%)    Parameters below as of 09 Feb 2024 06:00  Patient On (Oxygen Delivery Method): room air        CAPILLARY BLOOD GLUCOSE      POCT Blood Glucose.: 169 mg/dL (09 Feb 2024 05:18)  POCT Blood Glucose.: 361 mg/dL (09 Feb 2024 01:09)  POCT Blood Glucose.: 459 mg/dL (08 Feb 2024 23:59)  POCT Blood Glucose.: 324 mg/dL (08 Feb 2024 21:45)  POCT Blood Glucose.: 195 mg/dL (08 Feb 2024 17:46)  POCT Blood Glucose.: 169 mg/dL (08 Feb 2024 16:35)  POCT Blood Glucose.: 255 mg/dL (08 Feb 2024 13:39)  POCT Blood Glucose.: 307 mg/dL (08 Feb 2024 12:17)  POCT Blood Glucose.: 266 mg/dL (08 Feb 2024 08:38)      PHYSICAL EXAM:  GENERAL: NAD, resting comfortably, permacath CDI  HEAD:  Atraumatic, Normocephalic  EYES: EOMI, conjunctiva and sclera clear  CHEST/LUNG: CTAB, no wheezes rales or rhonchi  HEART: Regular rate and rhythm; No murmurs, rubs, or gallops  ABDOMEN: Soft, Nontender, Nondistended; Bowel sounds present  EXTREMITIES:  2+ Peripheral Pulses, No clubbing, cyanosis, trace bilateral lower extremity edema  PSYCH: AAOx3  NEUROLOGY: non-focal  SKIN: No rashes or lesions    LABS:                        10.2   19.73 )-----------( 318      ( 09 Feb 2024 04:18 )             31.6     Auto Eosinophil # x     / Auto Eosinophil % x     / Auto Neutrophil # x     / Auto Neutrophil % x     / BANDS % x                            9.0    17.65 )-----------( 305      ( 08 Feb 2024 06:16 )             27.7     Auto Eosinophil # x     / Auto Eosinophil % x     / Auto Neutrophil # x     / Auto Neutrophil % x     / BANDS % x        02-09    136  |  100  |  21  ----------------------------<  196<H>  3.9   |  23  |  3.02<H>  02-08    137  |  105  |  33<H>  ----------------------------<  235<H>  4.3   |  17<L>  |  4.34<H>    Ca    9.7      09 Feb 2024 04:18  Mg     2.30     02-09  Phos  3.7     02-09    PT/INR - ( 09 Feb 2024 05:21 )   PT: 10.7 sec;   INR: 0.95 ratio         PTT - ( 09 Feb 2024 05:21 )  PTT:34.6 sec      Urinalysis Basic - ( 09 Feb 2024 04:18 )    Color: x / Appearance: x / SG: x / pH: x  Gluc: 196 mg/dL / Ketone: x  / Bili: x / Urobili: x   Blood: x / Protein: x / Nitrite: x   Leuk Esterase: x / RBC: x / WBC x   Sq Epi: x / Non Sq Epi: x / Bacteria: x            RADIOLOGY & ADDITIONAL TESTS:    Imaging Personally Reviewed:    Consultant(s) Notes Reviewed:      Care Discussed with Consultants/Other Providers:   Denton Borjas MD  PGY 1 Department of Internal Medicine        Patient is a 63y old  Female who presents with a chief complaint of AHRF (08 Feb 2024 15:23)      SUBJECTIVE / OVERNIGHT EVENTS: Pt seen and examined. No acute overnight events. Complains of generalized weakness and R arm pain and swelling near IV. Denies fevers, chills, CP, SOB, Abdominal pain, NVD        MEDICATIONS  (STANDING):  amLODIPine   Tablet 10 milliGRAM(s) Oral daily  atorvastatin 20 milliGRAM(s) Oral at bedtime  budesonide 160 MICROgram(s)/formoterol 4.5 MICROgram(s) Inhaler 2 Puff(s) Inhalation two times a day  buMETAnide Injectable 2 milliGRAM(s) IV Push every 12 hours  chlorhexidine 2% Cloths 1 Application(s) Topical daily  dextrose 5%. 1000 milliLiter(s) (50 mL/Hr) IV Continuous <Continuous>  dextrose 5%. 1000 milliLiter(s) (100 mL/Hr) IV Continuous <Continuous>  dextrose 50% Injectable 25 Gram(s) IV Push once  dextrose 50% Injectable 25 Gram(s) IV Push once  dextrose 50% Injectable 12.5 Gram(s) IV Push once  epoetin melody (EPOGEN) Injectable 3000 Unit(s) IV Push <User Schedule>  glucagon  Injectable 1 milliGRAM(s) IntraMuscular once  heparin   Injectable 5000 Unit(s) SubCutaneous every 8 hours  insulin glargine Injectable (LANTUS) 30 Unit(s) SubCutaneous at bedtime  insulin lispro (ADMELOG) corrective regimen sliding scale   SubCutaneous every 6 hours  insulin lispro Injectable (ADMELOG) 9 Unit(s) SubCutaneous three times a day before meals  loratadine 10 milliGRAM(s) Oral daily  montelukast 10 milliGRAM(s) Oral daily  multivitamin 1 Tablet(s) Oral daily  senna 2 Tablet(s) Oral at bedtime    MEDICATIONS  (PRN):  dextrose Oral Gel 15 Gram(s) Oral once PRN Blood Glucose LESS THAN 70 milliGRAM(s)/deciliter  sodium chloride 0.9% Bolus. 100 milliLiter(s) IV Bolus every 5 minutes PRN SBP LESS THAN or EQUAL to 90 mmHg  sodium chloride 0.9% lock flush 10 milliLiter(s) IV Push every 1 hour PRN Pre/post blood products, medications, blood draw, and to maintain line patency      I&O's Summary    08 Feb 2024 07:01  -  09 Feb 2024 07:00  --------------------------------------------------------  IN: 400 mL / OUT: 2400 mL / NET: -2000 mL        Vital Signs Last 24 Hrs  T(C): 36.8 (09 Feb 2024 06:00), Max: 37.4 (08 Feb 2024 13:55)  T(F): 98.2 (09 Feb 2024 06:00), Max: 99.3 (08 Feb 2024 13:55)  HR: 68 (09 Feb 2024 06:00) (68 - 106)  BP: 133/77 (09 Feb 2024 06:00) (121/55 - 153/77)  BP(mean): --  RR: 18 (09 Feb 2024 06:00) (16 - 18)  SpO2: 98% (09 Feb 2024 06:00) (97% - 100%)    Parameters below as of 09 Feb 2024 06:00  Patient On (Oxygen Delivery Method): room air        CAPILLARY BLOOD GLUCOSE      POCT Blood Glucose.: 169 mg/dL (09 Feb 2024 05:18)  POCT Blood Glucose.: 361 mg/dL (09 Feb 2024 01:09)  POCT Blood Glucose.: 459 mg/dL (08 Feb 2024 23:59)  POCT Blood Glucose.: 324 mg/dL (08 Feb 2024 21:45)  POCT Blood Glucose.: 195 mg/dL (08 Feb 2024 17:46)  POCT Blood Glucose.: 169 mg/dL (08 Feb 2024 16:35)  POCT Blood Glucose.: 255 mg/dL (08 Feb 2024 13:39)  POCT Blood Glucose.: 307 mg/dL (08 Feb 2024 12:17)  POCT Blood Glucose.: 266 mg/dL (08 Feb 2024 08:38)      PHYSICAL EXAM:  GENERAL: NAD, resting comfortably, permacath CDI  HEAD:  Atraumatic, Normocephalic  EYES: EOMI, conjunctiva and sclera clear  CHEST/LUNG: CTAB, no wheezes rales or rhonchi  HEART: Regular rate and rhythm; No murmurs, rubs, or gallops  ABDOMEN: Soft, Nontender, Nondistended; Bowel sounds present  EXTREMITIES:  2+ Peripheral Pulses, No clubbing, cyanosis, no edema. Small area of swelling, erythema, tenderness medial to R wrist IV  PSYCH: AAOx3  NEUROLOGY: non-focal  SKIN: No rashes or lesions    LABS:                        10.2   19.73 )-----------( 318      ( 09 Feb 2024 04:18 )             31.6     Auto Eosinophil # x     / Auto Eosinophil % x     / Auto Neutrophil # x     / Auto Neutrophil % x     / BANDS % x                            9.0    17.65 )-----------( 305      ( 08 Feb 2024 06:16 )             27.7     Auto Eosinophil # x     / Auto Eosinophil % x     / Auto Neutrophil # x     / Auto Neutrophil % x     / BANDS % x        02-09    136  |  100  |  21  ----------------------------<  196<H>  3.9   |  23  |  3.02<H>  02-08    137  |  105  |  33<H>  ----------------------------<  235<H>  4.3   |  17<L>  |  4.34<H>    Ca    9.7      09 Feb 2024 04:18  Mg     2.30     02-09  Phos  3.7     02-09    PT/INR - ( 09 Feb 2024 05:21 )   PT: 10.7 sec;   INR: 0.95 ratio         PTT - ( 09 Feb 2024 05:21 )  PTT:34.6 sec      Urinalysis Basic - ( 09 Feb 2024 04:18 )    Color: x / Appearance: x / SG: x / pH: x  Gluc: 196 mg/dL / Ketone: x  / Bili: x / Urobili: x   Blood: x / Protein: x / Nitrite: x   Leuk Esterase: x / RBC: x / WBC x   Sq Epi: x / Non Sq Epi: x / Bacteria: x            RADIOLOGY & ADDITIONAL TESTS:    Imaging Personally Reviewed:    Consultant(s) Notes Reviewed:      Care Discussed with Consultants/Other Providers:

## 2024-02-09 NOTE — PROGRESS NOTE ADULT - PROBLEM SELECTOR PLAN 1
BUN/Cr 49/5.13 on admission (baseline Cr ~3.3). Dialysis initiated during this admission with improvement  US Kidney/bladder with echogenic lesion in the superior pole of right kidney vs right adrenal gland, CT recommended for further evaluation  -Nephrology consulted, appreciate recs. continue dialysis per nephro  -s/p permacath placement 2/5. vascular consulted for avf planning, tentatively scheduled 2/9  -trend bmp, UOP, I&Os  -c/w bumex IV bid  -can f/u lesion as outpatient

## 2024-02-09 NOTE — PROGRESS NOTE ADULT - SUBJECTIVE AND OBJECTIVE BOX
[facility] Division of Kidney Diseases & Hypertension  FOLLOW UP NOTE  [phone]--------------------------------------------------------------------------------  Chief Complaint: advanced progressive CKD on HD    24 hour events/subjective: Pt seen and evaluated bedside this morning. Reports pain and redness at IV site. Otherwise denies any headaches, nausea, vomiting, fevers/chills, chest pain, SOB, abdominal pain, and leg swelling.    PAST HISTORY  --------------------------------------------------------------------------------  No significant changes to PMH, PSH, FHx, SHx, unless otherwise noted    ALLERGIES & MEDICATIONS  --------------------------------------------------------------------------------  Allergies  No Known Allergies  Intolerances    Standing Inpatient Medications  amLODIPine   Tablet 10 milliGRAM(s) Oral daily  atorvastatin 20 milliGRAM(s) Oral at bedtime  budesonide 160 MICROgram(s)/formoterol 4.5 MICROgram(s) Inhaler 2 Puff(s) Inhalation two times a day  buMETAnide Injectable 2 milliGRAM(s) IV Push every 12 hours  chlorhexidine 2% Cloths 1 Application(s) Topical daily  dextrose 5%. 1000 milliLiter(s) IV Continuous <Continuous>  dextrose 5%. 1000 milliLiter(s) IV Continuous <Continuous>  dextrose 50% Injectable 12.5 Gram(s) IV Push once  dextrose 50% Injectable 25 Gram(s) IV Push once  dextrose 50% Injectable 25 Gram(s) IV Push once  epoetin melody (EPOGEN) Injectable 3000 Unit(s) IV Push <User Schedule>  glucagon  Injectable 1 milliGRAM(s) IntraMuscular once  heparin   Injectable 5000 Unit(s) SubCutaneous every 8 hours  insulin glargine Injectable (LANTUS) 30 Unit(s) SubCutaneous at bedtime  insulin lispro (ADMELOG) corrective regimen sliding scale   SubCutaneous every 6 hours  insulin lispro Injectable (ADMELOG) 10 Unit(s) SubCutaneous three times a day before meals  loratadine 10 milliGRAM(s) Oral daily  montelukast 10 milliGRAM(s) Oral daily  multivitamin 1 Tablet(s) Oral daily  senna 2 Tablet(s) Oral at bedtime    PRN Inpatient Medications  dextrose Oral Gel 15 Gram(s) Oral once PRN  sodium chloride 0.9% Bolus. 100 milliLiter(s) IV Bolus every 5 minutes PRN  sodium chloride 0.9% lock flush 10 milliLiter(s) IV Push every 1 hour PRN    REVIEW OF SYSTEMS  --------------------------------------------------------------------------------  Gen: +fatigue, no fever  Respiratory: no sob  CV: no cp  GI: no pain, no n/v  : no complaints  MSK: +RUE pain     VITALS/PHYSICAL EXAM  --------------------------------------------------------------------------------  T(C): 36.8 (02-09-24 @ 09:29), Max: 37.4 (02-08-24 @ 13:55)  HR: 88 (02-09-24 @ 09:29) (68 - 106)  BP: 136/64 (02-09-24 @ 09:29) (121/55 - 153/77)  RR: 18 (02-09-24 @ 09:29) (16 - 18)  SpO2: 98% (02-09-24 @ 09:29) (97% - 100%)  Wt(kg): --  Height (cm): 154.9 (02-09-24 @ 09:29)  Weight (kg): 72.076 (02-09-24 @ 09:29)  BMI (kg/m2): 30 (02-09-24 @ 09:29)  BSA (m2): 1.71 (02-09-24 @ 09:29)    02-08-24 @ 07:01  -  02-09-24 @ 07:00  --------------------------------------------------------  IN: 400 mL / OUT: 2400 mL / NET: -2000 mL    Physical Exam:  Gen NAD  HEENT: no JVD  Pulm: rales at bases   CV: S1S2,  Abd: Soft,   Ext: +trace edema B/L LE   Neuro: Awake and alert  Skin: Warm and dry  Vascular: RIJ tunneled HD catheter site is CDI    LABS/STUDIES  --------------------------------------------------------------------------------              10.2   19.73 >-----------<  318      [02-09-24 @ 04:18]              31.6     136  |  100  |  21  ----------------------------<  196      [02-09-24 @ 04:18]  3.9   |  23  |  3.02        Ca     9.7     [02-09-24 @ 04:18]      Mg     2.30     [02-09-24 @ 04:18]      Phos  3.7     [02-09-24 @ 04:18]      PT/INR: PT 10.7 , INR 0.95       [02-09-24 @ 05:21]  PTT: 34.6       [02-09-24 @ 05:21]    Creatinine Trend:  SCr 3.02 [02-09 @ 04:18]  SCr 4.34 [02-08 @ 06:43]  SCr 3.16 [02-07 @ 06:06]  SCr 4.20 [02-05 @ 04:34]  SCr 3.13 [02-04 @ 08:07]

## 2024-02-09 NOTE — CHART NOTE - NSCHARTNOTEFT_GEN_A_CORE
SURGERY POST-OPERATIVE PROCEDURE NOTE    PROCEDURE: left radiocephalic AVF creation.    SUBJECTIVE:  Patient seen and evaluated at the bedside. Son at bedside.    SOB:  [ ] YES [x] NO  Chest Discomfort: [ ] YES [x] NO    Nausea: [ ] YES [x] NO           Vomiting: [ ] YES [x] NO  Pain Control Adequate: [x] YES [ ] NO    OBJECTIVE:   Vital Signs Last 24 Hrs  T(C): 36.8 (09 Feb 2024 14:00), Max: 37.2 (08 Feb 2024 22:21)  T(F): 98.2 (09 Feb 2024 14:00), Max: 98.9 (08 Feb 2024 22:21)  HR: 88 (09 Feb 2024 14:00) (68 - 106)  BP: 135/50 (09 Feb 2024 14:00) (107/54 - 153/77)  BP(mean): 73 (09 Feb 2024 14:00) (63 - 73)  RR: 17 (09 Feb 2024 14:00) (14 - 20)  SpO2: 97% (09 Feb 2024 14:00) (97% - 100%)    Parameters below as of 09 Feb 2024 12:40  Patient On (Oxygen Delivery Method): room air      I&O's Summary    08 Feb 2024 07:01  -  09 Feb 2024 07:00  --------------------------------------------------------  IN: 400 mL / OUT: 2400 mL / NET: -2000 mL    09 Feb 2024 07:01  -  09 Feb 2024 16:20  --------------------------------------------------------  IN: 120 mL / OUT: 0 mL / NET: 120 mL      I&O's Detail    08 Feb 2024 07:01  -  09 Feb 2024 07:00  --------------------------------------------------------  IN:    Other (mL): 400 mL  Total IN: 400 mL    OUT:    Other (mL): 2400 mL  Total OUT: 2400 mL    Total NET: -2000 mL      09 Feb 2024 07:01  -  09 Feb 2024 16:20  --------------------------------------------------------  IN:    Oral Fluid: 120 mL  Total IN: 120 mL    OUT:  Total OUT: 0 mL    Total NET: 120 mL    Labs:                        10.2   19.73 )-----------( 318      ( 09 Feb 2024 04:18 )             31.6     02-09    136  |  100  |  21  ----------------------------<  196<H>  3.9   |  23  |  3.02<H>    Ca    9.7      09 Feb 2024 04:18  Phos  3.7     02-09  Mg     2.30     02-09      PT/INR - ( 09 Feb 2024 05:21 )   PT: 10.7 sec;   INR: 0.95 ratio         PTT - ( 09 Feb 2024 05:21 )  PTT:34.6 sec  Urinalysis Basic - ( 09 Feb 2024 04:18 )    Color: x / Appearance: x / SG: x / pH: x  Gluc: 196 mg/dL / Ketone: x  / Bili: x / Urobili: x   Blood: x / Protein: x / Nitrite: x   Leuk Esterase: x / RBC: x / WBC x   Sq Epi: x / Non Sq Epi: x / Bacteria: x      MEDICATIONS  (STANDING):  amLODIPine   Tablet 10 milliGRAM(s) Oral daily  atorvastatin 20 milliGRAM(s) Oral at bedtime  budesonide 160 MICROgram(s)/formoterol 4.5 MICROgram(s) Inhaler 2 Puff(s) Inhalation two times a day  buMETAnide 2 milliGRAM(s) Oral every 12 hours  chlorhexidine 2% Cloths 1 Application(s) Topical daily  dextrose 5%. 1000 milliLiter(s) (100 mL/Hr) IV Continuous <Continuous>  dextrose 5%. 1000 milliLiter(s) (50 mL/Hr) IV Continuous <Continuous>  dextrose 50% Injectable 25 Gram(s) IV Push once  dextrose 50% Injectable 12.5 Gram(s) IV Push once  dextrose 50% Injectable 25 Gram(s) IV Push once  epoetin melody (EPOGEN) Injectable 3000 Unit(s) IV Push <User Schedule>  glucagon  Injectable 1 milliGRAM(s) IntraMuscular once  heparin   Injectable 5000 Unit(s) SubCutaneous every 8 hours  insulin glargine Injectable (LANTUS) 30 Unit(s) SubCutaneous at bedtime  insulin lispro (ADMELOG) corrective regimen sliding scale   SubCutaneous every 6 hours  insulin lispro Injectable (ADMELOG) 10 Unit(s) SubCutaneous three times a day before meals  loratadine 10 milliGRAM(s) Oral daily  montelukast 10 milliGRAM(s) Oral daily  multivitamin 1 Tablet(s) Oral daily  senna 2 Tablet(s) Oral at bedtime    MEDICATIONS  (PRN):  dextrose Oral Gel 15 Gram(s) Oral once PRN Blood Glucose LESS THAN 70 milliGRAM(s)/deciliter  sodium chloride 0.9% Bolus. 100 milliLiter(s) IV Bolus every 5 minutes PRN SBP LESS THAN or EQUAL to 90 mmHg  sodium chloride 0.9% lock flush 10 milliLiter(s) IV Push every 1 hour PRN Pre/post blood products, medications, blood draw, and to maintain line patency    Physical Exam:  General: NAD  Cardiovascular: appears well perfused  Respiratory: respirations non labored  Gastrointestinal: soft, nontender, nondistended  Extremities: FROM, warm  Vascular: Left AVF site dressing c/d/i, no evidence of swelling or hematoma. Palpable thrill. Palpable radial pulse. Dopplerable Ulnar signal.  Neurological: A+Ox3    ASSESSMENT:  63F hx DM, asthma, CKD4, HTN, and HLD here for dyspnea and wheezing that began the evening prior to presentation. Pt admitted with volume overload 2/2 ESRD, now requiring HD. S/p RIJ permacath placement with IR today. Also with suspected URI. Vascular surgery consulted for AVF creation. Patient is right hand dominant. Denies hx of pacemaker, or LUE surgeries or trauma. Now s/p LUE Radiocephalic fistula creation (2/9/24).    Recommendations:  - Care per primary  - c/w HD through temporary catheter site  - f/u outpatient with Dr. Chito BLACKBURN Team/Vascular Surgery  [id number]

## 2024-02-10 DIAGNOSIS — M79.601 PAIN IN RIGHT ARM: ICD-10-CM

## 2024-02-10 DIAGNOSIS — N18.6 END STAGE RENAL DISEASE: ICD-10-CM

## 2024-02-10 LAB
ANION GAP SERPL CALC-SCNC: 15 MMOL/L — HIGH (ref 7–14)
BUN SERPL-MCNC: 30 MG/DL — HIGH (ref 7–23)
CALCIUM SERPL-MCNC: 9.2 MG/DL — SIGNIFICANT CHANGE UP (ref 8.4–10.5)
CHLORIDE SERPL-SCNC: 101 MMOL/L — SIGNIFICANT CHANGE UP (ref 98–107)
CO2 SERPL-SCNC: 22 MMOL/L — SIGNIFICANT CHANGE UP (ref 22–31)
CREAT SERPL-MCNC: 4.54 MG/DL — HIGH (ref 0.5–1.3)
EGFR: 10 ML/MIN/1.73M2 — LOW
GLUCOSE BLDC GLUCOMTR-MCNC: 165 MG/DL — HIGH (ref 70–99)
GLUCOSE BLDC GLUCOMTR-MCNC: 271 MG/DL — HIGH (ref 70–99)
GLUCOSE BLDC GLUCOMTR-MCNC: 334 MG/DL — HIGH (ref 70–99)
GLUCOSE BLDC GLUCOMTR-MCNC: 404 MG/DL — HIGH (ref 70–99)
GLUCOSE BLDC GLUCOMTR-MCNC: 479 MG/DL — CRITICAL HIGH (ref 70–99)
GLUCOSE SERPL-MCNC: 254 MG/DL — HIGH (ref 70–99)
HCT VFR BLD CALC: 28.5 % — LOW (ref 34.5–45)
HGB BLD-MCNC: 9.2 G/DL — LOW (ref 11.5–15.5)
MAGNESIUM SERPL-MCNC: 2.4 MG/DL — SIGNIFICANT CHANGE UP (ref 1.6–2.6)
MCHC RBC-ENTMCNC: 29 PG — SIGNIFICANT CHANGE UP (ref 27–34)
MCHC RBC-ENTMCNC: 32.3 GM/DL — SIGNIFICANT CHANGE UP (ref 32–36)
MCV RBC AUTO: 89.9 FL — SIGNIFICANT CHANGE UP (ref 80–100)
NRBC # BLD: 0 /100 WBCS — SIGNIFICANT CHANGE UP (ref 0–0)
NRBC # FLD: 0 K/UL — SIGNIFICANT CHANGE UP (ref 0–0)
PHOSPHATE SERPL-MCNC: 5.3 MG/DL — HIGH (ref 2.5–4.5)
PLATELET # BLD AUTO: 288 K/UL — SIGNIFICANT CHANGE UP (ref 150–400)
POTASSIUM SERPL-MCNC: 4 MMOL/L — SIGNIFICANT CHANGE UP (ref 3.5–5.3)
POTASSIUM SERPL-SCNC: 4 MMOL/L — SIGNIFICANT CHANGE UP (ref 3.5–5.3)
RBC # BLD: 3.17 M/UL — LOW (ref 3.8–5.2)
RBC # FLD: 16 % — HIGH (ref 10.3–14.5)
SODIUM SERPL-SCNC: 138 MMOL/L — SIGNIFICANT CHANGE UP (ref 135–145)
WBC # BLD: 16.88 K/UL — HIGH (ref 3.8–10.5)
WBC # FLD AUTO: 16.88 K/UL — HIGH (ref 3.8–10.5)

## 2024-02-10 PROCEDURE — 99232 SBSQ HOSP IP/OBS MODERATE 35: CPT | Mod: GC

## 2024-02-10 RX ORDER — INSULIN LISPRO 100/ML
11 VIAL (ML) SUBCUTANEOUS
Refills: 0 | Status: DISCONTINUED | OUTPATIENT
Start: 2024-02-10 | End: 2024-02-11

## 2024-02-10 RX ORDER — ACETAMINOPHEN 500 MG
650 TABLET ORAL ONCE
Refills: 0 | Status: COMPLETED | OUTPATIENT
Start: 2024-02-10 | End: 2024-02-10

## 2024-02-10 RX ORDER — INSULIN GLARGINE 100 [IU]/ML
33 INJECTION, SOLUTION SUBCUTANEOUS AT BEDTIME
Refills: 0 | Status: DISCONTINUED | OUTPATIENT
Start: 2024-02-10 | End: 2024-02-11

## 2024-02-10 RX ADMIN — AMLODIPINE BESYLATE 10 MILLIGRAM(S): 2.5 TABLET ORAL at 06:19

## 2024-02-10 RX ADMIN — Medication 10 UNIT(S): at 08:42

## 2024-02-10 RX ADMIN — BUDESONIDE AND FORMOTEROL FUMARATE DIHYDRATE 2 PUFF(S): 160; 4.5 AEROSOL RESPIRATORY (INHALATION) at 13:41

## 2024-02-10 RX ADMIN — SENNA PLUS 2 TABLET(S): 8.6 TABLET ORAL at 22:35

## 2024-02-10 RX ADMIN — MONTELUKAST 10 MILLIGRAM(S): 4 TABLET, CHEWABLE ORAL at 13:41

## 2024-02-10 RX ADMIN — Medication 11 UNIT(S): at 13:42

## 2024-02-10 RX ADMIN — Medication 6: at 08:42

## 2024-02-10 RX ADMIN — BUMETANIDE 2 MILLIGRAM(S): 0.25 INJECTION INTRAMUSCULAR; INTRAVENOUS at 06:19

## 2024-02-10 RX ADMIN — CHLORHEXIDINE GLUCONATE 1 APPLICATION(S): 213 SOLUTION TOPICAL at 13:42

## 2024-02-10 RX ADMIN — Medication 2: at 13:42

## 2024-02-10 RX ADMIN — Medication 650 MILLIGRAM(S): at 09:41

## 2024-02-10 RX ADMIN — HEPARIN SODIUM 5000 UNIT(S): 5000 INJECTION INTRAVENOUS; SUBCUTANEOUS at 22:35

## 2024-02-10 RX ADMIN — HEPARIN SODIUM 5000 UNIT(S): 5000 INJECTION INTRAVENOUS; SUBCUTANEOUS at 13:41

## 2024-02-10 RX ADMIN — HEPARIN SODIUM 5000 UNIT(S): 5000 INJECTION INTRAVENOUS; SUBCUTANEOUS at 06:19

## 2024-02-10 RX ADMIN — ATORVASTATIN CALCIUM 20 MILLIGRAM(S): 80 TABLET, FILM COATED ORAL at 22:35

## 2024-02-10 RX ADMIN — ERYTHROPOIETIN 3000 UNIT(S): 10000 INJECTION, SOLUTION INTRAVENOUS; SUBCUTANEOUS at 11:00

## 2024-02-10 RX ADMIN — Medication 8: at 18:54

## 2024-02-10 RX ADMIN — Medication 650 MILLIGRAM(S): at 08:41

## 2024-02-10 RX ADMIN — BUMETANIDE 2 MILLIGRAM(S): 0.25 INJECTION INTRAMUSCULAR; INTRAVENOUS at 18:09

## 2024-02-10 RX ADMIN — Medication 1 TABLET(S): at 13:41

## 2024-02-10 RX ADMIN — BUDESONIDE AND FORMOTEROL FUMARATE DIHYDRATE 2 PUFF(S): 160; 4.5 AEROSOL RESPIRATORY (INHALATION) at 22:34

## 2024-02-10 RX ADMIN — LORATADINE 10 MILLIGRAM(S): 10 TABLET ORAL at 13:41

## 2024-02-10 RX ADMIN — Medication 8: at 22:35

## 2024-02-10 RX ADMIN — INSULIN GLARGINE 33 UNIT(S): 100 INJECTION, SOLUTION SUBCUTANEOUS at 22:36

## 2024-02-10 NOTE — PROGRESS NOTE ADULT - PROBLEM SELECTOR PLAN 1
Pt with NELSON in setting of progressive CKD. Now deemed ESRD and initiated on HD this admission. Initial HD on 2/1/24 via R IJ non tunneled HD catheter. R IJ tunneled HD catheter placed 2/5/24. She underwent LUE AVF creation on 2/9/24. Last HD on 2/8/24, plan for HD via R IJ tunneled HD catheter today (plan for TTS schedule as outpatient). Remains on PO Bumex 2mg BID. Monitor labs. Dose meds as per HD.

## 2024-02-10 NOTE — PROGRESS NOTE ADULT - PROBLEM SELECTOR PLAN 4
A1c 6.6% from 10/23, now 6.1%. On lantus 20u in am and 20 qhs at home, as well as repaglinide and victoza.  -hold home repaglinide and victoza  -c/w lantus 33u qhs, admelog 10u premeal, and moderate ISS  -FS with meals CXR with perihilar opacities, likely cardiogenic. BNP elevated. No formal diagnosis of CHF. Chronic BLE edema per daughter. Prior echo 10/23 with EF 61%, repeat 63% with mild diastolic dysfunction  -s/p IV Lasix 40mg, c/w po Bumex 2mg BID  -goal diuresis 1-1.5L daily

## 2024-02-10 NOTE — PROGRESS NOTE ADULT - PROBLEM SELECTOR PLAN 2
Hgb 9.2 today. Tsat 20%, Ferritin wnl at 276. s/p 5 day course of Venofer. Continue EPO with HD. Monitor Hgb.

## 2024-02-10 NOTE — PROGRESS NOTE ADULT - ATTENDING COMMENTS
1. ESRD - S/P LUE AVF 2/9.  Using tunneled catheter for dialysis today.  2. Anemia - TRACEY with titration to course.   Recommend 2/11 - CBC, Renal function panel (last phosphorus 5.3).  See above discussion.

## 2024-02-10 NOTE — PROGRESS NOTE ADULT - SUBJECTIVE AND OBJECTIVE BOX
[facility] DIVISION OF KIDNEY DISEASES AND HYPERTENSION   FOLLOW UP NOTE  --------------------------------------------------------------------------------  Chief Complaint: Pt with progressive CKDV/ESRD, initiated on HD    24 hour events/subjective: Pt. was seen and examined today, daughter at bedside. She underwent LUE AVF creation on 2/9/24. Last HD on 2/8/24 via R IJ tunneled HD catheter. Plan for HD today via R IJ tunneled HD catheter. Pt remains on PO diuretics. Denied n/v/f/c/sob.    PAST HISTORY  --------------------------------------------------------------------------------  No significant changes to PMH, PSH, FHx, SHx, unless otherwise noted    ALLERGIES & MEDICATIONS  --------------------------------------------------------------------------------  Allergies  No Known Allergies    Intolerances    Standing Inpatient Medications  amLODIPine   Tablet 10 milliGRAM(s) Oral daily  atorvastatin 20 milliGRAM(s) Oral at bedtime  budesonide 160 MICROgram(s)/formoterol 4.5 MICROgram(s) Inhaler 2 Puff(s) Inhalation two times a day  buMETAnide 2 milliGRAM(s) Oral every 12 hours  chlorhexidine 2% Cloths 1 Application(s) Topical daily  dextrose 5%. 1000 milliLiter(s) IV Continuous <Continuous>  dextrose 5%. 1000 milliLiter(s) IV Continuous <Continuous>  dextrose 50% Injectable 12.5 Gram(s) IV Push once  dextrose 50% Injectable 25 Gram(s) IV Push once  dextrose 50% Injectable 25 Gram(s) IV Push once  epoetin melody (EPOGEN) Injectable 3000 Unit(s) IV Push <User Schedule>  glucagon  Injectable 1 milliGRAM(s) IntraMuscular once  heparin   Injectable 5000 Unit(s) SubCutaneous every 8 hours  insulin glargine Injectable (LANTUS) 33 Unit(s) SubCutaneous at bedtime  insulin lispro (ADMELOG) corrective regimen sliding scale   SubCutaneous at bedtime  insulin lispro (ADMELOG) corrective regimen sliding scale   SubCutaneous three times a day before meals  insulin lispro Injectable (ADMELOG) 11 Unit(s) SubCutaneous three times a day before meals  loratadine 10 milliGRAM(s) Oral daily  montelukast 10 milliGRAM(s) Oral daily  multivitamin 1 Tablet(s) Oral daily  senna 2 Tablet(s) Oral at bedtime    PRN Inpatient Medications  dextrose Oral Gel 15 Gram(s) Oral once PRN  sodium chloride 0.9% Bolus. 100 milliLiter(s) IV Bolus every 5 minutes PRN  sodium chloride 0.9% lock flush 10 milliLiter(s) IV Push every 1 hour PRN    REVIEW OF SYSTEMS  --------------------------------------------------------------------------------  All other systems were reviewed and are negative, except as noted.    VITALS/PHYSICAL EXAM  --------------------------------------------------------------------------------  T(C): 36.8 (02-10-24 @ 15:02), Max: 37.2 (02-10-24 @ 10:27)  HR: 97 (02-10-24 @ 15:02) (90 - 98)  BP: 143/60 (02-10-24 @ 15:02) (120/67 - 159/72)  RR: 17 (02-10-24 @ 15:02) (17 - 18)  SpO2: 99% (02-10-24 @ 15:02) (99% - 100%)  Wt(kg): --  Height (cm): 154.9 (02-09-24 @ 09:29)  Weight (kg): 72.076 (02-09-24 @ 09:29)  BMI (kg/m2): 30 (02-09-24 @ 09:29)  BSA (m2): 1.71 (02-09-24 @ 09:29)    02-09-24 @ 07:01  -  02-10-24 @ 07:00  --------------------------------------------------------  IN: 120 mL / OUT: 0 mL / NET: 120 mL    02-10-24 @ 07:01  -  02-10-24 @ 15:43  --------------------------------------------------------  IN: 400 mL / OUT: 1900 mL / NET: -1500 mL    Physical Exam:  Gen NAD  HEENT: No JVD  Pulm: Diminished B/L  CV: S1S2,  Abd: Soft  Ext: +Trace edema B/L LE   Neuro: Awake and alert  Skin: Warm and dry  Vascular: RIJ tunneled HD catheter site is YASMINE THURMAN AVF site c/d/i, bruit heard, no overt thrill    LABS/STUDIES  --------------------------------------------------------------------------------              9.2    16.88 >-----------<  288      [02-10-24 @ 07:58]              28.5     138  |  101  |  30  ----------------------------<  254      [02-10-24 @ 07:58]  4.0   |  22  |  4.54        Ca     9.2     [02-10-24 @ 07:58]      Mg     2.40     [02-10-24 @ 07:58]      Phos  5.3     [02-10-24 @ 07:58]      PT/INR: PT 10.7 , INR 0.95       [02-09-24 @ 05:21]  PTT: 34.6       [02-09-24 @ 05:21]    Creatinine Trend:  SCr 4.54 [02-10 @ 07:58]  SCr 3.02 [02-09 @ 04:18]  SCr 4.34 [02-08 @ 06:43]  SCr 3.16 [02-07 @ 06:06]  SCr 4.20 [02-05 @ 04:34]    HBsAb <3.0      [02-01-24 @ 16:30]  HBsAg Nonreact      [02-01-24 @ 16:30]  HBcAb Nonreact      [02-01-24 @ 16:30]  HCV 0.10, Nonreact      [02-01-24 @ 16:30]

## 2024-02-10 NOTE — PROGRESS NOTE ADULT - ASSESSMENT
62yo female with a hx of T2DM, asthma, CKD4, HTN, and HLD here for dyspnea and wheezing that began the evening prior to presentation, admitted for AHRF likely iso volume overload iso of ESRD now on HD 62yo female with a hx of T2DM, asthma, CKD4, HTN, and HLD here for dyspnea and wheezing that began the evening prior to presentation, admitted for AHRF likely iso volume overload iso of ESRD now on HD

## 2024-02-10 NOTE — PROGRESS NOTE ADULT - PROBLEM SELECTOR PLAN 1
BUN/Cr 49/5.13 on admission (baseline Cr ~3.3). Dialysis initiated during this admission with improvement  US Kidney/bladder with echogenic lesion in the superior pole of right kidney vs right adrenal gland, CT recommended for further evaluation  -Nephrology consulted, appreciate recs. continue dialysis per nephro  -s/p permacath placement 2/5 and uncomplicated avf fistula placement 2/9   -trend bmp, UOP, I&Os  -c/w bumex po bid  -can f/u lesion as outpatient BUN/Cr 49/5.13 on admission (baseline Cr ~3.3). Dialysis initiated during this admission with improvement  US Kidney/bladder with echogenic lesion in the superior pole of right kidney vs right adrenal gland, CT recommended for further evaluation  -Nephrology consulted, appreciate recs. continue dialysis per nephro. HD scheduled for 2/10  -s/p permacath placement 2/5 and uncomplicated avf fistula placement 2/9   -trend bmp, UOP, I&Os  -c/w bumex po bid  -can f/u lesion as outpatient

## 2024-02-10 NOTE — PROGRESS NOTE ADULT - SUBJECTIVE AND OBJECTIVE BOX
Rakan Ramirez MD  Interventional Cardiology / Advance Heart Failure and Cardiac Transplant Specialist  [place] Office : [phone] [place] [address]  Tel:   [place] Office : [address] [facility] [address]  Tel: [phone]      Subjective/Overnight events: Pt is lying in bed comfortable not in distress, no chest pains no SOB no palpitations  	  MEDICATIONS:  amLODIPine   Tablet 10 milliGRAM(s) Oral daily  buMETAnide 2 milliGRAM(s) Oral every 12 hours  heparin   Injectable 5000 Unit(s) SubCutaneous every 8 hours      budesonide 160 MICROgram(s)/formoterol 4.5 MICROgram(s) Inhaler 2 Puff(s) Inhalation two times a day  loratadine 10 milliGRAM(s) Oral daily  montelukast 10 milliGRAM(s) Oral daily      senna 2 Tablet(s) Oral at bedtime    atorvastatin 20 milliGRAM(s) Oral at bedtime  dextrose 50% Injectable 25 Gram(s) IV Push once  dextrose 50% Injectable 12.5 Gram(s) IV Push once  dextrose 50% Injectable 25 Gram(s) IV Push once  dextrose Oral Gel 15 Gram(s) Oral once PRN  glucagon  Injectable 1 milliGRAM(s) IntraMuscular once  insulin glargine Injectable (LANTUS) 33 Unit(s) SubCutaneous at bedtime  insulin lispro (ADMELOG) corrective regimen sliding scale   SubCutaneous at bedtime  insulin lispro (ADMELOG) corrective regimen sliding scale   SubCutaneous three times a day before meals  insulin lispro Injectable (ADMELOG) 11 Unit(s) SubCutaneous three times a day before meals    chlorhexidine 2% Cloths 1 Application(s) Topical daily  dextrose 5%. 1000 milliLiter(s) IV Continuous <Continuous>  dextrose 5%. 1000 milliLiter(s) IV Continuous <Continuous>  epoetin melody (EPOGEN) Injectable 3000 Unit(s) IV Push <User Schedule>  multivitamin 1 Tablet(s) Oral daily  sodium chloride 0.9% Bolus. 100 milliLiter(s) IV Bolus every 5 minutes PRN  sodium chloride 0.9% lock flush 10 milliLiter(s) IV Push every 1 hour PRN      PAST MEDICAL/SURGICAL HISTORY  PAST MEDICAL & SURGICAL HISTORY:  Type 2 diabetes mellitus      Stage 4 chronic kidney disease      HTN (hypertension)      HLD (hyperlipidemia)      No significant past surgical history          SOCIAL HISTORY: Substance Use (street drugs): ( x ) never used  (  ) other:    FAMILY HISTORY:  FH: type 2 diabetes mellitus (Mother)          PHYSICAL EXAM:  T(C): 37.2 (02-10-24 @ 10:27), Max: 37.2 (02-10-24 @ 10:27)  HR: 98 (02-10-24 @ 10:27) (87 - 98)  BP: 159/72 (02-10-24 @ 10:27) (122/51 - 159/72)  RR: 18 (02-10-24 @ 10:27) (17 - 20)  SpO2: 100% (02-10-24 @ 05:31) (97% - 100%)  Wt(kg): --  I&O's Summary    09 Feb 2024 07:01  -  10 Feb 2024 07:00  --------------------------------------------------------  IN: 120 mL / OUT: 0 mL / NET: 120 mL    10 Feb 2024 07:01  -  10 Feb 2024 13:25  --------------------------------------------------------  IN: 400 mL / OUT: 1900 mL / NET: -1500 mL          GENERAL: NAD  EYES:  conjunctiva and sclera clear  ENMT: No tonsillar erythema, exudates, or enlargement  Cardiovascular: Normal S1 S2, No JVD, No murmurs, No edema  Respiratory: Lungs clear to auscultation	  Gastrointestinal:  Soft  Extremities: No edema                                     9.2    16.88 )-----------( 288      ( 10 Feb 2024 07:58 )             28.5     02-10    138  |  101  |  30<H>  ----------------------------<  254<H>  4.0   |  22  |  4.54<H>    Ca    9.2      10 Feb 2024 07:58  Phos  5.3     02-10  Mg     2.40     02-10      proBNP:   Lipid Profile:   HgA1c:   TSH:     Consultant(s) Notes Reviewed:  [x ] YES  [ ] NO    Care Discussed with Consultants/Other Providers [ x] YES  [ ] NO    Imaging Personally Reviewed independently:  [x] YES  [ ] NO    All labs, radiologic studies, vitals, orders and medications list reviewed. Patient is seen and examined at bedside. Case discussed with medical team.

## 2024-02-10 NOTE — PROGRESS NOTE ADULT - PROBLEM SELECTOR PLAN 5
Elevated trop 118 on admission, now downtrending. No chest pain. ECG with sinus tachycardia, ?LVH. Likely demand ischemia  -CTM A1c 6.6% from 10/23, now 6.1%. On lantus 20u in am and 20 qhs at home, as well as repaglinide and victoza.  -hold home repaglinide and victoza  -c/w lantus 33u qhs, admelog 11u premeal, and moderate ISS  -FS with meals

## 2024-02-10 NOTE — PROGRESS NOTE ADULT - PROBLEM SELECTOR PLAN 3
CXR with perihilar opacities, likely cardiogenic. BNP elevated. No formal diagnosis of CHF. Chronic BLE edema per daughter. Prior echo 10/23 with EF 61%, repeat 63% with mild diastolic dysfunction  -s/p IV Lasix 40mg, c/w po Bumex 2mg BID  -goal diuresis 1-1.5L daily RESOLVED. p/w 2 days dyspnea, wheezing, generalized weakness, myalgias, and chest tightness. Required BiPAP for increased WOB, now titrated to 3L NC. CXR with perihilar opacities. Pt reports improving sx with tx received in ED. Likely exacerbation of CHF 2/2 underlying URI. asthma exacerbation and PNA lower on differential  -Goal SpO2 >92%, off supplemental O2  -volume overload and asthma exacerbation management per below

## 2024-02-10 NOTE — PROGRESS NOTE ADULT - SUBJECTIVE AND OBJECTIVE BOX
VASCULAR SURGERY DAILY PROGRESS NOTE:     SUBJECTIVE/ROS:   patient seen and evaluated on rounds.    OBJECTIVE:  Vital Signs Last 24 Hrs  T(C): 36.8 (10 Feb 2024 15:), Max: 37.2 (10 Feb 2024 10:27)  T(F): 98.2 (10 Feb 2024 15:), Max: 98.9 (10 Feb 2024 10:27)  HR: 97 (10 Feb 2024 15:) (90 - 98)  BP: 143/60 (10 Feb 2024 15:) (120/67 - 159/72)  BP(mean): --  RR: 17 (10 Feb 2024 15:) (17 - 18)  SpO2: 99% (10 Feb 2024 15:) (99% - 100%)    Parameters below as of 10 Feb 2024 15:  Patient On (Oxygen Delivery Method): room air      I&O's Detail    2024 07:01  -  10 Feb 2024 07:00  --------------------------------------------------------  IN:    Oral Fluid: 120 mL  Total IN: 120 mL    OUT:  Total OUT: 0 mL    Total NET: 120 mL      10 Feb 2024 07:01  -  10 Feb 2024 15:46  --------------------------------------------------------  IN:    Other (mL): 400 mL  Total IN: 400 mL    OUT:    Other (mL): 1900 mL  Total OUT: 1900 mL    Total NET: -1500 mL    Daily     Daily Weight in k.5 (10 Feb 2024 13:33)  MEDICATIONS  (STANDING):  amLODIPine   Tablet 10 milliGRAM(s) Oral daily  atorvastatin 20 milliGRAM(s) Oral at bedtime  budesonide 160 MICROgram(s)/formoterol 4.5 MICROgram(s) Inhaler 2 Puff(s) Inhalation two times a day  buMETAnide 2 milliGRAM(s) Oral every 12 hours  chlorhexidine 2% Cloths 1 Application(s) Topical daily  dextrose 5%. 1000 milliLiter(s) (100 mL/Hr) IV Continuous <Continuous>  dextrose 5%. 1000 milliLiter(s) (50 mL/Hr) IV Continuous <Continuous>  dextrose 50% Injectable 25 Gram(s) IV Push once  dextrose 50% Injectable 12.5 Gram(s) IV Push once  dextrose 50% Injectable 25 Gram(s) IV Push once  epoetin melody (EPOGEN) Injectable 3000 Unit(s) IV Push <User Schedule>  glucagon  Injectable 1 milliGRAM(s) IntraMuscular once  heparin   Injectable 5000 Unit(s) SubCutaneous every 8 hours  insulin glargine Injectable (LANTUS) 33 Unit(s) SubCutaneous at bedtime  insulin lispro (ADMELOG) corrective regimen sliding scale   SubCutaneous three times a day before meals  insulin lispro (ADMELOG) corrective regimen sliding scale   SubCutaneous at bedtime  insulin lispro Injectable (ADMELOG) 11 Unit(s) SubCutaneous three times a day before meals  loratadine 10 milliGRAM(s) Oral daily  montelukast 10 milliGRAM(s) Oral daily  multivitamin 1 Tablet(s) Oral daily  senna 2 Tablet(s) Oral at bedtime    MEDICATIONS  (PRN):  dextrose Oral Gel 15 Gram(s) Oral once PRN Blood Glucose LESS THAN 70 milliGRAM(s)/deciliter  sodium chloride 0.9% Bolus. 100 milliLiter(s) IV Bolus every 5 minutes PRN SBP LESS THAN or EQUAL to 90 mmHg  sodium chloride 0.9% lock flush 10 milliLiter(s) IV Push every 1 hour PRN Pre/post blood products, medications, blood draw, and to maintain line patency      Labs:                        9.2    16.88 )-----------( 288      ( 10 Feb 2024 07:58 )             28.5     02-10    138  |  101  |  30<H>  ----------------------------<  254<H>  4.0   |  22  |  4.54<H>    Ca    9.2      10 Feb 2024 07:58  Phos  5.3     02-10  Mg     2.40     02-10      PT/INR - ( 2024 05:21 )   PT: 10.7 sec;   INR: 0.95 ratio         PTT - ( 2024 05:21 )  PTT:34.6 sec  Urinalysis Basic - ( 10 Feb 2024 07:58 )    Color: x / Appearance: x / SG: x / pH: x  Gluc: 254 mg/dL / Ketone: x  / Bili: x / Urobili: x   Blood: x / Protein: x / Nitrite: x   Leuk Esterase: x / RBC: x / WBC x   Sq Epi: x / Non Sq Epi: x / Bacteria: x    Physical Exam:  General: NAD  Cardiovascular: appears well perfused  Respiratory: respirations non labored  Gastrointestinal: soft, nontender, nondistended  Extremities: FROM, warm  Vascular: Left AVF site dressing c/d/i, no evidence of swelling or hematoma. Palpable thrill. Palpable radial pulse. Dopplerable Ulnar signal.  Neurological: A+Ox3

## 2024-02-10 NOTE — PROGRESS NOTE ADULT - ASSESSMENT
62yo female with a hx of T2DM, asthma, CKD4, HTN, and HLD here for dyspnea and wheezing that began the evening prior to presentation, admitted for AHRF likely iso volume overload iso of ESRD now on HD    EKG: ST LVH PVCs      1. Post-op assessment  -denies CP, SOB  -EKG: ST LVH PVCs  -TTE normal LV systolic function, mild diastolic dysfunction . moderate AS  -s/p AVF creation doing well    2. Aortic stenosis  -TTE with peak transaortic velocity is 2.59 m/s, peak transaortic gradient is 26.8 mmHg and mean transaortic gradient is 15.0 mmHg with an LVOT/aortic valve VTI ratio of 0.30. Probable moderate aortic stenosis  -will need routine follow up OP, repeat TTE in 6 months    3. ESRD  -on new HD this admission  -f/u renal     4. HTN  -controlled  -c.w norvasc  -continue to monitor BP     5. DVT Prophylaxis  -hep subq

## 2024-02-10 NOTE — PROGRESS NOTE ADULT - ASSESSMENT
63F hx DM, asthma, CKD4, HTN, and HLD here for dyspnea and wheezing that began the evening prior to presentation. Pt admitted with volume overload 2/2 ESRD, now requiring HD. S/p RIJ permacath placement with IR today. Also with suspected URI. Vascular surgery consulted for AVF creation. Patient is right hand dominant. Denies hx of pacemaker, or LUE surgeries or trauma. Now s/p LUE Radiocephalic fistula creation (2/9/24).    Recommendations:  - Care per primary  - c/w HD through temporary catheter site  - f/u outpatient with Dr. Chito BLACKBURN Team/Vascular Surgery  [id number]

## 2024-02-10 NOTE — PROGRESS NOTE ADULT - PROBLEM SELECTOR PLAN 2
RESOLVED. p/w 2 days dyspnea, wheezing, generalized weakness, myalgias, and chest tightness. Required BiPAP for increased WOB, now titrated to 3L NC. CXR with perihilar opacities. Pt reports improving sx with tx received in ED. Likely exacerbation of CHF 2/2 underlying URI. asthma exacerbation and PNA lower on differential  -Goal SpO2 >92%, off supplemental O2  -volume overload and asthma exacerbation management per below Pt developed pain, warmth, swelling of R arm at IV site. Pt no longer requiring IV medication/therapies so IV removed. Likely superficial thrombophlebitis low suspicion for infection  -warm compresses and tylenol prn  -CTM  -if worsening can pursue US of extremity

## 2024-02-10 NOTE — PROGRESS NOTE ADULT - SUBJECTIVE AND OBJECTIVE BOX
Denton Borjas MD  PGY 1 Department of Internal Medicine        Patient is a 63y old  Female who presents with a chief complaint of AHRF (09 Feb 2024 11:07)      SUBJECTIVE / OVERNIGHT EVENTS: Pt seen and examined. No acute overnight events. Denies fevers, chills, CP, SOB, Abdominal pain, N/V, Constipation, Diarrhea        MEDICATIONS  (STANDING):  amLODIPine   Tablet 10 milliGRAM(s) Oral daily  atorvastatin 20 milliGRAM(s) Oral at bedtime  budesonide 160 MICROgram(s)/formoterol 4.5 MICROgram(s) Inhaler 2 Puff(s) Inhalation two times a day  buMETAnide 2 milliGRAM(s) Oral every 12 hours  chlorhexidine 2% Cloths 1 Application(s) Topical daily  dextrose 5%. 1000 milliLiter(s) (100 mL/Hr) IV Continuous <Continuous>  dextrose 5%. 1000 milliLiter(s) (50 mL/Hr) IV Continuous <Continuous>  dextrose 50% Injectable 25 Gram(s) IV Push once  dextrose 50% Injectable 12.5 Gram(s) IV Push once  dextrose 50% Injectable 25 Gram(s) IV Push once  epoetin melody (EPOGEN) Injectable 3000 Unit(s) IV Push <User Schedule>  glucagon  Injectable 1 milliGRAM(s) IntraMuscular once  heparin   Injectable 5000 Unit(s) SubCutaneous every 8 hours  insulin glargine Injectable (LANTUS) 30 Unit(s) SubCutaneous at bedtime  insulin lispro (ADMELOG) corrective regimen sliding scale   SubCutaneous at bedtime  insulin lispro (ADMELOG) corrective regimen sliding scale   SubCutaneous three times a day before meals  insulin lispro Injectable (ADMELOG) 10 Unit(s) SubCutaneous three times a day before meals  loratadine 10 milliGRAM(s) Oral daily  montelukast 10 milliGRAM(s) Oral daily  multivitamin 1 Tablet(s) Oral daily  senna 2 Tablet(s) Oral at bedtime    MEDICATIONS  (PRN):  dextrose Oral Gel 15 Gram(s) Oral once PRN Blood Glucose LESS THAN 70 milliGRAM(s)/deciliter  sodium chloride 0.9% Bolus. 100 milliLiter(s) IV Bolus every 5 minutes PRN SBP LESS THAN or EQUAL to 90 mmHg  sodium chloride 0.9% lock flush 10 milliLiter(s) IV Push every 1 hour PRN Pre/post blood products, medications, blood draw, and to maintain line patency      I&O's Summary    09 Feb 2024 07:01  -  10 Feb 2024 07:00  --------------------------------------------------------  IN: 120 mL / OUT: 0 mL / NET: 120 mL        Vital Signs Last 24 Hrs  T(C): 36.6 (10 Feb 2024 05:31), Max: 37 (09 Feb 2024 12:40)  T(F): 97.9 (10 Feb 2024 05:31), Max: 98.6 (09 Feb 2024 12:40)  HR: 98 (10 Feb 2024 05:31) (87 - 98)  BP: 133/70 (10 Feb 2024 05:31) (107/54 - 144/69)  BP(mean): 73 (09 Feb 2024 14:00) (63 - 73)  RR: 18 (10 Feb 2024 05:31) (14 - 20)  SpO2: 100% (10 Feb 2024 05:31) (97% - 100%)    Parameters below as of 10 Feb 2024 05:31  Patient On (Oxygen Delivery Method): room air        CAPILLARY BLOOD GLUCOSE      POCT Blood Glucose.: 256 mg/dL (09 Feb 2024 21:57)  POCT Blood Glucose.: 395 mg/dL (09 Feb 2024 17:37)  POCT Blood Glucose.: 225 mg/dL (09 Feb 2024 14:10)  POCT Blood Glucose.: 261 mg/dL (09 Feb 2024 12:44)  POCT Blood Glucose.: 174 mg/dL (09 Feb 2024 09:09)      PHYSICAL EXAM:  GENERAL: NAD, resting comfortably, permacath CDI  HEAD:  Atraumatic, Normocephalic  EYES: EOMI, conjunctiva and sclera clear  CHEST/LUNG: CTAB, no wheezes rales or rhonchi  HEART: Regular rate and rhythm; No murmurs, rubs, or gallops  ABDOMEN: Soft, Nontender, Nondistended; Bowel sounds present  EXTREMITIES:  2+ Peripheral Pulses, No clubbing, cyanosis, no edema. Small area of swelling, erythema, tenderness medial to R wrist IV  PSYCH: AAOx3  NEUROLOGY: non-focal  SKIN: No rashes or lesions    LABS:                        10.2   19.73 )-----------( 318      ( 09 Feb 2024 04:18 )             31.6     Auto Eosinophil # x     / Auto Eosinophil % x     / Auto Neutrophil # x     / Auto Neutrophil % x     / BANDS % x        02-09    136  |  100  |  21  ----------------------------<  196<H>  3.9   |  23  |  3.02<H>    Ca    9.7      09 Feb 2024 04:18  Mg     2.30     02-09  Phos  3.7     02-09    PT/INR - ( 09 Feb 2024 05:21 )   PT: 10.7 sec;   INR: 0.95 ratio         PTT - ( 09 Feb 2024 05:21 )  PTT:34.6 sec      Urinalysis Basic - ( 09 Feb 2024 04:18 )    Color: x / Appearance: x / SG: x / pH: x  Gluc: 196 mg/dL / Ketone: x  / Bili: x / Urobili: x   Blood: x / Protein: x / Nitrite: x   Leuk Esterase: x / RBC: x / WBC x   Sq Epi: x / Non Sq Epi: x / Bacteria: x            RADIOLOGY & ADDITIONAL TESTS:    Imaging Personally Reviewed:    Consultant(s) Notes Reviewed:      Care Discussed with Consultants/Other Providers:   Denton Borjas MD  PGY 1 Department of Internal Medicine        Patient is a 63y old  Female who presents with a chief complaint of AHRF (09 Feb 2024 11:07)      SUBJECTIVE / OVERNIGHT EVENTS: Pt seen and examined. No acute overnight events. Continues to have pain and swelling of R arm by prior IV site but overall improved from yesterday. Denies fevers, chills, CP, SOB, Abdominal pain, N/V/D        MEDICATIONS  (STANDING):  amLODIPine   Tablet 10 milliGRAM(s) Oral daily  atorvastatin 20 milliGRAM(s) Oral at bedtime  budesonide 160 MICROgram(s)/formoterol 4.5 MICROgram(s) Inhaler 2 Puff(s) Inhalation two times a day  buMETAnide 2 milliGRAM(s) Oral every 12 hours  chlorhexidine 2% Cloths 1 Application(s) Topical daily  dextrose 5%. 1000 milliLiter(s) (100 mL/Hr) IV Continuous <Continuous>  dextrose 5%. 1000 milliLiter(s) (50 mL/Hr) IV Continuous <Continuous>  dextrose 50% Injectable 25 Gram(s) IV Push once  dextrose 50% Injectable 12.5 Gram(s) IV Push once  dextrose 50% Injectable 25 Gram(s) IV Push once  epoetin melody (EPOGEN) Injectable 3000 Unit(s) IV Push <User Schedule>  glucagon  Injectable 1 milliGRAM(s) IntraMuscular once  heparin   Injectable 5000 Unit(s) SubCutaneous every 8 hours  insulin glargine Injectable (LANTUS) 30 Unit(s) SubCutaneous at bedtime  insulin lispro (ADMELOG) corrective regimen sliding scale   SubCutaneous at bedtime  insulin lispro (ADMELOG) corrective regimen sliding scale   SubCutaneous three times a day before meals  insulin lispro Injectable (ADMELOG) 10 Unit(s) SubCutaneous three times a day before meals  loratadine 10 milliGRAM(s) Oral daily  montelukast 10 milliGRAM(s) Oral daily  multivitamin 1 Tablet(s) Oral daily  senna 2 Tablet(s) Oral at bedtime    MEDICATIONS  (PRN):  dextrose Oral Gel 15 Gram(s) Oral once PRN Blood Glucose LESS THAN 70 milliGRAM(s)/deciliter  sodium chloride 0.9% Bolus. 100 milliLiter(s) IV Bolus every 5 minutes PRN SBP LESS THAN or EQUAL to 90 mmHg  sodium chloride 0.9% lock flush 10 milliLiter(s) IV Push every 1 hour PRN Pre/post blood products, medications, blood draw, and to maintain line patency      I&O's Summary    09 Feb 2024 07:01  -  10 Feb 2024 07:00  --------------------------------------------------------  IN: 120 mL / OUT: 0 mL / NET: 120 mL        Vital Signs Last 24 Hrs  T(C): 36.6 (10 Feb 2024 05:31), Max: 37 (09 Feb 2024 12:40)  T(F): 97.9 (10 Feb 2024 05:31), Max: 98.6 (09 Feb 2024 12:40)  HR: 98 (10 Feb 2024 05:31) (87 - 98)  BP: 133/70 (10 Feb 2024 05:31) (107/54 - 144/69)  BP(mean): 73 (09 Feb 2024 14:00) (63 - 73)  RR: 18 (10 Feb 2024 05:31) (14 - 20)  SpO2: 100% (10 Feb 2024 05:31) (97% - 100%)    Parameters below as of 10 Feb 2024 05:31  Patient On (Oxygen Delivery Method): room air        CAPILLARY BLOOD GLUCOSE      POCT Blood Glucose.: 256 mg/dL (09 Feb 2024 21:57)  POCT Blood Glucose.: 395 mg/dL (09 Feb 2024 17:37)  POCT Blood Glucose.: 225 mg/dL (09 Feb 2024 14:10)  POCT Blood Glucose.: 261 mg/dL (09 Feb 2024 12:44)  POCT Blood Glucose.: 174 mg/dL (09 Feb 2024 09:09)      PHYSICAL EXAM:  GENERAL: NAD, resting comfortably, permacath CDI  HEAD:  Atraumatic, Normocephalic  EYES: EOMI, conjunctiva and sclera clear  CHEST/LUNG: CTAB, no wheezes rales or rhonchi  HEART: Regular rate and rhythm; No murmurs, rubs, or gallops  ABDOMEN: Soft, Nontender, Nondistended; Bowel sounds present  EXTREMITIES:  2+ Peripheral Pulses, No clubbing, cyanosis, no edema. Small area of swelling, erythema, tenderness medial to R wrist IV, improved from yesterday  PSYCH: AAOx3  NEUROLOGY: non-focal  SKIN: No rashes or lesions    LABS:                        10.2   19.73 )-----------( 318      ( 09 Feb 2024 04:18 )             31.6     Auto Eosinophil # x     / Auto Eosinophil % x     / Auto Neutrophil # x     / Auto Neutrophil % x     / BANDS % x        02-09    136  |  100  |  21  ----------------------------<  196<H>  3.9   |  23  |  3.02<H>    Ca    9.7      09 Feb 2024 04:18  Mg     2.30     02-09  Phos  3.7     02-09    PT/INR - ( 09 Feb 2024 05:21 )   PT: 10.7 sec;   INR: 0.95 ratio         PTT - ( 09 Feb 2024 05:21 )  PTT:34.6 sec      Urinalysis Basic - ( 09 Feb 2024 04:18 )    Color: x / Appearance: x / SG: x / pH: x  Gluc: 196 mg/dL / Ketone: x  / Bili: x / Urobili: x   Blood: x / Protein: x / Nitrite: x   Leuk Esterase: x / RBC: x / WBC x   Sq Epi: x / Non Sq Epi: x / Bacteria: x            RADIOLOGY & ADDITIONAL TESTS:    Imaging Personally Reviewed:    Consultant(s) Notes Reviewed:      Care Discussed with Consultants/Other Providers:

## 2024-02-10 NOTE — PROGRESS NOTE ADULT - PROBLEM SELECTOR PLAN 8
diet: renal diet  dvt ppx: heparin tid  dispo: pending clinical improvement  Bowel regimen: miralax and senna  Code: full  Repletes Mg to 2.0, Phos to 3.0, K to 4.0    RCRI 2. Pt is currently optimized for procedure from medical standpoint stable but soft on admission. on amlodipine at home  -c/w amlodipine 10mg daily    #Hyperlipidemia  on atorvastatin at home  -c/w home meds

## 2024-02-10 NOTE — PROGRESS NOTE ADULT - PROBLEM SELECTOR PLAN 6
Hgb 8.9 on admission, baseline 9-10. Likely iso CKD. Low suspicion for any source of bleeding  -trend cbc  -s/p iv venofer (2/2 - 2/7)  -c/w epo Elevated trop 118 on admission, now downtrending. No chest pain. ECG with sinus tachycardia, ?LVH. Likely demand ischemia  -CTM

## 2024-02-10 NOTE — PROGRESS NOTE ADULT - ATTENDING COMMENTS
63F with PMH of DM2, asthma, progressive CKD here w/ AHRF 2' overload now progressed to ESRD started on new HD. Pt seen at bedside. Dtr present as well. Pt feels well. Tolerating HD. Pending OP coordination of HD.

## 2024-02-10 NOTE — PROGRESS NOTE ADULT - PROBLEM SELECTOR PLAN 7
stable but soft on admission. on amlodipine at home  -c/w amlodipine 10mg daily    #Hyperlipidemia  on atorvastatin at home  -c/w home meds Hgb 8.9 on admission, baseline 9-10. Likely iso CKD. Low suspicion for any source of bleeding  -trend cbc  -s/p iv venofer (2/2 - 2/7)  -c/w epo

## 2024-02-11 LAB
ANION GAP SERPL CALC-SCNC: 16 MMOL/L — HIGH (ref 7–14)
BUN SERPL-MCNC: 25 MG/DL — HIGH (ref 7–23)
CALCIUM SERPL-MCNC: 8.6 MG/DL — SIGNIFICANT CHANGE UP (ref 8.4–10.5)
CHLORIDE SERPL-SCNC: 102 MMOL/L — SIGNIFICANT CHANGE UP (ref 98–107)
CO2 SERPL-SCNC: 21 MMOL/L — LOW (ref 22–31)
CREAT SERPL-MCNC: 3.85 MG/DL — HIGH (ref 0.5–1.3)
EGFR: 12 ML/MIN/1.73M2 — LOW
GLUCOSE BLDC GLUCOMTR-MCNC: 227 MG/DL — HIGH (ref 70–99)
GLUCOSE BLDC GLUCOMTR-MCNC: 305 MG/DL — HIGH (ref 70–99)
GLUCOSE BLDC GLUCOMTR-MCNC: 328 MG/DL — HIGH (ref 70–99)
GLUCOSE BLDC GLUCOMTR-MCNC: 368 MG/DL — HIGH (ref 70–99)
GLUCOSE BLDC GLUCOMTR-MCNC: 389 MG/DL — HIGH (ref 70–99)
GLUCOSE BLDC GLUCOMTR-MCNC: 419 MG/DL — HIGH (ref 70–99)
GLUCOSE SERPL-MCNC: 227 MG/DL — HIGH (ref 70–99)
HCT VFR BLD CALC: 29.7 % — LOW (ref 34.5–45)
HGB BLD-MCNC: 9.5 G/DL — LOW (ref 11.5–15.5)
MAGNESIUM SERPL-MCNC: 2.2 MG/DL — SIGNIFICANT CHANGE UP (ref 1.6–2.6)
MCHC RBC-ENTMCNC: 29.1 PG — SIGNIFICANT CHANGE UP (ref 27–34)
MCHC RBC-ENTMCNC: 32 GM/DL — SIGNIFICANT CHANGE UP (ref 32–36)
MCV RBC AUTO: 90.8 FL — SIGNIFICANT CHANGE UP (ref 80–100)
NRBC # BLD: 0 /100 WBCS — SIGNIFICANT CHANGE UP (ref 0–0)
NRBC # FLD: 0 K/UL — SIGNIFICANT CHANGE UP (ref 0–0)
PHOSPHATE SERPL-MCNC: 4.4 MG/DL — SIGNIFICANT CHANGE UP (ref 2.5–4.5)
PLATELET # BLD AUTO: 284 K/UL — SIGNIFICANT CHANGE UP (ref 150–400)
POTASSIUM SERPL-MCNC: 3.7 MMOL/L — SIGNIFICANT CHANGE UP (ref 3.5–5.3)
POTASSIUM SERPL-SCNC: 3.7 MMOL/L — SIGNIFICANT CHANGE UP (ref 3.5–5.3)
RBC # BLD: 3.27 M/UL — LOW (ref 3.8–5.2)
RBC # FLD: 16.1 % — HIGH (ref 10.3–14.5)
SODIUM SERPL-SCNC: 139 MMOL/L — SIGNIFICANT CHANGE UP (ref 135–145)
WBC # BLD: 16.23 K/UL — HIGH (ref 3.8–10.5)
WBC # FLD AUTO: 16.23 K/UL — HIGH (ref 3.8–10.5)

## 2024-02-11 PROCEDURE — 99232 SBSQ HOSP IP/OBS MODERATE 35: CPT | Mod: GC

## 2024-02-11 RX ORDER — ACETAMINOPHEN 500 MG
650 TABLET ORAL ONCE
Refills: 0 | Status: COMPLETED | OUTPATIENT
Start: 2024-02-11 | End: 2024-02-11

## 2024-02-11 RX ORDER — INSULIN LISPRO 100/ML
14 VIAL (ML) SUBCUTANEOUS
Refills: 0 | Status: DISCONTINUED | OUTPATIENT
Start: 2024-02-11 | End: 2024-02-11

## 2024-02-11 RX ORDER — INSULIN GLARGINE 100 [IU]/ML
43 INJECTION, SOLUTION SUBCUTANEOUS AT BEDTIME
Refills: 0 | Status: DISCONTINUED | OUTPATIENT
Start: 2024-02-11 | End: 2024-02-12

## 2024-02-11 RX ORDER — INSULIN GLARGINE 100 [IU]/ML
40 INJECTION, SOLUTION SUBCUTANEOUS AT BEDTIME
Refills: 0 | Status: DISCONTINUED | OUTPATIENT
Start: 2024-02-11 | End: 2024-02-11

## 2024-02-11 RX ORDER — INSULIN LISPRO 100/ML
15 VIAL (ML) SUBCUTANEOUS
Refills: 0 | Status: DISCONTINUED | OUTPATIENT
Start: 2024-02-11 | End: 2024-02-12

## 2024-02-11 RX ADMIN — ATORVASTATIN CALCIUM 20 MILLIGRAM(S): 80 TABLET, FILM COATED ORAL at 21:08

## 2024-02-11 RX ADMIN — Medication 650 MILLIGRAM(S): at 15:40

## 2024-02-11 RX ADMIN — BUMETANIDE 2 MILLIGRAM(S): 0.25 INJECTION INTRAMUSCULAR; INTRAVENOUS at 06:47

## 2024-02-11 RX ADMIN — MONTELUKAST 10 MILLIGRAM(S): 4 TABLET, CHEWABLE ORAL at 12:47

## 2024-02-11 RX ADMIN — HEPARIN SODIUM 5000 UNIT(S): 5000 INJECTION INTRAVENOUS; SUBCUTANEOUS at 06:47

## 2024-02-11 RX ADMIN — Medication 15 UNIT(S): at 12:35

## 2024-02-11 RX ADMIN — Medication 1 TABLET(S): at 12:47

## 2024-02-11 RX ADMIN — Medication 4: at 08:59

## 2024-02-11 RX ADMIN — BUMETANIDE 2 MILLIGRAM(S): 0.25 INJECTION INTRAMUSCULAR; INTRAVENOUS at 18:20

## 2024-02-11 RX ADMIN — Medication 11 UNIT(S): at 08:59

## 2024-02-11 RX ADMIN — Medication 15 UNIT(S): at 18:19

## 2024-02-11 RX ADMIN — HEPARIN SODIUM 5000 UNIT(S): 5000 INJECTION INTRAVENOUS; SUBCUTANEOUS at 15:32

## 2024-02-11 RX ADMIN — LORATADINE 10 MILLIGRAM(S): 10 TABLET ORAL at 12:47

## 2024-02-11 RX ADMIN — Medication 4: at 21:12

## 2024-02-11 RX ADMIN — AMLODIPINE BESYLATE 10 MILLIGRAM(S): 2.5 TABLET ORAL at 06:47

## 2024-02-11 RX ADMIN — CHLORHEXIDINE GLUCONATE 1 APPLICATION(S): 213 SOLUTION TOPICAL at 15:39

## 2024-02-11 RX ADMIN — HEPARIN SODIUM 5000 UNIT(S): 5000 INJECTION INTRAVENOUS; SUBCUTANEOUS at 21:09

## 2024-02-11 RX ADMIN — SENNA PLUS 2 TABLET(S): 8.6 TABLET ORAL at 21:09

## 2024-02-11 RX ADMIN — Medication 10: at 12:34

## 2024-02-11 RX ADMIN — Medication 10: at 18:19

## 2024-02-11 RX ADMIN — INSULIN GLARGINE 43 UNIT(S): 100 INJECTION, SOLUTION SUBCUTANEOUS at 21:10

## 2024-02-11 RX ADMIN — Medication 650 MILLIGRAM(S): at 16:40

## 2024-02-11 RX ADMIN — BUDESONIDE AND FORMOTEROL FUMARATE DIHYDRATE 2 PUFF(S): 160; 4.5 AEROSOL RESPIRATORY (INHALATION) at 09:00

## 2024-02-11 NOTE — PROGRESS NOTE ADULT - PROBLEM SELECTOR PLAN 2
Pt developed pain, warmth, swelling of R arm at IV site. Pt no longer requiring IV medication/therapies so IV removed. Likely superficial thrombophlebitis low suspicion for infection  -warm compresses and tylenol prn  -CTM  -if worsening can pursue US of extremity A1c 6.6% from 10/23, now 6.1%. On lantus 20u in am and 20 qhs at home, as well as repaglinide and victoza.  -hold home repaglinide and victoza  -c/w lantus 33u qhs, admelog 11u premeal, and moderate ISS  -FS with meals  - elevated glucose, no outside food   - will c/w insulin adjustment

## 2024-02-11 NOTE — PROVIDER CONTACT NOTE (OTHER) - SITUATION
Patient's blood glucose 459. Patient asymptomatic.
/49 [id number]
Pt weaned from 3L NC to 1L NC placed on 2L NC when being transferred off unit for comfort. And dobbs needs to be emptied upon pt arrival back to unit from HD, dobbs not emptied before sending to HD.
F/S 479 mg/dl, 404 mg/dl

## 2024-02-11 NOTE — PROVIDER CONTACT NOTE (OTHER) - BACKGROUND
62 y/o female admitted for shortness of breath with hx of type 2 DM, HTN, HLD, stage 4 CKD
63y F admitted for SOB
63yr old female admitted for HF vs asthma exacerbation.
63yr old female admitted for HF vs asthma exacerbation.

## 2024-02-11 NOTE — PROGRESS NOTE ADULT - PROBLEM SELECTOR PLAN 3
RESOLVED. p/w 2 days dyspnea, wheezing, generalized weakness, myalgias, and chest tightness. Required BiPAP for increased WOB, now titrated to 3L NC. CXR with perihilar opacities. Pt reports improving sx with tx received in ED. Likely exacerbation of CHF 2/2 underlying URI. asthma exacerbation and PNA lower on differential  -Goal SpO2 >92%, off supplemental O2  -volume overload and asthma exacerbation management per below Pt developed pain, warmth, swelling of R arm at IV site. Pt no longer requiring IV medication/therapies so IV removed. Likely superficial thrombophlebitis low suspicion for infection  -warm compresses and tylenol prn  -CTM  -if worsening can pursue US of extremity  - improving

## 2024-02-11 NOTE — PROGRESS NOTE ADULT - SUBJECTIVE AND OBJECTIVE BOX
Rakan Ramirez MD  Interventional Cardiology / Advance Heart Failure and Cardiac Transplant Specialist  [place] Office : [phone] [place] [id number]  Tel:   [place] Office : [address] [facility] [address]  Tel: [phone]      Subjective/Overnight events: Pt is lying in bed comfortable not in distress, no chest pains no SOB no palpitations  	  MEDICATIONS:  amLODIPine   Tablet 10 milliGRAM(s) Oral daily  buMETAnide 2 milliGRAM(s) Oral every 12 hours  heparin   Injectable 5000 Unit(s) SubCutaneous every 8 hours      budesonide 160 MICROgram(s)/formoterol 4.5 MICROgram(s) Inhaler 2 Puff(s) Inhalation two times a day  loratadine 10 milliGRAM(s) Oral daily  montelukast 10 milliGRAM(s) Oral daily      senna 2 Tablet(s) Oral at bedtime    atorvastatin 20 milliGRAM(s) Oral at bedtime  dextrose 50% Injectable 25 Gram(s) IV Push once  dextrose 50% Injectable 12.5 Gram(s) IV Push once  dextrose 50% Injectable 25 Gram(s) IV Push once  dextrose Oral Gel 15 Gram(s) Oral once PRN  glucagon  Injectable 1 milliGRAM(s) IntraMuscular once  insulin glargine Injectable (LANTUS) 43 Unit(s) SubCutaneous at bedtime  insulin lispro (ADMELOG) corrective regimen sliding scale   SubCutaneous three times a day before meals  insulin lispro (ADMELOG) corrective regimen sliding scale   SubCutaneous at bedtime  insulin lispro Injectable (ADMELOG) 15 Unit(s) SubCutaneous three times a day before meals    chlorhexidine 2% Cloths 1 Application(s) Topical daily  dextrose 5%. 1000 milliLiter(s) IV Continuous <Continuous>  dextrose 5%. 1000 milliLiter(s) IV Continuous <Continuous>  epoetin melody (EPOGEN) Injectable 3000 Unit(s) IV Push <User Schedule>  multivitamin 1 Tablet(s) Oral daily  sodium chloride 0.9% Bolus. 100 milliLiter(s) IV Bolus every 5 minutes PRN  sodium chloride 0.9% lock flush 10 milliLiter(s) IV Push every 1 hour PRN      PAST MEDICAL/SURGICAL HISTORY  PAST MEDICAL & SURGICAL HISTORY:  Type 2 diabetes mellitus      Stage 4 chronic kidney disease      HTN (hypertension)      HLD (hyperlipidemia)      No significant past surgical history          SOCIAL HISTORY: Substance Use (street drugs): ( x ) never used  (  ) other:    FAMILY HISTORY:  FH: type 2 diabetes mellitus (Mother)        PHYSICAL EXAM:  T(C): 36.7 (02-11-24 @ 06:08), Max: 37.1 (02-10-24 @ 13:33)  HR: 80 (02-11-24 @ 12:08) (80 - 97)  BP: 152/74 (02-11-24 @ 12:08) (120/67 - 152/74)  RR: 18 (02-11-24 @ 12:08) (17 - 18)  SpO2: 100% (02-11-24 @ 12:08) (99% - 100%)  Wt(kg): --  I&O's Summary    10 Feb 2024 07:01  -  11 Feb 2024 07:00  --------------------------------------------------------  IN: 680 mL / OUT: 1900 mL / NET: -1220 mL          GENERAL: NAD  EYES:  conjunctiva and sclera clear  ENMT: No tonsillar erythema, exudates, or enlargement  Cardiovascular: Normal S1 S2, No JVD, No murmurs, No edema  Respiratory: Lungs clear to auscultation	  Gastrointestinal:  Soft  Extremities: No edema                                     9.5    16.23 )-----------( 284      ( 11 Feb 2024 06:42 )             29.7     02-11    139  |  102  |  25<H>  ----------------------------<  227<H>  3.7   |  21<L>  |  3.85<H>    Ca    8.6      11 Feb 2024 06:42  Phos  4.4     02-11  Mg     2.20     02-11      proBNP:   Lipid Profile:   HgA1c:   TSH:     Consultant(s) Notes Reviewed:  [x ] YES  [ ] NO    Care Discussed with Consultants/Other Providers [ x] YES  [ ] NO    Imaging Personally Reviewed independently:  [x] YES  [ ] NO    All labs, radiologic studies, vitals, orders and medications list reviewed. Patient is seen and examined at bedside. Case discussed with medical team.

## 2024-02-11 NOTE — PROGRESS NOTE ADULT - SUBJECTIVE AND OBJECTIVE BOX
Progress Note    01-31-24 (11d)    Patient is a 63y old  Female who presents with a chief complaint of AHRF (10 Feb 2024 15:46)      Subjective / Overnight Events :  - No acute events overnight.  - Pt seen and examined at bedside.     Additional ROS (if any):    MEDICATIONS  (STANDING):  amLODIPine   Tablet 10 milliGRAM(s) Oral daily  atorvastatin 20 milliGRAM(s) Oral at bedtime  budesonide 160 MICROgram(s)/formoterol 4.5 MICROgram(s) Inhaler 2 Puff(s) Inhalation two times a day  buMETAnide 2 milliGRAM(s) Oral every 12 hours  chlorhexidine 2% Cloths 1 Application(s) Topical daily  dextrose 5%. 1000 milliLiter(s) (100 mL/Hr) IV Continuous <Continuous>  dextrose 5%. 1000 milliLiter(s) (50 mL/Hr) IV Continuous <Continuous>  dextrose 50% Injectable 25 Gram(s) IV Push once  dextrose 50% Injectable 25 Gram(s) IV Push once  dextrose 50% Injectable 12.5 Gram(s) IV Push once  epoetin melody (EPOGEN) Injectable 3000 Unit(s) IV Push <User Schedule>  glucagon  Injectable 1 milliGRAM(s) IntraMuscular once  heparin   Injectable 5000 Unit(s) SubCutaneous every 8 hours  insulin glargine Injectable (LANTUS) 33 Unit(s) SubCutaneous at bedtime  insulin lispro (ADMELOG) corrective regimen sliding scale   SubCutaneous three times a day before meals  insulin lispro (ADMELOG) corrective regimen sliding scale   SubCutaneous at bedtime  insulin lispro Injectable (ADMELOG) 11 Unit(s) SubCutaneous three times a day before meals  loratadine 10 milliGRAM(s) Oral daily  montelukast 10 milliGRAM(s) Oral daily  multivitamin 1 Tablet(s) Oral daily  senna 2 Tablet(s) Oral at bedtime    MEDICATIONS  (PRN):  dextrose Oral Gel 15 Gram(s) Oral once PRN Blood Glucose LESS THAN 70 milliGRAM(s)/deciliter  sodium chloride 0.9% Bolus. 100 milliLiter(s) IV Bolus every 5 minutes PRN SBP LESS THAN or EQUAL to 90 mmHg  sodium chloride 0.9% lock flush 10 milliLiter(s) IV Push every 1 hour PRN Pre/post blood products, medications, blood draw, and to maintain line patency          PHYSICAL EXAM:  Vital Signs Last 24 Hrs  T(C): 36.8 (10 Feb 2024 21:57), Max: 37.2 (10 Feb 2024 10:27)  T(F): 98.2 (10 Feb 2024 21:57), Max: 98.9 (10 Feb 2024 10:27)  HR: 94 (10 Feb 2024 21:57) (90 - 98)  BP: 149/67 (10 Feb 2024 21:57) (120/67 - 159/72)  BP(mean): --  RR: 17 (10 Feb 2024 21:57) (17 - 18)  SpO2: 100% (10 Feb 2024 21:57) (99% - 100%)    Parameters below as of 10 Feb 2024 21:57  Patient On (Oxygen Delivery Method): room air        I&O's Summary    09 Feb 2024 07:01  -  10 Feb 2024 07:00  --------------------------------------------------------  IN: 120 mL / OUT: 0 mL / NET: 120 mL    10 Feb 2024 07:01  -  11 Feb 2024 05:24  --------------------------------------------------------  IN: 400 mL / OUT: 1900 mL / NET: -1500 mL        General: NAD, non-toxic appearing   HEENT: PERRLA, EOMi, no scleral icterus  CV: RRR, normal S1 and S2, no m/r/g  Lungs: normal respiratory effort. CTAB, no wheezes, rales, or rhonchi  Abd: soft, nontender, nondistended  Ext: no edema, 2+ peripheral pulses   Pysch: AAOx3, appropriate affect   Neuro: grossly non-focal, moving all extremities spontaneously   Skin: no rashes or lesions     LABS:  CAPILLARY BLOOD GLUCOSE      POCT Blood Glucose.: 328 mg/dL (11 Feb 2024 01:17)  POCT Blood Glucose.: 404 mg/dL (10 Feb 2024 23:42)  POCT Blood Glucose.: 479 mg/dL (10 Feb 2024 22:09)  POCT Blood Glucose.: 334 mg/dL (10 Feb 2024 18:02)  POCT Blood Glucose.: 165 mg/dL (10 Feb 2024 13:36)  POCT Blood Glucose.: 271 mg/dL (10 Feb 2024 08:31)                              9.2    16.88 )-----------( 288      ( 10 Feb 2024 07:58 )             28.5       WBC Trend: 16.88<--, 19.73<--, 17.65<--  Hb Trend: 9.2<--, 10.2<--, 9.0<--, 9.3<--, 8.9<--    02-10    138  |  101  |  30<H>  ----------------------------<  254<H>  4.0   |  22  |  4.54<H>    Ca    9.2      10 Feb 2024 07:58  Phos  5.3     02-10  Mg     2.40     02-10            Urinalysis Basic - ( 10 Feb 2024 07:58 )    Color: x / Appearance: x / SG: x / pH: x  Gluc: 254 mg/dL / Ketone: x  / Bili: x / Urobili: x   Blood: x / Protein: x / Nitrite: x   Leuk Esterase: x / RBC: x / WBC x   Sq Epi: x / Non Sq Epi: x / Bacteria: x            RADIOLOGY & ADDITIONAL TESTS: Reviewed Progress Note    01-31-24 (11d)    Patient is a 63y old  Female who presents with a chief complaint of AHRF (10 Feb 2024 15:46)      Subjective / Overnight Events :  No events overnight. Still having arm pain but improving. Fistula site is tender but otherwise ok. No food outside of what hospital provides. No other dietary changes.       Additional ROS (if any):    MEDICATIONS  (STANDING):  amLODIPine   Tablet 10 milliGRAM(s) Oral daily  atorvastatin 20 milliGRAM(s) Oral at bedtime  budesonide 160 MICROgram(s)/formoterol 4.5 MICROgram(s) Inhaler 2 Puff(s) Inhalation two times a day  buMETAnide 2 milliGRAM(s) Oral every 12 hours  chlorhexidine 2% Cloths 1 Application(s) Topical daily  dextrose 5%. 1000 milliLiter(s) (100 mL/Hr) IV Continuous <Continuous>  dextrose 5%. 1000 milliLiter(s) (50 mL/Hr) IV Continuous <Continuous>  dextrose 50% Injectable 25 Gram(s) IV Push once  dextrose 50% Injectable 25 Gram(s) IV Push once  dextrose 50% Injectable 12.5 Gram(s) IV Push once  epoetin melody (EPOGEN) Injectable 3000 Unit(s) IV Push <User Schedule>  glucagon  Injectable 1 milliGRAM(s) IntraMuscular once  heparin   Injectable 5000 Unit(s) SubCutaneous every 8 hours  insulin glargine Injectable (LANTUS) 33 Unit(s) SubCutaneous at bedtime  insulin lispro (ADMELOG) corrective regimen sliding scale   SubCutaneous three times a day before meals  insulin lispro (ADMELOG) corrective regimen sliding scale   SubCutaneous at bedtime  insulin lispro Injectable (ADMELOG) 11 Unit(s) SubCutaneous three times a day before meals  loratadine 10 milliGRAM(s) Oral daily  montelukast 10 milliGRAM(s) Oral daily  multivitamin 1 Tablet(s) Oral daily  senna 2 Tablet(s) Oral at bedtime    MEDICATIONS  (PRN):  dextrose Oral Gel 15 Gram(s) Oral once PRN Blood Glucose LESS THAN 70 milliGRAM(s)/deciliter  sodium chloride 0.9% Bolus. 100 milliLiter(s) IV Bolus every 5 minutes PRN SBP LESS THAN or EQUAL to 90 mmHg  sodium chloride 0.9% lock flush 10 milliLiter(s) IV Push every 1 hour PRN Pre/post blood products, medications, blood draw, and to maintain line patency          PHYSICAL EXAM:  Vital Signs Last 24 Hrs  T(C): 36.8 (10 Feb 2024 21:57), Max: 37.2 (10 Feb 2024 10:27)  T(F): 98.2 (10 Feb 2024 21:57), Max: 98.9 (10 Feb 2024 10:27)  HR: 94 (10 Feb 2024 21:57) (90 - 98)  BP: 149/67 (10 Feb 2024 21:57) (120/67 - 159/72)  BP(mean): --  RR: 17 (10 Feb 2024 21:57) (17 - 18)  SpO2: 100% (10 Feb 2024 21:57) (99% - 100%)    Parameters below as of 10 Feb 2024 21:57  Patient On (Oxygen Delivery Method): room air        I&O's Summary    09 Feb 2024 07:01  -  10 Feb 2024 07:00  --------------------------------------------------------  IN: 120 mL / OUT: 0 mL / NET: 120 mL    10 Feb 2024 07:01  -  11 Feb 2024 05:24  --------------------------------------------------------  IN: 400 mL / OUT: 1900 mL / NET: -1500 mL        General: NAD, non-toxic appearing   HEENT: PERRLA, EOMi, no scleral icterus  CV: RRR, normal S1 and S2, no m/r/g  Lungs: normal respiratory effort. CTAB, no wheezes, rales, or rhonchi  Abd: soft, nontender, nondistended  Ext: no edema, 2+ peripheral pulses. +r tenderness medially with erythema, no masses. +R fistula placement, covered clean and dry    Pysch: AAOx3, appropriate affect   Neuro: grossly non-focal, moving all extremities spontaneously   Skin: no rashes or lesions     LABS:  CAPILLARY BLOOD GLUCOSE      POCT Blood Glucose.: 328 mg/dL (11 Feb 2024 01:17)  POCT Blood Glucose.: 404 mg/dL (10 Feb 2024 23:42)  POCT Blood Glucose.: 479 mg/dL (10 Feb 2024 22:09)  POCT Blood Glucose.: 334 mg/dL (10 Feb 2024 18:02)  POCT Blood Glucose.: 165 mg/dL (10 Feb 2024 13:36)  POCT Blood Glucose.: 271 mg/dL (10 Feb 2024 08:31)                              9.2    16.88 )-----------( 288      ( 10 Feb 2024 07:58 )             28.5       WBC Trend: 16.88<--, 19.73<--, 17.65<--  Hb Trend: 9.2<--, 10.2<--, 9.0<--, 9.3<--, 8.9<--    02-10    138  |  101  |  30<H>  ----------------------------<  254<H>  4.0   |  22  |  4.54<H>    Ca    9.2      10 Feb 2024 07:58  Phos  5.3     02-10  Mg     2.40     02-10            Urinalysis Basic - ( 10 Feb 2024 07:58 )    Color: x / Appearance: x / SG: x / pH: x  Gluc: 254 mg/dL / Ketone: x  / Bili: x / Urobili: x   Blood: x / Protein: x / Nitrite: x   Leuk Esterase: x / RBC: x / WBC x   Sq Epi: x / Non Sq Epi: x / Bacteria: x            RADIOLOGY & ADDITIONAL TESTS: Reviewed

## 2024-02-11 NOTE — PROVIDER CONTACT NOTE (OTHER) - ACTION/TREATMENT ORDERED:
MD made aware and to give due insulin and re check f/s (404 mg/dl - MD updated and recheck 1 hour after; 328 mg/dl - MD updated, no new orders)
MD aware, no new interventions
Provider notified. TOV tonight.
MD Navas notified and made aware. 6 Units of ADMELOG administered. Recheck blood glucose in an hour as per provider's order.

## 2024-02-11 NOTE — PROGRESS NOTE ADULT - PROBLEM SELECTOR PLAN 1
BUN/Cr 49/5.13 on admission (baseline Cr ~3.3). Dialysis initiated during this admission with improvement  US Kidney/bladder with echogenic lesion in the superior pole of right kidney vs right adrenal gland, CT recommended for further evaluation  -Nephrology consulted, appreciate recs. continue dialysis per nephro. HD scheduled for 2/10  -s/p permacath placement 2/5 and uncomplicated avf fistula placement 2/9   -trend bmp, UOP, I&Os  -c/w bumex po bid  -can f/u lesion as outpatient  - dispo planning, will need dialysis center coordination

## 2024-02-11 NOTE — PROVIDER CONTACT NOTE (OTHER) - RECOMMENDATIONS
Provider notified. Will endorse to wean/ take off oxygen and empty dobbs upon return to unit.
Notify MD
give due lantus and admelog as per sliding scale; recheck f/s
Notify MD.

## 2024-02-11 NOTE — PROGRESS NOTE ADULT - PROBLEM SELECTOR PLAN 5
A1c 6.6% from 10/23, now 6.1%. On lantus 20u in am and 20 qhs at home, as well as repaglinide and victoza.  -hold home repaglinide and victoza  -c/w lantus 33u qhs, admelog 11u premeal, and moderate ISS  -FS with meals CXR with perihilar opacities, likely cardiogenic. BNP elevated. No formal diagnosis of CHF. Chronic BLE edema per daughter. Prior echo 10/23 with EF 61%, repeat 63% with mild diastolic dysfunction  -s/p IV Lasix 40mg, c/w po Bumex 2mg BID  -goal diuresis 1-1.5L daily

## 2024-02-11 NOTE — PROGRESS NOTE ADULT - ATTENDING COMMENTS
63F with PMH of DM2, asthma, progressive CKD here w/ AHRF 2' overload now progressed to ESRD started on new HD. Pt seen at bedside. Still having tenderness on RUE. Raise firm/red/tender. Labs stable. FS poorly controlled.    -Cont warm compresses to thrombophelbitis.  -Agree w/ insulin adjustment.      Rest of plan as above.

## 2024-02-12 DIAGNOSIS — E11.65 TYPE 2 DIABETES MELLITUS WITH HYPERGLYCEMIA: ICD-10-CM

## 2024-02-12 LAB
ANION GAP SERPL CALC-SCNC: 15 MMOL/L — HIGH (ref 7–14)
BUN SERPL-MCNC: 36 MG/DL — HIGH (ref 7–23)
CALCIUM SERPL-MCNC: 8.3 MG/DL — LOW (ref 8.4–10.5)
CHLORIDE SERPL-SCNC: 102 MMOL/L — SIGNIFICANT CHANGE UP (ref 98–107)
CO2 SERPL-SCNC: 22 MMOL/L — SIGNIFICANT CHANGE UP (ref 22–31)
CREAT SERPL-MCNC: 5.1 MG/DL — HIGH (ref 0.5–1.3)
EGFR: 9 ML/MIN/1.73M2 — LOW
GLUCOSE BLDC GLUCOMTR-MCNC: 222 MG/DL — HIGH (ref 70–99)
GLUCOSE BLDC GLUCOMTR-MCNC: 252 MG/DL — HIGH (ref 70–99)
GLUCOSE BLDC GLUCOMTR-MCNC: 304 MG/DL — HIGH (ref 70–99)
GLUCOSE BLDC GLUCOMTR-MCNC: 362 MG/DL — HIGH (ref 70–99)
GLUCOSE SERPL-MCNC: 219 MG/DL — HIGH (ref 70–99)
HCT VFR BLD CALC: 27.6 % — LOW (ref 34.5–45)
HGB BLD-MCNC: 8.9 G/DL — LOW (ref 11.5–15.5)
MAGNESIUM SERPL-MCNC: 2.3 MG/DL — SIGNIFICANT CHANGE UP (ref 1.6–2.6)
MCHC RBC-ENTMCNC: 29.2 PG — SIGNIFICANT CHANGE UP (ref 27–34)
MCHC RBC-ENTMCNC: 32.2 GM/DL — SIGNIFICANT CHANGE UP (ref 32–36)
MCV RBC AUTO: 90.5 FL — SIGNIFICANT CHANGE UP (ref 80–100)
NRBC # BLD: 0 /100 WBCS — SIGNIFICANT CHANGE UP (ref 0–0)
NRBC # FLD: 0 K/UL — SIGNIFICANT CHANGE UP (ref 0–0)
PHOSPHATE SERPL-MCNC: 4.6 MG/DL — HIGH (ref 2.5–4.5)
PLATELET # BLD AUTO: 264 K/UL — SIGNIFICANT CHANGE UP (ref 150–400)
POTASSIUM SERPL-MCNC: 3.7 MMOL/L — SIGNIFICANT CHANGE UP (ref 3.5–5.3)
POTASSIUM SERPL-SCNC: 3.7 MMOL/L — SIGNIFICANT CHANGE UP (ref 3.5–5.3)
RBC # BLD: 3.05 M/UL — LOW (ref 3.8–5.2)
RBC # FLD: 15.9 % — HIGH (ref 10.3–14.5)
SODIUM SERPL-SCNC: 139 MMOL/L — SIGNIFICANT CHANGE UP (ref 135–145)
WBC # BLD: 15.62 K/UL — HIGH (ref 3.8–10.5)
WBC # FLD AUTO: 15.62 K/UL — HIGH (ref 3.8–10.5)

## 2024-02-12 PROCEDURE — 99233 SBSQ HOSP IP/OBS HIGH 50: CPT | Mod: GC

## 2024-02-12 PROCEDURE — 99232 SBSQ HOSP IP/OBS MODERATE 35: CPT

## 2024-02-12 PROCEDURE — 99255 IP/OBS CONSLTJ NEW/EST HI 80: CPT | Mod: GC

## 2024-02-12 RX ORDER — INSULIN GLARGINE 100 [IU]/ML
50 INJECTION, SOLUTION SUBCUTANEOUS AT BEDTIME
Refills: 0 | Status: DISCONTINUED | OUTPATIENT
Start: 2024-02-12 | End: 2024-02-14

## 2024-02-12 RX ORDER — INSULIN LISPRO 100/ML
20 VIAL (ML) SUBCUTANEOUS
Refills: 0 | Status: DISCONTINUED | OUTPATIENT
Start: 2024-02-12 | End: 2024-02-13

## 2024-02-12 RX ADMIN — Medication 4: at 09:28

## 2024-02-12 RX ADMIN — HEPARIN SODIUM 5000 UNIT(S): 5000 INJECTION INTRAVENOUS; SUBCUTANEOUS at 21:25

## 2024-02-12 RX ADMIN — BUDESONIDE AND FORMOTEROL FUMARATE DIHYDRATE 2 PUFF(S): 160; 4.5 AEROSOL RESPIRATORY (INHALATION) at 09:29

## 2024-02-12 RX ADMIN — CHLORHEXIDINE GLUCONATE 1 APPLICATION(S): 213 SOLUTION TOPICAL at 13:08

## 2024-02-12 RX ADMIN — MONTELUKAST 10 MILLIGRAM(S): 4 TABLET, CHEWABLE ORAL at 13:05

## 2024-02-12 RX ADMIN — BUMETANIDE 2 MILLIGRAM(S): 0.25 INJECTION INTRAMUSCULAR; INTRAVENOUS at 18:27

## 2024-02-12 RX ADMIN — AMLODIPINE BESYLATE 10 MILLIGRAM(S): 2.5 TABLET ORAL at 05:44

## 2024-02-12 RX ADMIN — Medication 8: at 18:24

## 2024-02-12 RX ADMIN — Medication 1 TABLET(S): at 13:06

## 2024-02-12 RX ADMIN — BUMETANIDE 2 MILLIGRAM(S): 0.25 INJECTION INTRAMUSCULAR; INTRAVENOUS at 05:44

## 2024-02-12 RX ADMIN — Medication 20 UNIT(S): at 18:24

## 2024-02-12 RX ADMIN — HEPARIN SODIUM 5000 UNIT(S): 5000 INJECTION INTRAVENOUS; SUBCUTANEOUS at 13:08

## 2024-02-12 RX ADMIN — Medication 10: at 12:57

## 2024-02-12 RX ADMIN — ATORVASTATIN CALCIUM 20 MILLIGRAM(S): 80 TABLET, FILM COATED ORAL at 21:25

## 2024-02-12 RX ADMIN — INSULIN GLARGINE 50 UNIT(S): 100 INJECTION, SOLUTION SUBCUTANEOUS at 21:24

## 2024-02-12 RX ADMIN — Medication 2: at 21:35

## 2024-02-12 RX ADMIN — Medication 20 UNIT(S): at 12:56

## 2024-02-12 RX ADMIN — LORATADINE 10 MILLIGRAM(S): 10 TABLET ORAL at 13:06

## 2024-02-12 RX ADMIN — BUDESONIDE AND FORMOTEROL FUMARATE DIHYDRATE 2 PUFF(S): 160; 4.5 AEROSOL RESPIRATORY (INHALATION) at 21:24

## 2024-02-12 RX ADMIN — SENNA PLUS 2 TABLET(S): 8.6 TABLET ORAL at 21:25

## 2024-02-12 RX ADMIN — HEPARIN SODIUM 5000 UNIT(S): 5000 INJECTION INTRAVENOUS; SUBCUTANEOUS at 05:45

## 2024-02-12 RX ADMIN — Medication 15 UNIT(S): at 09:28

## 2024-02-12 NOTE — PROGRESS NOTE ADULT - ATTENDING COMMENTS
ESRD on HD   anemia on TRACEY. s/p iron   continue  po diuretics  AVF 2/9  HD in am  check PTH with am labs(ordered)  DC planning

## 2024-02-12 NOTE — PROGRESS NOTE ADULT - PROBLEM SELECTOR PLAN 1
BUN/Cr 49/5.13 on admission (baseline Cr ~3.3). Dialysis initiated during this admission with improvement  US Kidney/bladder with echogenic lesion in the superior pole of right kidney vs right adrenal gland, CT recommended for further evaluation  -Nephrology consulted, appreciate recs. continue dialysis per nephro. HD scheduled for 2/10  -s/p permacath placement 2/5 and uncomplicated avf fistula placement 2/9   -trend bmp, UOP, I&Os  -c/w bumex po bid  -can f/u lesion as outpatient  - dispo planning, will need dialysis center coordination BUN/Cr 49/5.13 on admission (baseline Cr ~3.3). Dialysis initiated on 2/1/24 during this admission with improvement  - US Kidney/bladder 1/31: no hydronephrosis. Echogenic lesion either in superior pole of R kidney or region of R adrenal gland. Unenhanced CT abd advised for further eval.    - s/p RIJ permacath placement 2/5 (converted from nontunneled placed on 2/1) and LUE radiocephalic AVF placement 2/9   - trend bmp, UOP, I&Os  - appreciate nephro recs. Next HD 2/13.  - outpatient will be T/Th/Sat HD schedule  - c/w bumex po bid

## 2024-02-12 NOTE — CONSULT NOTE ADULT - ATTENDING COMMENTS
pt is a 64 yo female with hx htn, Hld, ckd dm who presents  with hx sob and wheezing since one day prior to admission.  Pt brought in by daughter, Alert and awake in the er started  on bipap for shortness of breath.  /65 rr 22   heent no jvd,  lungs rales  b/l heart s1s2 abdomen nontender  ext edema neuro nonfocal     labs as above   bicarb 19 cr 5.2    wbc 18 hgb 9 hct 26    A/P   64 yo female with ckd, heart failure , volume overload  -agree with noninvasive ventilation for work of breathing  -diurese with increased iv furosamide 80 mg daily  -renal evaluation  -check echo to evaluate lv function  -dvt prophylaxis  -panculture,  blood, urine  -pt with metabolic acidosis from ckd , will monitor vbg  pt does not require icu level care
new onset ESRD now requiring HD  pt. needs HD access  will plan for AVF creation on this admission once medically optimized  protect L arm   vein mapping
Son at bedside providing Urdu interpretation.  DM2 with severe hyperglycemia to 400s.  Suspect HBA1c inaccurate (anemia, CKD).  Agree with basal bolus insulin as outlined.  Endocrine team consulted for uncontrolled diabetes. Patient is high risk with high level decision making due to uncontrolled diabetes which places patient at high risk for cardiovascular and cerebrovascular events. Patient with lability of glucose requiring close monitoring and insulin adjustments.    Suhas Marinelli MD  Division of Endocrinology  Pager: [phone]    If after 6PM or before 9AM, or on weekends/holidays, please call endocrine answering service for assistance ([phone]).  For nonurgent matters email [email or web address] for assistance.
NELSON on CKD   v/ol with CHF   increase diuretics therapy   replete K   discussed with daughter regarding potential need for dialysis. she is talking to her mom   needs better DM management   Further detailed plan of management and recommendation as outlined above.  avoid contrast studies, ACE-I, ARB, or NSAIDs

## 2024-02-12 NOTE — PROGRESS NOTE ADULT - PROBLEM SELECTOR PLAN 7
Hgb 8.9 on admission, baseline 9-10. Likely iso CKD. Low suspicion for any source of bleeding  -trend cbc  -s/p iv venofer (2/2 - 2/7)  -c/w epo stable but soft on admission. on amlodipine at home  - c/w amlodipine 10mg daily    #Hyperlipidemia  on atorvastatin at home  -c/w home meds

## 2024-02-12 NOTE — PROGRESS NOTE ADULT - SUBJECTIVE AND OBJECTIVE BOX
[facility] Division of Kidney Diseases & Hypertension  FOLLOW UP NOTE  [phone]--------------------------------------------------------------------------------  Chief Complaint:NELSON on HD     24 hour events/subjective: Pt seen and evaluated bedside this morning. Reports fatigue otherwise no complaints. Denies any headaches, nausea, vomiting, fevers/chills, chest pain, SOB, abdominal pain, and leg swelling.      PAST HISTORY  --------------------------------------------------------------------------------  No significant changes to PMH, PSH, FHx, SHx, unless otherwise noted    ALLERGIES & MEDICATIONS  --------------------------------------------------------------------------------  Allergies    No Known Allergies    Intolerances      Standing Inpatient Medications  amLODIPine   Tablet 10 milliGRAM(s) Oral daily  atorvastatin 20 milliGRAM(s) Oral at bedtime  budesonide 160 MICROgram(s)/formoterol 4.5 MICROgram(s) Inhaler 2 Puff(s) Inhalation two times a day  buMETAnide 2 milliGRAM(s) Oral every 12 hours  chlorhexidine 2% Cloths 1 Application(s) Topical daily  dextrose 5%. 1000 milliLiter(s) IV Continuous <Continuous>  dextrose 5%. 1000 milliLiter(s) IV Continuous <Continuous>  dextrose 50% Injectable 12.5 Gram(s) IV Push once  dextrose 50% Injectable 25 Gram(s) IV Push once  dextrose 50% Injectable 25 Gram(s) IV Push once  epoetin melody (EPOGEN) Injectable 3000 Unit(s) IV Push <User Schedule>  glucagon  Injectable 1 milliGRAM(s) IntraMuscular once  heparin   Injectable 5000 Unit(s) SubCutaneous every 8 hours  insulin glargine Injectable (LANTUS) 50 Unit(s) SubCutaneous at bedtime  insulin lispro (ADMELOG) corrective regimen sliding scale   SubCutaneous at bedtime  insulin lispro (ADMELOG) corrective regimen sliding scale   SubCutaneous three times a day before meals  insulin lispro Injectable (ADMELOG) 20 Unit(s) SubCutaneous three times a day before meals  loratadine 10 milliGRAM(s) Oral daily  montelukast 10 milliGRAM(s) Oral daily  multivitamin 1 Tablet(s) Oral daily  senna 2 Tablet(s) Oral at bedtime    PRN Inpatient Medications  dextrose Oral Gel 15 Gram(s) Oral once PRN  sodium chloride 0.9% Bolus. 100 milliLiter(s) IV Bolus every 5 minutes PRN  sodium chloride 0.9% lock flush 10 milliLiter(s) IV Push every 1 hour PRN      REVIEW OF SYSTEMS  --------------------------------------------------------------------------------  Gen: +fatigue, no fever  Respiratory: no sob  CV: no cp  GI: no pain, no n/v  : no complaints  MSK: no pain    VITALS/PHYSICAL EXAM  --------------------------------------------------------------------------------  T(C): 36.9 (02-12-24 @ 05:15), Max: 36.9 (02-12-24 @ 05:15)  HR: 99 (02-12-24 @ 05:15) (75 - 99)  BP: 141/79 (02-12-24 @ 05:15) (130/60 - 141/79)  RR: 17 (02-12-24 @ 05:15) (17 - 18)  SpO2: 100% (02-12-24 @ 05:15) (100% - 100%)  Wt(kg): --    02-11-24 @ 07:01  -  02-12-24 @ 07:00  --------------------------------------------------------  IN: 0 mL / OUT: 250 mL / NET: -250 mL    Physical Exam:  Gen NAD  HEENT: No JVD  Pulm: Diminished B/L  CV: S1S2,  Abd: Soft  Ext: +Trace edema B/L LE   Neuro: Awake and alert  Skin: Warm and dry  Vascular: RIJ tunneled HD catheter site is CDIJamil THURMAN AVF site c/d/i, bruit heard, no overt thrill    LABS/STUDIES  --------------------------------------------------------------------------------              8.9    15.62 >-----------<  264      [02-12-24 @ 06:03]              27.6     139  |  102  |  36  ----------------------------<  219      [02-12-24 @ 06:03]  3.7   |  22  |  5.10        Ca     8.3     [02-12-24 @ 06:03]      Mg     2.30     [02-12-24 @ 06:03]      Phos  4.6     [02-12-24 @ 06:03]    Creatinine Trend:  SCr 5.10 [02-12 @ 06:03]  SCr 3.85 [02-11 @ 06:42]  SCr 4.54 [02-10 @ 07:58]  SCr 3.02 [02-09 @ 04:18]  SCr 4.34 [02-08 @ 06:43]

## 2024-02-12 NOTE — PROGRESS NOTE ADULT - PROBLEM SELECTOR PLAN 5
CXR with perihilar opacities, likely cardiogenic. BNP elevated. No formal diagnosis of CHF. Chronic BLE edema per daughter. Prior echo 10/23 with EF 61%, repeat 63% with mild diastolic dysfunction  -s/p IV Lasix 40mg, c/w po Bumex 2mg BID  -goal diuresis 1-1.5L daily RESOLVED. p/w 2 days dyspnea, wheezing, generalized weakness, myalgias, and chest tightness. Required BiPAP in ED for increased WOB, now titrated to 3L NC.   - CXR 1/30: perihilar opacities w effusion likely cardiogenic than multifocal PNA    - Goal SpO2 >92%, off supplemental O2

## 2024-02-12 NOTE — PROGRESS NOTE ADULT - SUBJECTIVE AND OBJECTIVE BOX
Rakan Ramirez MD  Interventional Cardiology / Endovascular Specialist  [place] Office : [address] [place] [address] Tel:   [place] Office : [address] [facility] [address] [address]  Tel: [phone]      Subjective/Overnight events: Patient lying in bed. No acute distress. Denies chest pain, SOB or palpitations  	  MEDICATIONS:  amLODIPine   Tablet 10 milliGRAM(s) Oral daily  buMETAnide 2 milliGRAM(s) Oral every 12 hours  heparin   Injectable 5000 Unit(s) SubCutaneous every 8 hours      budesonide 160 MICROgram(s)/formoterol 4.5 MICROgram(s) Inhaler 2 Puff(s) Inhalation two times a day  loratadine 10 milliGRAM(s) Oral daily  montelukast 10 milliGRAM(s) Oral daily      senna 2 Tablet(s) Oral at bedtime    atorvastatin 20 milliGRAM(s) Oral at bedtime  dextrose 50% Injectable 25 Gram(s) IV Push once  dextrose 50% Injectable 12.5 Gram(s) IV Push once  dextrose 50% Injectable 25 Gram(s) IV Push once  dextrose Oral Gel 15 Gram(s) Oral once PRN  glucagon  Injectable 1 milliGRAM(s) IntraMuscular once  insulin glargine Injectable (LANTUS) 50 Unit(s) SubCutaneous at bedtime  insulin lispro (ADMELOG) corrective regimen sliding scale   SubCutaneous at bedtime  insulin lispro (ADMELOG) corrective regimen sliding scale   SubCutaneous three times a day before meals  insulin lispro Injectable (ADMELOG) 20 Unit(s) SubCutaneous three times a day before meals    chlorhexidine 2% Cloths 1 Application(s) Topical daily  dextrose 5%. 1000 milliLiter(s) IV Continuous <Continuous>  dextrose 5%. 1000 milliLiter(s) IV Continuous <Continuous>  epoetin melody (EPOGEN) Injectable 3000 Unit(s) IV Push <User Schedule>  multivitamin 1 Tablet(s) Oral daily  sodium chloride 0.9% Bolus. 100 milliLiter(s) IV Bolus every 5 minutes PRN  sodium chloride 0.9% lock flush 10 milliLiter(s) IV Push every 1 hour PRN      PAST MEDICAL/SURGICAL HISTORY  PAST MEDICAL & SURGICAL HISTORY:  Type 2 diabetes mellitus      Stage 4 chronic kidney disease      HTN (hypertension)      HLD (hyperlipidemia)      No significant past surgical history          SOCIAL HISTORY: Substance Use (street drugs): ( x ) never used  (  ) other:    FAMILY HISTORY:  FH: type 2 diabetes mellitus (Mother)          PHYSICAL EXAM:  T(C): 37.7 (02-12-24 @ 13:10), Max: 37.7 (02-12-24 @ 13:10)  HR: 92 (02-12-24 @ 13:10) (75 - 99)  BP: 134/50 (02-12-24 @ 13:10) (130/60 - 141/79)  RR: 18 (02-12-24 @ 13:10) (17 - 18)  SpO2: 100% (02-12-24 @ 13:10) (100% - 100%)  Wt(kg): --  I&O's Summary    11 Feb 2024 07:01  -  12 Feb 2024 07:00  --------------------------------------------------------  IN: 0 mL / OUT: 250 mL / NET: -250 mL          GENERAL: NAD  EYES:  conjunctiva and sclera clear  ENMT: No tonsillar erythema, exudates, or enlargement  Cardiovascular: Normal S1 S2, No JVD, No murmurs, No edema  Respiratory: Lungs clear to auscultation	  Gastrointestinal:  Soft,   Extremities: No edema                                     8.9    15.62 )-----------( 264      ( 12 Feb 2024 06:03 )             27.6     02-12    139  |  102  |  36<H>  ----------------------------<  219<H>  3.7   |  22  |  5.10<H>    Ca    8.3<L>      12 Feb 2024 06:03  Phos  4.6     02-12  Mg     2.30     02-12      proBNP:   Lipid Profile:   HgA1c:   TSH:     Consultant(s) Notes Reviewed:  [x ] YES  [ ] NO    Care Discussed with Consultants/Other Providers [ x] YES  [ ] NO    Imaging Personally Reviewed independently:  [x] YES  [ ] NO    All labs, radiologic studies, vitals, orders and medications list reviewed. Patient is seen and examined at bedside. Case discussed with medical team.

## 2024-02-12 NOTE — PROGRESS NOTE ADULT - PROBLEM SELECTOR PLAN 2
A1c 6.6% from 10/23, now 6.1%. On lantus 20u in am and 20 qhs at home, as well as repaglinide and victoza.  -hold home repaglinide and victoza  -c/w lantus 33u qhs, admelog 11u premeal, and moderate ISS  -FS with meals  - elevated glucose, no outside food   - will c/w insulin adjustment A1c 6.5% (from 6.6% in 10/2023) - likely underestimates hyperglycemia (common w ESRD given shorter 1/2 life of RBCs)  Home regimen: Basaglar 20u qam and qhs, Victoza 1.8mg qd, repaglinide 2mg tid qac,   Pt's endocrinologist Dr. Mary Ann Patel    - endo recs appreciated  - monitor FSG qac+qhs, target glucose 100-180, CC diet  - adjust insulin: Lantus 50u qhs, Lispro 20u tid qac, mod ISS  - f/u fructosamine level  - f/u endo for d/c recs

## 2024-02-12 NOTE — PROGRESS NOTE ADULT - PROBLEM SELECTOR PLAN 1
Pt with NELSON in setting of progressive CKD. Now deemed ESRD and initiated on HD this admission. Initial HD on 2/1/24 via R IJ non tunneled HD catheter. R IJ tunneled HD catheter placed 2/5/24. She underwent LUE AVF creation on 2/9/24. Last HD on 2/10/24, plan for HD via R IJ tunneled HD catheter on 2/13 (plan for TTS schedule as outpatient). Remains on PO Bumex 2mg BID. Monitor labs. Dose meds as per HD.

## 2024-02-12 NOTE — PROGRESS NOTE ADULT - PROBLEM SELECTOR PLAN 3
Pt developed pain, warmth, swelling of R arm at IV site. Pt no longer requiring IV medication/therapies so IV removed. Likely superficial thrombophlebitis low suspicion for infection  -warm compresses and tylenol prn  -CTM  -if worsening can pursue US of extremity  - improving CXR with perihilar opacities, likely cardiogenic. BNP elevated. No formal diagnosis of CHF. Chronic BLE edema per daughter. Prior echo 10/23 with EF 61%    - TTE 2/4: normal LVSF w EF 63%, probably mod AS  - s/p Lasix IV 40mg  - c/w Bumex PO 2mg bid   - goal diuresis 1-1.5L daily

## 2024-02-12 NOTE — CONSULT NOTE ADULT - CONSULT REASON
pre-op assessment
Conversion of non tunneled to tunneled HD catheter
new bipap, concern for DKA
AVF creation
HD catherter placement
NELSON on CKD
uncontrolled type 2 diabetes

## 2024-02-12 NOTE — PROGRESS NOTE ADULT - PROBLEM SELECTOR PLAN 6
Elevated trop 118 on admission, now downtrending. No chest pain. ECG with sinus tachycardia, ?LVH. Likely demand ischemia  -CTM Hgb 8.9 on admission, baseline 9-10. Likely iso CKD. Low suspicion for any source of bleeding    - trend cbc  - s/p iv venofer (2/2 - 2/7)  - c/w epo

## 2024-02-12 NOTE — PROGRESS NOTE ADULT - PROBLEM SELECTOR PROBLEM 9
Anemia
Need for prophylactic measure
Need for prophylactic measure
Anemia
Need for prophylactic measure

## 2024-02-12 NOTE — CONSULT NOTE ADULT - CONSULT REQUESTED DATE/TIME
12-Feb-2024 11:10
05-Feb-2024 15:19
30-Jan-2024
01-Feb-2024 11:48
03-Feb-2024 09:38
07-Feb-2024 13:41
31-Jan-2024 00:17

## 2024-02-12 NOTE — PROGRESS NOTE ADULT - SUBJECTIVE AND OBJECTIVE BOX
PROGRESS NOTE:     Patient is a 63y old  Female who presents with a chief complaint of AHRF (11 Feb 2024 13:10)      SUBJECTIVE / OVERNIGHT EVENTS:    Pain:  Bowel Movements:  Urination:  OOB:  PT:    REVIEW OF SYSTEMS:    CONSTITUTIONAL: No weakness, fevers or chills  EYES/ENT: No visual changes;  No vertigo or throat pain   NECK: No pain or stiffness  RESPIRATORY: No cough, wheezing, hemoptysis; No shortness of breath  CARDIOVASCULAR: No chest pain or palpitations  GASTROINTESTINAL: No abdominal or epigastric pain. No nausea, vomiting, or hematemesis; No diarrhea or constipation. No melena or hematochezia.  GENITOURINARY: No dysuria, frequency or hematuria  NEUROLOGICAL: No numbness or weakness  SKIN: No itching, rashes      MEDICATIONS  (STANDING):  amLODIPine   Tablet 10 milliGRAM(s) Oral daily  atorvastatin 20 milliGRAM(s) Oral at bedtime  budesonide 160 MICROgram(s)/formoterol 4.5 MICROgram(s) Inhaler 2 Puff(s) Inhalation two times a day  buMETAnide 2 milliGRAM(s) Oral every 12 hours  chlorhexidine 2% Cloths 1 Application(s) Topical daily  dextrose 5%. 1000 milliLiter(s) (100 mL/Hr) IV Continuous <Continuous>  dextrose 5%. 1000 milliLiter(s) (50 mL/Hr) IV Continuous <Continuous>  dextrose 50% Injectable 25 Gram(s) IV Push once  dextrose 50% Injectable 25 Gram(s) IV Push once  dextrose 50% Injectable 12.5 Gram(s) IV Push once  epoetin melody (EPOGEN) Injectable 3000 Unit(s) IV Push <User Schedule>  glucagon  Injectable 1 milliGRAM(s) IntraMuscular once  heparin   Injectable 5000 Unit(s) SubCutaneous every 8 hours  insulin glargine Injectable (LANTUS) 43 Unit(s) SubCutaneous at bedtime  insulin lispro (ADMELOG) corrective regimen sliding scale   SubCutaneous at bedtime  insulin lispro (ADMELOG) corrective regimen sliding scale   SubCutaneous three times a day before meals  insulin lispro Injectable (ADMELOG) 15 Unit(s) SubCutaneous three times a day before meals  loratadine 10 milliGRAM(s) Oral daily  montelukast 10 milliGRAM(s) Oral daily  multivitamin 1 Tablet(s) Oral daily  senna 2 Tablet(s) Oral at bedtime    MEDICATIONS  (PRN):  dextrose Oral Gel 15 Gram(s) Oral once PRN Blood Glucose LESS THAN 70 milliGRAM(s)/deciliter  sodium chloride 0.9% Bolus. 100 milliLiter(s) IV Bolus every 5 minutes PRN SBP LESS THAN or EQUAL to 90 mmHg  sodium chloride 0.9% lock flush 10 milliLiter(s) IV Push every 1 hour PRN Pre/post blood products, medications, blood draw, and to maintain line patency      CAPILLARY BLOOD GLUCOSE      POCT Blood Glucose.: 305 mg/dL (11 Feb 2024 21:07)  POCT Blood Glucose.: 368 mg/dL (11 Feb 2024 17:44)  POCT Blood Glucose.: 389 mg/dL (11 Feb 2024 11:54)  POCT Blood Glucose.: 419 mg/dL (11 Feb 2024 11:53)  POCT Blood Glucose.: 227 mg/dL (11 Feb 2024 08:30)    I&O's Summary    11 Feb 2024 07:01  -  12 Feb 2024 07:00  --------------------------------------------------------  IN: 0 mL / OUT: 250 mL / NET: -250 mL        VITALS:   T(C): 36.9 (02-12-24 @ 05:15), Max: 36.9 (02-12-24 @ 05:15)  HR: 99 (02-12-24 @ 05:15) (75 - 99)  BP: 141/79 (02-12-24 @ 05:15) (130/60 - 152/74)  RR: 17 (02-12-24 @ 05:15) (17 - 18)  SpO2: 100% (02-12-24 @ 05:15) (100% - 100%)    GENERAL: NAD, lying in bed comfortably  HEAD:  Atraumatic, normocephalic  EYES: EOMI, PERRLA, conjunctiva and sclera clear  ENT: Moist mucous membranes  NECK: Supple, no JVD  HEART: Regular rate and rhythm, no murmurs, rubs, or gallops  LUNGS: Unlabored respirations.  Clear to auscultation bilaterally, no crackles, wheezing, or rhonchi  ABDOMEN: Soft, nontender, nondistended, +BS  EXTREMITIES: 2+ peripheral pulses bilaterally. No clubbing, cyanosis, or edema  NERVOUS SYSTEM:  A&Ox3, no focal deficits   SKIN: No rashes or lesions    LABS:                        9.5    16.23 )-----------( 284      ( 11 Feb 2024 06:42 )             29.7     02-11    139  |  102  |  25<H>  ----------------------------<  227<H>  3.7   |  21<L>  |  3.85<H>    Ca    8.6      11 Feb 2024 06:42  Phos  4.4     02-11  Mg     2.20     02-11            Urinalysis Basic - ( 11 Feb 2024 06:42 )    Color: x / Appearance: x / SG: x / pH: x  Gluc: 227 mg/dL / Ketone: x  / Bili: x / Urobili: x   Blood: x / Protein: x / Nitrite: x   Leuk Esterase: x / RBC: x / WBC x   Sq Epi: x / Non Sq Epi: x / Bacteria: x          RADIOLOGY & ADDITIONAL TESTS:  Results Reviewed:   Imaging Personally Reviewed:  Electrocardiogram Personally Reviewed:    COORDINATION OF CARE:  Care Discussed with Consultants/Other Providers [Y/N]:  Prior or Outpatient Records Reviewed [Y/N]:   PROGRESS NOTE:     Patient is a 63y old  Female who presents with a chief complaint of AHRF (11 Feb 2024 13:10)      SUBJECTIVE / OVERNIGHT EVENTS:  No acute events overnight. Patient seen and examined at bedside this AM with son at bedside. Denies any acute complaints, denies SOB, chest pain, leg swelling.    REVIEW OF SYSTEMS:    CONSTITUTIONAL: No weakness, fevers or chills  EYES/ENT: No visual changes;  No vertigo or throat pain   NECK: No pain or stiffness  RESPIRATORY: No cough, wheezing, hemoptysis; No shortness of breath  CARDIOVASCULAR: No chest pain or palpitations  GASTROINTESTINAL: No abdominal or epigastric pain. No nausea, vomiting, or hematemesis; No diarrhea or constipation. No melena or hematochezia.  GENITOURINARY: No dysuria, frequency or hematuria  NEUROLOGICAL: No numbness or weakness  SKIN: No itching, rashes      MEDICATIONS  (STANDING):  amLODIPine   Tablet 10 milliGRAM(s) Oral daily  atorvastatin 20 milliGRAM(s) Oral at bedtime  budesonide 160 MICROgram(s)/formoterol 4.5 MICROgram(s) Inhaler 2 Puff(s) Inhalation two times a day  buMETAnide 2 milliGRAM(s) Oral every 12 hours  chlorhexidine 2% Cloths 1 Application(s) Topical daily  dextrose 5%. 1000 milliLiter(s) (100 mL/Hr) IV Continuous <Continuous>  dextrose 5%. 1000 milliLiter(s) (50 mL/Hr) IV Continuous <Continuous>  dextrose 50% Injectable 25 Gram(s) IV Push once  dextrose 50% Injectable 25 Gram(s) IV Push once  dextrose 50% Injectable 12.5 Gram(s) IV Push once  epoetin melody (EPOGEN) Injectable 3000 Unit(s) IV Push <User Schedule>  glucagon  Injectable 1 milliGRAM(s) IntraMuscular once  heparin   Injectable 5000 Unit(s) SubCutaneous every 8 hours  insulin glargine Injectable (LANTUS) 43 Unit(s) SubCutaneous at bedtime  insulin lispro (ADMELOG) corrective regimen sliding scale   SubCutaneous at bedtime  insulin lispro (ADMELOG) corrective regimen sliding scale   SubCutaneous three times a day before meals  insulin lispro Injectable (ADMELOG) 15 Unit(s) SubCutaneous three times a day before meals  loratadine 10 milliGRAM(s) Oral daily  montelukast 10 milliGRAM(s) Oral daily  multivitamin 1 Tablet(s) Oral daily  senna 2 Tablet(s) Oral at bedtime    MEDICATIONS  (PRN):  dextrose Oral Gel 15 Gram(s) Oral once PRN Blood Glucose LESS THAN 70 milliGRAM(s)/deciliter  sodium chloride 0.9% Bolus. 100 milliLiter(s) IV Bolus every 5 minutes PRN SBP LESS THAN or EQUAL to 90 mmHg  sodium chloride 0.9% lock flush 10 milliLiter(s) IV Push every 1 hour PRN Pre/post blood products, medications, blood draw, and to maintain line patency      CAPILLARY BLOOD GLUCOSE      POCT Blood Glucose.: 305 mg/dL (11 Feb 2024 21:07)  POCT Blood Glucose.: 368 mg/dL (11 Feb 2024 17:44)  POCT Blood Glucose.: 389 mg/dL (11 Feb 2024 11:54)  POCT Blood Glucose.: 419 mg/dL (11 Feb 2024 11:53)  POCT Blood Glucose.: 227 mg/dL (11 Feb 2024 08:30)    I&O's Summary    11 Feb 2024 07:01  -  12 Feb 2024 07:00  --------------------------------------------------------  IN: 0 mL / OUT: 250 mL / NET: -250 mL        VITALS:   T(C): 36.9 (02-12-24 @ 05:15), Max: 36.9 (02-12-24 @ 05:15)  HR: 99 (02-12-24 @ 05:15) (75 - 99)  BP: 141/79 (02-12-24 @ 05:15) (130/60 - 152/74)  RR: 17 (02-12-24 @ 05:15) (17 - 18)  SpO2: 100% (02-12-24 @ 05:15) (100% - 100%)    HEENT: NC/AT. Sclera clear, no conjunctival pallor. EOMI, PERRLA. No nasal discharge. Throat: no erythema, no exudates on tonsils, uvula midline.  Cardiac: RRR, +S1/S2. No murmurs, rubs, or gallops.   Chest: CTAB. No rales, rhonchi, or wheezing.   Abdomen: Soft, nontender, nondistended. No guarding, no rebound tenderness.  Neuro: Alert and oriented x3. Motor strength and sensation intact.    Extremities: No rashes, no bruising. No joint swelling. Trace pitting edema in b/l LE. Good peripheral pulses bilaterally.     LABS:                        9.5    16.23 )-----------( 284      ( 11 Feb 2024 06:42 )             29.7     02-11    139  |  102  |  25<H>  ----------------------------<  227<H>  3.7   |  21<L>  |  3.85<H>    Ca    8.6      11 Feb 2024 06:42  Phos  4.4     02-11  Mg     2.20     02-11            Urinalysis Basic - ( 11 Feb 2024 06:42 )    Color: x / Appearance: x / SG: x / pH: x  Gluc: 227 mg/dL / Ketone: x  / Bili: x / Urobili: x   Blood: x / Protein: x / Nitrite: x   Leuk Esterase: x / RBC: x / WBC x   Sq Epi: x / Non Sq Epi: x / Bacteria: x

## 2024-02-12 NOTE — PROGRESS NOTE ADULT - PROBLEM SELECTOR PLAN 8
stable but soft on admission. on amlodipine at home  -c/w amlodipine 10mg daily    #Hyperlipidemia  on atorvastatin at home  -c/w home meds Diet: regular, DASH/TLC, CC  DVT ppx: SQH  GI ppx: none    Dispo: pending endocrine/diabetes management  - PT rec home PT  - outpatient f/u vascular Dr. Dale  - outpatient f/u w pt's endo Dr. Mary Ann Patel  - f/u [facility]

## 2024-02-12 NOTE — CONSULT NOTE ADULT - SUBJECTIVE AND OBJECTIVE BOX
HPI:  64yo female with a hx of T2DM, asthma, CKD4, HTN, and HLD here for dyspnea and wheezing that began the evening prior to presentation. Also complains of associated generalized weakness, myalgias, and chest tightness. Per daughter giving multiple rounds of Albuterol at home without improvement in symptoms. Not on home O2. Reports having sore throat and chills 4 days ago. Denied chest pain, denies abdominal pain, nausea, vomiting, diarrhea, fever, dizziness, lightheadedness, nasal congestion, rhinorrhea, urinary sx.  Patient denies recent travel or sick contacts at home. Per family has chronic lower extremity swelling for >1 year.     In ED, initial vitals were 98.2F, , /59, 93% on RA. Labs significant for WBC 18.83, Hgb 8.9 (baseline 9-10), K 2.9 > 3.3, BUN/Cr 49/5.13 (baseline Cr ~3.3), AG 19, Trop 118 > 116, BNP 48546, BHB 0.7, VBG lactate 3.7 > 3.4, procal 0.34. UA with >1k glucose and protein. Glucose 480, given Lantus 25u x1 and Admelog 20u x2. RVP negative. CXR with Perihilar opacities with effusion more likely cardiogenic in origin than multifocal pneumonia. Prior EF 61% from 10/23. Due to increase WOB, MICU consulted for first-time BiPAP use which was tolerated well, pt determined to not be MICU candidate. Pt was given Vanc x1, Zosyn x1, solu-medrol, duonebs, and Bumex 2mg x1, and admitted for further management. (31 Jan 2024 07:10)      Consulted for: type 2 diabetes and hyperglycemia  This is a 64 yo F /w a PMH of type 2 diabetes, asthma,, HTN, and HLD who presents with dyspnea and worsening renal function now ESRD. Endocrinology consulted for hyperglycemia.    Patient has DM2 for  DM2 previously managed by  Current medications at home for management of DM2 include:    In the hospital glucose has remained uncontrolled in 200-300s  She has been receiving lantus 43 units nightly and admelog 15 units TID AC since lunch yesterday with moderate scales  She received 25 units of admelog with lunch and dinner  With the 43 of lantus and 4 of correctional admelog at bedtime glucose was 222 this morning    PAST MEDICAL & SURGICAL HISTORY:  Type 2 diabetes mellitus      Stage 4 chronic kidney disease      HTN (hypertension)      HLD (hyperlipidemia)      No significant past surgical history          FAMILY HISTORY:  FH: type 2 diabetes mellitus (Mother)        Social History:    Home Medications:  amLODIPine 10 mg oral tablet: 1 tab(s) orally once a day (31 Jan 2024 09:14)  atorvastatin 20 mg oral tablet: 1 tab(s) orally once a day (31 Jan 2024 09:14)  cetirizine 10 mg oral tablet: 1 tab(s) orally once a day (31 Jan 2024 12:40)  cetirizine 10 mg oral tablet: 1 tab(s) orally once a day (31 Jan 2024 09:10)  Dulera 200 mcg-5 mcg/inh inhalation aerosol: 2 puff(s) inhaled 2 times a day (31 Jan 2024 09:11)  furosemide 40 mg oral tablet: 1 tab(s) orally once a day (31 Jan 2024 09:08)  montelukast 10 mg oral tablet: 1 tab(s) orally once a day (31 Jan 2024 09:15)  repaglinide 1 mg oral tablet: 1 tab(s) orally once a day after breakfast (31 Jan 2024 09:11)  repaglinide 2 mg oral tablet: 1 tab(s) orally once a day after lunch (31 Jan 2024 09:11)  Tremfya One-Press 100 mg/mL subcutaneous solution: 100 milligram(s) subcutaneously every 8 weeks (31 Jan 2024 09:11)  Victoza 18 mg/3 mL subcutaneous solution: 1.8 milligram(s) subcutaneously once a day (31 Jan 2024 09:11)      MEDICATIONS  (STANDING):  amLODIPine   Tablet 10 milliGRAM(s) Oral daily  atorvastatin 20 milliGRAM(s) Oral at bedtime  budesonide 160 MICROgram(s)/formoterol 4.5 MICROgram(s) Inhaler 2 Puff(s) Inhalation two times a day  buMETAnide 2 milliGRAM(s) Oral every 12 hours  chlorhexidine 2% Cloths 1 Application(s) Topical daily  dextrose 5%. 1000 milliLiter(s) (100 mL/Hr) IV Continuous <Continuous>  dextrose 5%. 1000 milliLiter(s) (50 mL/Hr) IV Continuous <Continuous>  dextrose 50% Injectable 25 Gram(s) IV Push once  dextrose 50% Injectable 12.5 Gram(s) IV Push once  dextrose 50% Injectable 25 Gram(s) IV Push once  epoetin melody (EPOGEN) Injectable 3000 Unit(s) IV Push <User Schedule>  glucagon  Injectable 1 milliGRAM(s) IntraMuscular once  heparin   Injectable 5000 Unit(s) SubCutaneous every 8 hours  insulin glargine Injectable (LANTUS) 43 Unit(s) SubCutaneous at bedtime  insulin lispro (ADMELOG) corrective regimen sliding scale   SubCutaneous three times a day before meals  insulin lispro (ADMELOG) corrective regimen sliding scale   SubCutaneous at bedtime  insulin lispro Injectable (ADMELOG) 15 Unit(s) SubCutaneous three times a day before meals  loratadine 10 milliGRAM(s) Oral daily  montelukast 10 milliGRAM(s) Oral daily  multivitamin 1 Tablet(s) Oral daily  senna 2 Tablet(s) Oral at bedtime    MEDICATIONS  (PRN):  dextrose Oral Gel 15 Gram(s) Oral once PRN Blood Glucose LESS THAN 70 milliGRAM(s)/deciliter  sodium chloride 0.9% Bolus. 100 milliLiter(s) IV Bolus every 5 minutes PRN SBP LESS THAN or EQUAL to 90 mmHg  sodium chloride 0.9% lock flush 10 milliLiter(s) IV Push every 1 hour PRN Pre/post blood products, medications, blood draw, and to maintain line patency      Allergies    No Known Allergies    Intolerances      Review of Systems:  Constitutional: No fever  Eyes: No blurry vision  Neuro: No tremors  HEENT: No pain  Cardiovascular: No chest pain, palpitations  Respiratory: No SOB, no cough  GI: No nausea, vomiting, abdominal pain  : No dysuria  Skin: no rash  Psych: no depression  Endocrine: no polyuria, polydipsia  Hem/lymph: no swelling  Osteoporosis: no fractures    PHYSICAL EXAM:  VITALS: T(C): 36.9 (02-12-24 @ 05:15)  T(F): 98.4 (02-12-24 @ 05:15), Max: 98.4 (02-12-24 @ 05:15)  HR: 99 (02-12-24 @ 05:15) (75 - 99)  BP: 141/79 (02-12-24 @ 05:15) (130/60 - 152/74)  RR:  (17 - 18)  SpO2:  (100% - 100%)  Wt(kg): --  GENERAL: NAD  EYES: No proptosis, no lid lag, anicteric  THYROID: Normal size, no palpable nodules  RESPIRATORY: Clear to auscultation bilaterally  CARDIOVASCULAR: Regular rate and rhythm  GI: Soft, nontender, non distended  EXT: b/l feet without wounds; 2+ pulses  PSYCH: Alert and oriented x 3, reactive mood    POCT Blood Glucose.: 222 mg/dL (02-12-24 @ 08:48)  POCT Blood Glucose.: 305 mg/dL (02-11-24 @ 21:07)  POCT Blood Glucose.: 368 mg/dL (02-11-24 @ 17:44)  POCT Blood Glucose.: 389 mg/dL (02-11-24 @ 11:54)  POCT Blood Glucose.: 419 mg/dL (02-11-24 @ 11:53)  POCT Blood Glucose.: 227 mg/dL (02-11-24 @ 08:30)  POCT Blood Glucose.: 328 mg/dL (02-11-24 @ 01:17)  POCT Blood Glucose.: 404 mg/dL (02-10-24 @ 23:42)  POCT Blood Glucose.: 479 mg/dL (02-10-24 @ 22:09)  POCT Blood Glucose.: 334 mg/dL (02-10-24 @ 18:02)  POCT Blood Glucose.: 165 mg/dL (02-10-24 @ 13:36)  POCT Blood Glucose.: 271 mg/dL (02-10-24 @ 08:31)  POCT Blood Glucose.: 256 mg/dL (02-09-24 @ 21:57)  POCT Blood Glucose.: 395 mg/dL (02-09-24 @ 17:37)  POCT Blood Glucose.: 225 mg/dL (02-09-24 @ 14:10)  POCT Blood Glucose.: 261 mg/dL (02-09-24 @ 12:44)                            8.9    15.62 )-----------( 264      ( 12 Feb 2024 06:03 )             27.6       02-12    139  |  102  |  36<H>  ----------------------------<  219<H>  3.7   |  22  |  5.10<H>    eGFR: 9<L>    Ca    8.3<L>      02-12  Mg     2.30     02-12  Phos  4.6     02-12        Thyroid Function Tests:      A1C with Estimated Average Glucose Result: 6.5 % (02-01-24 @ 05:36)  A1C with Estimated Average Glucose Result: 6.1 % (01-31-24 @ 07:37)  A1C with Estimated Average Glucose Result: 6.6 % (10-18-23 @ 06:18)  A1C with Estimated Average Glucose Result: 6.7 % (10-17-23 @ 14:30)          Radiology:                HPI:  62yo female with a hx of T2DM, asthma, CKD4, HTN, and HLD here for dyspnea and wheezing that began the evening prior to presentation. Also complains of associated generalized weakness, myalgias, and chest tightness. Per daughter giving multiple rounds of Albuterol at home without improvement in symptoms. Not on home O2. Reports having sore throat and chills 4 days ago. Denied chest pain, denies abdominal pain, nausea, vomiting, diarrhea, fever, dizziness, lightheadedness, nasal congestion, rhinorrhea, urinary sx.  Patient denies recent travel or sick contacts at home. Per family has chronic lower extremity swelling for >1 year.     In ED, initial vitals were 98.2F, , /59, 93% on RA. Labs significant for WBC 18.83, Hgb 8.9 (baseline 9-10), K 2.9 > 3.3, BUN/Cr 49/5.13 (baseline Cr ~3.3), AG 19, Trop 118 > 116, BNP 04168, BHB 0.7, VBG lactate 3.7 > 3.4, procal 0.34. UA with >1k glucose and protein. Glucose 480, given Lantus 25u x1 and Admelog 20u x2. RVP negative. CXR with Perihilar opacities with effusion more likely cardiogenic in origin than multifocal pneumonia. Prior EF 61% from 10/23. Due to increase WOB, MICU consulted for first-time BiPAP use which was tolerated well, pt determined to not be MICU candidate. Pt was given Vanc x1, Zosyn x1, solu-medrol, duonebs, and Bumex 2mg x1, and admitted for further management. (31 Jan 2024 07:10)      Consulted for: type 2 diabetes and hyperglycemia  History obtained from patient's son at bedside and daughter over the phone. They speak english and also provided translation as needed at patient request  This is a 64 yo F /w a PMH of type 2 diabetes, asthma,, HTN, and HLD who presents with dyspnea and worsening renal function now ESRD. Endocrinology consulted for hyperglycemia.    Patient has DM2 for ~20 years  DM2 previously managed by her endocrinologist Dr. Mary Ann Patel  Current medications at home for management of DM2 include:  basaglar 20 units with breakfast and dinner  victoza 1.8mg daily  repaglinide 2mg TID AC  all medications are handled by the patient's daughter wogreg lives at home with patients and takes care of her full time  She monitors glucose with Dexcom G7  reports Fasting sugars in 130s and can go up to 300s during the day      In the hospital glucose has remained uncontrolled in 200-300s  She has been receiving lantus 43 units nightly and admelog 15 units TID AC since lunch yesterday with moderate scales  She received 25 units of admelog with lunch and dinner  With the 43 of lantus and 4 of correctional admelog at bedtime glucose was 222 this morning  No history of hypoglycemia at home  Patient has a history of retinopathy and nephropathy as well as CKD from DM2    PAST MEDICAL & SURGICAL HISTORY:  Type 2 diabetes mellitus      Stage 4 chronic kidney disease      HTN (hypertension)      HLD (hyperlipidemia)      No significant past surgical history          FAMILY HISTORY:  FH: type 2 diabetes mellitus (Mother)        Social History: denies all toxic habits    Home Medications:  amLODIPine 10 mg oral tablet: 1 tab(s) orally once a day (31 Jan 2024 09:14)  atorvastatin 20 mg oral tablet: 1 tab(s) orally once a day (31 Jan 2024 09:14)  cetirizine 10 mg oral tablet: 1 tab(s) orally once a day (31 Jan 2024 12:40)  cetirizine 10 mg oral tablet: 1 tab(s) orally once a day (31 Jan 2024 09:10)  Dulera 200 mcg-5 mcg/inh inhalation aerosol: 2 puff(s) inhaled 2 times a day (31 Jan 2024 09:11)  furosemide 40 mg oral tablet: 1 tab(s) orally once a day (31 Jan 2024 09:08)  montelukast 10 mg oral tablet: 1 tab(s) orally once a day (31 Jan 2024 09:15)  repaglinide 1 mg oral tablet: 1 tab(s) orally once a day after breakfast (31 Jan 2024 09:11)  repaglinide 2 mg oral tablet: 1 tab(s) orally once a day after lunch (31 Jan 2024 09:11)  Tremfya One-Press 100 mg/mL subcutaneous solution: 100 milligram(s) subcutaneously every 8 weeks (31 Jan 2024 09:11)  Victoza 18 mg/3 mL subcutaneous solution: 1.8 milligram(s) subcutaneously once a day (31 Jan 2024 09:11)      MEDICATIONS  (STANDING):  amLODIPine   Tablet 10 milliGRAM(s) Oral daily  atorvastatin 20 milliGRAM(s) Oral at bedtime  budesonide 160 MICROgram(s)/formoterol 4.5 MICROgram(s) Inhaler 2 Puff(s) Inhalation two times a day  buMETAnide 2 milliGRAM(s) Oral every 12 hours  chlorhexidine 2% Cloths 1 Application(s) Topical daily  dextrose 5%. 1000 milliLiter(s) (100 mL/Hr) IV Continuous <Continuous>  dextrose 5%. 1000 milliLiter(s) (50 mL/Hr) IV Continuous <Continuous>  dextrose 50% Injectable 25 Gram(s) IV Push once  dextrose 50% Injectable 12.5 Gram(s) IV Push once  dextrose 50% Injectable 25 Gram(s) IV Push once  epoetin melody (EPOGEN) Injectable 3000 Unit(s) IV Push <User Schedule>  glucagon  Injectable 1 milliGRAM(s) IntraMuscular once  heparin   Injectable 5000 Unit(s) SubCutaneous every 8 hours  insulin glargine Injectable (LANTUS) 43 Unit(s) SubCutaneous at bedtime  insulin lispro (ADMELOG) corrective regimen sliding scale   SubCutaneous three times a day before meals  insulin lispro (ADMELOG) corrective regimen sliding scale   SubCutaneous at bedtime  insulin lispro Injectable (ADMELOG) 15 Unit(s) SubCutaneous three times a day before meals  loratadine 10 milliGRAM(s) Oral daily  montelukast 10 milliGRAM(s) Oral daily  multivitamin 1 Tablet(s) Oral daily  senna 2 Tablet(s) Oral at bedtime    MEDICATIONS  (PRN):  dextrose Oral Gel 15 Gram(s) Oral once PRN Blood Glucose LESS THAN 70 milliGRAM(s)/deciliter  sodium chloride 0.9% Bolus. 100 milliLiter(s) IV Bolus every 5 minutes PRN SBP LESS THAN or EQUAL to 90 mmHg  sodium chloride 0.9% lock flush 10 milliLiter(s) IV Push every 1 hour PRN Pre/post blood products, medications, blood draw, and to maintain line patency      Allergies    No Known Allergies    Intolerances      Review of Systems:  Constitutional: No fever  Eyes: No blurry vision  Neuro: No tremors  HEENT: No pain  Cardiovascular: No chest pain, palpitations  Respiratory: No SOB, no cough  GI: No nausea, vomiting, abdominal pain  : No dysuria  Skin: no rash  Psych: no depression  Endocrine: no polyuria, polydipsia  Hem/lymph: no swelling  Osteoporosis: no fractures    PHYSICAL EXAM:  VITALS: T(C): 36.9 (02-12-24 @ 05:15)  T(F): 98.4 (02-12-24 @ 05:15), Max: 98.4 (02-12-24 @ 05:15)  HR: 99 (02-12-24 @ 05:15) (75 - 99)  BP: 141/79 (02-12-24 @ 05:15) (130/60 - 152/74)  RR:  (17 - 18)  SpO2:  (100% - 100%)  Wt(kg): --  GENERAL: NAD  EYES: No proptosis, no lid lag, anicteric  THYROID: Normal size, no palpable nodules  RESPIRATORY: Clear to auscultation bilaterally  CARDIOVASCULAR: Regular rate and rhythm  GI: Soft, nontender, non distended  EXT: b/l feet without wounds; 2+ pulses  PSYCH: Alert and oriented x 3, reactive mood    POCT Blood Glucose.: 222 mg/dL (02-12-24 @ 08:48)  POCT Blood Glucose.: 305 mg/dL (02-11-24 @ 21:07)  POCT Blood Glucose.: 368 mg/dL (02-11-24 @ 17:44)  POCT Blood Glucose.: 389 mg/dL (02-11-24 @ 11:54)  POCT Blood Glucose.: 419 mg/dL (02-11-24 @ 11:53)  POCT Blood Glucose.: 227 mg/dL (02-11-24 @ 08:30)  POCT Blood Glucose.: 328 mg/dL (02-11-24 @ 01:17)  POCT Blood Glucose.: 404 mg/dL (02-10-24 @ 23:42)  POCT Blood Glucose.: 479 mg/dL (02-10-24 @ 22:09)  POCT Blood Glucose.: 334 mg/dL (02-10-24 @ 18:02)  POCT Blood Glucose.: 165 mg/dL (02-10-24 @ 13:36)  POCT Blood Glucose.: 271 mg/dL (02-10-24 @ 08:31)  POCT Blood Glucose.: 256 mg/dL (02-09-24 @ 21:57)  POCT Blood Glucose.: 395 mg/dL (02-09-24 @ 17:37)  POCT Blood Glucose.: 225 mg/dL (02-09-24 @ 14:10)  POCT Blood Glucose.: 261 mg/dL (02-09-24 @ 12:44)                            8.9    15.62 )-----------( 264      ( 12 Feb 2024 06:03 )             27.6       02-12    139  |  102  |  36<H>  ----------------------------<  219<H>  3.7   |  22  |  5.10<H>    eGFR: 9<L>    Ca    8.3<L>      02-12  Mg     2.30     02-12  Phos  4.6     02-12        Thyroid Function Tests:      A1C with Estimated Average Glucose Result: 6.5 % (02-01-24 @ 05:36)  A1C with Estimated Average Glucose Result: 6.1 % (01-31-24 @ 07:37)  A1C with Estimated Average Glucose Result: 6.6 % (10-18-23 @ 06:18)  A1C with Estimated Average Glucose Result: 6.7 % (10-17-23 @ 14:30)          Radiology:

## 2024-02-12 NOTE — CONSULT NOTE ADULT - ASSESSMENT
This is a 64 yo F /w a PMH of type 2 diabetes, asthma,, HTN, and HLD who presents with dyspnea and worsening renal function now ESRD. Endocrinology consulted for hyperglycemia.    #DM2 - A1c 6.5%, likely underestimates hyperglycemia as evidence by high insulin requirements inpatient. This is common with patients with ESRD given shorter half-life of RBCs  -increase lantus to 50 units nightly  -increase admelog to 20 units TID AC  -moderate admelog correction scales with meals  -moderate admelog correction scales at bedtime  -carb consistent diet  -FSG before meals and bedtime with target glucose 100-180mg/dL  -RD consult  -hypoglycemia protocol if needed  -provider to RN for insulin pen teaching    #Discharge  -patient should be discharged on basal-bolus insulin  -please prescribe basaglar kwikpen - dose to be determined prior to discharge  -please prescribe humalog kwikpen - dose to be determined prior to discharge  -stop repaglinide  -can continue with victoza 1.8mg daily  -follow up at     #HTN  -c/w amlodipine 10mg daily  -cant use ACEi/ARB now that patient ahs ESRD    #HLD  -c/w atorvastatin 20mg daily  -outpatient lipid panel to assess need for high intensity statin dosing    Case discussed with Dr. Sanjeev Morgan MD  Endocrine Fellow  Can be reached via teams. For follow up questions, discharge recommendations, or new consults, please call answering service at [phone] (weekdays); [phone] (nights/weekends) This is a 62 yo F /w a PMH of type 2 diabetes, asthma,, HTN, and HLD who presents with dyspnea and worsening renal function now ESRD. Endocrinology consulted for hyperglycemia.    #DM2 - A1c 6.5%, likely underestimates hyperglycemia as evidence by high insulin requirements inpatient. This is common with patients with ESRD given shorter half-life of RBCs  -increase lantus to 50 units nightly  -increase admelog to 20 units TID AC  -moderate admelog correction scales with meals  -moderate admelog correction scales at bedtime  -carb consistent diet  -FSG before meals and bedtime with target glucose 100-180mg/dL  -RD consult  -hypoglycemia protocol if needed  -provider to RN for insulin pen teaching    #Discharge  -c/w basaglar but can change to once per day injection  -start humalog/admelog/novolog - dose to be determined prior to discharge  -stop repaglinide  -can continue with victoza 1.8mg daily  -follow up at     #HTN  -c/w amlodipine 10mg daily  -cant use ACEi/ARB now that patient ahs ESRD    #HLD  -c/w atorvastatin 20mg daily  -outpatient lipid panel to assess need for high intensity statin dosing    Case discussed with Dr. Sanjeev Morgan MD  Endocrine Fellow  Can be reached via teams. For follow up questions, discharge recommendations, or new consults, please call answering service at [phone] (weekdays); [phone] (nights/weekends) This is a 64 yo F /w a PMH of type 2 diabetes, asthma,, HTN, and HLD who presents with dyspnea and worsening renal function now ESRD. Endocrinology consulted for hyperglycemia.    #DM2 - A1c 6.5%, likely underestimates hyperglycemia as evidence by high insulin requirements inpatient. This is common with patients with ESRD given shorter half-life of RBCs  -please check a serum fructosamine level  -increase lantus to 50 units nightly  -increase admelog to 20 units TID AC  -moderate admelog correction scales with meals  -moderate admelog correction scales at bedtime  -carb consistent diet  -FSG before meals and bedtime with target glucose 100-180mg/dL  -RD consult  -hypoglycemia protocol if needed  -provider to RN for insulin pen teaching    #Discharge  -c/w basaglar but can change to once per day injection  -start humalog/admelog/novolog - dose to be determined prior to discharge  -stop repaglinide  -can continue with victoza 1.8mg daily  -follow up with Dr. Mary Ann Patel  -c/w Dexcom G7    #HTN  -c/w amlodipine 10mg daily  -cant use ACEi/ARB now that patient ahs ESRD    #HLD  -c/w atorvastatin 20mg daily  -outpatient lipid panel to assess need for high intensity statin dosing    Case discussed with Dr. Sanjeev Morgan MD  Endocrine Fellow  Can be reached via teams. For follow up questions, discharge recommendations, or new consults, please call answering service at [phone] (weekdays); [phone] (nights/weekends) This is a 64 yo F /w a PMH of type 2 diabetes, asthma,, HTN, and HLD who presents with dyspnea and worsening renal function now ESRD. Endocrinology consulted for hyperglycemia.    #DM2 - A1c 6.5%, likely underestimates hyperglycemia as evidence by high insulin requirements inpatient. This is common with patients with ESRD given shorter half-life of RBCs  -please check a serum fructosamine level  -increase lantus to 50 units nightly  -increase admelog to 20 units TID AC  -moderate admelog correction scales with meals  -moderate admelog correction scales at bedtime  -carb consistent diet  -FSG before meals and bedtime with target glucose 100-180mg/dL  -RD consult  -hypoglycemia protocol if needed  -provider to RN for insulin pen teaching    #Discharge  -c/w basaglar but can change to once per day injection  -start humalog/admelog/novolog - dose to be determined prior to discharge  -stop repaglinide  -please stop victoza and start trulicity 1.5mg weekly  -can continue with victoza 1.8mg if not able to switch to Trulicity  -follow up with Dr. Mary Ann Patel  -c/w Dexcom G7    #HTN  -c/w amlodipine 10mg daily  -cant use ACEi/ARB now that patient ahs ESRD    #HLD  -c/w atorvastatin 20mg daily  -outpatient lipid panel to assess need for high intensity statin dosing    Case discussed with Dr. Sanjeev Morgan MD  Endocrine Fellow  Can be reached via teams. For follow up questions, discharge recommendations, or new consults, please call answering service at [phone] (weekdays); [phone] (nights/weekends)

## 2024-02-12 NOTE — PROGRESS NOTE ADULT - ASSESSMENT
62yo female with a hx of T2DM, asthma, CKD4, HTN, and HLD here for dyspnea and wheezing that began the evening prior to presentation, admitted for AHRF likely iso volume overload iso of ESRD now on HD       62yo F w PMHx of T2DM on insulin, asthma, CKD4, HTN, and HLD here for dyspnea and wheezing that began the evening prior to presentation, admitted for AHRF likely iso volume overload iso of ESRD now on HD

## 2024-02-12 NOTE — PROGRESS NOTE ADULT - ATTENDING COMMENTS
64yo female with a hx of T2DM, asthma, CKD4, HTN, and HLD here for dyspnea and wheezing that began the evening prior to presentation, admitted for AHRF likely iso volume overload iso of ESRD now on HD    - HD outpatient set up at [facility]   - Uncontrolled DM2- endo consulted, will adjust to basal/bolus, switch to trulicity is insurance covers  - RUE US for swelling at IV site  - Will ask nephro if we can stop bumex now that patient on HD     Rest as above

## 2024-02-12 NOTE — PROGRESS NOTE ADULT - PROBLEM SELECTOR PLAN 4
RESOLVED. p/w 2 days dyspnea, wheezing, generalized weakness, myalgias, and chest tightness. Required BiPAP for increased WOB, now titrated to 3L NC. CXR with perihilar opacities. Pt reports improving sx with tx received in ED. Likely exacerbation of CHF 2/2 underlying URI. asthma exacerbation and PNA lower on differential  -Goal SpO2 >92%, off supplemental O2  -volume overload and asthma exacerbation management per below Pt developed pain, warmth, swelling of R arm at IV site. Pt no longer requiring IV medication/therapies so IV removed. Likely superficial thrombophlebitis low suspicion for infection    - warm compresses and tylenol prn  - f/u RUE US

## 2024-02-13 ENCOUNTER — RESULT REVIEW (OUTPATIENT)
Age: 64
End: 2024-02-13

## 2024-02-13 LAB
ANION GAP SERPL CALC-SCNC: 15 MMOL/L — HIGH (ref 7–14)
BUN SERPL-MCNC: 43 MG/DL — HIGH (ref 7–23)
CALCIUM SERPL-MCNC: 8.5 MG/DL — SIGNIFICANT CHANGE UP (ref 8.4–10.5)
CHLORIDE SERPL-SCNC: 106 MMOL/L — SIGNIFICANT CHANGE UP (ref 98–107)
CO2 SERPL-SCNC: 21 MMOL/L — LOW (ref 22–31)
CREAT SERPL-MCNC: 5.49 MG/DL — HIGH (ref 0.5–1.3)
EGFR: 8 ML/MIN/1.73M2 — LOW
FRUCTOSAMINE SERPL-MCNC: 306 UMOL/L — HIGH (ref 205–285)
GLUCOSE BLDC GLUCOMTR-MCNC: 140 MG/DL — HIGH (ref 70–99)
GLUCOSE BLDC GLUCOMTR-MCNC: 157 MG/DL — HIGH (ref 70–99)
GLUCOSE BLDC GLUCOMTR-MCNC: 158 MG/DL — HIGH (ref 70–99)
GLUCOSE BLDC GLUCOMTR-MCNC: 209 MG/DL — HIGH (ref 70–99)
GLUCOSE BLDC GLUCOMTR-MCNC: 222 MG/DL — HIGH (ref 70–99)
GLUCOSE BLDC GLUCOMTR-MCNC: 242 MG/DL — HIGH (ref 70–99)
GLUCOSE BLDC GLUCOMTR-MCNC: 266 MG/DL — HIGH (ref 70–99)
GLUCOSE SERPL-MCNC: 150 MG/DL — HIGH (ref 70–99)
HCT VFR BLD CALC: 26.4 % — LOW (ref 34.5–45)
HGB BLD-MCNC: 8.7 G/DL — LOW (ref 11.5–15.5)
MAGNESIUM SERPL-MCNC: 2.5 MG/DL — SIGNIFICANT CHANGE UP (ref 1.6–2.6)
MCHC RBC-ENTMCNC: 29.5 PG — SIGNIFICANT CHANGE UP (ref 27–34)
MCHC RBC-ENTMCNC: 33 GM/DL — SIGNIFICANT CHANGE UP (ref 32–36)
MCV RBC AUTO: 89.5 FL — SIGNIFICANT CHANGE UP (ref 80–100)
NRBC # BLD: 0 /100 WBCS — SIGNIFICANT CHANGE UP (ref 0–0)
NRBC # FLD: 0 K/UL — SIGNIFICANT CHANGE UP (ref 0–0)
PHOSPHATE SERPL-MCNC: 5 MG/DL — HIGH (ref 2.5–4.5)
PLATELET # BLD AUTO: 261 K/UL — SIGNIFICANT CHANGE UP (ref 150–400)
POTASSIUM SERPL-MCNC: 3.6 MMOL/L — SIGNIFICANT CHANGE UP (ref 3.5–5.3)
POTASSIUM SERPL-SCNC: 3.6 MMOL/L — SIGNIFICANT CHANGE UP (ref 3.5–5.3)
PTH-INTACT FLD-MCNC: 164 PG/ML — HIGH (ref 15–65)
RBC # BLD: 2.95 M/UL — LOW (ref 3.8–5.2)
RBC # FLD: 15.9 % — HIGH (ref 10.3–14.5)
SODIUM SERPL-SCNC: 142 MMOL/L — SIGNIFICANT CHANGE UP (ref 135–145)
WBC # BLD: 14.78 K/UL — HIGH (ref 3.8–10.5)
WBC # FLD AUTO: 14.78 K/UL — HIGH (ref 3.8–10.5)

## 2024-02-13 PROCEDURE — 99233 SBSQ HOSP IP/OBS HIGH 50: CPT | Mod: GC

## 2024-02-13 PROCEDURE — 93971 EXTREMITY STUDY: CPT | Mod: 26,RT

## 2024-02-13 PROCEDURE — 99232 SBSQ HOSP IP/OBS MODERATE 35: CPT

## 2024-02-13 PROCEDURE — 99231 SBSQ HOSP IP/OBS SF/LOW 25: CPT

## 2024-02-13 RX ORDER — ISOPROPYL ALCOHOL, BENZOCAINE .7; .06 ML/ML; ML/ML
0 SWAB TOPICAL
Qty: 100 | Refills: 1
Start: 2024-02-13

## 2024-02-13 RX ORDER — CHLORHEXIDINE GLUCONATE 213 G/1000ML
1 SOLUTION TOPICAL DAILY
Refills: 0 | Status: DISCONTINUED | OUTPATIENT
Start: 2024-02-13 | End: 2024-02-14

## 2024-02-13 RX ORDER — INSULIN LISPRO 100/ML
21 VIAL (ML) SUBCUTANEOUS
Refills: 0 | Status: DISCONTINUED | OUTPATIENT
Start: 2024-02-13 | End: 2024-02-14

## 2024-02-13 RX ORDER — POLYETHYLENE GLYCOL 3350 17 G/17G
17 POWDER, FOR SOLUTION ORAL DAILY
Refills: 0 | Status: DISCONTINUED | OUTPATIENT
Start: 2024-02-13 | End: 2024-02-14

## 2024-02-13 RX ORDER — DULAGLUTIDE 4.5 MG/.5ML
1.5 INJECTION, SOLUTION SUBCUTANEOUS
Qty: 4 | Refills: 0
Start: 2024-02-13 | End: 2024-03-13

## 2024-02-13 RX ORDER — HEPARIN SODIUM 5000 [USP'U]/ML
5000 INJECTION INTRAVENOUS; SUBCUTANEOUS EVERY 8 HOURS
Refills: 0 | Status: DISCONTINUED | OUTPATIENT
Start: 2024-02-13 | End: 2024-02-14

## 2024-02-13 RX ORDER — DULAGLUTIDE 4.5 MG/.5ML
1.5 INJECTION, SOLUTION SUBCUTANEOUS
Qty: 1 | Refills: 0
Start: 2024-02-13 | End: 2024-04-12

## 2024-02-13 RX ADMIN — CHLORHEXIDINE GLUCONATE 1 APPLICATION(S): 213 SOLUTION TOPICAL at 13:07

## 2024-02-13 RX ADMIN — BUMETANIDE 2 MILLIGRAM(S): 0.25 INJECTION INTRAMUSCULAR; INTRAVENOUS at 06:34

## 2024-02-13 RX ADMIN — Medication 21 UNIT(S): at 20:24

## 2024-02-13 RX ADMIN — Medication 2: at 20:23

## 2024-02-13 RX ADMIN — MONTELUKAST 10 MILLIGRAM(S): 4 TABLET, CHEWABLE ORAL at 12:51

## 2024-02-13 RX ADMIN — ATORVASTATIN CALCIUM 20 MILLIGRAM(S): 80 TABLET, FILM COATED ORAL at 21:46

## 2024-02-13 RX ADMIN — Medication 1 TABLET(S): at 12:50

## 2024-02-13 RX ADMIN — BUDESONIDE AND FORMOTEROL FUMARATE DIHYDRATE 2 PUFF(S): 160; 4.5 AEROSOL RESPIRATORY (INHALATION) at 21:45

## 2024-02-13 RX ADMIN — Medication 20 UNIT(S): at 12:55

## 2024-02-13 RX ADMIN — HEPARIN SODIUM 5000 UNIT(S): 5000 INJECTION INTRAVENOUS; SUBCUTANEOUS at 06:35

## 2024-02-13 RX ADMIN — ERYTHROPOIETIN 3000 UNIT(S): 10000 INJECTION, SOLUTION INTRAVENOUS; SUBCUTANEOUS at 18:58

## 2024-02-13 RX ADMIN — Medication 4: at 12:51

## 2024-02-13 RX ADMIN — HEPARIN SODIUM 5000 UNIT(S): 5000 INJECTION INTRAVENOUS; SUBCUTANEOUS at 21:46

## 2024-02-13 RX ADMIN — Medication 2: at 08:34

## 2024-02-13 RX ADMIN — LORATADINE 10 MILLIGRAM(S): 10 TABLET ORAL at 13:04

## 2024-02-13 RX ADMIN — INSULIN GLARGINE 50 UNIT(S): 100 INJECTION, SOLUTION SUBCUTANEOUS at 22:50

## 2024-02-13 RX ADMIN — POLYETHYLENE GLYCOL 3350 17 GRAM(S): 17 POWDER, FOR SOLUTION ORAL at 21:46

## 2024-02-13 RX ADMIN — BUMETANIDE 2 MILLIGRAM(S): 0.25 INJECTION INTRAMUSCULAR; INTRAVENOUS at 21:48

## 2024-02-13 RX ADMIN — HEPARIN SODIUM 5000 UNIT(S): 5000 INJECTION INTRAVENOUS; SUBCUTANEOUS at 15:43

## 2024-02-13 RX ADMIN — Medication 20 UNIT(S): at 08:34

## 2024-02-13 RX ADMIN — AMLODIPINE BESYLATE 10 MILLIGRAM(S): 2.5 TABLET ORAL at 06:34

## 2024-02-13 RX ADMIN — BUDESONIDE AND FORMOTEROL FUMARATE DIHYDRATE 2 PUFF(S): 160; 4.5 AEROSOL RESPIRATORY (INHALATION) at 12:48

## 2024-02-13 NOTE — PROGRESS NOTE ADULT - ATTENDING COMMENTS
newly diagnosed ESRD on HD. also CHF now better  anemia on TRACEY. s/p iron   continue  po diuretics  s/p AVF 2/9  plan for HD today. next Hd on Thursday if inpatient      DC planning

## 2024-02-13 NOTE — PROGRESS NOTE ADULT - PROBLEM SELECTOR PLAN 1
BUN/Cr 49/5.13 on admission (baseline Cr ~3.3). Dialysis initiated on 2/1/24 during this admission with improvement  - US Kidney/bladder 1/31: no hydronephrosis. Echogenic lesion either in superior pole of R kidney or region of R adrenal gland. Unenhanced CT abd advised for further eval.    - s/p RIJ permacath placement 2/5 (converted from nontunneled placed on 2/1) and LUE radiocephalic AVF placement 2/9   - trend bmp, UOP, I&Os  - appreciate nephro recs. Next HD 2/13.  - outpatient will be T/Th/Sat HD schedule  - c/w bumex po bid BUN/Cr 49/5.13 on admission (baseline Cr ~3.3). Dialysis initiated on 2/1/24 during this admission with improvement  - US Kidney/bladder 1/31: no hydronephrosis. Echogenic lesion either in superior pole of R kidney or region of R adrenal gland. Unenhanced CT abd advised for further eval.  - PTH elevated 164 w Ca wnl 8.5    - s/p RIJ permacath placement 2/5 (converted from nontunneled placed on 2/1) and LUE radiocephalic AVF placement 2/9   - trend bmp, UOP, I&Os  - appreciate nephro recs. s/p HD 2/13, next 2/15.  - outpatient will be T/Th/Sat HD schedule  - c/w bumex po bid

## 2024-02-13 NOTE — PROGRESS NOTE ADULT - PROBLEM SELECTOR PLAN 2
A1c 6.5% (from 6.6% in 10/2023) - likely underestimates hyperglycemia (common w ESRD given shorter 1/2 life of RBCs)  Home regimen: Basaglar 20u qam and qhs, Victoza 1.8mg qd, repaglinide 2mg tid qac,   Pt's endocrinologist Dr. Mary Ann Patel    - endo recs appreciated  - monitor FSG qac+qhs, target glucose 100-180, CC diet  - adjust insulin: Lantus 50u qhs, Lispro 20u tid qac, mod ISS  - f/u fructosamine level  - f/u endo for d/c recs A1c 6.5% (from 6.6% in 10/2023) - likely underestimates hyperglycemia (common w ESRD given shorter 1/2 life of RBCs)  Frucotasamine elevated 306 - equivalent to A1c 6.5% but low albumin can impair reliability  Home regimen: Basaglar 20u qam and qhs, Victoza 1.8mg qd, repaglinide 2mg tid qac,   Pt's endocrinologist Dr. Mary Ann Patel    - tramaine recs appreciated  - monitor FSG qac+qhs, target glucose 100-180, CC diet  - adjust insulin: currently Lantus 50u qhs, Lispro 21u tid qac, mod ISS  - f/u endo for d/c recs  - sent diabetes supplies meds to bed

## 2024-02-13 NOTE — PROGRESS NOTE ADULT - PROBLEM SELECTOR PLAN 1
Pt with NELSON in setting of progressive CKD. Now deemed ESRD and initiated on HD this admission. Initial HD on 2/1/24. s/p RIJ tunneled HD catheter on 2/5/24 and LUE AVF creation on 2/9/24. Last HD on 2/10/24, Plan for HD via RIJ tunneled HD catheter today (plan for TTS schedule as outpatient). Remains on PO Bumex 2mg BID. Pending outpt HD setup. Monitor labs. Dose meds as per HD.

## 2024-02-13 NOTE — PROGRESS NOTE ADULT - PROBLEM SELECTOR PLAN 2
Hgb 8.7 today. Tsat 20%, Ferritin wnl at 276. s/p 5 day course of Venofer. Continue EPO with HD. Monitor Hgb.    Final recommendations pending attending signature.    If you have any questions, please feel free to contact me  Bright Paulino  Nephrology Fellow  [employer]/Page [phone]  (After 5pm or on weekends please page the on-call fellow)

## 2024-02-13 NOTE — PROGRESS NOTE ADULT - ASSESSMENT
62yo F w PMHx of T2DM on insulin, asthma, CKD4, HTN, and HLD here for dyspnea and wheezing that began the evening prior to presentation, admitted for AHRF likely iso volume overload iso of ESRD now on HD       64yo F Bang[place] speaking w PMHx of T2DM on insulin, asthma, CKD4, HTN, and HLD here for dyspnea and wheezing that began the evening prior to presentation, admitted for AHRF likely iso volume overload iso of ESRD now on HD

## 2024-02-13 NOTE — PROGRESS NOTE ADULT - SUBJECTIVE AND OBJECTIVE BOX
PROGRESS NOTE:     Patient is a 63y old  Female who presents with a chief complaint of AHRF (12 Feb 2024 15:12)      SUBJECTIVE / OVERNIGHT EVENTS:    Pain:  Bowel Movements:  Urination:  OOB:  PT:    REVIEW OF SYSTEMS:    CONSTITUTIONAL: No weakness, fevers or chills  EYES/ENT: No visual changes;  No vertigo or throat pain   NECK: No pain or stiffness  RESPIRATORY: No cough, wheezing, hemoptysis; No shortness of breath  CARDIOVASCULAR: No chest pain or palpitations  GASTROINTESTINAL: No abdominal or epigastric pain. No nausea, vomiting, or hematemesis; No diarrhea or constipation. No melena or hematochezia.  GENITOURINARY: No dysuria, frequency or hematuria  NEUROLOGICAL: No numbness or weakness  SKIN: No itching, rashes      MEDICATIONS  (STANDING):  amLODIPine   Tablet 10 milliGRAM(s) Oral daily  atorvastatin 20 milliGRAM(s) Oral at bedtime  budesonide 160 MICROgram(s)/formoterol 4.5 MICROgram(s) Inhaler 2 Puff(s) Inhalation two times a day  buMETAnide 2 milliGRAM(s) Oral every 12 hours  chlorhexidine 2% Cloths 1 Application(s) Topical daily  dextrose 5%. 1000 milliLiter(s) (100 mL/Hr) IV Continuous <Continuous>  dextrose 5%. 1000 milliLiter(s) (50 mL/Hr) IV Continuous <Continuous>  dextrose 50% Injectable 25 Gram(s) IV Push once  dextrose 50% Injectable 12.5 Gram(s) IV Push once  dextrose 50% Injectable 25 Gram(s) IV Push once  epoetin melody (EPOGEN) Injectable 3000 Unit(s) IV Push <User Schedule>  glucagon  Injectable 1 milliGRAM(s) IntraMuscular once  heparin   Injectable 5000 Unit(s) SubCutaneous every 8 hours  insulin glargine Injectable (LANTUS) 50 Unit(s) SubCutaneous at bedtime  insulin lispro (ADMELOG) corrective regimen sliding scale   SubCutaneous at bedtime  insulin lispro (ADMELOG) corrective regimen sliding scale   SubCutaneous three times a day before meals  insulin lispro Injectable (ADMELOG) 20 Unit(s) SubCutaneous three times a day before meals  loratadine 10 milliGRAM(s) Oral daily  montelukast 10 milliGRAM(s) Oral daily  multivitamin 1 Tablet(s) Oral daily  senna 2 Tablet(s) Oral at bedtime    MEDICATIONS  (PRN):  dextrose Oral Gel 15 Gram(s) Oral once PRN Blood Glucose LESS THAN 70 milliGRAM(s)/deciliter  sodium chloride 0.9% Bolus. 100 milliLiter(s) IV Bolus every 5 minutes PRN SBP LESS THAN or EQUAL to 90 mmHg  sodium chloride 0.9% lock flush 10 milliLiter(s) IV Push every 1 hour PRN Pre/post blood products, medications, blood draw, and to maintain line patency      CAPILLARY BLOOD GLUCOSE      POCT Blood Glucose.: 252 mg/dL (12 Feb 2024 21:21)  POCT Blood Glucose.: 304 mg/dL (12 Feb 2024 17:30)  POCT Blood Glucose.: 362 mg/dL (12 Feb 2024 12:21)  POCT Blood Glucose.: 222 mg/dL (12 Feb 2024 08:48)    I&O's Summary      VITALS:   T(C): 36.4 (02-13-24 @ 05:23), Max: 37.7 (02-12-24 @ 13:10)  HR: 93 (02-13-24 @ 05:23) (85 - 95)  BP: 146/75 (02-13-24 @ 05:23) (122/54 - 146/75)  RR: 17 (02-13-24 @ 05:23) (17 - 18)  SpO2: 100% (02-13-24 @ 05:23) (99% - 100%)    GENERAL: NAD, lying in bed comfortably  HEAD:  Atraumatic, normocephalic  EYES: EOMI, PERRLA, conjunctiva and sclera clear  ENT: Moist mucous membranes  NECK: Supple, no JVD  HEART: Regular rate and rhythm, no murmurs, rubs, or gallops  LUNGS: Unlabored respirations.  Clear to auscultation bilaterally, no crackles, wheezing, or rhonchi  ABDOMEN: Soft, nontender, nondistended, +BS  EXTREMITIES: 2+ peripheral pulses bilaterally. No clubbing, cyanosis, or edema  NERVOUS SYSTEM:  A&Ox3, no focal deficits   SKIN: No rashes or lesions    LABS:                        8.9    15.62 )-----------( 264      ( 12 Feb 2024 06:03 )             27.6     02-12    139  |  102  |  36<H>  ----------------------------<  219<H>  3.7   |  22  |  5.10<H>    Ca    8.3<L>      12 Feb 2024 06:03  Phos  4.6     02-12  Mg     2.30     02-12            Urinalysis Basic - ( 12 Feb 2024 06:03 )    Color: x / Appearance: x / SG: x / pH: x  Gluc: 219 mg/dL / Ketone: x  / Bili: x / Urobili: x   Blood: x / Protein: x / Nitrite: x   Leuk Esterase: x / RBC: x / WBC x   Sq Epi: x / Non Sq Epi: x / Bacteria: x          RADIOLOGY & ADDITIONAL TESTS:  Results Reviewed:   Imaging Personally Reviewed:  Electrocardiogram Personally Reviewed:    COORDINATION OF CARE:  Care Discussed with Consultants/Other Providers [Y/N]:  Prior or Outpatient Records Reviewed [Y/N]:   PROGRESS NOTE:     Patient is a 63y old  Female who presents with a chief complaint of AHRF (12 Feb 2024 15:12)      SUBJECTIVE / OVERNIGHT EVENTS:  No acute events overnight. Patient seen and examined at bedside this AM with daughter at beside. Pt denies any chest pain, SOB, headache/dizziness, leg swelling. Pt continues to have some mild R forearm pain but is better than before.     REVIEW OF SYSTEMS:    CONSTITUTIONAL: No weakness, fevers or chills  EYES/ENT: No visual changes;  No vertigo or throat pain   NECK: No pain or stiffness  RESPIRATORY: No cough, wheezing, hemoptysis; No shortness of breath  CARDIOVASCULAR: No chest pain or palpitations  GASTROINTESTINAL: No abdominal or epigastric pain. No nausea, vomiting, or hematemesis; No diarrhea or constipation. No melena or hematochezia.  GENITOURINARY: No dysuria, frequency or hematuria  NEUROLOGICAL: No numbness or weakness  SKIN: No itching, rashes      MEDICATIONS  (STANDING):  amLODIPine   Tablet 10 milliGRAM(s) Oral daily  atorvastatin 20 milliGRAM(s) Oral at bedtime  budesonide 160 MICROgram(s)/formoterol 4.5 MICROgram(s) Inhaler 2 Puff(s) Inhalation two times a day  buMETAnide 2 milliGRAM(s) Oral every 12 hours  chlorhexidine 2% Cloths 1 Application(s) Topical daily  dextrose 5%. 1000 milliLiter(s) (100 mL/Hr) IV Continuous <Continuous>  dextrose 5%. 1000 milliLiter(s) (50 mL/Hr) IV Continuous <Continuous>  dextrose 50% Injectable 25 Gram(s) IV Push once  dextrose 50% Injectable 12.5 Gram(s) IV Push once  dextrose 50% Injectable 25 Gram(s) IV Push once  epoetin melody (EPOGEN) Injectable 3000 Unit(s) IV Push <User Schedule>  glucagon  Injectable 1 milliGRAM(s) IntraMuscular once  heparin   Injectable 5000 Unit(s) SubCutaneous every 8 hours  insulin glargine Injectable (LANTUS) 50 Unit(s) SubCutaneous at bedtime  insulin lispro (ADMELOG) corrective regimen sliding scale   SubCutaneous at bedtime  insulin lispro (ADMELOG) corrective regimen sliding scale   SubCutaneous three times a day before meals  insulin lispro Injectable (ADMELOG) 20 Unit(s) SubCutaneous three times a day before meals  loratadine 10 milliGRAM(s) Oral daily  montelukast 10 milliGRAM(s) Oral daily  multivitamin 1 Tablet(s) Oral daily  senna 2 Tablet(s) Oral at bedtime    MEDICATIONS  (PRN):  dextrose Oral Gel 15 Gram(s) Oral once PRN Blood Glucose LESS THAN 70 milliGRAM(s)/deciliter  sodium chloride 0.9% Bolus. 100 milliLiter(s) IV Bolus every 5 minutes PRN SBP LESS THAN or EQUAL to 90 mmHg  sodium chloride 0.9% lock flush 10 milliLiter(s) IV Push every 1 hour PRN Pre/post blood products, medications, blood draw, and to maintain line patency      CAPILLARY BLOOD GLUCOSE      POCT Blood Glucose.: 252 mg/dL (12 Feb 2024 21:21)  POCT Blood Glucose.: 304 mg/dL (12 Feb 2024 17:30)  POCT Blood Glucose.: 362 mg/dL (12 Feb 2024 12:21)  POCT Blood Glucose.: 222 mg/dL (12 Feb 2024 08:48)    I&O's Summary      VITALS:   T(C): 36.4 (02-13-24 @ 05:23), Max: 37.7 (02-12-24 @ 13:10)  HR: 93 (02-13-24 @ 05:23) (85 - 95)  BP: 146/75 (02-13-24 @ 05:23) (122/54 - 146/75)  RR: 17 (02-13-24 @ 05:23) (17 - 18)  SpO2: 100% (02-13-24 @ 05:23) (99% - 100%)    HEENT: NC/AT. Sclera clear, no conjunctival pallor. EOMI, PERRLA. No nasal discharge. Throat: no erythema, no exudates on tonsils, uvula midline.  Cardiac: RRR, +S1/S2. No murmurs, rubs, or gallops.   Chest: CTAB. No rales, rhonchi, or wheezing. R upper chest tunneled HD cath noted.  Abdomen: Soft, nontender, nondistended. No guarding, no rebound tenderness.  Neuro: Alert and oriented x3. Motor strength and sensation intact.    Extremities: No rashes, no bruising. No joint swelling. Trace pitting edema in b/l LE. Good peripheral pulses bilaterally. L wrist AVF noted.     LABS:                        8.9    15.62 )-----------( 264      ( 12 Feb 2024 06:03 )             27.6     02-12    139  |  102  |  36<H>  ----------------------------<  219<H>  3.7   |  22  |  5.10<H>    Ca    8.3<L>      12 Feb 2024 06:03  Phos  4.6     02-12  Mg     2.30     02-12            Urinalysis Basic - ( 12 Feb 2024 06:03 )    Color: x / Appearance: x / SG: x / pH: x  Gluc: 219 mg/dL / Ketone: x  / Bili: x / Urobili: x   Blood: x / Protein: x / Nitrite: x   Leuk Esterase: x / RBC: x / WBC x   Sq Epi: x / Non Sq Epi: x / Bacteria: x

## 2024-02-13 NOTE — PROGRESS NOTE ADULT - SUBJECTIVE AND OBJECTIVE BOX
[facility] Division of Kidney Diseases & Hypertension  FOLLOW UP NOTE  [phone]--------------------------------------------------------------------------------  Chief Complaint: new ESRD on HD     24 hour events/subjective: Pt seen and evaluated bedside this morning with daughter present. Reports no complaints. Denies any headaches, nausea, vomiting, fevers/chills, chest pain, SOB, abdominal pain, and leg swelling    PAST HISTORY  --------------------------------------------------------------------------------  No significant changes to PMH, PSH, FHx, SHx, unless otherwise noted    ALLERGIES & MEDICATIONS  --------------------------------------------------------------------------------  Allergies  No Known Allergies  Intolerances    Standing Inpatient Medications  amLODIPine   Tablet 10 milliGRAM(s) Oral daily  atorvastatin 20 milliGRAM(s) Oral at bedtime  budesonide 160 MICROgram(s)/formoterol 4.5 MICROgram(s) Inhaler 2 Puff(s) Inhalation two times a day  buMETAnide 2 milliGRAM(s) Oral every 12 hours  chlorhexidine 2% Cloths 1 Application(s) Topical daily  dextrose 5%. 1000 milliLiter(s) IV Continuous <Continuous>  dextrose 5%. 1000 milliLiter(s) IV Continuous <Continuous>  dextrose 50% Injectable 12.5 Gram(s) IV Push once  dextrose 50% Injectable 25 Gram(s) IV Push once  dextrose 50% Injectable 25 Gram(s) IV Push once  epoetin melody (EPOGEN) Injectable 3000 Unit(s) IV Push <User Schedule>  glucagon  Injectable 1 milliGRAM(s) IntraMuscular once  heparin   Injectable 5000 Unit(s) SubCutaneous every 8 hours  insulin glargine Injectable (LANTUS) 50 Unit(s) SubCutaneous at bedtime  insulin lispro (ADMELOG) corrective regimen sliding scale   SubCutaneous three times a day before meals  insulin lispro (ADMELOG) corrective regimen sliding scale   SubCutaneous at bedtime  insulin lispro Injectable (ADMELOG) 21 Unit(s) SubCutaneous three times a day before meals  loratadine 10 milliGRAM(s) Oral daily  montelukast 10 milliGRAM(s) Oral daily  multivitamin 1 Tablet(s) Oral daily  polyethylene glycol 3350 17 Gram(s) Oral daily  senna 2 Tablet(s) Oral at bedtime    PRN Inpatient Medications  dextrose Oral Gel 15 Gram(s) Oral once PRN  sodium chloride 0.9% Bolus. 100 milliLiter(s) IV Bolus every 5 minutes PRN  sodium chloride 0.9% lock flush 10 milliLiter(s) IV Push every 1 hour PRN    REVIEW OF SYSTEMS  --------------------------------------------------------------------------------  Gen: no fever  Respiratory: no sob  CV: no cp  GI: no pain, no n/v  : no complaints  MSK: no pain    VITALS/PHYSICAL EXAM  --------------------------------------------------------------------------------  T(C): 36.9 (02-13-24 @ 13:38), Max: 37 (02-12-24 @ 22:12)  HR: 94 (02-13-24 @ 13:38) (85 - 95)  BP: 133/62 (02-13-24 @ 13:38) (122/54 - 146/75)  RR: 18 (02-13-24 @ 13:38) (17 - 18)  SpO2: 100% (02-13-24 @ 13:38) (99% - 100%)  Wt(kg): --    Physical Exam:  Gen NAD  HEENT: No JVD  Pulm: Diminished B/L  CV: S1S2,  Abd: Soft  Ext: +Trace edema B/L LE   Neuro: Awake and alert  Skin: Warm and dry  Vascular: RIJ tunneled HD catheter site is CDI. OLMAN AVF site c/d/i, bruit heard, no overt thrill    LABS/STUDIES  --------------------------------------------------------------------------------              8.7    14.78 >-----------<  261      [02-13-24 @ 06:25]              26.4     142  |  106  |  43  ----------------------------<  150      [02-13-24 @ 06:25]  3.6   |  21  |  5.49        Ca     8.5     [02-13-24 @ 06:25]      Mg     2.50     [02-13-24 @ 06:25]      Phos  5.0     [02-13-24 @ 06:25]    Creatinine Trend:  SCr 5.49 [02-13 @ 06:25]  SCr 5.10 [02-12 @ 06:03]  SCr 3.85 [02-11 @ 06:42]  SCr 4.54 [02-10 @ 07:58]  SCr 3.02 [02-09 @ 04:18]    PTH -- (Ca --)      [02-13-24 @ 06:25]   164

## 2024-02-13 NOTE — PROGRESS NOTE ADULT - ATTENDING COMMENTS
64yo female with a hx of T2DM, asthma, CKD4, HTN, and HLD here for dyspnea and wheezing that began the evening prior to presentation, admitted for AHRF likely iso volume overload iso of ESRD now on HD    - HD outpatient set up at [facility]   - Uncontrolled DM2- endo consulted, will adjust to basal/bolus, switch to trulicity is insurance covers  - Adjusted pre-meal today as patient still hypergylcemic, counseled on diet   - RUE US for swelling at IV site  - Will continue bumex outpatient   - Will do meds to beds for diabetes supplies  - Will price check Trulicity  -DC planning once patient's in glycemic control and we have final recs from endo     Rest as above

## 2024-02-13 NOTE — PROGRESS NOTE ADULT - PROBLEM SELECTOR PLAN 8
Diet: regular, DASH/TLC, CC  DVT ppx: SQH  GI ppx: none    Dispo: pending endocrine/diabetes management  - PT rec home PT  - outpatient f/u vascular Dr. Dale  - outpatient f/u w pt's endo Dr. Mary Ann Patel  - f/u [facility]

## 2024-02-13 NOTE — PROGRESS NOTE ADULT - SUBJECTIVE AND OBJECTIVE BOX
Chief Complaint: DM2    History: resting comfortably  no overnight events noted  no hypoglycemia events noted  tolerating po intake    MEDICATIONS  (STANDING):  amLODIPine   Tablet 10 milliGRAM(s) Oral daily  atorvastatin 20 milliGRAM(s) Oral at bedtime  budesonide 160 MICROgram(s)/formoterol 4.5 MICROgram(s) Inhaler 2 Puff(s) Inhalation two times a day  buMETAnide 2 milliGRAM(s) Oral every 12 hours  chlorhexidine 2% Cloths 1 Application(s) Topical daily  dextrose 5%. 1000 milliLiter(s) (50 mL/Hr) IV Continuous <Continuous>  dextrose 5%. 1000 milliLiter(s) (100 mL/Hr) IV Continuous <Continuous>  dextrose 50% Injectable 25 Gram(s) IV Push once  dextrose 50% Injectable 25 Gram(s) IV Push once  dextrose 50% Injectable 12.5 Gram(s) IV Push once  epoetin melody (EPOGEN) Injectable 3000 Unit(s) IV Push <User Schedule>  glucagon  Injectable 1 milliGRAM(s) IntraMuscular once  heparin   Injectable 5000 Unit(s) SubCutaneous every 8 hours  insulin glargine Injectable (LANTUS) 50 Unit(s) SubCutaneous at bedtime  insulin lispro (ADMELOG) corrective regimen sliding scale   SubCutaneous three times a day before meals  insulin lispro (ADMELOG) corrective regimen sliding scale   SubCutaneous at bedtime  insulin lispro Injectable (ADMELOG) 20 Unit(s) SubCutaneous three times a day before meals  loratadine 10 milliGRAM(s) Oral daily  montelukast 10 milliGRAM(s) Oral daily  multivitamin 1 Tablet(s) Oral daily  senna 2 Tablet(s) Oral at bedtime    MEDICATIONS  (PRN):  dextrose Oral Gel 15 Gram(s) Oral once PRN Blood Glucose LESS THAN 70 milliGRAM(s)/deciliter  sodium chloride 0.9% Bolus. 100 milliLiter(s) IV Bolus every 5 minutes PRN SBP LESS THAN or EQUAL to 90 mmHg  sodium chloride 0.9% lock flush 10 milliLiter(s) IV Push every 1 hour PRN Pre/post blood products, medications, blood draw, and to maintain line patency      Allergies    No Known Allergies    Intolerances      Review of Systems:  ALL OTHER SYSTEMS REVIEWED AND NEGATIVE      PHYSICAL EXAM:  VITALS: T(C): 36.4 (02-13-24 @ 05:23)  T(F): 97.5 (02-13-24 @ 05:23), Max: 98.6 (02-12-24 @ 22:12)  HR: 93 (02-13-24 @ 05:23) (85 - 95)  BP: 146/75 (02-13-24 @ 05:23) (122/54 - 146/75)  RR:  (17 - 18)  SpO2:  (99% - 100%)  Wt(kg): --  GENERAL: NAD, well-groomed, well-developed  EYES: No proptosis, no lid lag, anicteric  HEENT:  Atraumatic, Normocephalic, moist mucous membranes  RESPIRATORY: nonlabored respirations, no wheezing  PSYCH: Alert and oriented x 3, normal affect, normal mood    CAPILLARY BLOOD GLUCOSE      POCT Blood Glucose.: 209 mg/dL (13 Feb 2024 12:04)  POCT Blood Glucose.: 158 mg/dL (13 Feb 2024 08:20)  POCT Blood Glucose.: 252 mg/dL (12 Feb 2024 21:21)  POCT Blood Glucose.: 304 mg/dL (12 Feb 2024 17:30)      02-13    142  |  106  |  43<H>  ----------------------------<  150<H>  3.6   |  21<L>  |  5.49<H>    eGFR: 8<L>    Ca    8.5      02-13  Mg     2.50     02-13  Phos  5.0     02-13        A1C with Estimated Average Glucose Result: 6.5 % (02-01-24 @ 05:36)  A1C with Estimated Average Glucose Result: 6.1 % (01-31-24 @ 07:37)  A1C with Estimated Average Glucose Result: 6.6 % (10-18-23 @ 06:18)  A1C with Estimated Average Glucose Result: 6.7 % (10-17-23 @ 14:30)      Thyroid Function Tests:

## 2024-02-13 NOTE — PROGRESS NOTE ADULT - PROBLEM SELECTOR PLAN 4
Pt developed pain, warmth, swelling of R arm at IV site. Pt no longer requiring IV medication/therapies so IV removed. Likely superficial thrombophlebitis low suspicion for infection    - warm compresses and tylenol prn  - f/u RUE US

## 2024-02-13 NOTE — PROGRESS NOTE ADULT - PROBLEM SELECTOR PLAN 3
CXR with perihilar opacities, likely cardiogenic. BNP elevated. No formal diagnosis of CHF. Chronic BLE edema per daughter. Prior echo 10/23 with EF 61%    - TTE 2/4: normal LVSF w EF 63%, probably mod AS  - s/p Lasix IV 40mg  - c/w Bumex PO 2mg bid   - goal diuresis 1-1.5L daily

## 2024-02-13 NOTE — PROGRESS NOTE ADULT - ASSESSMENT
This is a 64 yo F /w a PMH of type 2 diabetes, asthma,, HTN, and HLD who presents with dyspnea and worsening renal function now ESRD. Endocrinology consulted for hyperglycemia.    #DM2 - A1c 6.5%, likely underestimates hyperglycemia as evidence by high insulin requirements inpatient. This is common with patients with ESRD given shorter half-life of RBCs. Family reported hyperglycemia prior to admission.  - fructosamine level 306 - equivalent to HbA1c 6.8% but patient previously with low albumin (not checked recently) which can impair reliability of fructosamine.   While inpatient:  -Continue Lantus 50 units nightly  -increase admelog to 21 units TID AC  -moderate admelog correction scales with meals  -moderate admelog correction scales at bedtime  -carb consistent diet  -FSG before meals and bedtime with target glucose 100-180mg/dL  -RD consult  -hypoglycemia protocol if needed  -provider to RN for insulin pen teaching    #Discharge - basal/bolus insulin plus try for longer acting GLP1  -c/w basaglar but can change to once per day injection  -start humalog/admelog/novolog - dose to be determined prior to discharge  -stop repaglinide  -please stop victoza and start trulicity 1.5mg weekly  -can continue with victoza 1.8mg if not able to switch to Trulicity  -follow up with Dr. Mary Ann Patel  -c/w Dexcom G7    #HTN  -c/w amlodipine 10mg daily  -cant use ACEi/ARB now that patient ahs ESRD    #HLD  -c/w atorvastatin 20mg daily  -outpatient lipid panel to assess need for high intensity statin dosing    Suhas Marinelli MD  Division of Endocrinology  Pager: [phone]    If after 6PM or before 9AM, or on weekends/holidays, please call endocrine answering service for assistance ([phone]).  For nonurgent matters email [email or web address] for assistance.

## 2024-02-13 NOTE — PROGRESS NOTE ADULT - PROBLEM SELECTOR PLAN 6
Hgb 8.9 on admission, baseline 9-10. Likely iso CKD. Low suspicion for any source of bleeding    - trend cbc  - s/p iv venofer (2/2 - 2/7)  - c/w epo

## 2024-02-13 NOTE — PROGRESS NOTE ADULT - PROBLEM SELECTOR PLAN 5
RESOLVED. p/w 2 days dyspnea, wheezing, generalized weakness, myalgias, and chest tightness. Required BiPAP in ED for increased WOB, now titrated to 3L NC.   - CXR 1/30: perihilar opacities w effusion likely cardiogenic than multifocal PNA    - Goal SpO2 >92%, off supplemental O2

## 2024-02-14 ENCOUNTER — TRANSCRIPTION ENCOUNTER (OUTPATIENT)
Age: 64
End: 2024-02-14

## 2024-02-14 VITALS — SYSTOLIC BLOOD PRESSURE: 108 MMHG | HEART RATE: 86 BPM | DIASTOLIC BLOOD PRESSURE: 60 MMHG

## 2024-02-14 LAB
ANION GAP SERPL CALC-SCNC: 13 MMOL/L — SIGNIFICANT CHANGE UP (ref 7–14)
BUN SERPL-MCNC: 23 MG/DL — SIGNIFICANT CHANGE UP (ref 7–23)
CALCIUM SERPL-MCNC: 8.8 MG/DL — SIGNIFICANT CHANGE UP (ref 8.4–10.5)
CHLORIDE SERPL-SCNC: 103 MMOL/L — SIGNIFICANT CHANGE UP (ref 98–107)
CO2 SERPL-SCNC: 25 MMOL/L — SIGNIFICANT CHANGE UP (ref 22–31)
CREAT SERPL-MCNC: 3.35 MG/DL — HIGH (ref 0.5–1.3)
EGFR: 15 ML/MIN/1.73M2 — LOW
GLUCOSE BLDC GLUCOMTR-MCNC: 185 MG/DL — HIGH (ref 70–99)
GLUCOSE BLDC GLUCOMTR-MCNC: 197 MG/DL — HIGH (ref 70–99)
GLUCOSE BLDC GLUCOMTR-MCNC: 336 MG/DL — HIGH (ref 70–99)
GLUCOSE SERPL-MCNC: 198 MG/DL — HIGH (ref 70–99)
HCT VFR BLD CALC: 28.6 % — LOW (ref 34.5–45)
HGB BLD-MCNC: 9.3 G/DL — LOW (ref 11.5–15.5)
MAGNESIUM SERPL-MCNC: 2.2 MG/DL — SIGNIFICANT CHANGE UP (ref 1.6–2.6)
MCHC RBC-ENTMCNC: 29.2 PG — SIGNIFICANT CHANGE UP (ref 27–34)
MCHC RBC-ENTMCNC: 32.5 GM/DL — SIGNIFICANT CHANGE UP (ref 32–36)
MCV RBC AUTO: 89.9 FL — SIGNIFICANT CHANGE UP (ref 80–100)
NRBC # BLD: 0 /100 WBCS — SIGNIFICANT CHANGE UP (ref 0–0)
NRBC # FLD: 0 K/UL — SIGNIFICANT CHANGE UP (ref 0–0)
PHOSPHATE SERPL-MCNC: 4 MG/DL — SIGNIFICANT CHANGE UP (ref 2.5–4.5)
PLATELET # BLD AUTO: 275 K/UL — SIGNIFICANT CHANGE UP (ref 150–400)
POTASSIUM SERPL-MCNC: 3.8 MMOL/L — SIGNIFICANT CHANGE UP (ref 3.5–5.3)
POTASSIUM SERPL-SCNC: 3.8 MMOL/L — SIGNIFICANT CHANGE UP (ref 3.5–5.3)
RBC # BLD: 3.18 M/UL — LOW (ref 3.8–5.2)
RBC # FLD: 16.4 % — HIGH (ref 10.3–14.5)
SODIUM SERPL-SCNC: 141 MMOL/L — SIGNIFICANT CHANGE UP (ref 135–145)
WBC # BLD: 12.7 K/UL — HIGH (ref 3.8–10.5)
WBC # FLD AUTO: 12.7 K/UL — HIGH (ref 3.8–10.5)

## 2024-02-14 PROCEDURE — 99233 SBSQ HOSP IP/OBS HIGH 50: CPT

## 2024-02-14 PROCEDURE — 99231 SBSQ HOSP IP/OBS SF/LOW 25: CPT

## 2024-02-14 RX ORDER — CETIRIZINE HYDROCHLORIDE 10 MG/1
1 TABLET ORAL
Refills: 0 | DISCHARGE

## 2024-02-14 RX ORDER — ISOPROPYL ALCOHOL, BENZOCAINE .7; .06 ML/ML; ML/ML
0 SWAB TOPICAL
Qty: 100 | Refills: 1
Start: 2024-02-14

## 2024-02-14 RX ORDER — INSULIN LISPRO 100/ML
18 VIAL (ML) SUBCUTANEOUS
Qty: 5 | Refills: 1
Start: 2024-02-14 | End: 2024-04-13

## 2024-02-14 RX ORDER — DULAGLUTIDE 4.5 MG/.5ML
0.75 INJECTION, SOLUTION SUBCUTANEOUS
Qty: 4 | Refills: 0
Start: 2024-02-14

## 2024-02-14 RX ORDER — BUMETANIDE 0.25 MG/ML
1 INJECTION INTRAMUSCULAR; INTRAVENOUS
Qty: 60 | Refills: 0
Start: 2024-02-14 | End: 2024-03-14

## 2024-02-14 RX ORDER — REPAGLINIDE 1 MG/1
1 TABLET ORAL
Refills: 0 | DISCHARGE

## 2024-02-14 RX ORDER — FUROSEMIDE 40 MG
1 TABLET ORAL
Refills: 0 | DISCHARGE

## 2024-02-14 RX ORDER — INSULIN GLARGINE 100 [IU]/ML
20 INJECTION, SOLUTION SUBCUTANEOUS
Refills: 0 | DISCHARGE

## 2024-02-14 RX ORDER — INSULIN ASPART 100 [IU]/ML
18 INJECTION, SOLUTION SUBCUTANEOUS
Qty: 5 | Refills: 0
Start: 2024-02-14

## 2024-02-14 RX ORDER — INSULIN GLARGINE 100 [IU]/ML
45 INJECTION, SOLUTION SUBCUTANEOUS
Qty: 5 | Refills: 0
Start: 2024-02-14 | End: 2024-03-14

## 2024-02-14 RX ORDER — DULAGLUTIDE 4.5 MG/.5ML
1.5 INJECTION, SOLUTION SUBCUTANEOUS
Qty: 4 | Refills: 0
Start: 2024-02-14 | End: 2024-03-14

## 2024-02-14 RX ORDER — LIRAGLUTIDE 6 MG/ML
1.8 INJECTION SUBCUTANEOUS
Refills: 0 | DISCHARGE

## 2024-02-14 RX ADMIN — MONTELUKAST 10 MILLIGRAM(S): 4 TABLET, CHEWABLE ORAL at 12:20

## 2024-02-14 RX ADMIN — LORATADINE 10 MILLIGRAM(S): 10 TABLET ORAL at 12:19

## 2024-02-14 RX ADMIN — Medication 21 UNIT(S): at 08:56

## 2024-02-14 RX ADMIN — Medication 2: at 08:55

## 2024-02-14 RX ADMIN — Medication 21 UNIT(S): at 12:20

## 2024-02-14 RX ADMIN — BUMETANIDE 2 MILLIGRAM(S): 0.25 INJECTION INTRAMUSCULAR; INTRAVENOUS at 08:00

## 2024-02-14 RX ADMIN — HEPARIN SODIUM 5000 UNIT(S): 5000 INJECTION INTRAVENOUS; SUBCUTANEOUS at 14:00

## 2024-02-14 RX ADMIN — CHLORHEXIDINE GLUCONATE 1 APPLICATION(S): 213 SOLUTION TOPICAL at 12:20

## 2024-02-14 RX ADMIN — BUDESONIDE AND FORMOTEROL FUMARATE DIHYDRATE 2 PUFF(S): 160; 4.5 AEROSOL RESPIRATORY (INHALATION) at 08:56

## 2024-02-14 RX ADMIN — Medication 1 TABLET(S): at 12:19

## 2024-02-14 RX ADMIN — HEPARIN SODIUM 5000 UNIT(S): 5000 INJECTION INTRAVENOUS; SUBCUTANEOUS at 06:08

## 2024-02-14 RX ADMIN — BUMETANIDE 2 MILLIGRAM(S): 0.25 INJECTION INTRAMUSCULAR; INTRAVENOUS at 17:15

## 2024-02-14 RX ADMIN — AMLODIPINE BESYLATE 10 MILLIGRAM(S): 2.5 TABLET ORAL at 06:02

## 2024-02-14 RX ADMIN — Medication 2: at 17:53

## 2024-02-14 RX ADMIN — Medication 8: at 12:20

## 2024-02-14 RX ADMIN — POLYETHYLENE GLYCOL 3350 17 GRAM(S): 17 POWDER, FOR SOLUTION ORAL at 12:20

## 2024-02-14 NOTE — PROGRESS NOTE ADULT - ASSESSMENT
This is a 62 yo F /w a PMH of type 2 diabetes, asthma,, HTN, and HLD who presents with dyspnea and worsening renal function now ESRD. Endocrinology consulted for hyperglycemia.    #DM2 - A1c 6.5%, likely underestimates hyperglycemia as evidence by high insulin requirements inpatient. This is common with patients with ESRD given shorter half-life of RBCs. Family reported hyperglycemia prior to admission.  - fructosamine level 306 - equivalent to HbA1c 6.8% but patient previously with low albumin (not checked recently) which can impair reliability of fructosamine.   While inpatient:  -Continue Lantus 50 units nightly  -increase admelog to 21 units TID AC  -moderate admelog correction scales with meals  -moderate admelog correction scales at bedtime  -carb consistent diet  -FSG before meals and bedtime with target glucose 100-180mg/dL  -RD consult  -hypoglycemia protocol if needed  -provider to RN for insulin pen teaching    #Discharge - basal/bolus insulin plus try for longer acting GLP1  -c/w basaglar but can change to once per day injection- reduce to Basaglar 45 units at night   -start humalog/admelog/novolog - reduce to 18 for dc from 21   -stop repaglinide  -please stop victoza and start trulicity 0.75 mg weekly  -follow up with Dr. Mary Ann Patel  -c/w Dexcom G7    To prepare for dc:  -Discussed with fatmily  the importance of Carb consistent diet and exercise as tolerated.    -Recommend nutritional consultation  inpt prior to dc - i also had a very detailed conversation regarding diet with rice and roti, and balanced diet, daughter reports mom loves certain foods and d/w daughter does not have to compeltel eliminate the rice or roti but to control portions and amounts and to include more vegetable and fiber in meals   -Discussed glycemic goal of a1c <7% to prevent microvascular complications of diabetes mellitus and reduce the risk of macrovascular complications.  - Make sure patient knows how to inject insulin and check fingersticks with glucometer (ask bedside RN for teaching)  -Counseled family on kinetics and action of basal and prandial insulin. Discussed that pt should check FSBG before meals and ALSO take the prandial insulin BEFORE meals   - Discharge on premeal insulin and Lantus. do not dc on correction scale or bedtime scale.  - At home, Patient should check FSBG premeals and bedtime. Pt should call their doctor when FSBG <70 or above >400 and or consistently above 200s as changes in the regimen will have to be made.  -pt should call PMD for DM related questions or concerns until pt is seen by CDE or endocrine   -Recommend annual podiatry and ophthalmology follow up.   Patient will also need a glucometer, test strips, lancets, alcohol pads, especially if has issues with Dexcom and high or low readings on dexcom should be evaluated with glucometer check   Please also prescribe glucose tabs, Baqsimi nasal spray or glucagon emergency kit for hypoglycemia risk     daughter and family expressed understanding. explained to the family that bc the hospital is a different setting that her home and outpt, her glucoses inpt may not reflect what will happen outpt especially as diet will change.  i encouraged them to have good close fu with Dr. Patel and inform her endocrinologist if pt has issues with hyper or hypoglycemia at home     --------------------------------------------------------------------------------------------------------------------------------------        #HTN  -c/w amlodipine 10mg daily  -cant use ACEi/ARB now that patient has ESRD    #HLD  -c/w atorvastatin 20mg daily  -outpatient lipid panel to assess need for high intensity statin dosing      plan d/w medicine resident and family         Renée Nelson MD  Attending Physician   Department of Endocrinology, Diabetes and Metabolism     weekdays: 9am to 5pm: email [facility] or [facility] and or TEAMS     Nights and weekends: [phone]  Please note that this patient may be followed by a different provider tomorrow.   If no answer or after hours, please contact [phone].  For final dc reccomendations, please call [phone] or page the endocrine fellow on call.      complex pt, high level decision making  Spent over 35  minutes providing face to face education which was more than 50% of encounter that also included assessing pt/labs/meds and discussing plan of care with pt/team   review of tests and other providers' notes, counseling and educating the patient/family/caregiver, ordering medications communicating with other health care professionals, documenting clinical information in the EMR, independently interpreting results and communicating results to the patient/family/caregiven, care coordination.   This is a 62 yo F /w a PMH of type 2 diabetes, asthma,, HTN, and HLD who presents with dyspnea and worsening renal function now ESRD. Endocrinology consulted for hyperglycemia.    #DM2 - A1c 6.5%, likely underestimates hyperglycemia as evidence by high insulin requirements inpatient. This is common with patients with ESRD given shorter half-life of RBCs. Family reported hyperglycemia prior to admission.  - fructosamine level 306 - equivalent to HbA1c 6.8% but patient previously with low albumin (not checked recently) which can impair reliability of fructosamine.   While inpatient:  -Continue Lantus 50 units nightly  -increase admelog to 21 units TID AC  -moderate admelog correction scales with meals  -moderate admelog correction scales at bedtime  -carb consistent diet  -FSG before meals and bedtime with target glucose 100-180mg/dL  -RD consult  -hypoglycemia protocol if needed  -provider to RN for insulin pen teaching    #Discharge - basal/bolus insulin plus try for longer acting GLP1  -c/w basaglar but can change to once per day injection- reduce to Basaglar 45 units at night   -start humalog/admelog/novolog - reduce to 18 for dc from 21   -stop repaglinide  -please stop victoza and start trulicity 1.5 mg weekly  -follow up with Dr. Mary Ann Patel  -c/w Dexcom G7    To prepare for dc:  -Discussed with fatmily  the importance of Carb consistent diet and exercise as tolerated.    -Recommend nutritional consultation  inpt prior to dc - i also had a very detailed conversation regarding diet with rice and roti, and balanced diet, daughter reports mom loves certain foods and d/w daughter does not have to compeltel eliminate the rice or roti but to control portions and amounts and to include more vegetable and fiber in meals   -Discussed glycemic goal of a1c <7% to prevent microvascular complications of diabetes mellitus and reduce the risk of macrovascular complications.  - Make sure patient knows how to inject insulin and check fingersticks with glucometer (ask bedside RN for teaching)  -Counseled family on kinetics and action of basal and prandial insulin. Discussed that pt should check FSBG before meals and ALSO take the prandial insulin BEFORE meals   - Discharge on premeal insulin and Lantus. do not dc on correction scale or bedtime scale.  - At home, Patient should check FSBG premeals and bedtime. Pt should call their doctor when FSBG <70 or above >400 and or consistently above 200s as changes in the regimen will have to be made.  -pt should call PMD for DM related questions or concerns until pt is seen by CDE or endocrine   -Recommend annual podiatry and ophthalmology follow up.   Patient will also need a glucometer, test strips, lancets, alcohol pads, especially if has issues with Dexcom and high or low readings on dexcom should be evaluated with glucometer check   Please also prescribe glucose tabs, Baqsimi nasal spray or glucagon emergency kit for hypoglycemia risk     daughter and family expressed understanding. explained to the family that bc the hospital is a different setting that her home and outpt, her glucoses inpt may not reflect what will happen outpt especially as diet will change.  i encouraged them to have good close fu with Dr. Patel and inform her endocrinologist if pt has issues with hyper or hypoglycemia at home     --------------------------------------------------------------------------------------------------------------------------------------        #HTN  -c/w amlodipine 10mg daily  -cant use ACEi/ARB now that patient has ESRD    #HLD  -c/w atorvastatin 20mg daily  -outpatient lipid panel to assess need for high intensity statin dosing      plan d/w medicine resident and family         Renée Nelson MD  Attending Physician   Department of Endocrinology, Diabetes and Metabolism     weekdays: 9am to 5pm: [facility] or [facility] and or TEAMS     Nights and weekends: [phone]  Please note that this patient may be followed by a different provider tomorrow.   If no answer or after hours, please contact [phone].  For final dc reccomendations, please call [phone] or page the endocrine fellow on call.      complex pt, high level decision making  Spent over 35  minutes providing face to face education which was more than 50% of encounter that also included assessing pt/labs/meds and discussing plan of care with pt/team   review of tests and other providers' notes, counseling and educating the patient/family/caregiver, ordering medications communicating with other health care professionals, documenting clinical information in the EMR, independently interpreting results and communicating results to the patient/family/caregiven, care coordination.   Attending Attestation (For Attendings USE Only)...

## 2024-02-14 NOTE — PROGRESS NOTE ADULT - SUBJECTIVE AND OBJECTIVE BOX
Chief Complaint/Follow-up on: DM    Subjective:    pt seen with family at bedside   pt would request daughter to take part in medical decision making and hx and did not want   daughter is main care taker for the pt   POC blood glucose and insulin use reviewed.  pt feels well   no n/v  tolerating PO   we spoke about inpt DM management goals and monitoring and also the need to fu outpt for DM in detail with daughter and family      MEDICATIONS  (STANDING):  amLODIPine   Tablet 10 milliGRAM(s) Oral daily  atorvastatin 20 milliGRAM(s) Oral at bedtime  budesonide 160 MICROgram(s)/formoterol 4.5 MICROgram(s) Inhaler 2 Puff(s) Inhalation two times a day  buMETAnide 2 milliGRAM(s) Oral every 12 hours  chlorhexidine 2% Cloths 1 Application(s) Topical daily  dextrose 5%. 1000 milliLiter(s) (100 mL/Hr) IV Continuous <Continuous>  dextrose 5%. 1000 milliLiter(s) (50 mL/Hr) IV Continuous <Continuous>  dextrose 50% Injectable 25 Gram(s) IV Push once  dextrose 50% Injectable 12.5 Gram(s) IV Push once  dextrose 50% Injectable 25 Gram(s) IV Push once  epoetin melody (EPOGEN) Injectable 3000 Unit(s) IV Push <User Schedule>  glucagon  Injectable 1 milliGRAM(s) IntraMuscular once  heparin   Injectable 5000 Unit(s) SubCutaneous every 8 hours  insulin glargine Injectable (LANTUS) 50 Unit(s) SubCutaneous at bedtime  insulin lispro (ADMELOG) corrective regimen sliding scale   SubCutaneous three times a day before meals  insulin lispro (ADMELOG) corrective regimen sliding scale   SubCutaneous at bedtime  insulin lispro Injectable (ADMELOG) 21 Unit(s) SubCutaneous three times a day before meals  loratadine 10 milliGRAM(s) Oral daily  montelukast 10 milliGRAM(s) Oral daily  multivitamin 1 Tablet(s) Oral daily  polyethylene glycol 3350 17 Gram(s) Oral daily  senna 2 Tablet(s) Oral at bedtime    MEDICATIONS  (PRN):  dextrose Oral Gel 15 Gram(s) Oral once PRN Blood Glucose LESS THAN 70 milliGRAM(s)/deciliter  sodium chloride 0.9% Bolus. 100 milliLiter(s) IV Bolus every 5 minutes PRN SBP LESS THAN or EQUAL to 90 mmHg  sodium chloride 0.9% lock flush 10 milliLiter(s) IV Push every 1 hour PRN Pre/post blood products, medications, blood draw, and to maintain line patency      PHYSICAL EXAM:  VITALS: T(C): 36.9 (02-14-24 @ 13:35)  T(F): 98.5 (02-14-24 @ 13:35), Max: 98.5 (02-14-24 @ 13:35)  HR: 84 (02-14-24 @ 13:35) (84 - 93)  BP: 119/57 (02-14-24 @ 13:35) (119/57 - 151/72)  RR:  (17 - 18)  SpO2:  (100% - 100%)  Wt(kg): --  GENERAL: NAD, well-groomed, well-developed  EYES: No proptosis, no injection  HEENT:  Atraumatic, Normocephalic, moist mucous membranes  THYROID: Normal size, no palpable nodules  RESPIRATORY: Clear to auscultation bilaterally; No rales, rhonchi, wheezing, or rubs  CARDIOVASCULAR: Regular rate and rhythm; No murmurs; no peripheral edema  GI: Soft, nontender, non distended, normal bowel sounds  CUSHING'S SIGNS: no striae    POCT Blood Glucose.: 336 mg/dL (02-14-24 @ 12:00)  POCT Blood Glucose.: 185 mg/dL (02-14-24 @ 08:29)  POCT Blood Glucose.: 242 mg/dL (02-13-24 @ 22:49)  POCT Blood Glucose.: 157 mg/dL (02-13-24 @ 20:17)  POCT Blood Glucose.: 140 mg/dL (02-13-24 @ 19:03)  POCT Blood Glucose.: 266 mg/dL (02-13-24 @ 16:24)  POCT Blood Glucose.: 222 mg/dL (02-13-24 @ 14:51)  POCT Blood Glucose.: 209 mg/dL (02-13-24 @ 12:04)  POCT Blood Glucose.: 158 mg/dL (02-13-24 @ 08:20)  POCT Blood Glucose.: 252 mg/dL (02-12-24 @ 21:21)  POCT Blood Glucose.: 304 mg/dL (02-12-24 @ 17:30)  POCT Blood Glucose.: 362 mg/dL (02-12-24 @ 12:21)  POCT Blood Glucose.: 222 mg/dL (02-12-24 @ 08:48)  POCT Blood Glucose.: 305 mg/dL (02-11-24 @ 21:07)  POCT Blood Glucose.: 368 mg/dL (02-11-24 @ 17:44)    02-14    141  |  103  |  23  ----------------------------<  198<H>  3.8   |  25  |  3.35<H>    eGFR: 15<L>    Ca    8.8      02-14  Mg     2.20     02-14  Phos  4.0     02-14            Thyroid Function Tests:                               Chief Complaint/Follow-up on: DM    Subjective:    pt seen with family at bedside   pt would request daughter to take part in medical decision making and hx and did not want   daughter is main care taker for the pt   POC blood glucose and insulin use reviewed.  pt feels well   no n/v  tolerating PO   we spoke about inpt DM management goals and monitoring and also the need to fu outpt for DM in detail with daughter and family      MEDICATIONS  (STANDING):  amLODIPine   Tablet 10 milliGRAM(s) Oral daily  atorvastatin 20 milliGRAM(s) Oral at bedtime  budesonide 160 MICROgram(s)/formoterol 4.5 MICROgram(s) Inhaler 2 Puff(s) Inhalation two times a day  buMETAnide 2 milliGRAM(s) Oral every 12 hours  chlorhexidine 2% Cloths 1 Application(s) Topical daily  dextrose 5%. 1000 milliLiter(s) (100 mL/Hr) IV Continuous <Continuous>  dextrose 5%. 1000 milliLiter(s) (50 mL/Hr) IV Continuous <Continuous>  dextrose 50% Injectable 25 Gram(s) IV Push once  dextrose 50% Injectable 12.5 Gram(s) IV Push once  dextrose 50% Injectable 25 Gram(s) IV Push once  epoetin melody (EPOGEN) Injectable 3000 Unit(s) IV Push <User Schedule>  glucagon  Injectable 1 milliGRAM(s) IntraMuscular once  heparin   Injectable 5000 Unit(s) SubCutaneous every 8 hours  insulin glargine Injectable (LANTUS) 50 Unit(s) SubCutaneous at bedtime  insulin lispro (ADMELOG) corrective regimen sliding scale   SubCutaneous three times a day before meals  insulin lispro (ADMELOG) corrective regimen sliding scale   SubCutaneous at bedtime  insulin lispro Injectable (ADMELOG) 21 Unit(s) SubCutaneous three times a day before meals  loratadine 10 milliGRAM(s) Oral daily  montelukast 10 milliGRAM(s) Oral daily  multivitamin 1 Tablet(s) Oral daily  polyethylene glycol 3350 17 Gram(s) Oral daily  senna 2 Tablet(s) Oral at bedtime    MEDICATIONS  (PRN):  dextrose Oral Gel 15 Gram(s) Oral once PRN Blood Glucose LESS THAN 70 milliGRAM(s)/deciliter  sodium chloride 0.9% Bolus. 100 milliLiter(s) IV Bolus every 5 minutes PRN SBP LESS THAN or EQUAL to 90 mmHg  sodium chloride 0.9% lock flush 10 milliLiter(s) IV Push every 1 hour PRN Pre/post blood products, medications, blood draw, and to maintain line patency      PHYSICAL EXAM:  VITALS: T(C): 36.9 (02-14-24 @ 13:35)  T(F): 98.5 (02-14-24 @ 13:35), Max: 98.5 (02-14-24 @ 13:35)  HR: 84 (02-14-24 @ 13:35) (84 - 93)  BP: 119/57 (02-14-24 @ 13:35) (119/57 - 151/72)  RR:  (17 - 18)  SpO2:  (100% - 100%)  Wt(kg): --  GENERAL: NAD, well-groomed, well-developed  EYES: No proptosis, no injection  HEENT:  Atraumatic, Normocephalic, moist mucous membranes  THYROID: Normal size, no palpable nodules  RESPIRATORY: Clear to auscultation bilaterally; No rales, rhonchi, wheezing, or rubs  CARDIOVASCULAR: Regular rate and rhythm; No murmurs; no peripheral edema  GI: Soft, nontender, non distended, normal bowel sounds  CUSHING'S SIGNS: no striae      POCT Blood Glucose.: 185 mg/dL (02-14-24 @ 08:29)  POCT Blood Glucose.: 242 mg/dL (02-13-24 @ 22:49)  POCT Blood Glucose.: 157 mg/dL (02-13-24 @ 20:17)  POCT Blood Glucose.: 140 mg/dL (02-13-24 @ 19:03)  POCT Blood Glucose.: 266 mg/dL (02-13-24 @ 16:24)  POCT Blood Glucose.: 222 mg/dL (02-13-24 @ 14:51)  POCT Blood Glucose.: 209 mg/dL (02-13-24 @ 12:04)  POCT Blood Glucose.: 158 mg/dL (02-13-24 @ 08:20)  POCT Blood Glucose.: 252 mg/dL (02-12-24 @ 21:21)  POCT Blood Glucose.: 304 mg/dL (02-12-24 @ 17:30)  POCT Blood Glucose.: 362 mg/dL (02-12-24 @ 12:21)  POCT Blood Glucose.: 222 mg/dL (02-12-24 @ 08:48)  POCT Blood Glucose.: 305 mg/dL (02-11-24 @ 21:07)  POCT Blood Glucose.: 368 mg/dL (02-11-24 @ 17:44)    02-14    141  |  103  |  23  ----------------------------<  198<H>  3.8   |  25  |  3.35<H>    eGFR: 15<L>    Ca    8.8      02-14  Mg     2.20     02-14  Phos  4.0     02-14            Thyroid Function Tests:

## 2024-02-14 NOTE — PROGRESS NOTE ADULT - PROBLEM SELECTOR PLAN 4
Pt developed pain, warmth, swelling of R arm at IV site. Pt no longer requiring IV medication/therapies so IV removed. Likely superficial thrombophlebitis low suspicion for infection    - warm compresses and tylenol prn  - f/u RUE US Pt developed pain, warmth, swelling of R arm at IV site. Pt no longer requiring IV medication/therapies so IV removed. Likely superficial thrombophlebitis low suspicion for infection    - warm compresses and tylenol prn  - RUE US 2/13 neg for DVT
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
## 2024-02-14 NOTE — DISCHARGE NOTE NURSING/CASE MANAGEMENT/SOCIAL WORK - NSDCFUADDAPPT_GEN_ALL_CORE_FT
Please follow-up with your primary care physician in 1-2 weeks. Please discuss the lesion seen on kidney ultrasound (results included in this paperwork).    Please follow-up with your endocrinologist Dr. Mary Ann Patel. Please call to schedule an appointment if you do not already have one.     Please follow-up with vascular surgery Dr. Dale for your AV fistula. Please call to schedule an appointment if you do not already have one.     Please follow-up with a nephrologist/kidney doctor. Please call to schedule an appointment if you do not already have one.

## 2024-02-14 NOTE — DISCHARGE NOTE NURSING/CASE MANAGEMENT/SOCIAL WORK - HAS THE PATIENT RECEIVED THE INFLUENZA VACCINE THIS SEASON?
Instructions for Patients      What are the symptoms of COVID-19?  Symptoms can include fever, cough, shortness of breath, chills, headache, muscle pain sore throat, fatigue, runny or stuffy nose, and loss of taste and smell. Other less common symptoms include nausea, vomiting, or diarrhea (watery stools).    Know when to call 911. Emergency warning signs include:    Trouble breathing or shortness of breath    Pain or pressure in the chest that doesn't go away    Feeling confused like you haven't felt before, or not being able to wake up    Bluish-colored lips or face    How can I take care of myself?  1. Get lots of rest. Drink extra fluids (unless a doctor has told you not to).  2. Take Tylenol (acetaminophen) for fever or pain. If you have liver or kidney problems, ask your family doctor if it's okay to take Tylenol   Adults can take either:   650 mg (two 325 mg pills or tablets) every 4 to 6 hours, or...   1,000 mg (two 500 mg pills) every 8 hours as needed.  Note: Don't take more than 3,000 mg in one day. Acetaminophen is found in many medicines (both prescribed and over the counter). Read all labels to be sure you don't take too much.  For children, check the Tylenol bottle for the right dose. The dose is based on the child's age or weight.  3. Take over the counter medicines for your symptoms as needed. Talk to your pharmacist.  4. If you have other health problems (like cancer, heart failure, an organ transplant, or severe kidney disease): Call your specialty clinic if you don't feel better in the next 2 days.    Where can I get more information?     [employer] [place] COVID-19 Resource Hub: [email or web address]     CDC Quarantine & Isolation: https://www.cdc.gov/coronavirus/2019-ncov/your-health/quarantine-isolation.html     CDC - What to Do If You're Sick: https://www.cdc.gov/coronavirus/2019-ncov/if-you-are-sick/index.html    Learn about the ACTIV-6 Clinical Trial: [email or web address] or call  [phone]   yes...

## 2024-02-14 NOTE — PROGRESS NOTE ADULT - REASON FOR ADMISSION
AHRF

## 2024-02-14 NOTE — PROGRESS NOTE ADULT - PROBLEM SELECTOR PLAN 8
Diet: regular, DASH/TLC, CC  DVT ppx: SQH  GI ppx: none    Dispo: pending endocrine/diabetes management  - PT rec home PT  - outpatient f/u vascular Dr. Dale  - outpatient f/u w pt's endo Dr. Mary Ann Patel  - f/u [facility] Diet: regular, CC, Renal, halal, nepro cans bid  DVT ppx: SQH  GI ppx: none    Dispo: pending endocrine/diabetes management  - PT rec home PT  - outpatient f/u vascular Dr. Dale  - outpatient f/u w pt's endo Dr. Mary Ann Patel  - f/u HD [facility]

## 2024-02-14 NOTE — PROGRESS NOTE ADULT - PROBLEM SELECTOR PLAN 7
stable but soft on admission. on amlodipine at home  - c/w amlodipine 10mg daily    #Hyperlipidemia  on atorvastatin at home  -c/w home meds

## 2024-02-14 NOTE — CHART NOTE - NSCHARTNOTEFT_GEN_A_CORE
Source: Patient [ ]    Family [ ]     other [x ] Daughter, chart review    Patient seen for consult for assessment/education. 63 year old male with a PMH of T2DM on insulin, CKD4, HTN, and HLD here for dyspnea and wheezing that began the evening prior to presentation, admitted for AHRF likely iso volume overload iso of ESRD now on HD per chart.    Daughter reports patient w/ fair appetite. Consuming some breakfast this morning per observation. Unable to determine overall PO intake at this time per chart review. Daughter reports patient has been consuming Nepro supplement and will like it 2x daily moving forward. No GI distress reported at this time. No edema or pressure injuries per RN flow sheet. Reviewed importance of monitoring carbohydrate portions and focusing on protein/fiber rich foods for glycemic control. Reviewed renal diet as well. Provided consistent carbohydrate handout.    Diet : Diet, DASH/TLC:   Sodium & Cholesterol Restricted  Consistent Carbohydrate {No Snacks} (CSTCHO)  For patients receiving Renal Replacement - No Protein Restr, No Conc K, No Conc Phos, Low Sodium (RENAL)  Halal  Supplement Feeding Modality:  Oral  Nepro Cans or Servings Per Day:  1       Frequency:  Daily (02-11-24 @ 12:18)    Current Weight: Weight (kg): 62.2 kg (2/13) *post HD  62.5 kg (2/10)  63.5 kg (2/8)  62.6 kg (2/6)  69 kg (2/1)  Weight Change: Initial weight loss likely from volume overload, patient w/ stable weight since then.    Pertinent Medications: MEDICATIONS  (STANDING):  amLODIPine   Tablet 10 milliGRAM(s) Oral daily  atorvastatin 20 milliGRAM(s) Oral at bedtime  budesonide 160 MICROgram(s)/formoterol 4.5 MICROgram(s) Inhaler 2 Puff(s) Inhalation two times a day  buMETAnide 2 milliGRAM(s) Oral every 12 hours  chlorhexidine 2% Cloths 1 Application(s) Topical daily  dextrose 5%. 1000 milliLiter(s) (50 mL/Hr) IV Continuous <Continuous>  dextrose 5%. 1000 milliLiter(s) (100 mL/Hr) IV Continuous <Continuous>  dextrose 50% Injectable 25 Gram(s) IV Push once  dextrose 50% Injectable 25 Gram(s) IV Push once  dextrose 50% Injectable 12.5 Gram(s) IV Push once  epoetin melody (EPOGEN) Injectable 3000 Unit(s) IV Push <User Schedule>  glucagon  Injectable 1 milliGRAM(s) IntraMuscular once  heparin   Injectable 5000 Unit(s) SubCutaneous every 8 hours  insulin glargine Injectable (LANTUS) 50 Unit(s) SubCutaneous at bedtime  insulin lispro (ADMELOG) corrective regimen sliding scale   SubCutaneous three times a day before meals  insulin lispro (ADMELOG) corrective regimen sliding scale   SubCutaneous at bedtime  insulin lispro Injectable (ADMELOG) 21 Unit(s) SubCutaneous three times a day before meals  loratadine 10 milliGRAM(s) Oral daily  montelukast 10 milliGRAM(s) Oral daily  multivitamin 1 Tablet(s) Oral daily  polyethylene glycol 3350 17 Gram(s) Oral daily  senna 2 Tablet(s) Oral at bedtime    MEDICATIONS  (PRN):  dextrose Oral Gel 15 Gram(s) Oral once PRN Blood Glucose LESS THAN 70 milliGRAM(s)/deciliter  sodium chloride 0.9% Bolus. 100 milliLiter(s) IV Bolus every 5 minutes PRN SBP LESS THAN or EQUAL to 90 mmHg  sodium chloride 0.9% lock flush 10 milliLiter(s) IV Push every 1 hour PRN Pre/post blood products, medications, blood draw, and to maintain line patency    Pertinent Labs:  02-14 Na141 mmol/L Glu 198 mg/dL<H> K+ 3.8 mmol/L Cr  3.35 mg/dL<H> BUN 23 mg/dL 02-14 Phos 4.0 mg/dL    Estimated Needs: [x] no change since previous assessment    Previous Nutrition Diagnosis: Inadequate oral intake    Nutrition Diagnosis is [x ] ongoing  [ ] resolved [ ] not applicable     Education:    [x  ] Given today    Type of education provided:    [  ] Given on previous assessment by RD    [  ] Not applicable 2/2 cognitive deficit    [  ] Pt refusal of education offered    [  ] Not applicable 2/2 current prognosis    [  ] Not warranted at present    Recommend  - d/c DASH/TLC from diet  - increase Nepro to BID (840 kcal, 38 g pro)  - continue w/ multivitamin  - obtain pre/post HD weights and monitor PO intake    Monitoring and Evaluation:     [x ] PO intake [ x] Tolerance to diet prescription [x ] weights [x ] follow up per protocol    Rudi Pittman, [id number] or TEAMS

## 2024-02-14 NOTE — PROGRESS NOTE ADULT - ATTENDING SUPERVISION STATEMENT
Fellow
Resident

## 2024-02-14 NOTE — PROGRESS NOTE ADULT - ATTENDING COMMENTS
64yo female with a hx of T2DM, asthma, CKD4, HTN, and HLD here for dyspnea and wheezing that began the evening prior to presentation, admitted for AHRF likely iso volume overload iso of ESRD now on HD    - HD outpatient set up at [facility]   - [facility] US negative  - Uncontrolled DM2- endo consulted, adjusted to basal/bolus, switched to trulicity   - Will continue bumex outpatient   - s/p meds to beds for diabetes supplies    Patient medically cleared for discharge. I personally spent 35 minutes on discharge planning     Rest as above

## 2024-02-14 NOTE — PROGRESS NOTE ADULT - PROVIDER SPECIALTY LIST ADULT
Anesthesia
Cardiology
Cardiology
Internal Medicine
Vascular Surgery
Cardiology
Endocrinology
Internal Medicine
Nephrology
Cardiology
Internal Medicine
Nephrology
Vascular Surgery
Cardiology
Endocrinology
Nephrology
Nephrology
Internal Medicine
Nephrology
Nephrology
Internal Medicine
Internal Medicine
Nephrology-Glomerular
Nephrology
Internal Medicine

## 2024-02-14 NOTE — PROGRESS NOTE ADULT - PROBLEM SELECTOR PLAN 1
BUN/Cr 49/5.13 on admission (baseline Cr ~3.3). Dialysis initiated on 2/1/24 during this admission with improvement  - US Kidney/bladder 1/31: no hydronephrosis. Echogenic lesion either in superior pole of R kidney or region of R adrenal gland. Unenhanced CT abd advised for further eval.  - PTH elevated 164 w Ca wnl 8.5    - s/p RIJ permacath placement 2/5 (converted from nontunneled placed on 2/1) and LUE radiocephalic AVF placement 2/9   - trend bmp, UOP, I&Os  - appreciate nephro recs. s/p HD 2/13, next 2/15.  - outpatient will be T/Th/Sat HD schedule  - c/w bumex po bid

## 2024-02-14 NOTE — PROGRESS NOTE ADULT - SUBJECTIVE AND OBJECTIVE BOX
Rakan Ramirez MD  Interventional Cardiology / Endovascular Specialist  [place] Office : [address] [place] [id number] Tel:   [place] Office : [address] [facility] [address] [address]  Tel: [phone]      Subjective/Overnight events: Patient lying in bed comfortably. No acute distress. Denies chest pain, SOB or palpitations  	  MEDICATIONS:  amLODIPine   Tablet 10 milliGRAM(s) Oral daily  buMETAnide 2 milliGRAM(s) Oral every 12 hours  heparin   Injectable 5000 Unit(s) SubCutaneous every 8 hours      budesonide 160 MICROgram(s)/formoterol 4.5 MICROgram(s) Inhaler 2 Puff(s) Inhalation two times a day  loratadine 10 milliGRAM(s) Oral daily  montelukast 10 milliGRAM(s) Oral daily      polyethylene glycol 3350 17 Gram(s) Oral daily  senna 2 Tablet(s) Oral at bedtime    atorvastatin 20 milliGRAM(s) Oral at bedtime  dextrose 50% Injectable 25 Gram(s) IV Push once  dextrose 50% Injectable 12.5 Gram(s) IV Push once  dextrose 50% Injectable 25 Gram(s) IV Push once  dextrose Oral Gel 15 Gram(s) Oral once PRN  glucagon  Injectable 1 milliGRAM(s) IntraMuscular once  insulin glargine Injectable (LANTUS) 50 Unit(s) SubCutaneous at bedtime  insulin lispro (ADMELOG) corrective regimen sliding scale   SubCutaneous three times a day before meals  insulin lispro (ADMELOG) corrective regimen sliding scale   SubCutaneous at bedtime  insulin lispro Injectable (ADMELOG) 21 Unit(s) SubCutaneous three times a day before meals    chlorhexidine 2% Cloths 1 Application(s) Topical daily  dextrose 5%. 1000 milliLiter(s) IV Continuous <Continuous>  dextrose 5%. 1000 milliLiter(s) IV Continuous <Continuous>  epoetin melody (EPOGEN) Injectable 3000 Unit(s) IV Push <User Schedule>  multivitamin 1 Tablet(s) Oral daily  sodium chloride 0.9% Bolus. 100 milliLiter(s) IV Bolus every 5 minutes PRN  sodium chloride 0.9% lock flush 10 milliLiter(s) IV Push every 1 hour PRN      PAST MEDICAL/SURGICAL HISTORY  PAST MEDICAL & SURGICAL HISTORY:  Type 2 diabetes mellitus      Stage 4 chronic kidney disease      HTN (hypertension)      HLD (hyperlipidemia)      No significant past surgical history          SOCIAL HISTORY: Substance Use (street drugs): ( x ) never used  (  ) other:    FAMILY HISTORY:  FH: type 2 diabetes mellitus (Mother)            PHYSICAL EXAM:  T(C): 36.9 (02-14-24 @ 13:35), Max: 36.9 (02-14-24 @ 13:35)  HR: 84 (02-14-24 @ 13:35) (84 - 93)  BP: 119/57 (02-14-24 @ 13:35) (119/57 - 151/72)  RR: 18 (02-14-24 @ 13:35) (17 - 18)  SpO2: 100% (02-14-24 @ 13:35) (100% - 100%)  Wt(kg): --  I&O's Summary    13 Feb 2024 07:01  -  14 Feb 2024 07:00  --------------------------------------------------------  IN: 400 mL / OUT: 2400 mL / NET: -2000 mL          GENERAL: NAD  EYES:  conjunctiva and sclera clear  ENMT: No tonsillar erythema, exudates, or enlargement  Cardiovascular: Normal S1 S2, No JVD, No murmurs, No edema  Respiratory: Lungs clear to auscultation	  Gastrointestinal:  Soft,   Extremities: No edema                                     9.3    12.70 )-----------( 275      ( 14 Feb 2024 06:25 )             28.6     02-14    141  |  103  |  23  ----------------------------<  198<H>  3.8   |  25  |  3.35<H>    Ca    8.8      14 Feb 2024 06:25  Phos  4.0     02-14  Mg     2.20     02-14      proBNP:   Lipid Profile:   HgA1c:   TSH:     Consultant(s) Notes Reviewed:  [x ] YES  [ ] NO    Care Discussed with Consultants/Other Providers [ x] YES  [ ] NO    Imaging Personally Reviewed independently:  [x] YES  [ ] NO    All labs, radiologic studies, vitals, orders and medications list reviewed. Patient is seen and examined at bedside. Case discussed with medical team.

## 2024-02-14 NOTE — CHART NOTE - NSCHARTNOTESELECT_GEN_ALL_CORE
Post operative check/Event Note
Vascular surgery
Vascular surgery
IR pre-procedure/Event Note
IR pre-procedure/Event Note
Medical optimization/Event Note
Nutrition Services

## 2024-02-14 NOTE — PROGRESS NOTE ADULT - ASSESSMENT
64yo F Bang[place] speaking w PMHx of T2DM on insulin, asthma, CKD4, HTN, and HLD here for dyspnea and wheezing that began the evening prior to presentation, admitted for AHRF likely iso volume overload iso of ESRD now on HD

## 2024-02-14 NOTE — DISCHARGE NOTE NURSING/CASE MANAGEMENT/SOCIAL WORK - PATIENT PORTAL LINK FT
You can access the FollowMyHealth Patient Portal offered by [facility] by registering at the following website: [email or web address]. By joining [facility]’s FollowMyHealth portal, you will also be able to view your health information using other applications (apps) compatible with our system.

## 2024-02-14 NOTE — PROGRESS NOTE ADULT - SUBJECTIVE AND OBJECTIVE BOX
PROGRESS NOTE:     Patient is a 63y old  Female who presents with a chief complaint of AHRF (13 Feb 2024 14:43)      SUBJECTIVE / OVERNIGHT EVENTS:    Pain:  Bowel Movements:  Urination:  OOB:  PT:    REVIEW OF SYSTEMS:    CONSTITUTIONAL: No weakness, fevers or chills  EYES/ENT: No visual changes;  No vertigo or throat pain   NECK: No pain or stiffness  RESPIRATORY: No cough, wheezing, hemoptysis; No shortness of breath  CARDIOVASCULAR: No chest pain or palpitations  GASTROINTESTINAL: No abdominal or epigastric pain. No nausea, vomiting, or hematemesis; No diarrhea or constipation. No melena or hematochezia.  GENITOURINARY: No dysuria, frequency or hematuria  NEUROLOGICAL: No numbness or weakness  SKIN: No itching, rashes      MEDICATIONS  (STANDING):  amLODIPine   Tablet 10 milliGRAM(s) Oral daily  atorvastatin 20 milliGRAM(s) Oral at bedtime  budesonide 160 MICROgram(s)/formoterol 4.5 MICROgram(s) Inhaler 2 Puff(s) Inhalation two times a day  buMETAnide 2 milliGRAM(s) Oral every 12 hours  chlorhexidine 2% Cloths 1 Application(s) Topical daily  dextrose 5%. 1000 milliLiter(s) (100 mL/Hr) IV Continuous <Continuous>  dextrose 5%. 1000 milliLiter(s) (50 mL/Hr) IV Continuous <Continuous>  dextrose 50% Injectable 25 Gram(s) IV Push once  dextrose 50% Injectable 25 Gram(s) IV Push once  dextrose 50% Injectable 12.5 Gram(s) IV Push once  epoetin melody (EPOGEN) Injectable 3000 Unit(s) IV Push <User Schedule>  glucagon  Injectable 1 milliGRAM(s) IntraMuscular once  heparin   Injectable 5000 Unit(s) SubCutaneous every 8 hours  insulin glargine Injectable (LANTUS) 50 Unit(s) SubCutaneous at bedtime  insulin lispro (ADMELOG) corrective regimen sliding scale   SubCutaneous at bedtime  insulin lispro (ADMELOG) corrective regimen sliding scale   SubCutaneous three times a day before meals  insulin lispro Injectable (ADMELOG) 21 Unit(s) SubCutaneous three times a day before meals  loratadine 10 milliGRAM(s) Oral daily  montelukast 10 milliGRAM(s) Oral daily  multivitamin 1 Tablet(s) Oral daily  polyethylene glycol 3350 17 Gram(s) Oral daily  senna 2 Tablet(s) Oral at bedtime    MEDICATIONS  (PRN):  dextrose Oral Gel 15 Gram(s) Oral once PRN Blood Glucose LESS THAN 70 milliGRAM(s)/deciliter  sodium chloride 0.9% Bolus. 100 milliLiter(s) IV Bolus every 5 minutes PRN SBP LESS THAN or EQUAL to 90 mmHg  sodium chloride 0.9% lock flush 10 milliLiter(s) IV Push every 1 hour PRN Pre/post blood products, medications, blood draw, and to maintain line patency      CAPILLARY BLOOD GLUCOSE      POCT Blood Glucose.: 242 mg/dL (13 Feb 2024 22:49)  POCT Blood Glucose.: 157 mg/dL (13 Feb 2024 20:17)  POCT Blood Glucose.: 140 mg/dL (13 Feb 2024 19:03)  POCT Blood Glucose.: 266 mg/dL (13 Feb 2024 16:24)  POCT Blood Glucose.: 222 mg/dL (13 Feb 2024 14:51)  POCT Blood Glucose.: 209 mg/dL (13 Feb 2024 12:04)  POCT Blood Glucose.: 158 mg/dL (13 Feb 2024 08:20)    I&O's Summary    13 Feb 2024 07:01  -  14 Feb 2024 07:00  --------------------------------------------------------  IN: 400 mL / OUT: 2400 mL / NET: -2000 mL        VITALS:   T(C): 36.8 (02-14-24 @ 05:27), Max: 36.9 (02-13-24 @ 13:38)  HR: 87 (02-14-24 @ 05:27) (87 - 94)  BP: 151/72 (02-14-24 @ 05:27) (131/56 - 151/72)  RR: 17 (02-14-24 @ 05:27) (17 - 18)  SpO2: 100% (02-14-24 @ 05:27) (100% - 100%)    GENERAL: NAD, lying in bed comfortably  HEAD:  Atraumatic, normocephalic  EYES: EOMI, PERRLA, conjunctiva and sclera clear  ENT: Moist mucous membranes  NECK: Supple, no JVD  HEART: Regular rate and rhythm, no murmurs, rubs, or gallops  LUNGS: Unlabored respirations.  Clear to auscultation bilaterally, no crackles, wheezing, or rhonchi  ABDOMEN: Soft, nontender, nondistended, +BS  EXTREMITIES: 2+ peripheral pulses bilaterally. No clubbing, cyanosis, or edema  NERVOUS SYSTEM:  A&Ox3, no focal deficits   SKIN: No rashes or lesions    LABS:                        8.7    14.78 )-----------( 261      ( 13 Feb 2024 06:25 )             26.4     02-13    142  |  106  |  43<H>  ----------------------------<  150<H>  3.6   |  21<L>  |  5.49<H>    Ca    8.5      13 Feb 2024 06:25  Phos  5.0     02-13  Mg     2.50     02-13            Urinalysis Basic - ( 13 Feb 2024 06:25 )    Color: x / Appearance: x / SG: x / pH: x  Gluc: 150 mg/dL / Ketone: x  / Bili: x / Urobili: x   Blood: x / Protein: x / Nitrite: x   Leuk Esterase: x / RBC: x / WBC x   Sq Epi: x / Non Sq Epi: x / Bacteria: x          RADIOLOGY & ADDITIONAL TESTS:  Results Reviewed:   Imaging Personally Reviewed:  Electrocardiogram Personally Reviewed:    COORDINATION OF CARE:  Care Discussed with Consultants/Other Providers [Y/N]:  Prior or Outpatient Records Reviewed [Y/N]:   PROGRESS NOTE:     Patient is a 63y old  Female who presents with a chief complaint of AHRF (13 Feb 2024 14:43)      SUBJECTIVE / OVERNIGHT EVENTS:  No acute events overnight. Patient seen and examined at bedside this AM with daughter at bedside. PT denies any complaints, no chest pain, SOB, leg swelling, headache/dizziness. No pain at site of R upper chest RIJ tunneled cath nor L wrist AV fistula.    REVIEW OF SYSTEMS:    CONSTITUTIONAL: No weakness, fevers or chills  EYES/ENT: No visual changes;  No vertigo or throat pain   NECK: No pain or stiffness  RESPIRATORY: No cough, wheezing, hemoptysis; No shortness of breath  CARDIOVASCULAR: No chest pain or palpitations  GASTROINTESTINAL: No abdominal or epigastric pain. No nausea, vomiting, or hematemesis; No diarrhea or constipation. No melena or hematochezia.  GENITOURINARY: No dysuria, frequency or hematuria  NEUROLOGICAL: No numbness or weakness  SKIN: No itching, rashes      MEDICATIONS  (STANDING):  amLODIPine   Tablet 10 milliGRAM(s) Oral daily  atorvastatin 20 milliGRAM(s) Oral at bedtime  budesonide 160 MICROgram(s)/formoterol 4.5 MICROgram(s) Inhaler 2 Puff(s) Inhalation two times a day  buMETAnide 2 milliGRAM(s) Oral every 12 hours  chlorhexidine 2% Cloths 1 Application(s) Topical daily  dextrose 5%. 1000 milliLiter(s) (100 mL/Hr) IV Continuous <Continuous>  dextrose 5%. 1000 milliLiter(s) (50 mL/Hr) IV Continuous <Continuous>  dextrose 50% Injectable 25 Gram(s) IV Push once  dextrose 50% Injectable 25 Gram(s) IV Push once  dextrose 50% Injectable 12.5 Gram(s) IV Push once  epoetin melody (EPOGEN) Injectable 3000 Unit(s) IV Push <User Schedule>  glucagon  Injectable 1 milliGRAM(s) IntraMuscular once  heparin   Injectable 5000 Unit(s) SubCutaneous every 8 hours  insulin glargine Injectable (LANTUS) 50 Unit(s) SubCutaneous at bedtime  insulin lispro (ADMELOG) corrective regimen sliding scale   SubCutaneous at bedtime  insulin lispro (ADMELOG) corrective regimen sliding scale   SubCutaneous three times a day before meals  insulin lispro Injectable (ADMELOG) 21 Unit(s) SubCutaneous three times a day before meals  loratadine 10 milliGRAM(s) Oral daily  montelukast 10 milliGRAM(s) Oral daily  multivitamin 1 Tablet(s) Oral daily  polyethylene glycol 3350 17 Gram(s) Oral daily  senna 2 Tablet(s) Oral at bedtime    MEDICATIONS  (PRN):  dextrose Oral Gel 15 Gram(s) Oral once PRN Blood Glucose LESS THAN 70 milliGRAM(s)/deciliter  sodium chloride 0.9% Bolus. 100 milliLiter(s) IV Bolus every 5 minutes PRN SBP LESS THAN or EQUAL to 90 mmHg  sodium chloride 0.9% lock flush 10 milliLiter(s) IV Push every 1 hour PRN Pre/post blood products, medications, blood draw, and to maintain line patency      CAPILLARY BLOOD GLUCOSE      POCT Blood Glucose.: 242 mg/dL (13 Feb 2024 22:49)  POCT Blood Glucose.: 157 mg/dL (13 Feb 2024 20:17)  POCT Blood Glucose.: 140 mg/dL (13 Feb 2024 19:03)  POCT Blood Glucose.: 266 mg/dL (13 Feb 2024 16:24)  POCT Blood Glucose.: 222 mg/dL (13 Feb 2024 14:51)  POCT Blood Glucose.: 209 mg/dL (13 Feb 2024 12:04)  POCT Blood Glucose.: 158 mg/dL (13 Feb 2024 08:20)    I&O's Summary    13 Feb 2024 07:01  -  14 Feb 2024 07:00  --------------------------------------------------------  IN: 400 mL / OUT: 2400 mL / NET: -2000 mL        VITALS:   T(C): 36.8 (02-14-24 @ 05:27), Max: 36.9 (02-13-24 @ 13:38)  HR: 87 (02-14-24 @ 05:27) (87 - 94)  BP: 151/72 (02-14-24 @ 05:27) (131/56 - 151/72)  RR: 17 (02-14-24 @ 05:27) (17 - 18)  SpO2: 100% (02-14-24 @ 05:27) (100% - 100%)    HEENT: NC/AT. Sclera clear, no conjunctival pallor. EOMI, PERRLA. No nasal discharge. Throat: no erythema, no exudates on tonsils, uvula midline.  Cardiac: RRR, +S1/S2. No murmurs, rubs, or gallops.   Chest: CTAB. No rales, rhonchi, or wheezing. R upper chest tunneled HD cath noted.  Abdomen: Soft, nontender, nondistended. No guarding, no rebound tenderness.  Neuro: Alert and oriented x3. Motor strength and sensation intact.    Extremities: No rashes, no bruising. No joint swelling. Trace pitting edema in b/l LE. Good peripheral pulses bilaterally. L wrist AVF noted.     LABS:                        8.7    14.78 )-----------( 261      ( 13 Feb 2024 06:25 )             26.4     02-13    142  |  106  |  43<H>  ----------------------------<  150<H>  3.6   |  21<L>  |  5.49<H>    Ca    8.5      13 Feb 2024 06:25  Phos  5.0     02-13  Mg     2.50     02-13            Urinalysis Basic - ( 13 Feb 2024 06:25 )    Color: x / Appearance: x / SG: x / pH: x  Gluc: 150 mg/dL / Ketone: x  / Bili: x / Urobili: x   Blood: x / Protein: x / Nitrite: x   Leuk Esterase: x / RBC: x / WBC x   Sq Epi: x / Non Sq Epi: x / Bacteria: x

## 2024-02-15 ENCOUNTER — APPOINTMENT (OUTPATIENT)
Dept: CARDIOLOGY | Facility: CLINIC | Age: 64
End: 2024-02-15

## 2024-02-16 ENCOUNTER — NON-APPOINTMENT (OUTPATIENT)
Age: 64
End: 2024-02-16

## 2024-02-23 ENCOUNTER — APPOINTMENT (OUTPATIENT)
Dept: GASTROENTEROLOGY | Facility: CLINIC | Age: 64
End: 2024-02-23
Payer: COMMERCIAL

## 2024-02-23 VITALS
TEMPERATURE: 97.8 F | BODY MASS INDEX: 27.94 KG/M2 | SYSTOLIC BLOOD PRESSURE: 122 MMHG | HEART RATE: 86 BPM | DIASTOLIC BLOOD PRESSURE: 60 MMHG | RESPIRATION RATE: 16 BRPM | HEIGHT: 61 IN | WEIGHT: 148 LBS | OXYGEN SATURATION: 98 %

## 2024-02-23 DIAGNOSIS — K59.09 OTHER CONSTIPATION: ICD-10-CM

## 2024-02-23 DIAGNOSIS — Z79.4 TYPE 2 DIABETES MELLITUS W/OUT COMPLICATIONS: ICD-10-CM

## 2024-02-23 DIAGNOSIS — Z86.79 PERSONAL HISTORY OF OTHER DISEASES OF THE CIRCULATORY SYSTEM: ICD-10-CM

## 2024-02-23 DIAGNOSIS — E11.9 TYPE 2 DIABETES MELLITUS W/OUT COMPLICATIONS: ICD-10-CM

## 2024-02-23 DIAGNOSIS — Z86.39 PERSONAL HISTORY OF OTHER ENDOCRINE, NUTRITIONAL AND METABOLIC DISEASE: ICD-10-CM

## 2024-02-23 PROCEDURE — 99204 OFFICE O/P NEW MOD 45 MIN: CPT

## 2024-02-23 RX ORDER — LUBIPROSTONE 24 UG/1
24 CAPSULE ORAL
Qty: 60 | Refills: 5 | Status: ACTIVE | COMMUNITY
Start: 2024-02-23 | End: 1900-01-01

## 2024-02-23 NOTE — ASSESSMENT
[FreeTextEntry1] : She needs to complete her nuclear stress test and echocardiogram to achieve cardiology clearance  She is scheduled for close of these in March  I recommended that the patient and her daughter return in April and if she is cleared by cardiology,  we will schedule her  colonoscopy   I spent 47 minutes with the patient and her daughter and answered their questions

## 2024-02-23 NOTE — CONSULT LETTER
[Consult Letter:] : I had the pleasure of evaluating your patient, [unfilled]. [Dear  ___] : Dear  [unfilled], [( Thank you for referring [unfilled] for consultation for _____ )] : Thank you for referring [unfilled] for consultation for [unfilled] [Please see my note below.] : Please see my note below. [Consult Closing:] : Thank you very much for allowing me to participate in the care of this patient.  If you have any questions, please do not hesitate to contact me. [FreeTextEntry3] : Flaco Calero MD  Gastroenterology [facility]  [Sincerely,] : Sincerely,

## 2024-02-23 NOTE — HISTORY OF PRESENT ILLNESS
[FreeTextEntry1] : She is a 63-year-old female referred for a pre kidney transplant evaluation of her colon.  She has never had a colonoscopy.  She denies a family history of colon cancer.  She feels well except for chronic constipation.  She is on dialysis Tuesday Thursday and Saturday.  She is on 81 mg aspirin.  She saw her cardiologist but has not yet had a nuclear stress test or any echo.  She was recently in the hospital for 2 weeks for fluid overload and shortness of breath.   Her daughter was in the room and acted as

## 2024-02-23 NOTE — REASON FOR VISIT
[FreeTextEntry1] : Prekidney transplant evaluation of her colon colon cancer screening chronic  constipation

## 2024-02-26 ENCOUNTER — APPOINTMENT (OUTPATIENT)
Dept: VASCULAR SURGERY | Facility: CLINIC | Age: 64
End: 2024-02-26
Payer: MEDICAID

## 2024-02-26 VITALS
HEART RATE: 75 BPM | BODY MASS INDEX: 27.94 KG/M2 | SYSTOLIC BLOOD PRESSURE: 146 MMHG | TEMPERATURE: 98.4 F | WEIGHT: 148 LBS | HEIGHT: 61 IN | DIASTOLIC BLOOD PRESSURE: 78 MMHG

## 2024-02-26 PROCEDURE — 93990 DOPPLER FLOW TESTING: CPT

## 2024-02-26 PROCEDURE — 99024 POSTOP FOLLOW-UP VISIT: CPT

## 2024-02-28 ENCOUNTER — APPOINTMENT (OUTPATIENT)
Dept: CT IMAGING | Facility: CLINIC | Age: 64
End: 2024-02-28
Payer: COMMERCIAL

## 2024-02-28 PROCEDURE — 71250 CT THORAX DX C-: CPT

## 2024-02-28 PROCEDURE — 74176 CT ABD & PELVIS W/O CONTRAST: CPT

## 2024-02-28 NOTE — PHYSICAL EXAM
[2+] : right 2+ [No Rash or Lesion] : No rash or lesion [Alert] : alert [Oriented to Place] : oriented to place [Oriented to Person] : oriented to person [Oriented to Time] : oriented to time [Calm] : calm [de-identified] : NAD [de-identified] : NCAT [de-identified] : unlabored breathing [de-identified] : cooperative [FreeTextEntry1] : LUE incision c/d/i, healing well no bleeding or drainage no palpable thrill over left avf brisk capillary refill < 2 sec intact LUE sensory and motor function

## 2024-02-28 NOTE — REASON FOR VISIT
[Post Op: _________] : a [unfilled] post op visit [Family Member] : family member [FreeTextEntry1] : left avf

## 2024-02-28 NOTE — DATA REVIEWED
[FreeTextEntry1] : 2/26/2024 LUE dialysis ultrasound left avf occluded  2/26/2024 Bilateral upper extremities vein mapping  left upper cephalic vein 4.5 - 4.7 mm left basilic veins 3.8 - 4.5 mm right upper cephalic vein 3.1 -3.5 mm right basilic vein 3.4 - 3.9 mm

## 2024-02-28 NOTE — ASSESSMENT
[Other: _____] : [unfilled] [Arterial/Venous Disease] : arterial/venous disease [FreeTextEntry1] : Impression - ESRD on HD via permacath, occluded left avf   Plan Continue ESRD via permacath 3x/week d/w daughter indication, risks and benefits of left brachiocephalic avf daughter wants to hold off for now and will call back when ready

## 2024-02-28 NOTE — HISTORY OF PRESENT ILLNESS
[FreeTextEntry1] : Pt presents for post op visit. She is s/p left radiocephalic avf on 2/9/2024. Accompanied by daughter.  LUE incision c/d/i, healing well. Denies LUE swelling, pain, numbness or tingling. Pt is on HD via R IJ catheter Tues/Thurs/Sat without any issues.

## 2024-03-12 ENCOUNTER — TRANSCRIPTION ENCOUNTER (OUTPATIENT)
Age: 64
End: 2024-03-12

## 2024-03-12 RX ORDER — ATORVASTATIN CALCIUM 20 MG/1
20 TABLET, FILM COATED ORAL
Qty: 30 | Refills: 3 | Status: ACTIVE | COMMUNITY
Start: 2023-11-28 | End: 1900-01-01

## 2024-03-12 RX ORDER — BUMETANIDE 2 MG/1
2 TABLET ORAL TWICE DAILY
Qty: 120 | Refills: 3 | Status: ACTIVE | COMMUNITY
Start: 2024-02-16 | End: 1900-01-01

## 2024-03-18 ENCOUNTER — APPOINTMENT (OUTPATIENT)
Dept: CARDIOLOGY | Facility: CLINIC | Age: 64
End: 2024-03-18
Payer: COMMERCIAL

## 2024-03-18 PROCEDURE — 93306 TTE W/DOPPLER COMPLETE: CPT

## 2024-03-25 ENCOUNTER — APPOINTMENT (OUTPATIENT)
Dept: CARDIOLOGY | Facility: CLINIC | Age: 64
End: 2024-03-25
Payer: COMMERCIAL

## 2024-03-25 PROCEDURE — 93015 CV STRESS TEST SUPVJ I&R: CPT

## 2024-03-25 PROCEDURE — 78452 HT MUSCLE IMAGE SPECT MULT: CPT

## 2024-03-25 PROCEDURE — A9500: CPT

## 2024-04-02 ENCOUNTER — APPOINTMENT (OUTPATIENT)
Dept: ENDOCRINOLOGY | Facility: CLINIC | Age: 64
End: 2024-04-02

## 2024-04-19 ENCOUNTER — APPOINTMENT (OUTPATIENT)
Dept: VASCULAR SURGERY | Facility: CLINIC | Age: 64
End: 2024-04-19
Payer: MEDICAID

## 2024-04-19 VITALS
BODY MASS INDEX: 27.94 KG/M2 | WEIGHT: 148 LBS | DIASTOLIC BLOOD PRESSURE: 81 MMHG | HEIGHT: 61 IN | HEART RATE: 76 BPM | SYSTOLIC BLOOD PRESSURE: 139 MMHG

## 2024-04-19 PROCEDURE — 99204 OFFICE O/P NEW MOD 45 MIN: CPT | Mod: 25

## 2024-04-19 PROCEDURE — 93990 DOPPLER FLOW TESTING: CPT

## 2024-04-19 PROCEDURE — G0506: CPT

## 2024-04-22 ENCOUNTER — APPOINTMENT (OUTPATIENT)
Dept: GASTROENTEROLOGY | Facility: CLINIC | Age: 64
End: 2024-04-22
Payer: MEDICAID

## 2024-04-22 VITALS
WEIGHT: 148 LBS | HEIGHT: 61 IN | RESPIRATION RATE: 14 BRPM | HEART RATE: 78 BPM | TEMPERATURE: 97 F | DIASTOLIC BLOOD PRESSURE: 60 MMHG | BODY MASS INDEX: 27.94 KG/M2 | OXYGEN SATURATION: 99 % | SYSTOLIC BLOOD PRESSURE: 126 MMHG

## 2024-04-22 DIAGNOSIS — Z12.11 ENCOUNTER FOR SCREENING FOR MALIGNANT NEOPLASM OF COLON: ICD-10-CM

## 2024-04-22 PROCEDURE — 99213 OFFICE O/P EST LOW 20 MIN: CPT

## 2024-04-22 NOTE — HISTORY OF PRESENT ILLNESS
[FreeTextEntry1] : She was seen by cardiology and now has cardiology clearance.  She is feeling well and has no complaints.  She has dialysis Tuesday Thursday and Saturday and is not on any blood thinners except for 81 mg aspirin   Her daughter was in the room and acted as

## 2024-04-22 NOTE — ASSESSMENT
[FreeTextEntry1] : COURTNEY OLSON was advised to undergo colonoscopy to which she agreed. The procedure will be performed in [facility] with the assistance of an anesthesiologist. The patient was given a Golytely preparation prescription and understood the  procedure as it was explained to her. She was given a booklet distributed by the American Society of G[employer]  Endoscopy explaining the procedure in detail and she understood the risks of the procedure not limited to infection, bleeding, perforation or non- diagnosis of colorectal cancer. She was advised that she could not drive home, if she chooses to  receive sedation.  Further diagnostic and treatment recommendations will be based upon the procedure and any biopsies, if they are taken.  Thank you for allowing me to participate in this [facility] care.  , Best personal regards -- Don   I spent 23 minutes with the patient and her daughter and answered all of their questions

## 2024-04-22 NOTE — CONSULT LETTER
[Dear  ___] : Dear  [unfilled], [Consult Letter:] : I had the pleasure of evaluating your patient, [unfilled]. [( Thank you for referring [unfilled] for consultation for _____ )] : Thank you for referring [unfilled] for consultation for [unfilled] [Please see my note below.] : Please see my note below. [Consult Closing:] : Thank you very much for allowing me to participate in the care of this patient.  If you have any questions, please do not hesitate to contact me. [Sincerely,] : Sincerely, [FreeTextEntry3] : Flaco Calero MD  Gastroenterology [facility]

## 2024-04-24 ENCOUNTER — APPOINTMENT (OUTPATIENT)
Dept: ULTRASOUND IMAGING | Facility: CLINIC | Age: 64
End: 2024-04-24
Payer: MEDICAID

## 2024-04-24 ENCOUNTER — APPOINTMENT (OUTPATIENT)
Dept: MAMMOGRAPHY | Facility: CLINIC | Age: 64
End: 2024-04-24
Payer: MEDICAID

## 2024-04-24 ENCOUNTER — RESULT REVIEW (OUTPATIENT)
Age: 64
End: 2024-04-24

## 2024-04-24 ENCOUNTER — APPOINTMENT (OUTPATIENT)
Dept: CT IMAGING | Facility: CLINIC | Age: 64
End: 2024-04-24
Payer: MEDICAID

## 2024-04-24 PROCEDURE — 72131 CT LUMBAR SPINE W/O DYE: CPT

## 2024-04-24 PROCEDURE — 77063 BREAST TOMOSYNTHESIS BI: CPT

## 2024-04-24 PROCEDURE — 77067 SCR MAMMO BI INCL CAD: CPT

## 2024-05-01 ENCOUNTER — APPOINTMENT (OUTPATIENT)
Dept: INFECTIOUS DISEASE | Facility: CLINIC | Age: 64
End: 2024-05-01

## 2024-05-17 NOTE — PROGRESS NOTE ADULT - PROBLEM SELECTOR PLAN 2
Refill Routing Note   Medication(s) are not appropriate for processing by [facility] for the following reason(s):        Required labs outdated  No active prescription written by provider    [facility] action(s):  Defer     Requires labs : Yes             Appointments  past 12m or future 3m with PCP    Date Provider   Last Visit   5/15/2024 Andrea Cardenas MD   Next Visit   Visit date not found Andrea Cardenas MD   ED visits in past 90 days: 0        Note composed:3:12 PM 05/17/2024           Hgb 8.9 today. Tsat 20%, Ferritin wnl at 276. s/p 5 day course of Venofer. Continue EPO with HD. Monitor Hgb.    Final recommendations pending attending signature.    If you have any questions, please feel free to contact me  Bright Paulino  Nephrology Fellow  [place]/Page [phone]  (After 5pm or on weekends please page the on-call fellow)

## 2024-05-20 ENCOUNTER — OUTPATIENT (OUTPATIENT)
Dept: OUTPATIENT SERVICES | Facility: HOSPITAL | Age: 64
LOS: 1 days | End: 2024-05-20
Payer: MEDICAID

## 2024-05-20 ENCOUNTER — APPOINTMENT (OUTPATIENT)
Dept: INTERNAL MEDICINE | Facility: CLINIC | Age: 64
End: 2024-05-20

## 2024-05-20 ENCOUNTER — APPOINTMENT (OUTPATIENT)
Dept: INTERNAL MEDICINE | Facility: CLINIC | Age: 64
End: 2024-05-20
Payer: MEDICAID

## 2024-05-20 VITALS
BODY MASS INDEX: 27.19 KG/M2 | HEIGHT: 61 IN | OXYGEN SATURATION: 97 % | DIASTOLIC BLOOD PRESSURE: 70 MMHG | SYSTOLIC BLOOD PRESSURE: 140 MMHG | WEIGHT: 144 LBS | HEART RATE: 88 BPM

## 2024-05-20 DIAGNOSIS — I10 ESSENTIAL (PRIMARY) HYPERTENSION: ICD-10-CM

## 2024-05-20 DIAGNOSIS — E11.9 TYPE 2 DIABETES MELLITUS W/OUT COMPLICATIONS: ICD-10-CM

## 2024-05-20 DIAGNOSIS — M48.04 SPINAL STENOSIS, THORACIC REGION: ICD-10-CM

## 2024-05-20 DIAGNOSIS — J45.909 UNSPECIFIED ASTHMA, UNCOMPLICATED: ICD-10-CM

## 2024-05-20 DIAGNOSIS — Z23 ENCOUNTER FOR IMMUNIZATION: ICD-10-CM

## 2024-05-20 DIAGNOSIS — E78.5 HYPERLIPIDEMIA, UNSPECIFIED: ICD-10-CM

## 2024-05-20 DIAGNOSIS — G89.29 LOW BACK PAIN, UNSPECIFIED: ICD-10-CM

## 2024-05-20 DIAGNOSIS — Z01.818 ENCOUNTER FOR OTHER PREPROCEDURAL EXAMINATION: ICD-10-CM

## 2024-05-20 DIAGNOSIS — M54.50 LOW BACK PAIN, UNSPECIFIED: ICD-10-CM

## 2024-05-20 LAB — HBA1C MFR BLD HPLC: 6.6

## 2024-05-20 PROCEDURE — 90715 TDAP VACCINE 7 YRS/> IM: CPT

## 2024-05-20 PROCEDURE — G0463: CPT | Mod: 25

## 2024-05-20 PROCEDURE — 90750 HZV VACC RECOMBINANT IM: CPT

## 2024-05-20 PROCEDURE — 83036 HEMOGLOBIN GLYCOSYLATED A1C: CPT

## 2024-05-20 PROCEDURE — 99213 OFFICE O/P EST LOW 20 MIN: CPT | Mod: 25,GE

## 2024-05-20 PROCEDURE — 90472 IMMUNIZATION ADMIN EACH ADD: CPT

## 2024-05-20 PROCEDURE — 90471 IMMUNIZATION ADMIN: CPT

## 2024-05-20 RX ORDER — SODIUM BICARBONATE 650 MG/1
650 TABLET ORAL
Qty: 180 | Refills: 3 | Status: DISCONTINUED | COMMUNITY
Start: 2023-11-22 | End: 2024-05-20

## 2024-05-20 RX ORDER — REPAGLINIDE 2 MG/1
2 TABLET ORAL
Refills: 0 | Status: DISCONTINUED | COMMUNITY
End: 2024-05-20

## 2024-05-20 RX ORDER — LIRAGLUTIDE 6 MG/ML
18 INJECTION SUBCUTANEOUS
Refills: 0 | Status: DISCONTINUED | COMMUNITY
End: 2024-05-20

## 2024-05-20 RX ORDER — INSULIN ASPART 100 [IU]/ML
100 INJECTION, SOLUTION INTRAVENOUS; SUBCUTANEOUS
Qty: 1 | Refills: 0 | Status: ACTIVE | COMMUNITY
Start: 2024-05-20 | End: 1900-01-01

## 2024-05-20 RX ORDER — REPAGLINIDE 1 MG/1
1 TABLET ORAL
Refills: 0 | Status: DISCONTINUED | COMMUNITY
End: 2024-05-20

## 2024-05-20 RX ORDER — INSULIN GLARGINE 100 [IU]/ML
100 INJECTION, SOLUTION SUBCUTANEOUS
Refills: 0 | Status: DISCONTINUED | COMMUNITY
End: 2024-05-20

## 2024-05-20 RX ORDER — FUROSEMIDE 40 MG/1
40 TABLET ORAL DAILY
Qty: 90 | Refills: 3 | Status: DISCONTINUED | COMMUNITY
Start: 2023-11-02 | End: 2024-05-20

## 2024-05-20 RX ORDER — INSULIN GLARGINE 300 [IU]/ML
300 INJECTION, SOLUTION SUBCUTANEOUS AT BEDTIME
Qty: 1 | Refills: 0 | Status: ACTIVE | COMMUNITY
Start: 2024-05-20 | End: 1900-01-01

## 2024-05-20 RX ORDER — AMLODIPINE BESYLATE 5 MG/1
5 TABLET ORAL DAILY
Qty: 90 | Refills: 3 | Status: ACTIVE | COMMUNITY
Start: 1900-01-01 | End: 1900-01-01

## 2024-05-20 RX ORDER — POLYETHYLENE GLYCOL 3350 AND ELECTROLYTES WITH LEMON FLAVOR 236; 22.74; 6.74; 5.86; 2.97 G/4L; G/4L; G/4L; G/4L; G/4L
236 POWDER, FOR SOLUTION ORAL
Qty: 1 | Refills: 0 | Status: DISCONTINUED | COMMUNITY
Start: 2024-04-22 | End: 2024-05-20

## 2024-05-20 RX ORDER — ERYTHROPOIETIN 10000 [IU]/ML
10000 INJECTION, SOLUTION INTRAVENOUS; SUBCUTANEOUS
Qty: 1 | Refills: 0 | Status: DISCONTINUED | OUTPATIENT
Start: 2023-12-14 | End: 2024-05-20

## 2024-05-21 NOTE — PHYSICAL EXAM
[Normal] : soft, non-tender, non-distended, no masses palpated, no HSM and normal bowel sounds [de-identified] : (+) no thrill to LUE AV fistula

## 2024-05-21 NOTE — PLAN
[FreeTextEntry1] : 63 year old female with history of T2DM,  ESRD on HD (TTHS) since February 2024, HTN, and HLD , psoriasis, previous admissions for fluid overload.  #DM - Continue Novolog 15 units premeal, 24 units Glargine before bed - Lindsey follows with Endocrinology at [facility] - Will send prescription for Trulicity 0.75 to vivo at [facility]. States local pharmacies in [place] are on backorder.  - a1c of 6.6 today   #HTN - Cotninue Amlodipine 5 mg daily   #ESRD on HD - Patient undergoing renal transplant evaluation as well as vascular surgery evaluation for fistula placement - Continue Tuesday, Thursday, Saturday HD - Patient has difficulty with ambulation due to ESRD on HD, will require disability parking permit and transportation. Will fill out forms. Patient has difficulty with ambulation, unable to ambulate on her own. Requires wheelchair for dialysis appointments. Requires cane and assistance when ambulating otherwise.  - Has established nephrology care in the [facility]  - Given ESRD, patient would benefit from Nepro can twice a day.   #Back pain - Patient with history of chronic back pain - Cervical spine CT shows Multilevel degenerative changes of the lumbar spine with up to severe central canal and moderate to severe foraminal narrowing. - Findings further complicate ambulation - Neurology and spine referral   #HCM - Prevnar utd - Tetanus and shngles (1st dose) today - Mammogram 2024 BIRADS2, Next will be 2025 - Colonoscopy scheduled for July 2024 - PAP+HPV negative in 2023, next will be 2028

## 2024-05-21 NOTE — HISTORY OF PRESENT ILLNESS
[Other: _____] : [unfilled] [FreeTextEntry1] : JEFFREY form, Post-hospital visit [de-identified] : 63 year old female with history of T2DM, ESRD on HD (TTHS) since February 2024, HTN, and HLD , psoriasis, presents for post hospital follow up and for medical forms. Patient was admitted to [facility] in January 2024 for acute hypoxic respiratory failure secondary to volume overload due to (at that time) CKD stage 4. Patient was diuresed and subsequentially discharged. Has initiated dialysis since then through right permacath. AVD on LUE is non-funcitonal at this time. Harrison has been doing well with no complaints since. Presents with forms for JEFFREY, parking permit, transportation, and in need for Nepro script.

## 2024-05-22 ENCOUNTER — APPOINTMENT (OUTPATIENT)
Dept: ENDOVASCULAR SURGERY | Facility: CLINIC | Age: 64
End: 2024-05-22
Payer: MEDICAID

## 2024-05-22 ENCOUNTER — RESULT REVIEW (OUTPATIENT)
Age: 64
End: 2024-05-22

## 2024-05-22 VITALS
OXYGEN SATURATION: 97 % | WEIGHT: 48 LBS | BODY MASS INDEX: 9.06 KG/M2 | TEMPERATURE: 98.3 F | HEART RATE: 78 BPM | RESPIRATION RATE: 18 BRPM | SYSTOLIC BLOOD PRESSURE: 126 MMHG | HEIGHT: 61 IN | DIASTOLIC BLOOD PRESSURE: 77 MMHG

## 2024-05-22 DIAGNOSIS — N18.6 END STAGE RENAL DISEASE: ICD-10-CM

## 2024-05-22 DIAGNOSIS — Z99.2 END STAGE RENAL DISEASE: ICD-10-CM

## 2024-05-22 PROCEDURE — 37246Z: CUSTOM | Mod: 59

## 2024-05-22 PROCEDURE — 76937 US GUIDE VASCULAR ACCESS: CPT

## 2024-05-22 PROCEDURE — 37197Z: CUSTOM | Mod: 59

## 2024-05-22 PROCEDURE — 36905Z: CUSTOM

## 2024-05-22 PROCEDURE — 36216Z: CUSTOM | Mod: 59

## 2024-05-24 ENCOUNTER — NON-APPOINTMENT (OUTPATIENT)
Age: 64
End: 2024-05-24

## 2024-05-28 DIAGNOSIS — E11.9 TYPE 2 DIABETES MELLITUS WITHOUT COMPLICATIONS: ICD-10-CM

## 2024-05-28 DIAGNOSIS — G89.29 OTHER CHRONIC PAIN: ICD-10-CM

## 2024-05-28 DIAGNOSIS — Z23 ENCOUNTER FOR IMMUNIZATION: ICD-10-CM

## 2024-05-28 DIAGNOSIS — E78.5 HYPERLIPIDEMIA, UNSPECIFIED: ICD-10-CM

## 2024-05-28 DIAGNOSIS — J45.909 UNSPECIFIED ASTHMA, UNCOMPLICATED: ICD-10-CM

## 2024-05-28 DIAGNOSIS — Z01.818 ENCOUNTER FOR OTHER PREPROCEDURAL EXAMINATION: ICD-10-CM

## 2024-05-28 DIAGNOSIS — M48.04 SPINAL STENOSIS, THORACIC REGION: ICD-10-CM

## 2024-05-28 DIAGNOSIS — N18.6 END STAGE RENAL DISEASE: ICD-10-CM

## 2024-05-28 DIAGNOSIS — M54.50 LOW BACK PAIN, UNSPECIFIED: ICD-10-CM

## 2024-05-29 ENCOUNTER — RESULT REVIEW (OUTPATIENT)
Age: 64
End: 2024-05-29

## 2024-05-29 ENCOUNTER — APPOINTMENT (OUTPATIENT)
Dept: ENDOVASCULAR SURGERY | Facility: CLINIC | Age: 64
End: 2024-05-29
Payer: MEDICAID

## 2024-05-29 VITALS
OXYGEN SATURATION: 100 % | RESPIRATION RATE: 20 BRPM | WEIGHT: 145 LBS | SYSTOLIC BLOOD PRESSURE: 137 MMHG | HEART RATE: 72 BPM | DIASTOLIC BLOOD PRESSURE: 60 MMHG | BODY MASS INDEX: 27.38 KG/M2 | HEIGHT: 61 IN | TEMPERATURE: 98.1 F

## 2024-05-29 PROCEDURE — 76937 US GUIDE VASCULAR ACCESS: CPT

## 2024-05-29 PROCEDURE — 36902Z: CUSTOM

## 2024-05-29 NOTE — REASON FOR VISIT
[Other ___] : a [unfilled] visit for [Clotted Access] : clotted access [Inadequate Flow within AVF] : inadequate flow within AVF [FreeTextEntry2] :  scheduled for 1 week f/u [Non-Maturing Fistula] : non-maturing fistula

## 2024-05-30 NOTE — PAST MEDICAL HISTORY
[Increasing age ( >40 years old)] : Increasing age ( >40 years old) [No therapy indicated for cases scheduled for less than one hour] : No therapy indicated for cases scheduled for less than one hour. [FreeTextEntry1] : Malignant Hyperthermis (MH) Screening Tool and Risk of Bleeding Assessement Ms. COURTNEY OLSON  denies family history of unexpected death following Anesthesia or Exercise. Denies Family history of Malignant Hyperthermia, Muscle or Neuromuscular disorder and High Temperature following exercise.  Ms. COURTNEY OLSON denies history of Muscle Spasm, Dark or Chocolate - Colored urine and Unanticipated fever immediately following anesthesia or serious exercise.  Ms. OLSON  also denies bleeding tendencies/ Risks of Bleeding

## 2024-05-30 NOTE — HISTORY OF PRESENT ILLNESS
[] : right internal jugular tunneled catheter [FreeTextEntry1] : 2/9/24 Dr Dale  [FreeTextEntry2] : 2/3/24  [FreeTextEntry4] : yesterday via tunneled catheter  [FreeTextEntry5] : yesterday at 8pm  [FreeTextEntry6] : Dr Arriola

## 2024-05-30 NOTE — PROCEDURE
[Resume diet] : resume diet [Site check for bleeding/hematoma/thrill/bruit] : Site check for bleeding/hematoma/thrill/bruit [Vital signs on admission the q 15 mins x2] : Vital signs on admission the q 15 mins x2 [FreeTextEntry1] : left fistulogram with angioplasty

## 2024-05-31 ENCOUNTER — APPOINTMENT (OUTPATIENT)
Age: 64
End: 2024-05-31

## 2024-05-31 RX ORDER — NUT.TX.GLUCOSE INTOLERANCE,SOY
BAR ORAL
Qty: 1 | Refills: 3 | Status: ACTIVE | COMMUNITY
Start: 2024-05-20 | End: 1900-01-01

## 2024-05-31 NOTE — HISTORY OF PRESENT ILLNESS
[] : right internal jugular tunneled catheter [FreeTextEntry1] : 2/9/24 Dr Dale  [FreeTextEntry2] : 2/3/24  [FreeTextEntry4] : yesterday via tunneled catheter  [FreeTextEntry6] : Dr Arriola [FreeTextEntry5] : yesterday 11pm

## 2024-05-31 NOTE — PROCEDURE
[Resume diet] : resume diet [Site check for bleeding/hematoma/thrill/bruit] : Site check for bleeding/hematoma/thrill/bruit [Vital signs on admission the q 15 mins x2] : Vital signs on admission the q 15 mins x2 [FreeTextEntry1] : left fistulogram/thrombectomy

## 2024-06-03 ENCOUNTER — OUTPATIENT (OUTPATIENT)
Dept: OUTPATIENT SERVICES | Facility: HOSPITAL | Age: 64
LOS: 1 days | Discharge: HOME | End: 2024-06-03

## 2024-06-06 ENCOUNTER — APPOINTMENT (OUTPATIENT)
Dept: INFECTIOUS DISEASE | Facility: CLINIC | Age: 64
End: 2024-06-06

## 2024-06-06 PROCEDURE — 99215 OFFICE O/P EST HI 40 MIN: CPT

## 2024-06-06 NOTE — ASSESSMENT
[FreeTextEntry1] : A. ACTIVE INFECTIOUS DISEASES ISSUES 1.leucocytosis with no clear culprit, CT C/A/P negative, asymptomatic. Will repeat labs as last CBC was without diff and back in December and with differential.   ----------------- B. PRE-TRANSPLANT EVALUTION AND PREVENTIVE CARE ------- ------- 1. Routine and additional ID screening Please send, if not done yet and/or follow: - HIV Ab/Ag neg - HSV 1/2 IgG - VZV IgG - EBV Serology panel (or VCA IgG) - CMV IgG - Hepatitis A IgG - Hepatitis B surface IgG - Hepatitis B core IgG - Hepatitis B surface Antigen - Hepatitis C Antibody - Measles (Rubeola) IgG - Rubella IgG (Slovak Measles) - Mumps IgG - Syphilis screening (antitreponemal antibody with reflex to RPR) - Quantiferon Gold  -Toxoplasma IgG  Additionally, please add the following testing based on the patient's exposures: - Strongyloides IgG - Trypanozoma Cruzi IgG (Chagas Disease) - Leishmania IgG (miscellaneous test to [facility]) - Schistosoma IgG - Brucella IgG - Q fever serologies - Coccidiodes Antibody panel (including CF,ID, MONICA IgG/IgM) - Filariasis IgG ----- 2. Immunizations  -Overall, vaccinations are recommended ideally in the pre-transplant period, at least 14 days prior to transplantation.  -If transplantation is felt imminent, then immunizations should be deferred to the post-transplant period, at least 4-6 months post-transplant with the exception of COVID-19 vaccination and the flu vaccine, which can be started as early as 1 month post-transplant. Similarly, all incomplete schedules prior to transplantation should be deferred to 4-6 months post-transplant.  - No live vaccinations should be administered unless transplantation is not anticipated or planned within the 4 weeks following vaccine administration. - If no plan to transplant in the next 4 weeks, could administer MMR if the patient is non immune to either  Measles, Rubeola, Mumps   COVID19:  Influenza:  Pneumococcal:  HAV:  HBV:  MMR:  Varicella:  Shingles:   Tdap:  HPV:   Recommend administration of: - The seasonal flu vaccine. this should be repeated yearly.  - Begin COVID-19 Pfizer or Moderna primary series - COVID-19  booster.  - Heplislav First/second dose (Hepatitis B). Will need a second dose in 1 month if not transplanted at the time of due vaccine. - Havrix First/second dose (Hepatitis A). Will need a second dose in 6 months if not transplanted at the time of due vaccine - Twinrix first dose. Recommend an expedited schedule with the second and thrid doses at 7 and 21 days, if not transplanted at the time of due vaccine. - Tdap  (tetanus, diphteria, pertussis): once, with follow up Td every 10 years needed - Prevnar 20 (pneumococcal vaccine): once with no additional pneumococcal vaccine needed - Shingrix (Herpes Zoster): Will need a second dose 2 to 6 months later if not transplanted at the time of due vaccine. - HPV vaccine Gardisil-9. Will need 2 additional doses after 1-2 months and 6 months,  if not transplanted at the time of due vaccine.  - Additional vaccinations Asplenic, functional asplenic, HIV patients also need: - HiB once - Meningoccocal vaccine: ---- MENVEO (preferably) or MENACTRA (do not administer at the same time as Prevnar) : 2 dose series, second dose at least 8 weeks later.  ---- BEXSERO - a second dose is needed at least 1 month later.   3. PERIOPERATIVE AND POST-TRANSPLANT PROPHYLAXIS  Perioperative ANTIBACTERIAL prophylaxis: - No history of MDRO: prophylaxis per protocol  - History of MDRO and thus recommend altering routine prophylaxis.   VIRAL Prophylaxis; --- CMV PROPHYLAXIS: based on donor/recipient IgG status CMV D+/R-: at high risk of primary infection. Per protocol, begin Valcyte 900 mg daily for 6 months, dose adjusted to creatinine clearance.  CMV R+: intermediate risk of reactivation.  Valcyte for 3-6 months , ideallyat full dose of 900 mg daily to avoid resistances. Alternatively, a preemptive approach may be elected of weekly plasma PCR checks and early treatment with reactivation. CMV D-/R-: At risk of de marla infection. No prophylaxis routinely recommended but early CMV PCR screening in case of symptoms advised (fever, malaise, diarrhea, dysphagia, thrombocytopenia, leucopenia) Recommend not decreasing valcyte dose for neutropenia/leucopenia as this could lead to resistance development. A preemptive approach is preferred in those instances or use of letermovir as prophylaxis.  Once antivirals are discontinued, recommend serial CMV PCR weekly for 1 months, then every 2 weeks for 1 months, then monthly until 6 months have passed to detect early reactivations.Patients should be  advised to contact transplant center for evaluation of fever, myalgias, malaise, headache, diarrhea and other new complaints  --- HSV/VZV prophylaxis: Patients who do not recive valcyte during the first month post-transplant should receive acyclovir 400 mg bid to prevent early HSV reactivation or in rare instances, donor derived HSV/VZV. longer duration may be considered in patients with history of recurrent HSV/VZV.  PCP/Toxoplasmosis:  --- PCP prophylaxis:  PO TMP-SMX 1 SS or DS daily (if at high risk of toxoplasmosis) for at least 6 months per protocol Alternatives for TMP-SMX allergic/intolerant patients  	Atovaquone 1500 PO once daily (also prevents toxoplasma) if NOT at high risk for Toxoplasmosis 	Dapsone 100 mg PO once daily (after excluding G6PD deficiency), sulfone derivative - low risk of cross-reactivity with mild sulfa allergy .       pentamidine monthly: least preferred option;   ---Toxoplasma prophylaxis:  If Toxoplasma D+/R-  this is high risk of primary toxoplasma infection status post transplant (or toxoplasma reactivation in seropositive heart transplant patients), recommend PO TMP-SMX DS daily or atovaquone 750 mg BID for at 6 months to a year.  Toxoplasmosis R+ patients are at moderate risk of reactivation and thus still would recommend bactrim or atovaquone prophylaxis for 6 months.    4. REFERRALS - Allergy/immunology for clearance of pre-transplant allergies - Dental : for dental clearance- a yearly checkup, ideally once pre-transplant is recommended.

## 2024-06-06 NOTE — HISTORY OF PRESENT ILLNESS
[FreeTextEntry1] : Jun 6 2024 12:00PM  This is a telehealth visit and mostly conducted via translation from and by daughter  --------------------------------------------------------------------------------- Ms. COURTNEY OLSON  is a 63 year  year-old F   undergoing evaluation for transplantation. Infectious diseases (ID) has been consulted for assessment of infection risks and to render an opinion as to whether or not they are a suitable candidate for transplantation from the infectious diseases standpoint. HAs CKD on HD, but making some urine.  Has also psoriasis on guselkumab (tremfya) b1gwdot Denies fever, chills, NS, weight loss, HA, joint aches, rashes, diarrhea.  CT Chest/abdomen and pelvis 2/28/24 No nodule or LN, no infiltraate  -------------------------------------------------------- Past/current ID issues (taken from patient report and records)  history of hospitalization due to infection: NO History of bacteremia, sepsis:NO History of endocarditis: NO History of MDRO: NO History of pneumonia: NO History of Flu/COVID 19: No History of recurrent UTIs: had an episode of ? UTI while inpatient in october 2023. also had a previous infection.  she does not have incontinence . She does still makes some urine daily.  History of infectious diarrhea, Cdiff: mostly constipated  History of dental infection:No, having dental appointment next week History of meningitis:NO History of sinusitis:NO History of HPV/high grade dysplasia: NO History of STD: NO History of MC herpes:? History of Shingles: she already got one vaccine History of VV Candidiasis:NO History of FUO: NO History of OM, skin soft tissue infections: NO -- History of tonsillectomy/appendectomy/splenectomy: no  History of metal hardware (e.g:PPM/defibrillator, prosthesis, Pins/needles):NO ---------------------------------------------------------------------------------------------------------------------------------- Childhood illnesses Chickenpox:  daughter thinks she did   Measles, Rubella, Scarlet fever, RH fever: NO  --------------------------------------------------------------------------------------------------------------------------------- Exposure/Travel History:    Current Residence (Born and raised): [facility], [place]  Lived in other states: NO,  Travel within  (including [facility] or [facility]): no Foreign travel history: she visited [place] [place] [employer] in 2009 and 2018  Work: no Hobbies: no ------------------------------------------------------------------------------------------------------------------------- Tuberculosis exposure history:   Lived in [place] but does not recall any contact History of screening, if yes, treatment: No , no  exposure: no history of contacts, has not lived/stayed/volunteered in any facility such as shelter, [employer], correctional,  base. Never been homeless.   work in Healthcare setting: no  ---------------------------------------------------------------------------------------------------------------------------- Animal Exposure history: 	Domestic : She has cat 	Wildlife :NO 	Livestock animals: chickens  	Birds : no 	Fishes no 	other:  ----------------------------------------------------------------------------------------------------------------------------- Dietary Habits:  Reviewed risks of consuming and advised to avoid: 	Raw animal or dairy products -unpasteurized milk /cheese. 	Raw seafood -sushi 	Raw eggs - 	Raw or undercooked meat/poultry -   	Unpasteurized milk products  -   	Home made sausage - no      Unwashed vegetables -  Reviewed food safety as it relates to infection risks in the setting of transplantation in the immunocompromised host.   Reviewed CDC guidelines for the prevention of Listeria in the immunocompromised host.      Environment: 	Residence water supply: city[place]island 	Reviewed of occupational and recreational exposure risks as they relate to infection in the setting of transplantation.  --------------------------------------------------------------------- Prior or current immunosuppressive therapies:  ---------------------------------------------------------------------- Immunization History: ----- Childhood immunizations:    -----  Vaccines: live vaccines are generally avoided in the transplant recipient following transplantation.  Live vaccines are permitted up to one month prior to transplant.    -------------------------------------------------------------------------------------------------------------------------------------------------- ROS GENERAL: denies chills, , night sweats, weight loss.  PSYCH: denies depression, anxiety, suicidal ideation, hallucination, and delusions SKIN: no rash or lesions; no color changes, no abnormal nevi,no  dryness, and no jaundice   EYES: denies visual changes, floaters, pain, inflammation, blurred vision, and discharge ENT: denies tinnitus, vertigo, epistaxis, oral lesion, and decreased acuity PULM: denies, hemoptysis, pleurisy CVS: denies angina, palpitations,+ orthopnea, no syncope, or heart murmur GI: denies constipation, diarrhea, melena, abdominal pain, nausea.  : denies dysuria, frequency, discharge, incontinence, stones or macroscopic hematuria MS: no arthralgias, no erythema or swelling, no myalgias, no edema, or lower back pain.  CNS: denies numbness, dizziness, seizure, or tremor ENDO: denies heat/cold intolerance, polyuria, polydipsia, malaise.   HEME: denies bruising, bleeding, lymphadenopathy, anemia, and calf pain --------------------------------------------------------------------------------------------------------------------------------------------------------- Physical Exam:  General: AOx3, NAD reminder of exam deferred

## 2024-06-17 RX ORDER — DULAGLUTIDE 0.75 MG/.5ML
0.75 INJECTION, SOLUTION SUBCUTANEOUS
Qty: 2 | Refills: 0 | Status: ACTIVE | COMMUNITY
Start: 1900-01-01 | End: 1900-01-01

## 2024-06-25 PROBLEM — N18.6 ESRD ON HEMODIALYSIS: Status: ACTIVE | Noted: 2024-02-28

## 2024-06-26 ENCOUNTER — APPOINTMENT (OUTPATIENT)
Dept: ENDOVASCULAR SURGERY | Facility: CLINIC | Age: 64
End: 2024-06-26
Payer: MEDICAID

## 2024-06-26 ENCOUNTER — RESULT REVIEW (OUTPATIENT)
Age: 64
End: 2024-06-26

## 2024-06-26 VITALS
OXYGEN SATURATION: 100 % | HEART RATE: 79 BPM | WEIGHT: 145 LBS | DIASTOLIC BLOOD PRESSURE: 75 MMHG | TEMPERATURE: 98.2 F | HEIGHT: 61 IN | BODY MASS INDEX: 27.38 KG/M2 | SYSTOLIC BLOOD PRESSURE: 134 MMHG | RESPIRATION RATE: 18 BRPM

## 2024-06-26 DIAGNOSIS — Z99.2 END STAGE RENAL DISEASE: ICD-10-CM

## 2024-06-26 DIAGNOSIS — N18.6 END STAGE RENAL DISEASE: ICD-10-CM

## 2024-06-26 PROCEDURE — 36011Z: CUSTOM | Mod: 59

## 2024-06-26 PROCEDURE — 36902Z: CUSTOM | Mod: 59

## 2024-06-26 PROCEDURE — 36909Z: CUSTOM

## 2024-07-05 RX ORDER — POLYETHYLENE GLYCOL 3350 AND ELECTROLYTES WITH LEMON FLAVOR 236; 22.74; 6.74; 5.86; 2.97 G/4L; G/4L; G/4L; G/4L; G/4L
236 POWDER, FOR SOLUTION ORAL
Qty: 1 | Refills: 0 | Status: ACTIVE | COMMUNITY
Start: 2024-07-05 | End: 1900-01-01

## 2024-07-10 ENCOUNTER — APPOINTMENT (OUTPATIENT)
Dept: GASTROENTEROLOGY | Facility: AMBULATORY SURGERY CENTER | Age: 64
End: 2024-07-10
Payer: MEDICAID

## 2024-07-10 ENCOUNTER — RESULT REVIEW (OUTPATIENT)
Age: 64
End: 2024-07-10

## 2024-07-10 PROCEDURE — 45380 COLONOSCOPY AND BIOPSY: CPT

## 2024-07-16 ENCOUNTER — NON-APPOINTMENT (OUTPATIENT)
Age: 64
End: 2024-07-16

## 2024-07-17 ENCOUNTER — APPOINTMENT (OUTPATIENT)
Dept: ENDOVASCULAR SURGERY | Facility: CLINIC | Age: 64
End: 2024-07-17
Payer: MEDICAID

## 2024-07-17 PROCEDURE — 36589 REMOVAL TUNNELED CV CATH: CPT

## 2024-07-31 ENCOUNTER — RESULT REVIEW (OUTPATIENT)
Age: 64
End: 2024-07-31

## 2024-07-31 ENCOUNTER — APPOINTMENT (OUTPATIENT)
Dept: ENDOVASCULAR SURGERY | Facility: CLINIC | Age: 64
End: 2024-07-31
Payer: MEDICAID

## 2024-07-31 VITALS
RESPIRATION RATE: 18 BRPM | OXYGEN SATURATION: 98 % | HEIGHT: 61 IN | WEIGHT: 145 LBS | BODY MASS INDEX: 27.38 KG/M2 | SYSTOLIC BLOOD PRESSURE: 113 MMHG | HEART RATE: 72 BPM | TEMPERATURE: 98 F | DIASTOLIC BLOOD PRESSURE: 68 MMHG

## 2024-07-31 PROCEDURE — 76937 US GUIDE VASCULAR ACCESS: CPT

## 2024-07-31 PROCEDURE — 36902Z: CUSTOM

## 2024-08-02 ENCOUNTER — NON-APPOINTMENT (OUTPATIENT)
Age: 64
End: 2024-08-02

## 2024-08-05 ENCOUNTER — APPOINTMENT (OUTPATIENT)
Dept: INFECTIOUS DISEASE | Facility: CLINIC | Age: 64
End: 2024-08-05

## 2024-08-05 PROBLEM — Z23 ENCOUNTER FOR IMMUNIZATION: Status: ACTIVE | Noted: 2023-11-28 | Resolved: 2024-08-19

## 2024-08-05 PROCEDURE — 90750 HZV VACC RECOMBINANT IM: CPT

## 2024-08-05 PROCEDURE — 90471 IMMUNIZATION ADMIN: CPT

## 2024-08-07 NOTE — PROCEDURE
[Resume diet] : resume diet [Site check for bleeding/hematoma/thrill/bruit] : Site check for bleeding/hematoma/thrill/bruit [Vital signs on admission the q 15 mins x2] : Vital signs on admission the q 15 mins x2 [FreeTextEntry1] : left fistulogram, angioplasty

## 2024-08-07 NOTE — ASSESSMENT
[Other: _____] : [unfilled] [FreeTextEntry1] : Immature fistula, plan for left fistulogram and possible intervention

## 2024-08-07 NOTE — HISTORY OF PRESENT ILLNESS
[] : right internal jugular tunneled catheter [FreeTextEntry1] : 2/9/24 Dr Dale  [FreeTextEntry2] : 2/3/24 removed July 2024 [FreeTextEntry4] : yesterday  [FreeTextEntry5] : today 6am  [FreeTextEntry6] : Dr Arriola

## 2024-08-07 NOTE — PROCEDURE
[Resume diet] : resume diet [Site check for bleeding/hematoma/thrill/bruit] : Site check for bleeding/hematoma/thrill/bruit [Vital signs on admission the q 15 mins x2] : Vital signs on admission the q 15 mins x2 [FreeTextEntry1] : left fistulogram, angioplasty Patient/Caregiver provided printed discharge information.

## 2024-08-08 ENCOUNTER — NON-APPOINTMENT (OUTPATIENT)
Age: 64
End: 2024-08-08

## 2024-08-12 ENCOUNTER — APPOINTMENT (OUTPATIENT)
Dept: GASTROENTEROLOGY | Facility: CLINIC | Age: 64
End: 2024-08-12
Payer: MEDICAID

## 2024-08-12 VITALS
RESPIRATION RATE: 16 BRPM | SYSTOLIC BLOOD PRESSURE: 112 MMHG | DIASTOLIC BLOOD PRESSURE: 60 MMHG | HEIGHT: 61 IN | OXYGEN SATURATION: 98 % | HEART RATE: 90 BPM | WEIGHT: 143 LBS | BODY MASS INDEX: 27 KG/M2

## 2024-08-12 DIAGNOSIS — Z12.11 ENCOUNTER FOR SCREENING FOR MALIGNANT NEOPLASM OF COLON: ICD-10-CM

## 2024-08-12 DIAGNOSIS — Z01.818 ENCOUNTER FOR OTHER PREPROCEDURAL EXAMINATION: ICD-10-CM

## 2024-08-12 DIAGNOSIS — K59.09 OTHER CONSTIPATION: ICD-10-CM

## 2024-08-12 PROCEDURE — 99213 OFFICE O/P EST LOW 20 MIN: CPT

## 2024-08-12 RX ORDER — SENNOSIDES 15 MG
15 TABLET ORAL DAILY
Qty: 30 | Refills: 5 | Status: ACTIVE | COMMUNITY
Start: 2024-08-12 | End: 1900-01-01

## 2024-08-12 NOTE — CONSULT LETTER
[Dear  ___] : Dear  [unfilled], [Consult Letter:] : I had the pleasure of evaluating your patient, [unfilled]. [( Thank you for referring [unfilled] for consultation for _____ )] : Thank you for referring [unfilled] for consultation for [unfilled] [Please see my note below.] : Please see my note below. [Consult Closing:] : Thank you very much for allowing me to participate in the care of this patient.  If you have any questions, please do not hesitate to contact me. [Sincerely,] : Sincerely, [FreeTextEntry3] : Flaco Caelro MD  Gastroenterology [facility]

## 2024-08-12 NOTE — HISTORY OF PRESENT ILLNESS
[FreeTextEntry1] : Her recent colonoscopy revealed 3 small polyps which were removed and were benign.  The rest  of the colon was completely normal.  She is feeling well and has no complaints except for occasional constipation which responds to senna.  Her daughter was in the room

## 2024-08-12 NOTE — ASSESSMENT
[FreeTextEntry1] : Repeat colonoscopy in 7 years High-fiber diet I prescribed senna  Office follow-up if needed   I spent 22 minutes with the patient and her daughter and answered all of their questions

## 2024-08-16 RX ORDER — ACETAMINOPHEN 325 MG/1
325 TABLET ORAL EVERY 6 HOURS
Qty: 30 | Refills: 3 | Status: ACTIVE | COMMUNITY
Start: 2024-08-16 | End: 1900-01-01

## 2024-08-16 RX ORDER — SENNOSIDES 8.6 MG TABLETS 8.6 MG/1
8.6 TABLET ORAL
Qty: 60 | Refills: 0 | Status: ACTIVE | COMMUNITY
Start: 2024-08-16 | End: 1900-01-01

## 2024-08-21 ENCOUNTER — APPOINTMENT (OUTPATIENT)
Dept: PEDIATRIC ALLERGY IMMUNOLOGY | Facility: CLINIC | Age: 64
End: 2024-08-21
Payer: COMMERCIAL

## 2024-08-21 VITALS
WEIGHT: 143.13 LBS | HEIGHT: 61 IN | SYSTOLIC BLOOD PRESSURE: 114 MMHG | BODY MASS INDEX: 27.02 KG/M2 | DIASTOLIC BLOOD PRESSURE: 60 MMHG | OXYGEN SATURATION: 96 % | HEART RATE: 99 BPM

## 2024-08-21 DIAGNOSIS — H57.9 UNSPECIFIED DISORDER OF EYE AND ADNEXA: ICD-10-CM

## 2024-08-21 DIAGNOSIS — J31.0 CHRONIC RHINITIS: ICD-10-CM

## 2024-08-21 DIAGNOSIS — J45.909 UNSPECIFIED ASTHMA, UNCOMPLICATED: ICD-10-CM

## 2024-08-21 DIAGNOSIS — R06.7 SNEEZING: ICD-10-CM

## 2024-08-21 PROCEDURE — 99203 OFFICE O/P NEW LOW 30 MIN: CPT | Mod: GC

## 2024-08-22 ENCOUNTER — APPOINTMENT (OUTPATIENT)
Age: 64
End: 2024-08-22
Payer: COMMERCIAL

## 2024-08-22 PROBLEM — R06.7 SNEEZING: Status: ACTIVE | Noted: 2024-08-22

## 2024-08-22 PROBLEM — J31.0 RHINITIS: Status: ACTIVE | Noted: 2024-08-22

## 2024-08-22 PROBLEM — H57.9 ITCHY EYES: Status: ACTIVE | Noted: 2024-08-22

## 2024-08-22 PROCEDURE — SCREENING: CUSTOM

## 2024-08-22 PROCEDURE — D0330 PANORAMIC RADIOGRAPHIC IMAGE: CPT

## 2024-08-22 RX ORDER — ALBUTEROL SULFATE 90 UG/1
108 (90 BASE) INHALANT RESPIRATORY (INHALATION) EVERY 4 HOURS
Qty: 1 | Refills: 2 | Status: ACTIVE | COMMUNITY
Start: 2024-08-22 | End: 1900-01-01

## 2024-08-23 NOTE — CONSULT LETTER
[Dear  ___] : Dear  [unfilled], [Courtesy Letter:] : I had the pleasure of seeing your patient, [unfilled], in my office today. [Please see my note below.] : Please see my note below. [Consult Closing:] : Thank you very much for allowing me to participate in the care of this patient.  If you have any questions, please do not hesitate to contact me. [Sincerely,] : Sincerely, [FreeTextEntry3] : Kayla Myers MD  ___________________________________ Fellow, Division of Allergy and Immunology  [facility] [employer] [facility]/[address], [place] [address] Tel: [phone] Fax: [phone] Email: [email or web address]

## 2024-08-23 NOTE — REASON FOR VISIT
[Initial Consultation] : an initial consultation for [Allergy Evaluation/ Skin Testing] : allergy evaluation and or skin testing [Asthma] : asthma [Family Member] : family member [FreeTextEntry3] : sneezing

## 2024-08-23 NOTE — PHYSICAL EXAM

## 2024-08-23 NOTE — HISTORY OF PRESENT ILLNESS
[de-identified] : 64 year old female with psoriasis, chronic kidney disease (on dialysis), diabetes mellitus, asthma presents for initial consultation. She was previously followed for her asthma by Dr. Devin Hauser but he no longer takes her insurance so she would like to transfer care to this clinic.   ASTHMA Previously followed by Dr. Devin Fried Has had asthma for >30 years  Doing well on current asthma regiment of Montelukast, cetirizine, Dulera 4 puffs every day.  Feels well controlled, no cough Never had Motrin or Asprin because of kidney disease. Uses tylenol for pain control.  Asthma for 30-32 years  SNEEZING Any time she has food or drink- she sneezes  Has a lot of sneezing that always occurs arounds meals, as soon as she starts eating  Worse at night, no runny nose  Happens with all different kinds of foods- can not think of one specific trigger food  Uses Flonase every day BID which helps + azelastine every day  Feels sneezing is preventing her from eating and she has had a 5lb weight loss since Feb.- not on purpose  Getting very weak  Trulicity also decreased appetite   Started Dialysis for kidney disease Feb. 1  Last couple of weeks eyes have been itchy + redness in center of eye, no swelling. Has not had a chance to see eye doctor yet.

## 2024-08-23 NOTE — CONSULT LETTER
[Dear  ___] : Dear  [unfilled], [Courtesy Letter:] : I had the pleasure of seeing your patient, [unfilled], in my office today. [Please see my note below.] : Please see my note below. [Consult Closing:] : Thank you very much for allowing me to participate in the care of this patient.  If you have any questions, please do not hesitate to contact me. [Sincerely,] : Sincerely, [FreeTextEntry3] : Kayla Myers MD  ___________________________________ Fellow, Division of Allergy and Immunology  [facility] [employer] at [facility]/[address], [place] [address] Tel: [phone] Fax: [phone] Email: [email or web address]

## 2024-08-23 NOTE — REVIEW OF SYSTEMS
[FreeTextEntry2] : see HPI [FreeTextEntry3] : see HPI [FreeTextEntry4] : see HPI [FreeTextEntry6] : see HPI [FreeTextEntry1] : see HPI

## 2024-08-23 NOTE — HISTORY OF PRESENT ILLNESS
[de-identified] : 64 year old female with psoriasis, chronic kidney disease (on dialysis), diabetes mellitus, asthma presents for initial consultation. She was previously followed for her asthma by Dr. Devin Hauser but he no longer takes her insurance so she would like to transfer care to this clinic.   ASTHMA Previously followed by Dr. Devni Fried Has had asthma for >30 years  Doing well on current asthma regiment of Montelukast, cetirizine, Dulera 4 puffs every day.  Feels well controlled, no cough Never had Motrin or Asprin because of kidney disease. Uses tylenol for pain control.  Asthma for 30-32 years  SNEEZING Any time she has food or drink- she sneezes  Has a lot of sneezing that always occurs arounds meals, as soon as she starts eating  Worse at night, no runny nose  Happens with all different kinds of foods- can not think of one specific trigger food  Uses Flonase every day BID which helps + azelastine every day  Feels sneezing is preventing her from eating and she has had a 5lb weight loss since Feb.- not on purpose  Getting very weak  Trulicity also decreased appetite   Started Dialysis for kidney disease Feb. 1  Last couple of weeks eyes have been itchy + redness in center of eye, no swelling. Has not had a chance to see eye doctor yet.

## 2024-08-23 NOTE — HISTORY OF PRESENT ILLNESS
[de-identified] : 64 year old female with psoriasis, chronic kidney disease (on dialysis), diabetes mellitus, asthma presents for initial consultation. She was previously followed for her asthma by Dr. Devin Hauser but he no longer takes her insurance so she would like to transfer care to this clinic.   ASTHMA Previously followed by Dr. Devin Fried Has had asthma for >30 years  Doing well on current asthma regiment of Montelukast, cetirizine, Dulera 4 puffs every day.  Feels well controlled, no cough Never had Motrin or Asprin because of kidney disease. Uses tylenol for pain control.  Asthma for 30-32 years  SNEEZING Any time she has food or drink- she sneezes  Has a lot of sneezing that always occurs arounds meals, as soon as she starts eating  Worse at night, no runny nose  Happens with all different kinds of foods- can not think of one specific trigger food  Uses Flonase every day BID which helps + azelastine every day  Feels sneezing is preventing her from eating and she has had a 5lb weight loss since Feb.- not on purpose  Getting very weak  Trulicity also decreased appetite   Started Dialysis for kidney disease Feb. 1  Last couple of weeks eyes have been itchy + redness in center of eye, no swelling. Has not had a chance to see eye doctor yet.

## 2024-08-23 NOTE — CONSULT LETTER
[Dear  ___] : Dear  [unfilled], [Courtesy Letter:] : I had the pleasure of seeing your patient, [unfilled], in my office today. [Please see my note below.] : Please see my note below. [Consult Closing:] : Thank you very much for allowing me to participate in the care of this patient.  If you have any questions, please do not hesitate to contact me. [Sincerely,] : Sincerely, [FreeTextEntry3] : Kayla Myers MD  ___________________________________ Fellow, Division of Allergy and Immunology  [facility] at [facility]/[address], [place] [address] Tel: [phone] Fax: [phone] Email: [email or web address]

## 2024-09-04 ENCOUNTER — APPOINTMENT (OUTPATIENT)
Dept: INTERNAL MEDICINE | Facility: CLINIC | Age: 64
End: 2024-09-04

## 2024-09-23 ENCOUNTER — RX RENEWAL (OUTPATIENT)
Age: 64
End: 2024-09-23

## 2024-11-04 ENCOUNTER — APPOINTMENT (OUTPATIENT)
Dept: VASCULAR SURGERY | Facility: CLINIC | Age: 64
End: 2024-11-04

## 2024-11-06 ENCOUNTER — APPOINTMENT (OUTPATIENT)
Age: 64
End: 2024-11-06

## 2024-11-08 ENCOUNTER — APPOINTMENT (OUTPATIENT)
Dept: VASCULAR SURGERY | Facility: CLINIC | Age: 64
End: 2024-11-08

## 2024-11-08 PROCEDURE — 93990 DOPPLER FLOW TESTING: CPT

## 2024-11-08 PROCEDURE — 99213 OFFICE O/P EST LOW 20 MIN: CPT

## 2024-11-08 PROCEDURE — G2211 COMPLEX E/M VISIT ADD ON: CPT | Mod: NC

## 2024-11-09 ENCOUNTER — RX RENEWAL (OUTPATIENT)
Age: 64
End: 2024-11-09

## 2024-11-10 ENCOUNTER — EMERGENCY (EMERGENCY)
Facility: HOSPITAL | Age: 64
LOS: 1 days | Discharge: HOME | End: 2024-11-10
Attending: EMERGENCY MEDICINE | Admitting: EMERGENCY MEDICINE
Payer: MEDICAID

## 2024-11-10 VITALS
WEIGHT: 147.93 LBS | TEMPERATURE: 98 F | HEART RATE: 89 BPM | OXYGEN SATURATION: 98 % | RESPIRATION RATE: 18 BRPM | DIASTOLIC BLOOD PRESSURE: 84 MMHG | HEIGHT: 61 IN | SYSTOLIC BLOOD PRESSURE: 145 MMHG

## 2024-11-10 VITALS
HEART RATE: 92 BPM | DIASTOLIC BLOOD PRESSURE: 66 MMHG | OXYGEN SATURATION: 100 % | TEMPERATURE: 99 F | RESPIRATION RATE: 18 BRPM | SYSTOLIC BLOOD PRESSURE: 146 MMHG

## 2024-11-10 LAB
ADD ON TEST-SPECIMEN IN LAB: SIGNIFICANT CHANGE UP
ALBUMIN SERPL ELPH-MCNC: 4.6 G/DL — SIGNIFICANT CHANGE UP (ref 3.3–5)
ALP SERPL-CCNC: 94 U/L — SIGNIFICANT CHANGE UP (ref 40–120)
ALT FLD-CCNC: 21 U/L — SIGNIFICANT CHANGE UP (ref 4–33)
ANION GAP SERPL CALC-SCNC: 19 MMOL/L — HIGH (ref 7–14)
AST SERPL-CCNC: 14 U/L — SIGNIFICANT CHANGE UP (ref 4–32)
BASOPHILS # BLD AUTO: 0.02 K/UL — SIGNIFICANT CHANGE UP (ref 0–0.2)
BASOPHILS NFR BLD AUTO: 0.2 % — SIGNIFICANT CHANGE UP (ref 0–2)
BILIRUB SERPL-MCNC: 0.3 MG/DL — SIGNIFICANT CHANGE UP (ref 0.2–1.2)
BLOOD GAS VENOUS COMPREHENSIVE RESULT: SIGNIFICANT CHANGE UP
BUN SERPL-MCNC: 64 MG/DL — HIGH (ref 7–23)
CALCIUM SERPL-MCNC: 10.3 MG/DL — SIGNIFICANT CHANGE UP (ref 8.4–10.5)
CHLORIDE SERPL-SCNC: 89 MMOL/L — LOW (ref 98–107)
CO2 SERPL-SCNC: 30 MMOL/L — SIGNIFICANT CHANGE UP (ref 22–31)
CREAT SERPL-MCNC: 5.37 MG/DL — HIGH (ref 0.5–1.3)
EGFR: 8 ML/MIN/1.73M2 — LOW
EOSINOPHIL # BLD AUTO: 0.09 K/UL — SIGNIFICANT CHANGE UP (ref 0–0.5)
EOSINOPHIL NFR BLD AUTO: 0.9 % — SIGNIFICANT CHANGE UP (ref 0–6)
GLUCOSE SERPL-MCNC: 143 MG/DL — HIGH (ref 70–99)
HCT VFR BLD CALC: 34.8 % — SIGNIFICANT CHANGE UP (ref 34.5–45)
HGB BLD-MCNC: 11.8 G/DL — SIGNIFICANT CHANGE UP (ref 11.5–15.5)
IANC: 7.11 K/UL — SIGNIFICANT CHANGE UP (ref 1.8–7.4)
IMM GRANULOCYTES NFR BLD AUTO: 0.5 % — SIGNIFICANT CHANGE UP (ref 0–0.9)
LIDOCAIN IGE QN: 195 U/L — HIGH (ref 7–60)
LYMPHOCYTES # BLD AUTO: 1.58 K/UL — SIGNIFICANT CHANGE UP (ref 1–3.3)
LYMPHOCYTES # BLD AUTO: 16.6 % — SIGNIFICANT CHANGE UP (ref 13–44)
MAGNESIUM SERPL-MCNC: 2.7 MG/DL — HIGH (ref 1.6–2.6)
MCHC RBC-ENTMCNC: 30.7 PG — SIGNIFICANT CHANGE UP (ref 27–34)
MCHC RBC-ENTMCNC: 33.9 G/DL — SIGNIFICANT CHANGE UP (ref 32–36)
MCV RBC AUTO: 90.6 FL — SIGNIFICANT CHANGE UP (ref 80–100)
MONOCYTES # BLD AUTO: 0.69 K/UL — SIGNIFICANT CHANGE UP (ref 0–0.9)
MONOCYTES NFR BLD AUTO: 7.2 % — SIGNIFICANT CHANGE UP (ref 2–14)
NEUTROPHILS # BLD AUTO: 7.11 K/UL — SIGNIFICANT CHANGE UP (ref 1.8–7.4)
NEUTROPHILS NFR BLD AUTO: 74.6 % — SIGNIFICANT CHANGE UP (ref 43–77)
NRBC # BLD: 0 /100 WBCS — SIGNIFICANT CHANGE UP (ref 0–0)
NRBC # FLD: 0 K/UL — SIGNIFICANT CHANGE UP (ref 0–0)
NT-PROBNP SERPL-SCNC: 1213 PG/ML — HIGH
PLATELET # BLD AUTO: 306 K/UL — SIGNIFICANT CHANGE UP (ref 150–400)
POTASSIUM SERPL-MCNC: 4.3 MMOL/L — SIGNIFICANT CHANGE UP (ref 3.5–5.3)
POTASSIUM SERPL-SCNC: 4.3 MMOL/L — SIGNIFICANT CHANGE UP (ref 3.5–5.3)
PROT SERPL-MCNC: 9.2 G/DL — HIGH (ref 6–8.3)
RBC # BLD: 3.84 M/UL — SIGNIFICANT CHANGE UP (ref 3.8–5.2)
RBC # FLD: 13.6 % — SIGNIFICANT CHANGE UP (ref 10.3–14.5)
SODIUM SERPL-SCNC: 138 MMOL/L — SIGNIFICANT CHANGE UP (ref 135–145)
TROPONIN T, HIGH SENSITIVITY RESULT: 71 NG/L — CRITICAL HIGH
TROPONIN T, HIGH SENSITIVITY RESULT: 76 NG/L — CRITICAL HIGH
WBC # BLD: 9.54 K/UL — SIGNIFICANT CHANGE UP (ref 3.8–10.5)
WBC # FLD AUTO: 9.54 K/UL — SIGNIFICANT CHANGE UP (ref 3.8–10.5)

## 2024-11-10 PROCEDURE — 74177 CT ABD & PELVIS W/CONTRAST: CPT | Mod: 26,MC

## 2024-11-10 PROCEDURE — 99285 EMERGENCY DEPT VISIT HI MDM: CPT

## 2024-11-10 PROCEDURE — 93010 ELECTROCARDIOGRAM REPORT: CPT

## 2024-11-10 PROCEDURE — 71046 X-RAY EXAM CHEST 2 VIEWS: CPT | Mod: 26

## 2024-11-10 NOTE — ED PROVIDER NOTE - CLINICAL SUMMARY MEDICAL DECISION MAKING FREE TEXT BOX
Ms. Powers is a 64 y.o. woman w/ PMH ESRD (on HD TuThSat), T2DM on insulin, HTN, HLD presenting with daily nausea and vomiting x 1.5 weeks; pt has episodes of spontaneous nausea and vomiting however they previously occurred once a month. VSS, exam unremarkable, pt not nauseous at present, only "weak." DDx includes gastroparesis,  gastroenteritis, vertigo. Must r/o ileus/bowel obstruction, ACS. Will order CXR and CT A/P due to vague symptoms. Will order basic labs, lipase troponin A1c

## 2024-11-10 NOTE — ED ADULT TRIAGE NOTE - CHIEF COMPLAINT QUOTE
pt living with DM type2, ESRD on HD, c/o of vomiting for past few days, pt denies any abd pain denies chest pain or sob, last HD treatment on Saturday

## 2024-11-10 NOTE — ED PROVIDER NOTE - PROGRESS NOTE DETAILS
Patrick, PGY1: Received patient in sign out. patient states she is feeling better. awaiting CT and tropx2 Patrick, PGY1: Hilda puente, Per Nephologist, Dr. Mcqueen, patient can f/u at her normal dialysis on tuesday after receiving IV contrast

## 2024-11-10 NOTE — ED PROVIDER NOTE - ATTENDING CONTRIBUTION TO CARE
Attending note:   After face to face evaluation of this patient, I concur with above noted hx, pe, and care plan for this patient.  Schumacher: 64-year-old female with past medical history of type 2 diabetes and hypertension. , End-stage renal disease on dialysis Tuesday Thursday Saturday.  Patient had last full dialysis yesterday.  Patient's daughter brought patient to ED for nausea vomiting.  Patient would have sudden onset of nausea vomiting once every few months over the last week and a half patient has had multiple episodes of sudden onset of nausea vomiting.  This is never associated with any abdominal pain, chest pain, shortness of breath, fevers chills, diarrhea.  Currently patient is not nauseous.  Patient not able to describe the symptoms but there is no associated headache or lightheadedness or dizziness.  Patient's vital signs are unremarkable.  Patient's lungs are clear and heart is regular rate and rhythm.  There is no midline spinal tenderness.  There is no pitting edema and pulses are equal and strong in all 4 extremities.  Left forearm fistula is noted with good thrill and no overlying erythema or edema.  Patient's abdomen does not appear to have any significant tenderness at this time.  There is no CVA tenderness noted.  Patient sudden onset of nausea vomiting could be due to an infectious process, electrolyte issues, MI could also cause a diabetic to have nausea and vomiting of sudden onset.  Will get EKG, labs including troponin, chest x-ray and consider CT if symptoms appear to be worsening and are intermittent in nature.  Since patient is currently asymptomatic we will hold off on any treatment but checks patient's symptoms return we will treat.  Patient normally makes urine once a day and will send patient makes urine in ED.

## 2024-11-10 NOTE — ED PROVIDER NOTE - PHYSICAL EXAMINATION
VITALS:   T(C): 36.7 (11-10-24 @ 16:27), Max: 36.7 (11-10-24 @ 16:27)  HR: 89 (11-10-24 @ 16:27) (89 - 89)  BP: 145/84 (11-10-24 @ 16:27) (145/84 - 145/84)  RR: 18 (11-10-24 @ 16:27) (18 - 18)  SpO2: 98% (11-10-24 @ 16:27) (98% - 98%)    GENERAL: NAD, lying in bed comfortably  HEAD:  Atraumatic, normocephalic  EYES: EOMI, PERRLA, conjunctiva and sclera clear  ENT: Moist mucous membranes  NECK: Supple, no JVD  HEART: Regular rate and rhythm, no murmurs, rubs, or gallops  LUNGS: Unlabored respirations.  Clear to auscultation bilaterally, no crackles, wheezing, or rhonchi  ABDOMEN: Soft, nontender, nondistended, +BS  EXTREMITIES: 2+ peripheral pulses bilaterally. No clubbing, cyanosis, or edema  NERVOUS SYSTEM:  A&Ox3, no focal deficits   SKIN: No rashes or lesions

## 2024-11-10 NOTE — ED PROVIDER NOTE - NSFOLLOWUPINSTRUCTIONS_ED_ALL_ED_FT
There were no signs of an emergency medical condition on completion of today's workup.  You will need further medical care and evaluation for the nausea and vomiting.    Please call you gastroenterologist first thing to set up a follow-up appointment for further management and evaluation.    Follow up with your medical doctor in 2-3 days or call our clinic at [phone] and state you were seen in the Emergency Department and would like to be seen in clinic. You may also call [phone] to speak with a representative to assist follow up care with medicine, surgery, or specialists.    If you develop worsening nausea, vomiting, unable to eat or drink, abdominal pain, please return to the emergency room.    Return for any persistent, worsening symptoms, or ANY concerns at all.

## 2024-11-10 NOTE — ED PROVIDER NOTE - OBJECTIVE STATEMENT
Ms. Powers is a 64 y.o. woman w/ PMH T2DM, CDK4 on HD (Tu/Th/Sat), HFpEF, HTN, HLD presenting with acute on chronic nausea and vomiting. Patient's daughter at bedside providing collateral. She says that her mother has had episodes of "sudden nausea and vomiting" that previously occurred about once a month or so, sometimes following HD sessions, other times after riding in a car (pt gets very carsick), and other times without explanation. However, over the past week and a half, patient has been nauseous and vomiting daily. Daughter says that occasionally it occurs immediately after eating/drinking and other times it is a few hours after. At present patient says she just feels "very weak" but denies nausea at present. Patient's daughter said that pt had colonoscopy this summer and it was "perfect." No known history of abdominal surgeries. No fever, chills, diarrhea. Patient's most recent A1c was 6.5% and she is on insulin.

## 2024-11-10 NOTE — ED PROVIDER NOTE - PATIENT PORTAL LINK FT
You can access the FollowMyHealth Patient Portal offered by [facility] by registering at the following website: [email or web address]. By joining [facility]’s FollowMyHealth portal, you will also be able to view your health information using other applications (apps) compatible with our system.

## 2024-11-10 NOTE — ED ADULT NURSE REASSESSMENT NOTE - NS ED NURSE REASSESS COMMENT FT1
Break covering RN: patient received, appears to be resting comfortably in bed, no complaints noted at this time. Breathing even and unlabored, pallor/diaphoresis not noted. waiting for CT ABD, will continue to monitor.

## 2024-11-10 NOTE — ED PROVIDER NOTE - NS ED ROS FT
REVIEW OF SYSTEMS:    CONSTITUTIONAL: +Weakness, no fevers or chills  EYES/ENT: No visual changes;  No vertigo or throat pain   NECK: No pain or stiffness  RESPIRATORY: No cough, wheezing, hemoptysis; No shortness of breath  CARDIOVASCULAR: No chest pain or palpitations  GASTROINTESTINAL: +Nausea and vomiting. No abdominal or epigastric pain. No diarrhea or constipation. No melena or hematochezia.  GENITOURINARY: No dysuria, frequency or hematuria  NEUROLOGICAL: No numbness or weakness  SKIN: No itching, rashes

## 2024-11-11 ENCOUNTER — APPOINTMENT (OUTPATIENT)
Dept: GASTROENTEROLOGY | Facility: CLINIC | Age: 64
End: 2024-11-11
Payer: COMMERCIAL

## 2024-11-11 VITALS
SYSTOLIC BLOOD PRESSURE: 125 MMHG | DIASTOLIC BLOOD PRESSURE: 77 MMHG | HEART RATE: 91 BPM | OXYGEN SATURATION: 99 % | RESPIRATION RATE: 16 BRPM | BODY MASS INDEX: 27 KG/M2 | WEIGHT: 143 LBS | HEIGHT: 61 IN

## 2024-11-11 DIAGNOSIS — R11.2 NAUSEA WITH VOMITING, UNSPECIFIED: ICD-10-CM

## 2024-11-11 PROCEDURE — 99204 OFFICE O/P NEW MOD 45 MIN: CPT

## 2024-11-11 RX ORDER — LUBIPROSTONE 24 UG/1
24 CAPSULE ORAL
Qty: 60 | Refills: 5 | Status: ACTIVE | COMMUNITY
Start: 2024-11-11 | End: 1900-01-01

## 2024-11-11 RX ORDER — ONDANSETRON 4 MG/1
4 TABLET ORAL EVERY 6 HOURS
Qty: 120 | Refills: 1 | Status: ACTIVE | COMMUNITY
Start: 2024-11-11 | End: 1900-01-01

## 2024-11-11 RX ORDER — SENNOSIDES 8.6 MG/1
8.6 CAPSULE, GELATIN COATED ORAL
Qty: 180 | Refills: 2 | Status: ACTIVE | COMMUNITY
Start: 2024-11-11 | End: 1900-01-01

## 2024-11-11 RX ORDER — PANTOPRAZOLE 40 MG/1
40 TABLET, DELAYED RELEASE ORAL
Qty: 30 | Refills: 4 | Status: ACTIVE | COMMUNITY
Start: 2024-11-11 | End: 1900-01-01

## 2024-11-11 NOTE — HISTORY OF PRESENT ILLNESS
[FreeTextEntry1] : She is a 64 year old female who has a 1 1/2 week history of nausea and vomiting after dialysis and during car rides. She denies abdominal pain or heartburn. She denies NSAID use. Her daughter took her to [facility] yesterday where her labs and cardiac enzymes were normal.     Her daughter was in the room and acted as .

## 2024-11-11 NOTE — CONSULT LETTER
[Dear  ___] : Dear  [unfilled], [Consult Letter:] : I had the pleasure of evaluating your patient, [unfilled]. [( Thank you for referring [unfilled] for consultation for _____ )] : Thank you for referring [unfilled] for consultation for [unfilled] [Please see my note below.] : Please see my note below. [Consult Closing:] : Thank you very much for allowing me to participate in the care of this patient.  If you have any questions, please do not hesitate to contact me. [Sincerely,] : Sincerely, [FreeTextEntry3] : Alireza Rogel MD  Gastroenterology [facility]

## 2024-11-11 NOTE — ASSESSMENT
[FreeTextEntry1] : I treated her conservatively with pantoprazole and Zofran  If she does not improve by her follow up visit, she will need an upper endoscopy.     Office follow up in 3 weeks    I spent 31 minutes with the patient and her daughter and answered all of their questions.

## 2024-11-26 RX ORDER — ONDANSETRON 8 MG/1
8 TABLET, ORALLY DISINTEGRATING ORAL
Qty: 30 | Refills: 2 | Status: ACTIVE | COMMUNITY
Start: 2024-11-26 | End: 1900-01-01

## 2024-12-02 ENCOUNTER — RX RENEWAL (OUTPATIENT)
Age: 64
End: 2024-12-02

## 2024-12-06 ENCOUNTER — APPOINTMENT (OUTPATIENT)
Dept: GASTROENTEROLOGY | Facility: CLINIC | Age: 64
End: 2024-12-06
Payer: COMMERCIAL

## 2024-12-06 VITALS
WEIGHT: 143 LBS | OXYGEN SATURATION: 98 % | RESPIRATION RATE: 16 BRPM | HEIGHT: 61 IN | SYSTOLIC BLOOD PRESSURE: 102 MMHG | DIASTOLIC BLOOD PRESSURE: 78 MMHG | HEART RATE: 86 BPM | BODY MASS INDEX: 27 KG/M2

## 2024-12-06 DIAGNOSIS — R11.2 NAUSEA WITH VOMITING, UNSPECIFIED: ICD-10-CM

## 2024-12-06 PROCEDURE — 99213 OFFICE O/P EST LOW 20 MIN: CPT

## 2024-12-06 RX ORDER — ONDANSETRON 8 MG/1
8 TABLET, ORALLY DISINTEGRATING ORAL EVERY 8 HOURS
Qty: 45 | Refills: 5 | Status: ACTIVE | COMMUNITY
Start: 2024-12-06 | End: 1900-01-01

## 2024-12-08 NOTE — CONSULT LETTER
[Dear  ___] : Dear  [unfilled], [Consult Letter:] : I had the pleasure of evaluating your patient, [unfilled]. [( Thank you for referring [unfilled] for consultation for _____ )] : Thank you for referring [unfilled] for consultation for [unfilled] [Please see my note below.] : Please see my note below. [Consult Closing:] : Thank you very much for allowing me to participate in the care of this patient.  If you have any questions, please do not hesitate to contact me. [Sincerely,] : Sincerely, [FreeTextEntry3] : Alireza Rogel MD  Gastroenterology [facility] 234 beds at the [facility]

## 2024-12-08 NOTE — HISTORY OF PRESENT ILLNESS
[FreeTextEntry1] : She complains of intermittent episodes of nausea usually when driving in the car. I had ordered ondansetron 4mg 3x/ day and pantoprazole which helped her a little. Her daughter spoke to Lyric Jefferson NP who increased her ondansetron to  8mg  3x/ day which has completely resolved her symptoms. She is  feeling well now and ha no complaints.   Her daughter was in he room and acted as .

## 2024-12-08 NOTE — ASSESSMENT
[FreeTextEntry1] : Continue ondansetron as needed  Office follow up in 2 to 3 months   I spent 24 minutes with the patient and her daughter and answered all of their questions

## 2024-12-12 RX ORDER — NUT.TX.IMP.RENAL FXN,LAC-REDUC 0.08 G-1.8
LIQUID (ML) ORAL
Qty: 2 | Refills: 6 | Status: ACTIVE | OUTPATIENT
Start: 2024-12-12

## 2024-12-13 ENCOUNTER — APPOINTMENT (OUTPATIENT)
Dept: INTERNAL MEDICINE | Facility: CLINIC | Age: 64
End: 2024-12-13
Payer: COMMERCIAL

## 2024-12-13 VITALS — SYSTOLIC BLOOD PRESSURE: 102 MMHG | DIASTOLIC BLOOD PRESSURE: 64 MMHG

## 2024-12-13 VITALS
BODY MASS INDEX: 27.19 KG/M2 | OXYGEN SATURATION: 98 % | HEIGHT: 61 IN | DIASTOLIC BLOOD PRESSURE: 70 MMHG | HEART RATE: 92 BPM | SYSTOLIC BLOOD PRESSURE: 109 MMHG | WEIGHT: 144 LBS | RESPIRATION RATE: 18 BRPM

## 2024-12-13 DIAGNOSIS — J45.909 UNSPECIFIED ASTHMA, UNCOMPLICATED: ICD-10-CM

## 2024-12-13 DIAGNOSIS — T78.40XA ALLERGY, UNSPECIFIED, INITIAL ENCOUNTER: ICD-10-CM

## 2024-12-13 DIAGNOSIS — H57.9 UNSPECIFIED DISORDER OF EYE AND ADNEXA: ICD-10-CM

## 2024-12-13 DIAGNOSIS — B37.0 CANDIDAL STOMATITIS: ICD-10-CM

## 2024-12-13 PROCEDURE — 99204 OFFICE O/P NEW MOD 45 MIN: CPT

## 2024-12-13 RX ORDER — VORICONAZOLE 200 MG/1
200 TABLET ORAL
Qty: 2 | Refills: 1 | Status: ACTIVE | COMMUNITY
Start: 2024-12-13 | End: 1900-01-01

## 2024-12-13 NOTE — REVIEW OF SYSTEMS
[Fatigue] : fatigue [Skin Rash] : skin rash [Fever] : no fever [Chills] : no chills [Discharge] : no discharge [Pain] : no pain [Itching] : no itching [Earache] : no earache [Hearing Loss] : no hearing loss [Sore Throat] : no sore throat [Postnasal Drip] : no postnasal drip [Chest Pain] : no chest pain [Palpitations] : no palpitations [Leg Claudication] : no leg claudication [Shortness Of Breath] : no shortness of breath [Wheezing] : no wheezing [Cough] : no cough [Abdominal Pain] : no abdominal pain [Nausea] : no nausea [Constipation] : no constipation [Vomiting] : no vomiting [Joint Pain] : no joint pain [Headache] : no headache [Dizziness] : no dizziness [FreeTextEntry6] : + sneeze using Zyrtec  Flonase and Astelin Montelukast [FreeTextEntry8] : On Dialysis [de-identified] : Psoriasis

## 2024-12-13 NOTE — HISTORY OF PRESENT ILLNESS
[FreeTextEntry1] : Here for Rx mgmt presently on Dialysis @ [facility], [place] 3 x per week for 3 hours 15 minutes.  recent bout N/V saw GI normal exam Today feels well - received flu vaccine not eating well lost 20 lbs since 2/2024 No CP SOB or palpitations No n/v/d has urine 1 x per day

## 2024-12-13 NOTE — ASSESSMENT
[FreeTextEntry1] : Using Zyrtec Flonase Astelin and Montelukast - renew  and check w/ Dialysis On Tremfya for psoriasis Review old records Brush tongue and oral rinse Consider Voriconazole for thrush 400 mg x 2 after discussion w/ dialysis All questions answered Re check 3 months

## 2024-12-13 NOTE — PHYSICAL EXAM
[No Acute Distress] : no acute distress [Normal Sclera/Conjunctiva] : normal sclera/conjunctiva [EOMI] : extraocular movements intact [Normal Outer Ear/Nose] : the outer ears and nose were normal in appearance [Normal TMs] : both tympanic membranes were normal [No JVD] : no jugular venous distention [No Lymphadenopathy] : no lymphadenopathy [No Respiratory Distress] : no respiratory distress  [Clear to Auscultation] : lungs were clear to auscultation bilaterally [Normal Rate] : normal rate  [Regular Rhythm] : with a regular rhythm [Soft] : abdomen soft [Non Tender] : non-tender [Normal Bowel Sounds] : normal bowel sounds [No CVA Tenderness] : no CVA  tenderness [No Joint Swelling] : no joint swelling [Normal Gait] : normal gait [de-identified] : Psoriasis on Tremfya

## 2024-12-19 ENCOUNTER — APPOINTMENT (OUTPATIENT)
Dept: INTERNAL MEDICINE | Facility: CLINIC | Age: 64
End: 2024-12-19

## 2025-01-10 ENCOUNTER — APPOINTMENT (OUTPATIENT)
Dept: INTERNAL MEDICINE | Facility: CLINIC | Age: 65
End: 2025-01-10

## 2025-01-15 ENCOUNTER — APPOINTMENT (OUTPATIENT)
Age: 65
End: 2025-01-15

## 2025-01-16 ENCOUNTER — APPOINTMENT (OUTPATIENT)
Dept: ENDOVASCULAR SURGERY | Facility: CLINIC | Age: 65
End: 2025-01-16
Payer: MEDICAID

## 2025-01-16 ENCOUNTER — TRANSCRIPTION ENCOUNTER (OUTPATIENT)
Age: 65
End: 2025-01-16

## 2025-01-16 ENCOUNTER — RESULT REVIEW (OUTPATIENT)
Age: 65
End: 2025-01-16

## 2025-01-16 VITALS
DIASTOLIC BLOOD PRESSURE: 74 MMHG | WEIGHT: 144 LBS | HEART RATE: 75 BPM | TEMPERATURE: 97.9 F | HEIGHT: 61 IN | OXYGEN SATURATION: 100 % | BODY MASS INDEX: 27.19 KG/M2 | RESPIRATION RATE: 18 BRPM | SYSTOLIC BLOOD PRESSURE: 123 MMHG

## 2025-01-16 DIAGNOSIS — N18.6 END STAGE RENAL DISEASE: ICD-10-CM

## 2025-01-16 DIAGNOSIS — Z99.2 END STAGE RENAL DISEASE: ICD-10-CM

## 2025-01-16 PROCEDURE — 36902Z: CUSTOM

## 2025-01-16 NOTE — REASON FOR VISIT
[Other ___] : a [unfilled] visit for [High Venous Pressure] : high venous pressure [Difficult Cannulation] : difficult cannulation

## 2025-01-16 NOTE — HISTORY OF PRESENT ILLNESS
[] : right internal jugular tunneled catheter [FreeTextEntry1] : 2/9/24 Dr Olsen  [FreeTextEntry2] : 2/3/24 removed July 2024 [FreeTextEntry4] : yesterday  [FreeTextEntry5] : yesterday at 7pm  [FreeTextEntry6] : Dr Wang

## 2025-01-16 NOTE — PAST MEDICAL HISTORY
[Increasing age ( >40 years old)] : Increasing age ( >40 years old) [No therapy indicated for cases scheduled for less than one hour] : No therapy indicated for cases scheduled for less than one hour. [FreeTextEntry1] : Malignant Hyperthermis (MH) Screening Tool and Risk of Bleeding Assessement Ms. LAURA MACIAS  denies family history of unexpected death following Anesthesia or Exercise. Denies Family history of Malignant Hyperthermia, Muscle or Neuromuscular disorder and High Temperature following exercise.  Ms. LAURA MACIAS denies history of Muscle Spasm, Dark or Chocolate - Colored urine and Unanticipated fever immediately following anesthesia or serious exercise.  Ms. MACIAS  also denies bleeding tendencies/ Risks of Bleeding

## 2025-01-16 NOTE — ASSESSMENT
[Other: _____] : [unfilled] [FreeTextEntry1] : ESRD on HD with elevated pressures within fistula for fistulogram and possible intervention.  Consent obtained.  ANesthesia plan formulated and discussed with anesthesiologist.  Moderate stenosis in distal outfloe vein PTA to 6 mm with good result and no complication.  EBL= minimal.  FUll report to follow.

## 2025-01-29 RX ORDER — STANDARDIZED SENNA CONCENTRATE 8.6 MG/1
8.6 TABLET ORAL
Qty: 60 | Refills: 2 | Status: ACTIVE | COMMUNITY
Start: 2025-01-29 | End: 1900-01-01

## 2025-02-20 ENCOUNTER — RX RENEWAL (OUTPATIENT)
Age: 65
End: 2025-02-20

## 2025-02-24 ENCOUNTER — APPOINTMENT (OUTPATIENT)
Dept: INTERNAL MEDICINE | Facility: CLINIC | Age: 65
End: 2025-02-24

## 2025-03-24 ENCOUNTER — NON-APPOINTMENT (OUTPATIENT)
Age: 65
End: 2025-03-24

## 2025-04-02 NOTE — PROGRESS NOTE ADULT - PROBLEM SELECTOR PLAN 10
Detail Level: Detailed Was A Bandage Applied: Yes Punch Size In Mm: 5 Size Of Lesion In Cm (Optional): 0 Depth Of Punch Biopsy: dermis Biopsy Type: H and E Anesthesia Type: 1% lidocaine with epinephrine Anesthesia Volume In Cc: 0.5 Hemostasis: None Epidermal Sutures: 4-0 Ethilon stable but soft on admission. on amlodipine at home  -home hold meds iso soft pressures    #Hyperlipidemia  on atorvastatin at home  -c/w home meds Wound Care: Petrolatum Dressing: bandage Suture Removal: 10 days Patient Will Remove Sutures At Home?: No Lab: [id number] Lab Facility: [id number] Consent: Written consent was obtained and risks were reviewed including but not limited to scarring, infection, bleeding, scabbing, incomplete removal, nerve damage and allergy to anesthesia. Post-Care Instructions: I reviewed with the patient in detail post-care instructions. Patient is to keep the biopsy site dry overnight, and then apply bacitracin twice daily until healed. Patient may apply hydrogen peroxide soaks to remove any crusting. Home Suture Removal Text: Patient was provided a home suture removal kit and will remove their sutures at home.  If they have any questions or difficulties they will call the office. Notification Instructions: Patient will be notified of biopsy results. However, patient instructed to call the office if not contacted within 2 weeks. Billing Type: Third-Party Bill Information: Selecting Yes will display possible errors in your note based on the variables you have selected. This validation is only offered as a suggestion for you. PLEASE NOTE THAT THE VALIDATION TEXT WILL BE REMOVED WHEN YOU FINALIZE YOUR NOTE. IF YOU WANT TO FAX A PRELIMINARY NOTE YOU WILL NEED TO TOGGLE THIS TO 'NO' IF YOU DO NOT WANT IT IN YOUR FAXED NOTE.

## 2025-04-03 ENCOUNTER — APPOINTMENT (OUTPATIENT)
Dept: INTERNAL MEDICINE | Facility: CLINIC | Age: 65
End: 2025-04-03

## 2025-04-04 ENCOUNTER — APPOINTMENT (OUTPATIENT)
Dept: INTERNAL MEDICINE | Facility: CLINIC | Age: 65
End: 2025-04-04

## 2025-04-07 ENCOUNTER — APPOINTMENT (OUTPATIENT)
Dept: GASTROENTEROLOGY | Facility: CLINIC | Age: 65
End: 2025-04-07

## 2025-04-18 ENCOUNTER — APPOINTMENT (OUTPATIENT)
Dept: VASCULAR SURGERY | Facility: CLINIC | Age: 65
End: 2025-04-18
Payer: MEDICAID

## 2025-04-18 VITALS
DIASTOLIC BLOOD PRESSURE: 81 MMHG | HEIGHT: 61 IN | HEART RATE: 95 BPM | WEIGHT: 144 LBS | BODY MASS INDEX: 27.19 KG/M2 | SYSTOLIC BLOOD PRESSURE: 124 MMHG

## 2025-04-18 DIAGNOSIS — Z99.2 DEPENDENCE ON RENAL DIALYSIS: ICD-10-CM

## 2025-04-18 DIAGNOSIS — Z98.890 OTHER SPECIFIED POSTPROCEDURAL STATES: ICD-10-CM

## 2025-04-18 DIAGNOSIS — Z99.2 END STAGE RENAL DISEASE: ICD-10-CM

## 2025-04-18 DIAGNOSIS — N18.6 END STAGE RENAL DISEASE: ICD-10-CM

## 2025-04-18 PROCEDURE — 99214 OFFICE O/P EST MOD 30 MIN: CPT

## 2025-04-18 PROCEDURE — 93990 DOPPLER FLOW TESTING: CPT

## 2025-04-18 NOTE — HISTORY OF PRESENT ILLNESS
[] : left radiocephalic fistula [FreeTextEntry1] :   Patient is a 64-year-old female with end-stage renal disease currently on hemodialysis via left upper extremity AV fistula presents the office today for routine surveillance.   Patient denies any present issues with dialysis.

## 2025-04-18 NOTE — PHYSICAL EXAM
[Thrill] : thrill [Normal] : normal rate, regular rhythm, normal S1/S2, no murmur [Pulsatile Thrill] : no pulsatile thrill [Aneurysm] : no aneurysm [Bleeding] : no bleeding [Hand well perfused] : hand well perfused [Ulcer] : no ulcer [2+] : left 2+ [de-identified] :   Intact

## 2025-05-12 ENCOUNTER — RX RENEWAL (OUTPATIENT)
Age: 65
End: 2025-05-12

## 2025-05-19 ENCOUNTER — NON-APPOINTMENT (OUTPATIENT)
Age: 65
End: 2025-05-19

## 2025-05-21 ENCOUNTER — RESULT REVIEW (OUTPATIENT)
Age: 65
End: 2025-05-21

## 2025-05-29 ENCOUNTER — APPOINTMENT (OUTPATIENT)
Dept: MAMMOGRAPHY | Facility: CLINIC | Age: 65
End: 2025-05-29
Payer: MEDICAID

## 2025-05-29 ENCOUNTER — APPOINTMENT (OUTPATIENT)
Dept: ULTRASOUND IMAGING | Facility: CLINIC | Age: 65
End: 2025-05-29
Payer: MEDICAID

## 2025-05-29 ENCOUNTER — RESULT REVIEW (OUTPATIENT)
Age: 65
End: 2025-05-29

## 2025-05-29 PROCEDURE — 77063 BREAST TOMOSYNTHESIS BI: CPT

## 2025-05-29 PROCEDURE — 77067 SCR MAMMO BI INCL CAD: CPT

## 2025-06-03 ENCOUNTER — APPOINTMENT (OUTPATIENT)
Dept: GASTROENTEROLOGY | Facility: CLINIC | Age: 65
End: 2025-06-03
Payer: MEDICAID

## 2025-06-03 VITALS
BODY MASS INDEX: 28.32 KG/M2 | OXYGEN SATURATION: 99 % | SYSTOLIC BLOOD PRESSURE: 126 MMHG | HEIGHT: 61 IN | WEIGHT: 150 LBS | DIASTOLIC BLOOD PRESSURE: 80 MMHG | HEART RATE: 90 BPM | RESPIRATION RATE: 16 BRPM

## 2025-06-03 DIAGNOSIS — R11.0 NAUSEA: ICD-10-CM

## 2025-06-03 DIAGNOSIS — Z87.19 PERSONAL HISTORY OF OTHER DISEASES OF THE DIGESTIVE SYSTEM: ICD-10-CM

## 2025-06-03 PROBLEM — K59.09 CHRONIC CONSTIPATION: Status: ACTIVE | Noted: 2025-06-03

## 2025-06-03 PROCEDURE — G2211 COMPLEX E/M VISIT ADD ON: CPT | Mod: NC

## 2025-06-03 PROCEDURE — 99213 OFFICE O/P EST LOW 20 MIN: CPT

## 2025-06-03 RX ORDER — LINACLOTIDE 145 UG/1
145 CAPSULE, GELATIN COATED ORAL
Qty: 30 | Refills: 5 | Status: ACTIVE | COMMUNITY
Start: 2025-06-03 | End: 1900-01-01

## 2025-06-03 NOTE — CONSULT LETTER
[Dear  ___] : Dear  [unfilled], [Consult Letter:] : I had the pleasure of evaluating your patient, [unfilled]. [( Thank you for referring [unfilled] for consultation for _____ )] : Thank you for referring [unfilled] for consultation for [unfilled] [Please see my note below.] : Please see my note below. [Consult Closing:] : Thank you very much for allowing me to participate in the care of this patient.  If you have any questions, please do not hesitate to contact me. [Sincerely,] : Sincerely, [FreeTextEntry3] : Alireza Rogel MD  Gastroenterology [facility]

## 2025-06-03 NOTE — HISTORY OF PRESENT ILLNESS
[FreeTextEntry1] : She admits to chronic constipation she is presently taking over-the-counter laxatives Senokot and senna her as well as Amitiza without any improvement in her symptoms.  She had a colonoscopy last year which 3 small polyps removed..  She denies abdominal pain.  She also admits to occasional episodes of nausea.   Her daughter was in the room

## 2025-06-03 NOTE — ASSESSMENT
[FreeTextEntry1] : Start Linzess 145 mcg daily Take ondansetron 4 mg every 4-6 hours as needed for nausea  Office follow-up in 1 to 2 months   I spent 23 minutes with the patient and her daughter answered all the questions and spent time completing her office note

## 2025-06-09 ENCOUNTER — APPOINTMENT (OUTPATIENT)
Dept: INTERNAL MEDICINE | Facility: CLINIC | Age: 65
End: 2025-06-09
Payer: MEDICAID

## 2025-06-09 ENCOUNTER — OUTPATIENT (OUTPATIENT)
Dept: OUTPATIENT SERVICES | Facility: HOSPITAL | Age: 65
LOS: 1 days | End: 2025-06-09

## 2025-06-09 DIAGNOSIS — I10 ESSENTIAL (PRIMARY) HYPERTENSION: ICD-10-CM

## 2025-06-09 PROCEDURE — 99213 OFFICE O/P EST LOW 20 MIN: CPT | Mod: 93

## 2025-06-09 RX ORDER — LIRAGLUTIDE 6 MG/ML
18 INJECTION SUBCUTANEOUS DAILY
Qty: 1 | Refills: 0 | Status: ACTIVE | COMMUNITY
Start: 2025-06-09

## 2025-06-09 RX ORDER — ONDANSETRON 4 MG/1
4 TABLET ORAL EVERY 6 HOURS
Qty: 120 | Refills: 1 | Status: DISCONTINUED | COMMUNITY
Start: 2025-06-03 | End: 2025-06-09

## 2025-06-09 NOTE — HISTORY OF PRESENT ILLNESS
[[place] [place] : at the medical office located in  [Home] : at home, [unfilled] , at the time of the visit. [Telephone (audio)] : This telephonic visit was provided via audio only technology. [Patient preference] : patient preference. [Verbal consent obtained from patient] : the patient, [unfilled] [Family Member] : family member [Formal Caregiver] : formal caregiver [FreeTextEntry1] : Forms  [de-identified] : 63 year old female with history of T2DM, ESRD on HD (MWF), HTN, and HLD , psoriasis presents for health forms. Last seen in clinic on 05/2024. In the interim had HD schedule changed to MWF. Saw endocrine and stopped Insulin. Now on insulin pump and started on Victoza. Reports constipation, started on Linzess by GI. Was evaluated at [facility] for renal transplant. Was suggested to get cards evaluation. Patient has specialists in different health systems. Difficult to coordinate information.

## 2025-06-09 NOTE — PLAN
[FreeTextEntry1] : 63 year old female with history of T2DM, ESRD on HD (MWF), HTN, and HLD , psoriasis presents for health forms.  #DM  - Patient follows with Endocrinology at [facility]  - Insulin pump - Continue Victoza, defer to endocrine  - Podiatry referral provided   #ESRD on HD  - Patient undergoing renal transplant evaluation  - Continue MWF HD  - Patient has difficulty with ambulation due to ESRD on HD, will require disability parking permit and transportation. Will fill out forms. Patient has difficulty with ambulation, unable to ambulate on her own. Requires wheelchair for dialysis appointments. Requires cane and assistance when ambulating otherwise.  - Has established nephrology care in the [facility]  - Given ESRD, patient would benefit from Nepro can twice a day.  - Cardiology referral for eventual renal transplant   #Asthma - Pulmonology referral provided   #Constipation - Recently evaluated by GI - Started Linzess today - Dulcolax  #HCM  - Prevnar utd  - Tdap in 2024, next will be 2034  - Shingles UTD  - Mammogram 2025 BIRADS2, Will need script next year  - Colonoscopy IN 2024, 3 polyps were found. next recommended [id number]  - PAP+HPV negative in 2023, next will be 2028.  - DEXA script provided for this year   - Scripts for labs sent - Emphasized in person CPE   - Given patient's difficulty in getting to appointments due to chronic medical conditions and dialysis schedule, will send referral for house call program

## 2025-06-19 DIAGNOSIS — N18.6 END STAGE RENAL DISEASE: ICD-10-CM

## 2025-06-19 DIAGNOSIS — E11.9 TYPE 2 DIABETES MELLITUS WITHOUT COMPLICATIONS: ICD-10-CM

## 2025-06-19 DIAGNOSIS — Z99.2 DEPENDENCE ON RENAL DIALYSIS: ICD-10-CM

## 2025-06-19 DIAGNOSIS — K59.09 OTHER CONSTIPATION: ICD-10-CM

## 2025-06-19 DIAGNOSIS — J45.909 UNSPECIFIED ASTHMA, UNCOMPLICATED: ICD-10-CM

## 2025-06-24 ENCOUNTER — APPOINTMENT (OUTPATIENT)
Dept: PODIATRY | Facility: CLINIC | Age: 65
End: 2025-06-24
Payer: COMMERCIAL

## 2025-06-24 PROBLEM — L40.9 PSORIASIS: Status: ACTIVE | Noted: 2023-04-27

## 2025-06-24 PROBLEM — L60.3 ONYCHODYSTROPHY: Status: ACTIVE | Noted: 2025-06-24

## 2025-06-24 PROBLEM — B35.1 ONYCHOMYCOSIS: Status: ACTIVE | Noted: 2025-06-24

## 2025-06-24 PROBLEM — L85.3 XEROSIS CUTIS: Status: ACTIVE | Noted: 2025-06-24

## 2025-06-24 PROCEDURE — 11721 DEBRIDE NAIL 6 OR MORE: CPT

## 2025-06-24 PROCEDURE — 99204 OFFICE O/P NEW MOD 45 MIN: CPT | Mod: 25

## 2025-06-24 RX ORDER — AMMONIUM LACTATE 12 %
12 CREAM (GRAM) TOPICAL DAILY
Qty: 1 | Refills: 3 | Status: ACTIVE | COMMUNITY
Start: 2025-06-24 | End: 1900-01-01

## 2025-06-24 RX ORDER — DICLOFENAC SODIUM 10 MG/G
1 GEL TOPICAL DAILY
Qty: 2 | Refills: 1 | Status: ACTIVE | COMMUNITY
Start: 2025-06-24 | End: 1900-01-01

## 2025-06-24 NOTE — ASSESSMENT
[FreeTextEntry1] : Discussed importance of glycemic control , neuropathic precautions and diabetic foot care, discussed proper shoegear, advised not to wear crocks, instead wear sneakers. File nails in between appointments, do not use sharp objects. Onychodystrophy vs psoriatic nail vs onychomycosis. Nail plate was taken for fungal/ path analysis. Nails x 10 were wiped with alcohol and debrided with sterile nippers and dremel in thickness and in length. Xerosis- discussed ammonium lactate use. Patient's daughter can soak her feet in warm water to soften the skin, always check temp with hand to avoid waster being too hot/ cold. Gentle exfoliiation wish wash rag. Avoid drying aggressive topical such as etoh, chlorhexidine. For diabetic neuropathy- advised patient to discuss with nephrology/ pcp regarding gabapentin/ lyrica. Rx topical pain cream to use prn RTC 3 mo

## 2025-06-24 NOTE — HISTORY OF PRESENT ILLNESS
[FreeTextEntry1] : patient presents today for high risk footcare, complaint of nail changes, diabetic foot exam. Patient's daughter provided hx and translation. DM for >30 yrs, last A1C 5.9, sees Mikal in [facility], last seen in June. Denies hx of amputations, chronic wounds. Reports numbness, sensitivity pain to the foot when her daughter gently scrubs it. Endorses nail thickening and grey discoloration for few yrs, has not tried any intervention. Hx of psoriasis, sees derm, gets tremfya injections. No fingernail involvement.

## 2025-06-24 NOTE — REASON FOR VISIT
[Initial Visit] : an initial visit for [Onychomycosis] : onychomycosis [FreeTextEntry2] : diabetic exam

## 2025-06-24 NOTE — PHYSICAL EXAM
[General Appearance - Alert] : alert [General Appearance - In No Acute Distress] : in no acute distress [2+] : left foot dorsalis pedis 2+ [No Joint Swelling] : no joint swelling [Normal Foot/Ankle] : Both lower extremities were exposed and visualized. Standing exam demonstrates normal foot posture and alignment. Hindfoot exam shows no hindfoot valgus or varus [Vibration Dec.] : diminished vibratory sensation at the level of the toes [Diminished Throughout Right Foot] : diminished sensation with monofilament testing throughout right foot [Diminished Throughout Left Foot] : diminished sensation with monofilament testing throughout left foot [Ankle Swelling (On Exam)] : not present [Varicose Veins Of Lower Extremities] : not present [] : not present [Delayed in the Right Toes] : capillary refills normal in right toes [Delayed in the Left Toes] : capillary refills normal in the left toes [FreeTextEntry1] : Nails x 10 with thickening to 3mm, distal yellow/grey discoloration with subungual debris, elongated. Xerotic skin with epithelial build up and resulting mild darkening.  No open lesions, no clinical signs of bacterial infection

## 2025-06-27 LAB — CALCOFLUOR WHITE SPEC: NORMAL

## 2025-07-03 LAB — CORE LAB BIOPSY: NORMAL

## 2025-07-05 LAB — FUNGUS SKIN CULT: NORMAL

## 2025-07-15 ENCOUNTER — APPOINTMENT (OUTPATIENT)
Dept: CARDIOLOGY | Facility: CLINIC | Age: 65
End: 2025-07-15

## 2025-07-17 ENCOUNTER — APPOINTMENT (OUTPATIENT)
Dept: RADIOLOGY | Facility: CLINIC | Age: 65
End: 2025-07-17
Payer: MEDICAID

## 2025-07-17 PROCEDURE — 77080 DXA BONE DENSITY AXIAL: CPT

## 2025-07-17 PROCEDURE — 80061 LIPID PANEL: CPT

## 2025-07-17 PROCEDURE — 83036 HEMOGLOBIN GLYCOSYLATED A1C: CPT

## 2025-07-17 PROCEDURE — 85025 COMPLETE CBC W/AUTO DIFF WBC: CPT

## 2025-07-18 ENCOUNTER — NON-APPOINTMENT (OUTPATIENT)
Age: 65
End: 2025-07-18

## 2025-07-22 DIAGNOSIS — M85.80 OTHER SPECIFIED DISORDERS OF BONE DENSITY AND STRUCTURE, UNSPECIFIED SITE: ICD-10-CM

## 2025-07-22 RX ORDER — MULTIVIT-MIN/IRON/FOLIC ACID/K 18-600-40
50 MCG CAPSULE ORAL
Qty: 30 | Refills: 2 | Status: ACTIVE | COMMUNITY
Start: 2025-07-22 | End: 1900-01-01

## 2025-07-29 ENCOUNTER — APPOINTMENT (OUTPATIENT)
Dept: CARDIOLOGY | Facility: CLINIC | Age: 65
End: 2025-07-29
Payer: MEDICAID

## 2025-07-29 ENCOUNTER — NON-APPOINTMENT (OUTPATIENT)
Age: 65
End: 2025-07-29

## 2025-07-29 VITALS
WEIGHT: 146 LBS | DIASTOLIC BLOOD PRESSURE: 76 MMHG | HEART RATE: 75 BPM | HEIGHT: 61 IN | SYSTOLIC BLOOD PRESSURE: 132 MMHG | OXYGEN SATURATION: 98 % | BODY MASS INDEX: 27.56 KG/M2

## 2025-07-29 DIAGNOSIS — Z01.818 ENCOUNTER FOR OTHER PREPROCEDURAL EXAMINATION: ICD-10-CM

## 2025-07-29 DIAGNOSIS — I35.0 NONRHEUMATIC AORTIC (VALVE) STENOSIS: ICD-10-CM

## 2025-07-29 DIAGNOSIS — I44.0 ATRIOVENTRICULAR BLOCK, FIRST DEGREE: ICD-10-CM

## 2025-07-29 DIAGNOSIS — I34.0 NONRHEUMATIC MITRAL (VALVE) INSUFFICIENCY: ICD-10-CM

## 2025-07-29 DIAGNOSIS — R94.39 ABNORMAL RESULT OF OTHER CARDIOVASCULAR FUNCTION STUDY: ICD-10-CM

## 2025-07-29 DIAGNOSIS — I44.4 LEFT ANTERIOR FASCICULAR BLOCK: ICD-10-CM

## 2025-07-29 PROCEDURE — 93306 TTE W/DOPPLER COMPLETE: CPT

## 2025-07-29 PROCEDURE — 99204 OFFICE O/P NEW MOD 45 MIN: CPT

## 2025-07-29 PROCEDURE — 78452 HT MUSCLE IMAGE SPECT MULT: CPT

## 2025-07-29 PROCEDURE — A9500: CPT

## 2025-07-29 PROCEDURE — 93015 CV STRESS TEST SUPVJ I&R: CPT

## 2025-07-29 PROCEDURE — 93000 ELECTROCARDIOGRAM COMPLETE: CPT

## 2025-07-29 NOTE — HISTORY OF PRESENT ILLNESS
[FreeTextEntry1] : Mrs. Vickie Schilling is a 64 year old woman presenting for cardiovascular evaluation as a candidate for kidney transplant. Advanced kidney disease attributed to diabetes and she is now on hemodialysis. She also has hypertension and hyperlipidemia, but no known cardiac disease. She walks, but very slowly, denies chest pain or dyspnea. Sleeps poorly at night, but there is no orthopnea or paroxysmal nocturnal dyspnea.  Continues to tolerate dialysis, no chest pain, no dyspnea, no dizziness.

## 2025-07-29 NOTE — CARDIOLOGY SUMMARY
[de-identified] : 12/27/2023 sinus rhythm 1st degree heart block,  ms, LAHB, LVH, non-specific ST-T wave abnormality. 7/29/2025 sinus rhythm 1st degree heart block,  ms, LAHB, LVH, non-specific ST-T wave abnormality. [de-identified] : 3/25/2024 1. Normal myocardial perfusion scan, with no evidence of infarction or inducible ischemia. 2. Qualitative Perfusion: - medium-sized, mild defect(s) in the proximal to mid inferior and inferolateral walls that are predominantly fixed, totally normalizes with prone imaging suggestive of diaphragmatic attenuation artifact. 3. The left ventricle is normal in function and normal in size. The post stress left ventricular EF is 63 %. The stress end diastolic volume is 60 ml and systolic volume is 22 ml.  [de-identified] : 3/18/2024 1. Left ventricular cavity is normal in size. Left ventricular systolic function is normal with an ejection fraction of 59 % by Mcgregor's method of disks. 2. Normal left and right atrial size. 3. There is calcification of the aortic valve leaflets. moderate aortic stenosis. 4. There is calcification of the mitral valve annulus.

## 2025-07-29 NOTE — DISCUSSION/SUMMARY
[Patient] : the patient [EKG obtained to assist in diagnosis and management of assessed problem(s)] : EKG obtained to assist in diagnosis and management of assessed problem(s) [Hypertension] : hypertension [Hypertensive Cardiomyopathy] : hypertensive cardiomyopathy [A-V Block] : A-V block [Echocardiogram] : echocardiogram [Hypercholesterolemia] : hypercholesterolemia [Responding to Treatment] : responding to treatment [de-identified] : Left anterior hemiblock [de-identified] : Murmur consistent with mitral regurgitation [FreeTextEntry2] : and daughter [FreeTextEntry1] : 64 year old woman candidate for kidney transplant without cardiac history, but with elevated blood pressure, murmur of mitral regurgitation, abnormal EKG. Prior cardiac echo and pharmacologic stress test were satisfactory, but greater than year ago. Arranging updated testing. If results remain satisfactory she is a good cardiovascular candidate for kidney transplant and there are no contraindications to necessary procedures, surgery and anesthesia.

## 2025-07-30 PROBLEM — R94.39 ABNORMAL NUCLEAR STRESS TEST: Status: ACTIVE | Noted: 2025-07-30

## 2025-08-01 ENCOUNTER — TRANSCRIPTION ENCOUNTER (OUTPATIENT)
Age: 65
End: 2025-08-01

## 2025-08-02 ENCOUNTER — TRANSCRIPTION ENCOUNTER (OUTPATIENT)
Age: 65
End: 2025-08-02

## 2025-08-03 ENCOUNTER — INPATIENT (INPATIENT)
Facility: HOSPITAL | Age: 65
LOS: 1 days | Discharge: HOME HEALTH | End: 2025-08-05
Attending: HOSPITALIST | Admitting: HOSPITALIST
Payer: MEDICAID

## 2025-08-03 VITALS
DIASTOLIC BLOOD PRESSURE: 66 MMHG | SYSTOLIC BLOOD PRESSURE: 134 MMHG | RESPIRATION RATE: 16 BRPM | TEMPERATURE: 98 F | WEIGHT: 145.95 LBS | HEART RATE: 89 BPM | HEIGHT: 61 IN | OXYGEN SATURATION: 88 %

## 2025-08-03 DIAGNOSIS — D64.9 ANEMIA, UNSPECIFIED: ICD-10-CM

## 2025-08-03 DIAGNOSIS — N18.6 END STAGE RENAL DISEASE: ICD-10-CM

## 2025-08-03 LAB
ADD ON TEST-SPECIMEN IN LAB: SIGNIFICANT CHANGE UP
ADD ON TEST-SPECIMEN IN LAB: SIGNIFICANT CHANGE UP
ALBUMIN SERPL ELPH-MCNC: 4.4 G/DL — SIGNIFICANT CHANGE UP (ref 3.3–5)
ALP SERPL-CCNC: 177 U/L — HIGH (ref 40–120)
ALT FLD-CCNC: 213 U/L — HIGH (ref 4–33)
ANION GAP SERPL CALC-SCNC: 23 MMOL/L — HIGH (ref 7–14)
APTT BLD: 27.9 SEC — SIGNIFICANT CHANGE UP (ref 26.1–36.8)
AST SERPL-CCNC: 128 U/L — HIGH (ref 4–32)
BASOPHILS # BLD AUTO: 0.02 K/UL — SIGNIFICANT CHANGE UP (ref 0–0.2)
BASOPHILS NFR BLD AUTO: 0.1 % — SIGNIFICANT CHANGE UP (ref 0–2)
BILIRUB SERPL-MCNC: 1 MG/DL — SIGNIFICANT CHANGE UP (ref 0.2–1.2)
BUN SERPL-MCNC: 68 MG/DL — HIGH (ref 7–23)
CALCIUM SERPL-MCNC: 9.8 MG/DL — SIGNIFICANT CHANGE UP (ref 8.4–10.5)
CHLORIDE SERPL-SCNC: 92 MMOL/L — LOW (ref 98–107)
CO2 SERPL-SCNC: 25 MMOL/L — SIGNIFICANT CHANGE UP (ref 22–31)
CREAT SERPL-MCNC: 6.46 MG/DL — HIGH (ref 0.5–1.3)
DIALYSIS INSTRUMENT RESULT - HEPATITIS B SURFACE ANTIGEN: NEGATIVE — SIGNIFICANT CHANGE UP
DIALYSIS INSTRUMENT RESULT - HEPATITIS B SURFACE ANTIGEN: NEGATIVE — SIGNIFICANT CHANGE UP
EGFR: 7 ML/MIN/1.73M2 — LOW
EGFR: 7 ML/MIN/1.73M2 — LOW
EOSINOPHIL # BLD AUTO: 0.05 K/UL — SIGNIFICANT CHANGE UP (ref 0–0.5)
EOSINOPHIL NFR BLD AUTO: 0.3 % — SIGNIFICANT CHANGE UP (ref 0–6)
FLUAV AG NPH QL: SIGNIFICANT CHANGE UP
FLUBV AG NPH QL: SIGNIFICANT CHANGE UP
GAS PNL BLDV: SIGNIFICANT CHANGE UP
GAS PNL BLDV: SIGNIFICANT CHANGE UP
GLUCOSE SERPL-MCNC: 294 MG/DL — HIGH (ref 70–99)
HCT VFR BLD CALC: 31.1 % — LOW (ref 34.5–45)
HGB BLD-MCNC: 9.9 G/DL — LOW (ref 11.5–15.5)
IMM GRANULOCYTES # BLD AUTO: 0.11 K/UL — HIGH (ref 0–0.07)
IMM GRANULOCYTES NFR BLD AUTO: 0.7 % — SIGNIFICANT CHANGE UP (ref 0–0.9)
INR BLD: 1.08 RATIO — SIGNIFICANT CHANGE UP (ref 0.85–1.16)
LYMPHOCYTES # BLD AUTO: 1 K/UL — SIGNIFICANT CHANGE UP (ref 1–3.3)
LYMPHOCYTES NFR BLD AUTO: 5.9 % — LOW (ref 13–44)
MAGNESIUM SERPL-MCNC: 2.3 MG/DL — SIGNIFICANT CHANGE UP (ref 1.6–2.6)
MCHC RBC-ENTMCNC: 28.7 PG — SIGNIFICANT CHANGE UP (ref 27–34)
MCHC RBC-ENTMCNC: 31.8 G/DL — LOW (ref 32–36)
MCV RBC AUTO: 90.1 FL — SIGNIFICANT CHANGE UP (ref 80–100)
MONOCYTES # BLD AUTO: 0.9 K/UL — SIGNIFICANT CHANGE UP (ref 0–0.9)
MONOCYTES NFR BLD AUTO: 5.3 % — SIGNIFICANT CHANGE UP (ref 2–14)
NEUTROPHILS # BLD AUTO: 14.76 K/UL — HIGH (ref 1.8–7.4)
NEUTROPHILS NFR BLD AUTO: 87.7 % — HIGH (ref 43–77)
NRBC # BLD AUTO: 0 K/UL — SIGNIFICANT CHANGE UP (ref 0–0)
NRBC # FLD: 0 K/UL — SIGNIFICANT CHANGE UP (ref 0–0)
NRBC BLD AUTO-RTO: 0 /100 WBCS — SIGNIFICANT CHANGE UP (ref 0–0)
PLATELET # BLD AUTO: 286 K/UL — SIGNIFICANT CHANGE UP (ref 150–400)
PMV BLD: 10.7 FL — SIGNIFICANT CHANGE UP (ref 7–13)
POTASSIUM SERPL-MCNC: 3.8 MMOL/L — SIGNIFICANT CHANGE UP (ref 3.5–5.3)
POTASSIUM SERPL-SCNC: 3.8 MMOL/L — SIGNIFICANT CHANGE UP (ref 3.5–5.3)
PROT SERPL-MCNC: 9.1 G/DL — HIGH (ref 6–8.3)
PROTHROM AB SERPL-ACNC: 12.9 SEC — SIGNIFICANT CHANGE UP (ref 9.9–13.4)
RBC # BLD: 3.45 M/UL — LOW (ref 3.8–5.2)
RBC # FLD: 15 % — HIGH (ref 10.3–14.5)
RSV RNA NPH QL NAA+NON-PROBE: SIGNIFICANT CHANGE UP
SARS-COV-2 RNA SPEC QL NAA+PROBE: SIGNIFICANT CHANGE UP
SODIUM SERPL-SCNC: 140 MMOL/L — SIGNIFICANT CHANGE UP (ref 135–145)
SOURCE RESPIRATORY: SIGNIFICANT CHANGE UP
TROPONIN T, HIGH SENSITIVITY RESULT: 76 NG/L — CRITICAL HIGH
WBC # BLD: 16.84 K/UL — HIGH (ref 3.8–10.5)
WBC # FLD AUTO: 16.84 K/UL — HIGH (ref 3.8–10.5)

## 2025-08-03 PROCEDURE — 70491 CT SOFT TISSUE NECK W/DYE: CPT | Mod: 26

## 2025-08-03 PROCEDURE — 71045 X-RAY EXAM CHEST 1 VIEW: CPT | Mod: 26

## 2025-08-03 PROCEDURE — 71260 CT THORAX DX C+: CPT | Mod: 26

## 2025-08-03 PROCEDURE — 74177 CT ABD & PELVIS W/CONTRAST: CPT | Mod: 26

## 2025-08-03 PROCEDURE — 99285 EMERGENCY DEPT VISIT HI MDM: CPT

## 2025-08-03 RX ORDER — CEFTRIAXONE 500 MG/1
1000 INJECTION, POWDER, FOR SOLUTION INTRAMUSCULAR; INTRAVENOUS ONCE
Refills: 0 | Status: COMPLETED | OUTPATIENT
Start: 2025-08-03 | End: 2025-08-03

## 2025-08-03 RX ORDER — INSULIN GLARGINE-YFGN 100 [IU]/ML
50 INJECTION, SOLUTION SUBCUTANEOUS AT BEDTIME
Refills: 0 | Status: DISCONTINUED | OUTPATIENT
Start: 2025-08-03 | End: 2025-08-03

## 2025-08-03 RX ORDER — ACETAMINOPHEN 500 MG/5ML
1000 LIQUID (ML) ORAL ONCE
Refills: 0 | Status: COMPLETED | OUTPATIENT
Start: 2025-08-03 | End: 2025-08-03

## 2025-08-03 RX ORDER — AZITHROMYCIN 250 MG
500 CAPSULE ORAL ONCE
Refills: 0 | Status: COMPLETED | OUTPATIENT
Start: 2025-08-03 | End: 2025-08-03

## 2025-08-03 RX ORDER — INSULIN GLARGINE-YFGN 100 [IU]/ML
20 INJECTION, SOLUTION SUBCUTANEOUS AT BEDTIME
Refills: 0 | Status: DISCONTINUED | OUTPATIENT
Start: 2025-08-03 | End: 2025-08-04

## 2025-08-03 RX ORDER — INSULIN LISPRO 100 U/ML
5 INJECTION, SOLUTION INTRAVENOUS; SUBCUTANEOUS ONCE
Refills: 0 | Status: COMPLETED | OUTPATIENT
Start: 2025-08-03 | End: 2025-08-03

## 2025-08-03 RX ORDER — INSULIN LISPRO 100 U/ML
12 INJECTION, SOLUTION INTRAVENOUS; SUBCUTANEOUS ONCE
Refills: 0 | Status: COMPLETED | OUTPATIENT
Start: 2025-08-03 | End: 2025-08-03

## 2025-08-03 RX ADMIN — INSULIN LISPRO 12 UNIT(S): 100 INJECTION, SOLUTION INTRAVENOUS; SUBCUTANEOUS at 22:47

## 2025-08-03 RX ADMIN — Medication 400 MILLIGRAM(S): at 21:40

## 2025-08-03 RX ADMIN — Medication 250 MILLIGRAM(S): at 19:45

## 2025-08-03 RX ADMIN — INSULIN GLARGINE-YFGN 20 UNIT(S): 100 INJECTION, SOLUTION SUBCUTANEOUS at 22:45

## 2025-08-03 RX ADMIN — CEFTRIAXONE 100 MILLIGRAM(S): 500 INJECTION, POWDER, FOR SOLUTION INTRAMUSCULAR; INTRAVENOUS at 19:19

## 2025-08-04 DIAGNOSIS — I10 ESSENTIAL (PRIMARY) HYPERTENSION: ICD-10-CM

## 2025-08-04 DIAGNOSIS — R13.10 DYSPHAGIA, UNSPECIFIED: ICD-10-CM

## 2025-08-04 DIAGNOSIS — D17.79 BENIGN LIPOMATOUS NEOPLASM OF OTHER SITES: ICD-10-CM

## 2025-08-04 DIAGNOSIS — J45.909 UNSPECIFIED ASTHMA, UNCOMPLICATED: ICD-10-CM

## 2025-08-04 DIAGNOSIS — E87.70 FLUID OVERLOAD, UNSPECIFIED: ICD-10-CM

## 2025-08-04 DIAGNOSIS — E78.5 HYPERLIPIDEMIA, UNSPECIFIED: ICD-10-CM

## 2025-08-04 DIAGNOSIS — D17.9 BENIGN LIPOMATOUS NEOPLASM, UNSPECIFIED: ICD-10-CM

## 2025-08-04 DIAGNOSIS — J69.0 PNEUMONITIS DUE TO INHALATION OF FOOD AND VOMIT: ICD-10-CM

## 2025-08-04 DIAGNOSIS — R07.9 CHEST PAIN, UNSPECIFIED: ICD-10-CM

## 2025-08-04 DIAGNOSIS — I77.0 ARTERIOVENOUS FISTULA, ACQUIRED: Chronic | ICD-10-CM

## 2025-08-04 DIAGNOSIS — A41.9 SEPSIS, UNSPECIFIED ORGANISM: ICD-10-CM

## 2025-08-04 DIAGNOSIS — E11.9 TYPE 2 DIABETES MELLITUS WITHOUT COMPLICATIONS: ICD-10-CM

## 2025-08-04 DIAGNOSIS — R06.00 DYSPNEA, UNSPECIFIED: ICD-10-CM

## 2025-08-04 DIAGNOSIS — J96.01 ACUTE RESPIRATORY FAILURE WITH HYPOXIA: ICD-10-CM

## 2025-08-04 DIAGNOSIS — Z29.9 ENCOUNTER FOR PROPHYLACTIC MEASURES, UNSPECIFIED: ICD-10-CM

## 2025-08-04 PROBLEM — N18.4 CHRONIC KIDNEY DISEASE, STAGE 4 (SEVERE): Chronic | Status: INACTIVE | Noted: 2023-10-17 | Resolved: 2025-08-04

## 2025-08-04 LAB
A1C WITH ESTIMATED AVERAGE GLUCOSE RESULT: 6.4 % — HIGH (ref 4–5.6)
A1C WITH ESTIMATED AVERAGE GLUCOSE RESULT: 6.5 % — HIGH (ref 4–5.6)
ADD ON TEST-SPECIMEN IN LAB: SIGNIFICANT CHANGE UP
ALBUMIN SERPL ELPH-MCNC: 3.6 G/DL — SIGNIFICANT CHANGE UP (ref 3.3–5)
ALBUMIN SERPL ELPH-MCNC: 3.7 G/DL — SIGNIFICANT CHANGE UP (ref 3.3–5)
ALP SERPL-CCNC: 142 U/L — HIGH (ref 40–120)
ALP SERPL-CCNC: 155 U/L — HIGH (ref 40–120)
ALT FLD-CCNC: 143 U/L — HIGH (ref 4–33)
ALT FLD-CCNC: 150 U/L — HIGH (ref 4–33)
ANION GAP SERPL CALC-SCNC: 16 MMOL/L — HIGH (ref 7–14)
ANION GAP SERPL CALC-SCNC: 17 MMOL/L — HIGH (ref 7–14)
AST SERPL-CCNC: 70 U/L — HIGH (ref 4–32)
AST SERPL-CCNC: 76 U/L — HIGH (ref 4–32)
B PERT DNA SPEC QL NAA+PROBE: SIGNIFICANT CHANGE UP
B PERT+PARAPERT DNA PNL SPEC NAA+PROBE: SIGNIFICANT CHANGE UP
BASOPHILS # BLD AUTO: 0.04 K/UL — SIGNIFICANT CHANGE UP (ref 0–0.2)
BASOPHILS NFR BLD AUTO: 0.2 % — SIGNIFICANT CHANGE UP (ref 0–2)
BILIRUB SERPL-MCNC: 0.6 MG/DL — SIGNIFICANT CHANGE UP (ref 0.2–1.2)
BILIRUB SERPL-MCNC: 1.2 MG/DL — SIGNIFICANT CHANGE UP (ref 0.2–1.2)
BLOOD GAS VENOUS COMPREHENSIVE RESULT: SIGNIFICANT CHANGE UP
BUN SERPL-MCNC: 25 MG/DL — HIGH (ref 7–23)
BUN SERPL-MCNC: 71 MG/DL — HIGH (ref 7–23)
C PNEUM DNA SPEC QL NAA+PROBE: SIGNIFICANT CHANGE UP
CALCIUM SERPL-MCNC: 8.8 MG/DL — SIGNIFICANT CHANGE UP (ref 8.4–10.5)
CALCIUM SERPL-MCNC: 9.5 MG/DL — SIGNIFICANT CHANGE UP (ref 8.4–10.5)
CHLORIDE SERPL-SCNC: 93 MMOL/L — LOW (ref 98–107)
CHLORIDE SERPL-SCNC: 94 MMOL/L — LOW (ref 98–107)
CHOLEST SERPL-MCNC: 75 MG/DL — SIGNIFICANT CHANGE UP
CO2 SERPL-SCNC: 25 MMOL/L — SIGNIFICANT CHANGE UP (ref 22–31)
CO2 SERPL-SCNC: 27 MMOL/L — SIGNIFICANT CHANGE UP (ref 22–31)
CREAT SERPL-MCNC: 3.23 MG/DL — HIGH (ref 0.5–1.3)
CREAT SERPL-MCNC: 6.58 MG/DL — HIGH (ref 0.5–1.3)
EGFR: 15 ML/MIN/1.73M2 — LOW
EGFR: 15 ML/MIN/1.73M2 — LOW
EGFR: 6 ML/MIN/1.73M2 — LOW
EGFR: 6 ML/MIN/1.73M2 — LOW
EOSINOPHIL # BLD AUTO: 0.07 K/UL — SIGNIFICANT CHANGE UP (ref 0–0.5)
EOSINOPHIL NFR BLD AUTO: 0.4 % — SIGNIFICANT CHANGE UP (ref 0–6)
ESTIMATED AVERAGE GLUCOSE: 137 — SIGNIFICANT CHANGE UP
ESTIMATED AVERAGE GLUCOSE: 140 — SIGNIFICANT CHANGE UP
FLUAV SUBTYP SPEC NAA+PROBE: SIGNIFICANT CHANGE UP
FLUBV RNA SPEC QL NAA+PROBE: SIGNIFICANT CHANGE UP
GAS PNL BLDV: SIGNIFICANT CHANGE UP
GLUCOSE BLDC GLUCOMTR-MCNC: 136 MG/DL — HIGH (ref 70–99)
GLUCOSE BLDC GLUCOMTR-MCNC: 136 MG/DL — HIGH (ref 70–99)
GLUCOSE BLDC GLUCOMTR-MCNC: 173 MG/DL — HIGH (ref 70–99)
GLUCOSE BLDC GLUCOMTR-MCNC: 196 MG/DL — HIGH (ref 70–99)
GLUCOSE BLDC GLUCOMTR-MCNC: 202 MG/DL — HIGH (ref 70–99)
GLUCOSE BLDC GLUCOMTR-MCNC: 235 MG/DL — HIGH (ref 70–99)
GLUCOSE BLDC GLUCOMTR-MCNC: 237 MG/DL — HIGH (ref 70–99)
GLUCOSE SERPL-MCNC: 218 MG/DL — HIGH (ref 70–99)
GLUCOSE SERPL-MCNC: 347 MG/DL — HIGH (ref 70–99)
HADV DNA SPEC QL NAA+PROBE: SIGNIFICANT CHANGE UP
HAV IGM SER-ACNC: SIGNIFICANT CHANGE UP
HBV CORE AB SER-ACNC: SIGNIFICANT CHANGE UP
HBV CORE IGM SER-ACNC: SIGNIFICANT CHANGE UP
HBV SURFACE AB SER-ACNC: <3.3 MIU/ML — LOW
HBV SURFACE AG SER-ACNC: SIGNIFICANT CHANGE UP
HCOV 229E RNA SPEC QL NAA+PROBE: SIGNIFICANT CHANGE UP
HCOV HKU1 RNA SPEC QL NAA+PROBE: SIGNIFICANT CHANGE UP
HCOV NL63 RNA SPEC QL NAA+PROBE: SIGNIFICANT CHANGE UP
HCOV OC43 RNA SPEC QL NAA+PROBE: SIGNIFICANT CHANGE UP
HCT VFR BLD CALC: 25.6 % — LOW (ref 34.5–45)
HCV AB S/CO SERPL IA: 0.12 S/CO — SIGNIFICANT CHANGE UP (ref 0–0.79)
HCV AB SERPL-IMP: SIGNIFICANT CHANGE UP
HDLC SERPL-MCNC: 36 MG/DL — LOW
HGB BLD-MCNC: 8.3 G/DL — LOW (ref 11.5–15.5)
HMPV RNA SPEC QL NAA+PROBE: SIGNIFICANT CHANGE UP
HPIV1 RNA SPEC QL NAA+PROBE: SIGNIFICANT CHANGE UP
HPIV2 RNA SPEC QL NAA+PROBE: SIGNIFICANT CHANGE UP
HPIV3 RNA SPEC QL NAA+PROBE: SIGNIFICANT CHANGE UP
HPIV4 RNA SPEC QL NAA+PROBE: SIGNIFICANT CHANGE UP
IMM GRANULOCYTES # BLD AUTO: 0.13 K/UL — HIGH (ref 0–0.07)
IMM GRANULOCYTES NFR BLD AUTO: 0.7 % — SIGNIFICANT CHANGE UP (ref 0–0.9)
LDLC SERPL-MCNC: 20 MG/DL — SIGNIFICANT CHANGE UP
LIPID PNL WITH DIRECT LDL SERPL: 20 MG/DL — SIGNIFICANT CHANGE UP
LYMPHOCYTES # BLD AUTO: 0.95 K/UL — LOW (ref 1–3.3)
LYMPHOCYTES NFR BLD AUTO: 5.3 % — LOW (ref 13–44)
M PNEUMO DNA SPEC QL NAA+PROBE: SIGNIFICANT CHANGE UP
MAGNESIUM SERPL-MCNC: 2.1 MG/DL — SIGNIFICANT CHANGE UP (ref 1.6–2.6)
MCHC RBC-ENTMCNC: 29 PG — SIGNIFICANT CHANGE UP (ref 27–34)
MCHC RBC-ENTMCNC: 32.4 G/DL — SIGNIFICANT CHANGE UP (ref 32–36)
MCV RBC AUTO: 89.5 FL — SIGNIFICANT CHANGE UP (ref 80–100)
MONOCYTES # BLD AUTO: 1.25 K/UL — HIGH (ref 0–0.9)
MONOCYTES NFR BLD AUTO: 7 % — SIGNIFICANT CHANGE UP (ref 2–14)
NEUTROPHILS # BLD AUTO: 15.35 K/UL — HIGH (ref 1.8–7.4)
NEUTROPHILS NFR BLD AUTO: 86.4 % — HIGH (ref 43–77)
NONHDLC SERPL-MCNC: 39 MG/DL — SIGNIFICANT CHANGE UP
NRBC # BLD AUTO: 0 K/UL — SIGNIFICANT CHANGE UP (ref 0–0)
NRBC # FLD: 0 K/UL — SIGNIFICANT CHANGE UP (ref 0–0)
NRBC BLD AUTO-RTO: 0 /100 WBCS — SIGNIFICANT CHANGE UP (ref 0–0)
PHOSPHATE SERPL-MCNC: 2.3 MG/DL — LOW (ref 2.5–4.5)
PLATELET # BLD AUTO: 269 K/UL — SIGNIFICANT CHANGE UP (ref 150–400)
PMV BLD: 10.4 FL — SIGNIFICANT CHANGE UP (ref 7–13)
POTASSIUM SERPL-MCNC: 3.5 MMOL/L — SIGNIFICANT CHANGE UP (ref 3.5–5.3)
POTASSIUM SERPL-MCNC: 3.6 MMOL/L — SIGNIFICANT CHANGE UP (ref 3.5–5.3)
POTASSIUM SERPL-SCNC: 3.5 MMOL/L — SIGNIFICANT CHANGE UP (ref 3.5–5.3)
POTASSIUM SERPL-SCNC: 3.6 MMOL/L — SIGNIFICANT CHANGE UP (ref 3.5–5.3)
PROT SERPL-MCNC: 7.4 G/DL — SIGNIFICANT CHANGE UP (ref 6–8.3)
PROT SERPL-MCNC: 8 G/DL — SIGNIFICANT CHANGE UP (ref 6–8.3)
RAPID RVP RESULT: SIGNIFICANT CHANGE UP
RBC # BLD: 2.86 M/UL — LOW (ref 3.8–5.2)
RBC # FLD: 14.8 % — HIGH (ref 10.3–14.5)
RSV RNA SPEC QL NAA+PROBE: SIGNIFICANT CHANGE UP
RV+EV RNA SPEC QL NAA+PROBE: SIGNIFICANT CHANGE UP
S PYO DNA THROAT QL NAA+PROBE: SIGNIFICANT CHANGE UP
SARS-COV-2 RNA SPEC QL NAA+PROBE: SIGNIFICANT CHANGE UP
SODIUM SERPL-SCNC: 135 MMOL/L — SIGNIFICANT CHANGE UP (ref 135–145)
SODIUM SERPL-SCNC: 137 MMOL/L — SIGNIFICANT CHANGE UP (ref 135–145)
TRIGL SERPL-MCNC: 103 MG/DL — SIGNIFICANT CHANGE UP
TROPONIN T, HIGH SENSITIVITY RESULT: 74 NG/L — CRITICAL HIGH
TROPONIN T, HIGH SENSITIVITY RESULT: 83 NG/L — CRITICAL HIGH
TSH SERPL-MCNC: 2.99 UIU/ML — SIGNIFICANT CHANGE UP (ref 0.27–4.2)
WBC # BLD: 17.79 K/UL — HIGH (ref 3.8–10.5)
WBC # FLD AUTO: 17.79 K/UL — HIGH (ref 3.8–10.5)

## 2025-08-04 PROCEDURE — 99232 SBSQ HOSP IP/OBS MODERATE 35: CPT | Mod: GC,25

## 2025-08-04 PROCEDURE — 99291 CRITICAL CARE FIRST HOUR: CPT | Mod: GC

## 2025-08-04 PROCEDURE — 99292 CRITICAL CARE ADDL 30 MIN: CPT | Mod: GC

## 2025-08-04 PROCEDURE — 99223 1ST HOSP IP/OBS HIGH 75: CPT

## 2025-08-04 PROCEDURE — 99255 IP/OBS CONSLTJ NEW/EST HI 80: CPT | Mod: GC

## 2025-08-04 RX ORDER — LINACLOTIDE 290 UG/1
1 CAPSULE, GELATIN COATED ORAL
Refills: 0 | DISCHARGE

## 2025-08-04 RX ORDER — INSULIN LISPRO 100 U/ML
3 INJECTION, SOLUTION INTRAVENOUS; SUBCUTANEOUS
Refills: 0 | Status: DISCONTINUED | OUTPATIENT
Start: 2025-08-04 | End: 2025-08-04

## 2025-08-04 RX ORDER — INSULIN LISPRO 100 U/ML
INJECTION, SOLUTION INTRAVENOUS; SUBCUTANEOUS AT BEDTIME
Refills: 0 | Status: DISCONTINUED | OUTPATIENT
Start: 2025-08-04 | End: 2025-08-04

## 2025-08-04 RX ORDER — DEXTROSE 50 % IN WATER 50 %
25 SYRINGE (ML) INTRAVENOUS ONCE
Refills: 0 | Status: DISCONTINUED | OUTPATIENT
Start: 2025-08-04 | End: 2025-08-05

## 2025-08-04 RX ORDER — SENNA 187 MG
2 TABLET ORAL AT BEDTIME
Refills: 0 | Status: DISCONTINUED | OUTPATIENT
Start: 2025-08-04 | End: 2025-08-05

## 2025-08-04 RX ORDER — GLUCAGON 3 MG/1
1 POWDER NASAL ONCE
Refills: 0 | Status: DISCONTINUED | OUTPATIENT
Start: 2025-08-04 | End: 2025-08-05

## 2025-08-04 RX ORDER — SODIUM CHLORIDE 9 G/1000ML
1000 INJECTION, SOLUTION INTRAVENOUS
Refills: 0 | Status: DISCONTINUED | OUTPATIENT
Start: 2025-08-04 | End: 2025-08-05

## 2025-08-04 RX ORDER — INSULIN LISPRO 100 U/ML
INJECTION, SOLUTION INTRAVENOUS; SUBCUTANEOUS
Refills: 0 | Status: DISCONTINUED | OUTPATIENT
Start: 2025-08-04 | End: 2025-08-04

## 2025-08-04 RX ORDER — MAGNESIUM, ALUMINUM HYDROXIDE 200-200 MG
30 TABLET,CHEWABLE ORAL EVERY 4 HOURS
Refills: 0 | Status: DISCONTINUED | OUTPATIENT
Start: 2025-08-04 | End: 2025-08-05

## 2025-08-04 RX ORDER — ACETAMINOPHEN 500 MG/5ML
650 LIQUID (ML) ORAL EVERY 6 HOURS
Refills: 0 | Status: DISCONTINUED | OUTPATIENT
Start: 2025-08-04 | End: 2025-08-05

## 2025-08-04 RX ORDER — ALBUTEROL SULFATE 2.5 MG/3ML
2 VIAL, NEBULIZER (ML) INHALATION EVERY 6 HOURS
Refills: 0 | Status: DISCONTINUED | OUTPATIENT
Start: 2025-08-04 | End: 2025-08-05

## 2025-08-04 RX ORDER — ONDANSETRON HCL/PF 4 MG/2 ML
4 VIAL (ML) INJECTION EVERY 8 HOURS
Refills: 0 | Status: DISCONTINUED | OUTPATIENT
Start: 2025-08-04 | End: 2025-08-04

## 2025-08-04 RX ORDER — HEPARIN SODIUM 1000 [USP'U]/ML
5000 INJECTION INTRAVENOUS; SUBCUTANEOUS EVERY 8 HOURS
Refills: 0 | Status: DISCONTINUED | OUTPATIENT
Start: 2025-08-04 | End: 2025-08-05

## 2025-08-04 RX ORDER — AMPICILLIN SODIUM AND SULBACTAM SODIUM 1; .5 G/1; G/1
INJECTION, POWDER, FOR SOLUTION INTRAMUSCULAR; INTRAVENOUS
Refills: 0 | Status: DISCONTINUED | OUTPATIENT
Start: 2025-08-04 | End: 2025-08-05

## 2025-08-04 RX ORDER — INSULIN GLARGINE-YFGN 100 [IU]/ML
10 INJECTION, SOLUTION SUBCUTANEOUS AT BEDTIME
Refills: 0 | Status: DISCONTINUED | OUTPATIENT
Start: 2025-08-04 | End: 2025-08-04

## 2025-08-04 RX ORDER — INSULIN LISPRO 100 U/ML
7 INJECTION, SOLUTION INTRAVENOUS; SUBCUTANEOUS
Refills: 0 | Status: DISCONTINUED | OUTPATIENT
Start: 2025-08-04 | End: 2025-08-05

## 2025-08-04 RX ORDER — LIDOCAINE AND PRILOCAINE 25; 25 MG/G; MG/G
1 CREAM TOPICAL ONCE
Refills: 0 | Status: COMPLETED | OUTPATIENT
Start: 2025-08-04 | End: 2025-08-05

## 2025-08-04 RX ORDER — ATORVASTATIN CALCIUM 80 MG/1
20 TABLET, FILM COATED ORAL AT BEDTIME
Refills: 0 | Status: DISCONTINUED | OUTPATIENT
Start: 2025-08-04 | End: 2025-08-05

## 2025-08-04 RX ORDER — INSULIN LISPRO 100 U/ML
INJECTION, SOLUTION INTRAVENOUS; SUBCUTANEOUS
Refills: 0 | Status: DISCONTINUED | OUTPATIENT
Start: 2025-08-04 | End: 2025-08-05

## 2025-08-04 RX ORDER — MELATONIN 5 MG
3 TABLET ORAL AT BEDTIME
Refills: 0 | Status: DISCONTINUED | OUTPATIENT
Start: 2025-08-04 | End: 2025-08-05

## 2025-08-04 RX ORDER — MONTELUKAST SODIUM 10 MG/1
10 TABLET ORAL DAILY
Refills: 0 | Status: DISCONTINUED | OUTPATIENT
Start: 2025-08-04 | End: 2025-08-05

## 2025-08-04 RX ORDER — DEXTROSE 50 % IN WATER 50 %
12.5 SYRINGE (ML) INTRAVENOUS ONCE
Refills: 0 | Status: DISCONTINUED | OUTPATIENT
Start: 2025-08-04 | End: 2025-08-05

## 2025-08-04 RX ORDER — INSULIN LISPRO 100 U/ML
INJECTION, SOLUTION INTRAVENOUS; SUBCUTANEOUS AT BEDTIME
Refills: 0 | Status: DISCONTINUED | OUTPATIENT
Start: 2025-08-04 | End: 2025-08-05

## 2025-08-04 RX ORDER — AMLODIPINE BESYLATE 10 MG/1
1 TABLET ORAL
Refills: 0 | DISCHARGE

## 2025-08-04 RX ORDER — AMLODIPINE BESYLATE 10 MG/1
5 TABLET ORAL DAILY
Refills: 0 | Status: DISCONTINUED | OUTPATIENT
Start: 2025-08-04 | End: 2025-08-05

## 2025-08-04 RX ORDER — ONDANSETRON HCL/PF 4 MG/2 ML
1 VIAL (ML) INJECTION
Refills: 0 | DISCHARGE

## 2025-08-04 RX ORDER — INSULIN GLARGINE-YFGN 100 [IU]/ML
20 INJECTION, SOLUTION SUBCUTANEOUS AT BEDTIME
Refills: 0 | Status: DISCONTINUED | OUTPATIENT
Start: 2025-08-04 | End: 2025-08-05

## 2025-08-04 RX ORDER — AMPICILLIN SODIUM AND SULBACTAM SODIUM 1; .5 G/1; G/1
3 INJECTION, POWDER, FOR SOLUTION INTRAMUSCULAR; INTRAVENOUS ONCE
Refills: 0 | Status: COMPLETED | OUTPATIENT
Start: 2025-08-04 | End: 2025-08-04

## 2025-08-04 RX ORDER — POLYETHYLENE GLYCOL 3350 17 G/17G
17 POWDER, FOR SOLUTION ORAL DAILY
Refills: 0 | Status: DISCONTINUED | OUTPATIENT
Start: 2025-08-04 | End: 2025-08-05

## 2025-08-04 RX ORDER — DEXTROSE 50 % IN WATER 50 %
15 SYRINGE (ML) INTRAVENOUS ONCE
Refills: 0 | Status: DISCONTINUED | OUTPATIENT
Start: 2025-08-04 | End: 2025-08-05

## 2025-08-04 RX ORDER — AMPICILLIN SODIUM AND SULBACTAM SODIUM 1; .5 G/1; G/1
3 INJECTION, POWDER, FOR SOLUTION INTRAMUSCULAR; INTRAVENOUS EVERY 24 HOURS
Refills: 0 | Status: DISCONTINUED | OUTPATIENT
Start: 2025-08-05 | End: 2025-08-05

## 2025-08-04 RX ORDER — BISACODYL 5 MG
5 TABLET, DELAYED RELEASE (ENTERIC COATED) ORAL EVERY 12 HOURS
Refills: 0 | Status: DISCONTINUED | OUTPATIENT
Start: 2025-08-04 | End: 2025-08-05

## 2025-08-04 RX ADMIN — AMLODIPINE BESYLATE 5 MILLIGRAM(S): 10 TABLET ORAL at 06:08

## 2025-08-04 RX ADMIN — INSULIN LISPRO 1: 100 INJECTION, SOLUTION INTRAVENOUS; SUBCUTANEOUS at 13:21

## 2025-08-04 RX ADMIN — INSULIN GLARGINE-YFGN 20 UNIT(S): 100 INJECTION, SOLUTION SUBCUTANEOUS at 21:59

## 2025-08-04 RX ADMIN — HEPARIN SODIUM 5000 UNIT(S): 1000 INJECTION INTRAVENOUS; SUBCUTANEOUS at 06:08

## 2025-08-04 RX ADMIN — Medication 2 TABLET(S): at 21:59

## 2025-08-04 RX ADMIN — Medication 40 MILLIGRAM(S): at 06:08

## 2025-08-04 RX ADMIN — INSULIN LISPRO 2: 100 INJECTION, SOLUTION INTRAVENOUS; SUBCUTANEOUS at 09:06

## 2025-08-04 RX ADMIN — MONTELUKAST SODIUM 10 MILLIGRAM(S): 10 TABLET ORAL at 11:25

## 2025-08-04 RX ADMIN — ATORVASTATIN CALCIUM 20 MILLIGRAM(S): 80 TABLET, FILM COATED ORAL at 21:59

## 2025-08-04 RX ADMIN — HEPARIN SODIUM 5000 UNIT(S): 1000 INJECTION INTRAVENOUS; SUBCUTANEOUS at 13:22

## 2025-08-04 RX ADMIN — Medication 1 DOSE(S): at 15:46

## 2025-08-04 RX ADMIN — Medication 1 DOSE(S): at 22:03

## 2025-08-04 RX ADMIN — HEPARIN SODIUM 5000 UNIT(S): 1000 INJECTION INTRAVENOUS; SUBCUTANEOUS at 21:59

## 2025-08-04 RX ADMIN — INSULIN LISPRO 7 UNIT(S): 100 INJECTION, SOLUTION INTRAVENOUS; SUBCUTANEOUS at 18:44

## 2025-08-04 RX ADMIN — INSULIN LISPRO 1: 100 INJECTION, SOLUTION INTRAVENOUS; SUBCUTANEOUS at 18:44

## 2025-08-04 RX ADMIN — AMPICILLIN SODIUM AND SULBACTAM SODIUM 200 GRAM(S): 1; .5 INJECTION, POWDER, FOR SOLUTION INTRAMUSCULAR; INTRAVENOUS at 06:07

## 2025-08-05 ENCOUNTER — TRANSCRIPTION ENCOUNTER (OUTPATIENT)
Age: 65
End: 2025-08-05

## 2025-08-05 ENCOUNTER — NON-APPOINTMENT (OUTPATIENT)
Age: 65
End: 2025-08-05

## 2025-08-05 VITALS
DIASTOLIC BLOOD PRESSURE: 79 MMHG | RESPIRATION RATE: 18 BRPM | HEART RATE: 77 BPM | SYSTOLIC BLOOD PRESSURE: 132 MMHG | OXYGEN SATURATION: 96 % | TEMPERATURE: 98 F

## 2025-08-05 LAB
A1C WITH ESTIMATED AVERAGE GLUCOSE RESULT: 6.4 % — HIGH (ref 4–5.6)
ANION GAP SERPL CALC-SCNC: 18 MMOL/L — HIGH (ref 7–14)
BUN SERPL-MCNC: 47 MG/DL — HIGH (ref 7–23)
CALCIUM SERPL-MCNC: 9.1 MG/DL — SIGNIFICANT CHANGE UP (ref 8.4–10.5)
CHLORIDE SERPL-SCNC: 97 MMOL/L — LOW (ref 98–107)
CO2 SERPL-SCNC: 26 MMOL/L — SIGNIFICANT CHANGE UP (ref 22–31)
CREAT SERPL-MCNC: 5.79 MG/DL — HIGH (ref 0.5–1.3)
CULTURE RESULTS: SIGNIFICANT CHANGE UP
EGFR: 8 ML/MIN/1.73M2 — LOW
EGFR: 8 ML/MIN/1.73M2 — LOW
ESTIMATED AVERAGE GLUCOSE: 137 — SIGNIFICANT CHANGE UP
GLUCOSE BLDC GLUCOMTR-MCNC: 120 MG/DL — HIGH (ref 70–99)
GLUCOSE BLDC GLUCOMTR-MCNC: 136 MG/DL — HIGH (ref 70–99)
GLUCOSE BLDC GLUCOMTR-MCNC: 145 MG/DL — HIGH (ref 70–99)
GLUCOSE BLDC GLUCOMTR-MCNC: 210 MG/DL — HIGH (ref 70–99)
GLUCOSE SERPL-MCNC: 125 MG/DL — HIGH (ref 70–99)
HCT VFR BLD CALC: 23.5 % — LOW (ref 34.5–45)
HGB BLD-MCNC: 7.6 G/DL — LOW (ref 11.5–15.5)
MAGNESIUM SERPL-MCNC: 2.2 MG/DL — SIGNIFICANT CHANGE UP (ref 1.6–2.6)
MCHC RBC-ENTMCNC: 28.9 PG — SIGNIFICANT CHANGE UP (ref 27–34)
MCHC RBC-ENTMCNC: 32.3 G/DL — SIGNIFICANT CHANGE UP (ref 32–36)
MCV RBC AUTO: 89.4 FL — SIGNIFICANT CHANGE UP (ref 80–100)
NRBC # BLD AUTO: 0 K/UL — SIGNIFICANT CHANGE UP (ref 0–0)
NRBC # FLD: 0 K/UL — SIGNIFICANT CHANGE UP (ref 0–0)
NRBC BLD AUTO-RTO: 0 /100 WBCS — SIGNIFICANT CHANGE UP (ref 0–0)
PHOSPHATE SERPL-MCNC: 4.3 MG/DL — SIGNIFICANT CHANGE UP (ref 2.5–4.5)
PLATELET # BLD AUTO: 291 K/UL — SIGNIFICANT CHANGE UP (ref 150–400)
PMV BLD: 10.6 FL — SIGNIFICANT CHANGE UP (ref 7–13)
POTASSIUM SERPL-MCNC: 3.7 MMOL/L — SIGNIFICANT CHANGE UP (ref 3.5–5.3)
POTASSIUM SERPL-SCNC: 3.7 MMOL/L — SIGNIFICANT CHANGE UP (ref 3.5–5.3)
RBC # BLD: 2.63 M/UL — LOW (ref 3.8–5.2)
RBC # FLD: 14.9 % — HIGH (ref 10.3–14.5)
SODIUM SERPL-SCNC: 141 MMOL/L — SIGNIFICANT CHANGE UP (ref 135–145)
SPECIMEN SOURCE: SIGNIFICANT CHANGE UP
WBC # BLD: 13.34 K/UL — HIGH (ref 3.8–10.5)
WBC # FLD AUTO: 13.34 K/UL — HIGH (ref 3.8–10.5)

## 2025-08-05 PROCEDURE — 99232 SBSQ HOSP IP/OBS MODERATE 35: CPT

## 2025-08-05 PROCEDURE — 99239 HOSP IP/OBS DSCHRG MGMT >30: CPT | Mod: GC

## 2025-08-05 PROCEDURE — 90935 HEMODIALYSIS ONE EVALUATION: CPT

## 2025-08-05 RX ORDER — POLYETHYLENE GLYCOL 3350 17 G/17G
17 POWDER, FOR SOLUTION ORAL
Qty: 0 | Refills: 0 | DISCHARGE
Start: 2025-08-05

## 2025-08-05 RX ORDER — INSULIN LISPRO 100 U/ML
8 INJECTION, SOLUTION INTRAVENOUS; SUBCUTANEOUS
Refills: 0 | Status: DISCONTINUED | OUTPATIENT
Start: 2025-08-05 | End: 2025-08-05

## 2025-08-05 RX ORDER — INSULIN ASPART 100 [IU]/ML
0 INJECTION, SOLUTION INTRAVENOUS; SUBCUTANEOUS
Refills: 0 | DISCHARGE

## 2025-08-05 RX ADMIN — INSULIN LISPRO 7 UNIT(S): 100 INJECTION, SOLUTION INTRAVENOUS; SUBCUTANEOUS at 09:48

## 2025-08-05 RX ADMIN — AMPICILLIN SODIUM AND SULBACTAM SODIUM 200 GRAM(S): 1; .5 INJECTION, POWDER, FOR SOLUTION INTRAMUSCULAR; INTRAVENOUS at 03:57

## 2025-08-05 RX ADMIN — LIDOCAINE AND PRILOCAINE 1 APPLICATION(S): 25; 25 CREAM TOPICAL at 05:41

## 2025-08-05 RX ADMIN — INSULIN LISPRO 7 UNIT(S): 100 INJECTION, SOLUTION INTRAVENOUS; SUBCUTANEOUS at 13:19

## 2025-08-05 RX ADMIN — AMLODIPINE BESYLATE 5 MILLIGRAM(S): 10 TABLET ORAL at 09:53

## 2025-08-05 RX ADMIN — HEPARIN SODIUM 5000 UNIT(S): 1000 INJECTION INTRAVENOUS; SUBCUTANEOUS at 13:10

## 2025-08-05 RX ADMIN — Medication 1 APPLICATION(S): at 13:20

## 2025-08-05 RX ADMIN — MONTELUKAST SODIUM 10 MILLIGRAM(S): 10 TABLET ORAL at 11:29

## 2025-08-05 RX ADMIN — Medication 40 MILLIGRAM(S): at 05:42

## 2025-08-05 RX ADMIN — INSULIN LISPRO 2: 100 INJECTION, SOLUTION INTRAVENOUS; SUBCUTANEOUS at 13:19

## 2025-08-05 RX ADMIN — POLYETHYLENE GLYCOL 3350 17 GRAM(S): 17 POWDER, FOR SOLUTION ORAL at 11:28

## 2025-08-05 RX ADMIN — HEPARIN SODIUM 5000 UNIT(S): 1000 INJECTION INTRAVENOUS; SUBCUTANEOUS at 05:42

## 2025-08-05 RX ADMIN — Medication 1 DOSE(S): at 09:51

## 2025-08-06 PROBLEM — N18.6 END STAGE RENAL DISEASE: Chronic | Status: ACTIVE | Noted: 2025-08-04

## 2025-08-06 PROBLEM — L40.9 PSORIASIS, UNSPECIFIED: Chronic | Status: ACTIVE | Noted: 2025-08-03

## 2025-08-06 PROBLEM — I50.30 UNSPECIFIED DIASTOLIC (CONGESTIVE) HEART FAILURE: Chronic | Status: ACTIVE | Noted: 2025-08-04

## 2025-08-06 PROBLEM — J45.909 UNSPECIFIED ASTHMA, UNCOMPLICATED: Chronic | Status: ACTIVE | Noted: 2025-08-04

## 2025-08-06 RX ORDER — AMOXICILLIN AND CLAVULANATE POTASSIUM 500; 125 MG/1; MG/1
1 TABLET, FILM COATED ORAL
Qty: 16 | Refills: 0
Start: 2025-08-06 | End: 2025-08-13

## 2025-08-08 ENCOUNTER — APPOINTMENT (OUTPATIENT)
Dept: CARE COORDINATION | Facility: HOME HEALTH | Age: 65
End: 2025-08-08
Payer: MEDICAID

## 2025-08-08 DIAGNOSIS — I50.9 HEART FAILURE, UNSPECIFIED: ICD-10-CM

## 2025-08-08 DIAGNOSIS — J45.909 UNSPECIFIED ASTHMA, UNCOMPLICATED: ICD-10-CM

## 2025-08-08 DIAGNOSIS — I10 ESSENTIAL (PRIMARY) HYPERTENSION: ICD-10-CM

## 2025-08-08 LAB
CULTURE RESULTS: SIGNIFICANT CHANGE UP
CULTURE RESULTS: SIGNIFICANT CHANGE UP
SPECIMEN SOURCE: SIGNIFICANT CHANGE UP
SPECIMEN SOURCE: SIGNIFICANT CHANGE UP

## 2025-08-08 PROCEDURE — 99349 HOME/RES VST EST MOD MDM 40: CPT | Mod: 95

## 2025-08-12 ENCOUNTER — OUTPATIENT (OUTPATIENT)
Dept: OUTPATIENT SERVICES | Facility: HOSPITAL | Age: 65
LOS: 1 days | End: 2025-08-12
Payer: MEDICAID

## 2025-08-12 ENCOUNTER — APPOINTMENT (OUTPATIENT)
Dept: INTERNAL MEDICINE | Facility: CLINIC | Age: 65
End: 2025-08-12
Payer: MEDICAID

## 2025-08-12 VITALS
BODY MASS INDEX: 27.75 KG/M2 | OXYGEN SATURATION: 97 % | HEART RATE: 88 BPM | HEIGHT: 61 IN | SYSTOLIC BLOOD PRESSURE: 126 MMHG | DIASTOLIC BLOOD PRESSURE: 60 MMHG | WEIGHT: 147 LBS

## 2025-08-12 DIAGNOSIS — I77.0 ARTERIOVENOUS FISTULA, ACQUIRED: Chronic | ICD-10-CM

## 2025-08-12 DIAGNOSIS — I10 ESSENTIAL (PRIMARY) HYPERTENSION: ICD-10-CM

## 2025-08-12 DIAGNOSIS — Z99.2 END STAGE RENAL DISEASE: ICD-10-CM

## 2025-08-12 DIAGNOSIS — J03.90 ACUTE TONSILLITIS, UNSPECIFIED: ICD-10-CM

## 2025-08-12 DIAGNOSIS — R59.0 LOCALIZED ENLARGED LYMPH NODES: ICD-10-CM

## 2025-08-12 DIAGNOSIS — N18.6 END STAGE RENAL DISEASE: ICD-10-CM

## 2025-08-12 PROCEDURE — G0463: CPT

## 2025-08-12 PROCEDURE — G2211 COMPLEX E/M VISIT ADD ON: CPT | Mod: NC

## 2025-08-12 PROCEDURE — 99215 OFFICE O/P EST HI 40 MIN: CPT

## 2025-08-12 RX ORDER — AMOXICILLIN AND CLAVULANATE POTASSIUM 500; 125 MG/1; MG/1
500-125 TABLET, FILM COATED ORAL
Qty: 8 | Refills: 0 | Status: ACTIVE | COMMUNITY
Start: 2025-08-06 | End: 1900-01-01

## 2025-08-12 RX ORDER — LUBIPROSTONE 0.02 MG/1
24 CAPSULE ORAL
Qty: 180 | Refills: 2 | Status: DISCONTINUED | COMMUNITY
Start: 2025-08-02 | End: 2025-08-12

## 2025-08-13 PROBLEM — I50.9 CHF (CONGESTIVE HEART FAILURE): Status: ACTIVE | Noted: 2025-08-13

## 2025-08-15 ENCOUNTER — APPOINTMENT (OUTPATIENT)
Dept: NEPHROLOGY | Facility: CLINIC | Age: 65
End: 2025-08-15

## 2025-08-18 ENCOUNTER — NON-APPOINTMENT (OUTPATIENT)
Age: 65
End: 2025-08-18

## 2025-08-21 ENCOUNTER — TRANSCRIPTION ENCOUNTER (OUTPATIENT)
Age: 65
End: 2025-08-21

## 2025-08-21 ENCOUNTER — OUTPATIENT (OUTPATIENT)
Dept: OUTPATIENT SERVICES | Facility: HOSPITAL | Age: 65
LOS: 1 days | End: 2025-08-21
Payer: MEDICAID

## 2025-08-21 ENCOUNTER — NON-APPOINTMENT (OUTPATIENT)
Age: 65
End: 2025-08-21

## 2025-08-21 VITALS
DIASTOLIC BLOOD PRESSURE: 74 MMHG | TEMPERATURE: 98 F | OXYGEN SATURATION: 99 % | HEIGHT: 61 IN | HEART RATE: 70 BPM | SYSTOLIC BLOOD PRESSURE: 166 MMHG | RESPIRATION RATE: 16 BRPM | WEIGHT: 145.95 LBS

## 2025-08-21 VITALS
HEART RATE: 75 BPM | SYSTOLIC BLOOD PRESSURE: 167 MMHG | DIASTOLIC BLOOD PRESSURE: 76 MMHG | RESPIRATION RATE: 18 BRPM | OXYGEN SATURATION: 100 %

## 2025-08-21 DIAGNOSIS — I77.0 ARTERIOVENOUS FISTULA, ACQUIRED: Chronic | ICD-10-CM

## 2025-08-21 DIAGNOSIS — J03.90 ACUTE TONSILLITIS, UNSPECIFIED: ICD-10-CM

## 2025-08-21 DIAGNOSIS — Z99.2 DEPENDENCE ON RENAL DIALYSIS: ICD-10-CM

## 2025-08-21 DIAGNOSIS — N18.6 END STAGE RENAL DISEASE: ICD-10-CM

## 2025-08-21 DIAGNOSIS — R59.0 LOCALIZED ENLARGED LYMPH NODES: ICD-10-CM

## 2025-08-21 DIAGNOSIS — R94.39 ABNORMAL RESULT OF OTHER CARDIOVASCULAR FUNCTION STUDY: ICD-10-CM

## 2025-08-21 LAB
ANION GAP SERPL CALC-SCNC: 23 MMOL/L — HIGH (ref 5–17)
BUN SERPL-MCNC: 43 MG/DL — HIGH (ref 7–23)
CALCIUM SERPL-MCNC: 9.3 MG/DL — SIGNIFICANT CHANGE UP (ref 8.4–10.5)
CHLORIDE SERPL-SCNC: 91 MMOL/L — LOW (ref 96–108)
CO2 SERPL-SCNC: 23 MMOL/L — SIGNIFICANT CHANGE UP (ref 22–31)
CREAT SERPL-MCNC: 4.2 MG/DL — HIGH (ref 0.5–1.3)
EGFR: 11 ML/MIN/1.73M2 — LOW
EGFR: 11 ML/MIN/1.73M2 — LOW
GLUCOSE BLDC GLUCOMTR-MCNC: 242 MG/DL — HIGH (ref 70–99)
GLUCOSE BLDC GLUCOMTR-MCNC: 291 MG/DL — HIGH (ref 70–99)
GLUCOSE SERPL-MCNC: 237 MG/DL — HIGH (ref 70–99)
HCT VFR BLD CALC: 30.5 % — LOW (ref 34.5–45)
HGB BLD-MCNC: 9.8 G/DL — LOW (ref 11.5–15.5)
MCHC RBC-ENTMCNC: 29.4 PG — SIGNIFICANT CHANGE UP (ref 27–34)
MCHC RBC-ENTMCNC: 32.1 G/DL — SIGNIFICANT CHANGE UP (ref 32–36)
MCV RBC AUTO: 91.6 FL — SIGNIFICANT CHANGE UP (ref 80–100)
NRBC # BLD AUTO: 0 K/UL — SIGNIFICANT CHANGE UP (ref 0–0)
NRBC # FLD: 0 K/UL — SIGNIFICANT CHANGE UP (ref 0–0)
NRBC BLD AUTO-RTO: 0 /100 WBCS — SIGNIFICANT CHANGE UP (ref 0–0)
PLATELET # BLD AUTO: 291 K/UL — SIGNIFICANT CHANGE UP (ref 150–400)
PMV BLD: 9.9 FL — SIGNIFICANT CHANGE UP (ref 7–13)
POTASSIUM SERPL-MCNC: 3.6 MMOL/L — SIGNIFICANT CHANGE UP (ref 3.5–5.3)
POTASSIUM SERPL-SCNC: 3.6 MMOL/L — SIGNIFICANT CHANGE UP (ref 3.5–5.3)
RBC # BLD: 3.33 M/UL — LOW (ref 3.8–5.2)
RBC # FLD: 16.7 % — HIGH (ref 10.3–14.5)
SODIUM SERPL-SCNC: 137 MMOL/L — SIGNIFICANT CHANGE UP (ref 135–145)
WBC # BLD: 12.96 K/UL — HIGH (ref 3.8–10.5)
WBC # FLD AUTO: 12.96 K/UL — HIGH (ref 3.8–10.5)

## 2025-08-21 PROCEDURE — C1894: CPT

## 2025-08-21 PROCEDURE — 93458 L HRT ARTERY/VENTRICLE ANGIO: CPT | Mod: 26

## 2025-08-21 PROCEDURE — 85027 COMPLETE CBC AUTOMATED: CPT

## 2025-08-21 PROCEDURE — 93458 L HRT ARTERY/VENTRICLE ANGIO: CPT

## 2025-08-21 PROCEDURE — C1887: CPT

## 2025-08-21 PROCEDURE — 99152 MOD SED SAME PHYS/QHP 5/>YRS: CPT

## 2025-08-21 PROCEDURE — 80048 BASIC METABOLIC PNL TOTAL CA: CPT

## 2025-08-21 PROCEDURE — 93010 ELECTROCARDIOGRAM REPORT: CPT

## 2025-08-21 PROCEDURE — C1769: CPT

## 2025-08-21 PROCEDURE — 82962 GLUCOSE BLOOD TEST: CPT

## 2025-08-21 RX ORDER — ACETAMINOPHEN 500 MG/5ML
1000 LIQUID (ML) ORAL ONCE
Refills: 0 | Status: COMPLETED | OUTPATIENT
Start: 2025-08-21 | End: 2025-08-21

## 2025-08-21 RX ORDER — GUSELKUMAB 200 MG/2ML
1 INJECTION SUBCUTANEOUS
Refills: 0 | DISCHARGE

## 2025-08-21 RX ORDER — ALBUTEROL SULFATE 2.5 MG/3ML
2 VIAL, NEBULIZER (ML) INHALATION
Refills: 0 | DISCHARGE

## 2025-08-21 RX ORDER — LUBIPROSTONE 8 UG/1
1 CAPSULE, GELATIN COATED ORAL
Refills: 0 | DISCHARGE

## 2025-08-21 RX ORDER — BUMETANIDE 1 MG/1
1 TABLET ORAL
Refills: 0 | DISCHARGE

## 2025-08-21 RX ORDER — LIRAGLUTIDE 6 MG/ML
1.8 INJECTION SUBCUTANEOUS
Refills: 0 | DISCHARGE

## 2025-08-21 RX ORDER — ERGOCALCIFEROL 1.25 MG/1
1 CAPSULE ORAL
Refills: 0 | DISCHARGE

## 2025-08-21 RX ORDER — FLUTICASONE PROPIONATE 220 UG/1
1 AEROSOL, METERED RESPIRATORY (INHALATION)
Refills: 0 | DISCHARGE

## 2025-08-21 RX ORDER — AZELASTINE HYDROCHLORIDE 137 UG/1
2 SPRAY, METERED NASAL
Refills: 0 | DISCHARGE

## 2025-08-21 RX ORDER — AMLODIPINE BESYLATE 10 MG/1
1 TABLET ORAL
Refills: 0 | DISCHARGE

## 2025-08-21 RX ORDER — SENNA 187 MG
2 TABLET ORAL
Refills: 0 | DISCHARGE

## 2025-08-21 RX ORDER — AMLODIPINE BESYLATE 10 MG/1
5 TABLET ORAL ONCE
Refills: 0 | Status: COMPLETED | OUTPATIENT
Start: 2025-08-21 | End: 2025-08-21

## 2025-08-21 RX ADMIN — Medication 400 MILLIGRAM(S): at 11:19

## 2025-08-21 RX ADMIN — Medication 1000 MILLIGRAM(S): at 11:45

## 2025-08-21 RX ADMIN — AMLODIPINE BESYLATE 5 MILLIGRAM(S): 10 TABLET ORAL at 11:19

## 2025-08-26 ENCOUNTER — NON-APPOINTMENT (OUTPATIENT)
Age: 65
End: 2025-08-26

## 2025-09-04 ENCOUNTER — APPOINTMENT (OUTPATIENT)
Dept: GASTROENTEROLOGY | Facility: CLINIC | Age: 65
End: 2025-09-04
Payer: MEDICAID

## 2025-09-04 VITALS
OXYGEN SATURATION: 99 % | WEIGHT: 148 LBS | DIASTOLIC BLOOD PRESSURE: 80 MMHG | SYSTOLIC BLOOD PRESSURE: 130 MMHG | BODY MASS INDEX: 27.94 KG/M2 | HEART RATE: 85 BPM | RESPIRATION RATE: 16 BRPM | HEIGHT: 61 IN

## 2025-09-04 DIAGNOSIS — K59.09 OTHER CONSTIPATION: ICD-10-CM

## 2025-09-04 PROCEDURE — G2211 COMPLEX E/M VISIT ADD ON: CPT | Mod: NC

## 2025-09-04 PROCEDURE — 99213 OFFICE O/P EST LOW 20 MIN: CPT

## 2025-09-04 RX ORDER — LINACLOTIDE 290 UG/1
290 CAPSULE, GELATIN COATED ORAL
Qty: 30 | Refills: 3 | Status: ACTIVE | COMMUNITY
Start: 2025-09-04 | End: 1900-01-01

## 2025-09-09 ENCOUNTER — NON-APPOINTMENT (OUTPATIENT)
Age: 65
End: 2025-09-09

## 2025-09-09 ENCOUNTER — APPOINTMENT (OUTPATIENT)
Dept: CARDIOLOGY | Facility: CLINIC | Age: 65
End: 2025-09-09
Payer: MEDICAID

## 2025-09-09 VITALS
WEIGHT: 144 LBS | BODY MASS INDEX: 27.21 KG/M2 | HEART RATE: 85 BPM | DIASTOLIC BLOOD PRESSURE: 72 MMHG | OXYGEN SATURATION: 98 % | SYSTOLIC BLOOD PRESSURE: 138 MMHG

## 2025-09-09 VITALS — SYSTOLIC BLOOD PRESSURE: 118 MMHG | DIASTOLIC BLOOD PRESSURE: 70 MMHG | HEART RATE: 84 BPM

## 2025-09-09 DIAGNOSIS — I44.4 LEFT ANTERIOR FASCICULAR BLOCK: ICD-10-CM

## 2025-09-09 DIAGNOSIS — Z01.818 ENCOUNTER FOR OTHER PREPROCEDURAL EXAMINATION: ICD-10-CM

## 2025-09-09 DIAGNOSIS — I44.0 ATRIOVENTRICULAR BLOCK, FIRST DEGREE: ICD-10-CM

## 2025-09-09 DIAGNOSIS — I35.0 NONRHEUMATIC AORTIC (VALVE) STENOSIS: ICD-10-CM

## 2025-09-09 PROCEDURE — 99214 OFFICE O/P EST MOD 30 MIN: CPT

## 2025-09-09 PROCEDURE — 93000 ELECTROCARDIOGRAM COMPLETE: CPT

## 2025-09-09 PROCEDURE — G2211 COMPLEX E/M VISIT ADD ON: CPT | Mod: NC

## 2025-09-15 ENCOUNTER — APPOINTMENT (OUTPATIENT)
Dept: PULMONOLOGY | Facility: CLINIC | Age: 65
End: 2025-09-15

## 2025-09-23 PROBLEM — E11.49 TYPE 2 DIABETES MELLITUS WITH OTHER NEUROLOGIC COMPLICATION: Status: ACTIVE | Noted: 2025-09-23

## 2025-09-25 PROBLEM — L29.9 EAR ITCHING: Status: ACTIVE | Noted: 2025-09-25

## 2025-09-25 PROBLEM — Z87.09 HISTORY OF TONSILLITIS: Status: RESOLVED | Noted: 2025-08-06 | Resolved: 2025-09-25

## 2025-09-25 PROBLEM — Z82.49 FAMILY HISTORY OF HYPERTENSION: Status: ACTIVE | Noted: 2025-09-25

## 2025-09-25 PROBLEM — Z84.1 FAMILY HISTORY OF KIDNEY DISEASE: Status: ACTIVE | Noted: 2025-09-25

## 2025-09-25 PROBLEM — H90.3 BILATERAL SENSORINEURAL HEARING LOSS: Status: ACTIVE | Noted: 2025-09-25

## (undated) DEVICE — SYNOVIS VASCULAR PROBE 1.5MM 15CM

## (undated) DEVICE — VENODYNE/SCD SLEEVE CALF MEDIUM

## (undated) DEVICE — WARMING BLANKET LOWER ADULT

## (undated) DEVICE — SUT SILK 3-0 18" TIES

## (undated) DEVICE — POSITIONER STRAP ARMBOARD VELCRO TS-30

## (undated) DEVICE — VESSEL LOOP MINI-BLUE 0.075" X 16"

## (undated) DEVICE — SUT MONOCRYL 4-0 27" PS-2 UNDYED

## (undated) DEVICE — BAG DECANTER DISP

## (undated) DEVICE — SOL BAG NS 0.9% 1000ML

## (undated) DEVICE — DRAPE TOWEL BLUE 17" X 24"

## (undated) DEVICE — GEL AQUSNC PACKET 20GR

## (undated) DEVICE — ELCTR GROUNDING PAD ADULT COVIDIEN

## (undated) DEVICE — PREP CHLORAPREP HI-LITE ORANGE 26ML

## (undated) DEVICE — DRSG TEGADERM 2.5X3"

## (undated) DEVICE — SUT SILK 4-0 17-18"

## (undated) DEVICE — SUT SILK 2-0 18" TIES

## (undated) DEVICE — SOL IRR BAG NS 0.9% 1000ML

## (undated) DEVICE — DRAPE HAND 77" X 146"

## (undated) DEVICE — SUT PROLENE 7-0 24" BV-1

## (undated) DEVICE — DRAPE 3/4 SHEET 52X76"

## (undated) DEVICE — SUT PROLENE 6-0 24" BV-1

## (undated) DEVICE — SUT VICRYL 3-0 27" SH UNDYED

## (undated) DEVICE — SYR LUER LOK 10CC

## (undated) DEVICE — NDL HYPO SAFE 25G X 5/8" (ORANGE)

## (undated) DEVICE — CLAMP BULLDOG MIDI 45 DEGREE (GREEN) DISP

## (undated) DEVICE — PACK AV FISTULA

## (undated) DEVICE — DURABLE MEDICAL EQUIPMENT: Type: DURABLE MEDICAL EQUIPMENT

## (undated) DEVICE — DRSG CURITY GAUZE SPONGE 4 X 4" 12-PLY